# Patient Record
Sex: FEMALE | Race: WHITE | NOT HISPANIC OR LATINO | Employment: OTHER | URBAN - METROPOLITAN AREA
[De-identification: names, ages, dates, MRNs, and addresses within clinical notes are randomized per-mention and may not be internally consistent; named-entity substitution may affect disease eponyms.]

---

## 2017-01-01 ENCOUNTER — NON-PROVIDER VISIT (OUTPATIENT)
Dept: CARDIOLOGY | Facility: MEDICAL CENTER | Age: 67
End: 2017-01-01
Payer: MEDICARE

## 2017-01-01 ENCOUNTER — APPOINTMENT (OUTPATIENT)
Dept: RADIOLOGY | Facility: MEDICAL CENTER | Age: 67
DRG: 834 | End: 2017-01-01
Attending: INTERNAL MEDICINE
Payer: MEDICARE

## 2017-01-01 ENCOUNTER — HOSPITAL ENCOUNTER (OUTPATIENT)
Dept: RADIOLOGY | Facility: MEDICAL CENTER | Age: 67
End: 2017-04-29
Attending: OBSTETRICS & GYNECOLOGY
Payer: MEDICARE

## 2017-01-01 ENCOUNTER — HOSPITAL ENCOUNTER (OUTPATIENT)
Facility: MEDICAL CENTER | Age: 67
End: 2017-08-14
Attending: INTERNAL MEDICINE
Payer: MEDICARE

## 2017-01-01 ENCOUNTER — HOSPITAL ENCOUNTER (OUTPATIENT)
Dept: LAB | Facility: MEDICAL CENTER | Age: 67
End: 2017-08-28
Attending: INTERNAL MEDICINE
Payer: MEDICARE

## 2017-01-01 ENCOUNTER — APPOINTMENT (OUTPATIENT)
Dept: RADIOLOGY | Facility: MEDICAL CENTER | Age: 67
DRG: 834 | End: 2017-01-01
Attending: HOSPITALIST
Payer: MEDICARE

## 2017-01-01 ENCOUNTER — HOSPITAL ENCOUNTER (OUTPATIENT)
Facility: MEDICAL CENTER | Age: 67
End: 2017-10-10
Attending: NURSE PRACTITIONER
Payer: MEDICARE

## 2017-01-01 ENCOUNTER — RESOLUTE PROFESSIONAL BILLING HOSPITAL PROF FEE (OUTPATIENT)
Dept: HOSPITALIST | Facility: MEDICAL CENTER | Age: 67
End: 2017-01-01
Payer: MEDICARE

## 2017-01-01 ENCOUNTER — HOSPITAL ENCOUNTER (OUTPATIENT)
Dept: LAB | Facility: MEDICAL CENTER | Age: 67
End: 2017-10-03
Attending: NURSE PRACTITIONER
Payer: MEDICARE

## 2017-01-01 ENCOUNTER — HOSPITAL ENCOUNTER (OUTPATIENT)
Facility: MEDICAL CENTER | Age: 67
End: 2017-03-31
Attending: HOSPITALIST | Admitting: HOSPITALIST
Payer: MEDICARE

## 2017-01-01 ENCOUNTER — APPOINTMENT (OUTPATIENT)
Dept: MEDICAL GROUP | Facility: MEDICAL CENTER | Age: 67
End: 2017-01-01
Payer: MEDICARE

## 2017-01-01 ENCOUNTER — HOSPITAL ENCOUNTER (OUTPATIENT)
Facility: MEDICAL CENTER | Age: 67
End: 2017-06-01
Attending: INTERNAL MEDICINE | Admitting: INTERNAL MEDICINE
Payer: MEDICARE

## 2017-01-01 ENCOUNTER — TELEPHONE (OUTPATIENT)
Dept: OTHER | Facility: MEDICAL CENTER | Age: 67
End: 2017-01-01

## 2017-01-01 ENCOUNTER — HOSPITAL ENCOUNTER (INPATIENT)
Facility: MEDICAL CENTER | Age: 67
LOS: 33 days | DRG: 834 | End: 2017-11-24
Attending: EMERGENCY MEDICINE | Admitting: HOSPITALIST
Payer: MEDICARE

## 2017-01-01 ENCOUNTER — OUTPATIENT INFUSION SERVICES (OUTPATIENT)
Dept: ONCOLOGY | Facility: MEDICAL CENTER | Age: 67
End: 2017-01-01
Attending: INTERNAL MEDICINE
Payer: MEDICARE

## 2017-01-01 ENCOUNTER — HOSPITAL ENCOUNTER (OUTPATIENT)
Facility: MEDICAL CENTER | Age: 67
End: 2017-09-07
Attending: HOSPITALIST | Admitting: HOSPITALIST
Payer: MEDICARE

## 2017-01-01 ENCOUNTER — HOSPITAL ENCOUNTER (OUTPATIENT)
Facility: MEDICAL CENTER | Age: 67
End: 2017-02-06
Attending: INTERNAL MEDICINE
Payer: MEDICARE

## 2017-01-01 ENCOUNTER — APPOINTMENT (OUTPATIENT)
Dept: RADIOLOGY | Facility: MEDICAL CENTER | Age: 67
End: 2017-01-01
Attending: INTERNAL MEDICINE
Payer: MEDICARE

## 2017-01-01 ENCOUNTER — NON-PROVIDER VISIT (OUTPATIENT)
Dept: CARDIOLOGY | Facility: MEDICAL CENTER | Age: 67
End: 2017-01-01

## 2017-01-01 ENCOUNTER — HOSPITAL ENCOUNTER (OUTPATIENT)
Facility: MEDICAL CENTER | Age: 67
End: 2017-03-06
Attending: INTERNAL MEDICINE
Payer: MEDICARE

## 2017-01-01 VITALS
HEIGHT: 62 IN | OXYGEN SATURATION: 98 % | HEART RATE: 83 BPM | SYSTOLIC BLOOD PRESSURE: 140 MMHG | BODY MASS INDEX: 26.86 KG/M2 | TEMPERATURE: 98.1 F | RESPIRATION RATE: 16 BRPM | DIASTOLIC BLOOD PRESSURE: 65 MMHG | WEIGHT: 145.94 LBS

## 2017-01-01 VITALS
OXYGEN SATURATION: 99 % | HEIGHT: 62 IN | RESPIRATION RATE: 16 BRPM | BODY MASS INDEX: 27.63 KG/M2 | SYSTOLIC BLOOD PRESSURE: 116 MMHG | WEIGHT: 150.13 LBS | DIASTOLIC BLOOD PRESSURE: 56 MMHG | TEMPERATURE: 98.4 F | HEART RATE: 71 BPM

## 2017-01-01 VITALS
OXYGEN SATURATION: 31 % | DIASTOLIC BLOOD PRESSURE: 53 MMHG | TEMPERATURE: 98.2 F | HEIGHT: 62 IN | HEART RATE: 101 BPM | RESPIRATION RATE: 14 BRPM | BODY MASS INDEX: 26.61 KG/M2 | SYSTOLIC BLOOD PRESSURE: 106 MMHG | WEIGHT: 144.62 LBS

## 2017-01-01 VITALS
HEIGHT: 63 IN | WEIGHT: 150.79 LBS | DIASTOLIC BLOOD PRESSURE: 71 MMHG | TEMPERATURE: 98.3 F | BODY MASS INDEX: 26.72 KG/M2 | HEART RATE: 60 BPM | OXYGEN SATURATION: 99 % | RESPIRATION RATE: 16 BRPM | SYSTOLIC BLOOD PRESSURE: 141 MMHG

## 2017-01-01 VITALS
HEART RATE: 67 BPM | TEMPERATURE: 97.2 F | OXYGEN SATURATION: 97 % | RESPIRATION RATE: 18 BRPM | SYSTOLIC BLOOD PRESSURE: 152 MMHG | DIASTOLIC BLOOD PRESSURE: 60 MMHG | WEIGHT: 152.9 LBS | BODY MASS INDEX: 28.14 KG/M2 | HEIGHT: 62 IN

## 2017-01-01 VITALS
BODY MASS INDEX: 27.59 KG/M2 | HEIGHT: 62 IN | DIASTOLIC BLOOD PRESSURE: 53 MMHG | WEIGHT: 149.91 LBS | RESPIRATION RATE: 16 BRPM | OXYGEN SATURATION: 98 % | HEART RATE: 79 BPM | SYSTOLIC BLOOD PRESSURE: 114 MMHG | TEMPERATURE: 98.4 F

## 2017-01-01 VITALS — HEIGHT: 63 IN | BODY MASS INDEX: 26.75 KG/M2 | WEIGHT: 151 LBS

## 2017-01-01 DIAGNOSIS — D46.9 MYELODYSPLASTIC SYNDROME (HCC): ICD-10-CM

## 2017-01-01 DIAGNOSIS — Z01.812 PRE-OPERATIVE LABORATORY EXAMINATION: ICD-10-CM

## 2017-01-01 DIAGNOSIS — D69.6 THROMBOCYTOPENIA (HCC): ICD-10-CM

## 2017-01-01 DIAGNOSIS — Z95.5 STENTED CORONARY ARTERY: ICD-10-CM

## 2017-01-01 DIAGNOSIS — Z12.31 VISIT FOR SCREENING MAMMOGRAM: ICD-10-CM

## 2017-01-01 DIAGNOSIS — D41.9: ICD-10-CM

## 2017-01-01 DIAGNOSIS — D46.9 MDS (MYELODYSPLASTIC SYNDROME) (HCC): ICD-10-CM

## 2017-01-01 DIAGNOSIS — D61.818 PANCYTOPENIA (HCC): ICD-10-CM

## 2017-01-01 DIAGNOSIS — I21.4 NSTEMI (NON-ST ELEVATED MYOCARDIAL INFARCTION) (HCC): ICD-10-CM

## 2017-01-01 LAB
ABO GROUP BLD: NORMAL
ALBUMIN SERPL BCP-MCNC: 1.8 G/DL (ref 3.2–4.9)
ALBUMIN SERPL BCP-MCNC: 2 G/DL (ref 3.2–4.9)
ALBUMIN SERPL BCP-MCNC: 2 G/DL (ref 3.2–4.9)
ALBUMIN SERPL BCP-MCNC: 2.1 G/DL (ref 3.2–4.9)
ALBUMIN SERPL BCP-MCNC: 2.1 G/DL (ref 3.2–4.9)
ALBUMIN SERPL BCP-MCNC: 2.2 G/DL (ref 3.2–4.9)
ALBUMIN SERPL BCP-MCNC: 2.3 G/DL (ref 3.2–4.9)
ALBUMIN SERPL BCP-MCNC: 2.5 G/DL (ref 3.2–4.9)
ALBUMIN SERPL BCP-MCNC: 2.6 G/DL (ref 3.2–4.9)
ALBUMIN SERPL BCP-MCNC: 2.7 G/DL (ref 3.2–4.9)
ALBUMIN SERPL BCP-MCNC: 2.8 G/DL (ref 3.2–4.9)
ALBUMIN SERPL BCP-MCNC: 3.3 G/DL (ref 3.2–4.9)
ALBUMIN SERPL BCP-MCNC: 3.3 G/DL (ref 3.2–4.9)
ALBUMIN SERPL BCP-MCNC: 3.5 G/DL (ref 3.2–4.9)
ALBUMIN SERPL BCP-MCNC: 3.7 G/DL (ref 3.2–4.9)
ALBUMIN SERPL BCP-MCNC: 3.8 G/DL (ref 3.2–4.9)
ALBUMIN SERPL BCP-MCNC: 3.8 G/DL (ref 3.2–4.9)
ALBUMIN SERPL BCP-MCNC: 4.3 G/DL (ref 3.2–4.9)
ALBUMIN/GLOB SERPL: 0.6 G/DL
ALBUMIN/GLOB SERPL: 0.7 G/DL
ALBUMIN/GLOB SERPL: 0.9 G/DL
ALBUMIN/GLOB SERPL: 1 G/DL
ALBUMIN/GLOB SERPL: 1.1 G/DL
ALBUMIN/GLOB SERPL: 1.3 G/DL
ALBUMIN/GLOB SERPL: 1.3 G/DL
ALBUMIN/GLOB SERPL: 1.5 G/DL
ALBUMIN/GLOB SERPL: 1.5 G/DL
ALBUMIN/GLOB SERPL: 1.6 G/DL
ALBUMIN/GLOB SERPL: 1.7 G/DL
ALP SERPL-CCNC: 103 U/L (ref 30–99)
ALP SERPL-CCNC: 113 U/L (ref 30–99)
ALP SERPL-CCNC: 113 U/L (ref 30–99)
ALP SERPL-CCNC: 115 U/L (ref 30–99)
ALP SERPL-CCNC: 120 U/L (ref 30–99)
ALP SERPL-CCNC: 122 U/L (ref 30–99)
ALP SERPL-CCNC: 123 U/L (ref 30–99)
ALP SERPL-CCNC: 127 U/L (ref 30–99)
ALP SERPL-CCNC: 128 U/L (ref 30–99)
ALP SERPL-CCNC: 130 U/L (ref 30–99)
ALP SERPL-CCNC: 130 U/L (ref 30–99)
ALP SERPL-CCNC: 131 U/L (ref 30–99)
ALP SERPL-CCNC: 136 U/L (ref 30–99)
ALP SERPL-CCNC: 143 U/L (ref 30–99)
ALP SERPL-CCNC: 145 U/L (ref 30–99)
ALP SERPL-CCNC: 160 U/L (ref 30–99)
ALP SERPL-CCNC: 56 U/L (ref 30–99)
ALP SERPL-CCNC: 57 U/L (ref 30–99)
ALP SERPL-CCNC: 58 U/L (ref 30–99)
ALP SERPL-CCNC: 59 U/L (ref 30–99)
ALP SERPL-CCNC: 63 U/L (ref 30–99)
ALP SERPL-CCNC: 76 U/L (ref 30–99)
ALP SERPL-CCNC: 87 U/L (ref 30–99)
ALT SERPL-CCNC: 10 U/L (ref 2–50)
ALT SERPL-CCNC: 11 U/L (ref 2–50)
ALT SERPL-CCNC: 12 U/L (ref 2–50)
ALT SERPL-CCNC: 17 U/L (ref 2–50)
ALT SERPL-CCNC: 18 U/L (ref 2–50)
ALT SERPL-CCNC: 23 U/L (ref 2–50)
ALT SERPL-CCNC: 24 U/L (ref 2–50)
ALT SERPL-CCNC: 24 U/L (ref 2–50)
ALT SERPL-CCNC: 27 U/L (ref 2–50)
ALT SERPL-CCNC: 28 U/L (ref 2–50)
ALT SERPL-CCNC: 32 U/L (ref 2–50)
ALT SERPL-CCNC: 33 U/L (ref 2–50)
ALT SERPL-CCNC: 38 U/L (ref 2–50)
ALT SERPL-CCNC: 46 U/L (ref 2–50)
ALT SERPL-CCNC: 6 U/L (ref 2–50)
ALT SERPL-CCNC: 6 U/L (ref 2–50)
ALT SERPL-CCNC: 7 U/L (ref 2–50)
ALT SERPL-CCNC: 8 U/L (ref 2–50)
ALT SERPL-CCNC: 9 U/L (ref 2–50)
ANION GAP SERPL CALC-SCNC: 10 MMOL/L (ref 0–11.9)
ANION GAP SERPL CALC-SCNC: 11 MMOL/L (ref 0–11.9)
ANION GAP SERPL CALC-SCNC: 12 MMOL/L (ref 0–11.9)
ANION GAP SERPL CALC-SCNC: 4 MMOL/L (ref 0–11.9)
ANION GAP SERPL CALC-SCNC: 5 MMOL/L (ref 0–11.9)
ANION GAP SERPL CALC-SCNC: 6 MMOL/L (ref 0–11.9)
ANION GAP SERPL CALC-SCNC: 7 MMOL/L (ref 0–11.9)
ANION GAP SERPL CALC-SCNC: 8 MMOL/L (ref 0–11.9)
ANION GAP SERPL CALC-SCNC: 9 MMOL/L (ref 0–11.9)
ANISOCYTOSIS BLD QL SMEAR: ABNORMAL
APPEARANCE UR: CLEAR
APPEARANCE UR: CLEAR
APTT PPP: 35.5 SEC (ref 24.7–36)
AST SERPL-CCNC: 10 U/L (ref 12–45)
AST SERPL-CCNC: 11 U/L (ref 12–45)
AST SERPL-CCNC: 12 U/L (ref 12–45)
AST SERPL-CCNC: 12 U/L (ref 12–45)
AST SERPL-CCNC: 13 U/L (ref 12–45)
AST SERPL-CCNC: 16 U/L (ref 12–45)
AST SERPL-CCNC: 17 U/L (ref 12–45)
AST SERPL-CCNC: 19 U/L (ref 12–45)
AST SERPL-CCNC: 22 U/L (ref 12–45)
AST SERPL-CCNC: 25 U/L (ref 12–45)
AST SERPL-CCNC: 29 U/L (ref 12–45)
AST SERPL-CCNC: 34 U/L (ref 12–45)
AST SERPL-CCNC: 41 U/L (ref 12–45)
AST SERPL-CCNC: 41 U/L (ref 12–45)
AST SERPL-CCNC: 42 U/L (ref 12–45)
AST SERPL-CCNC: 45 U/L (ref 12–45)
AST SERPL-CCNC: 49 U/L (ref 12–45)
AST SERPL-CCNC: 66 U/L (ref 12–45)
AST SERPL-CCNC: 86 U/L (ref 12–45)
AST SERPL-CCNC: 9 U/L (ref 12–45)
BACTERIA #/AREA URNS HPF: NEGATIVE /HPF
BACTERIA BLD CULT: NORMAL
BACTERIA BLD CULT: NORMAL
BACTERIA UR CULT: NORMAL
BARCODED ABORH UBTYP: 5100
BARCODED ABORH UBTYP: 6200
BARCODED ABORH UBTYP: 9500
BARCODED ABORH UBTYP: 9500
BARCODED PRD CODE UBPRD: NORMAL
BARCODED UNIT NUM UBUNT: NORMAL
BASE EXCESS BLDA CALC-SCNC: 7 MMOL/L (ref -4–3)
BASOPHILS # BLD AUTO: 0 % (ref 0–1.8)
BASOPHILS # BLD AUTO: 0.9 % (ref 0–1.8)
BASOPHILS # BLD AUTO: 1 % (ref 0–1.8)
BASOPHILS # BLD AUTO: 1.1 % (ref 0–1.8)
BASOPHILS # BLD AUTO: 1.5 % (ref 0–1.8)
BASOPHILS # BLD AUTO: ABNORMAL % (ref 0–1.8)
BASOPHILS # BLD: 0 K/UL (ref 0–0.12)
BASOPHILS # BLD: 0.01 K/UL (ref 0–0.12)
BASOPHILS # BLD: 0.02 K/UL (ref 0–0.12)
BASOPHILS # BLD: ABNORMAL K/UL (ref 0–0.12)
BILIRUB CONJ SERPL-MCNC: 1.5 MG/DL (ref 0.1–0.5)
BILIRUB CONJ SERPL-MCNC: 1.8 MG/DL (ref 0.1–0.5)
BILIRUB CONJ SERPL-MCNC: 2 MG/DL (ref 0.1–0.5)
BILIRUB INDIRECT SERPL-MCNC: 1 MG/DL (ref 0–1)
BILIRUB INDIRECT SERPL-MCNC: 1.1 MG/DL (ref 0–1)
BILIRUB SERPL-MCNC: 0.4 MG/DL (ref 0.1–1.5)
BILIRUB SERPL-MCNC: 0.6 MG/DL (ref 0.1–1.5)
BILIRUB SERPL-MCNC: 0.7 MG/DL (ref 0.1–1.5)
BILIRUB SERPL-MCNC: 0.8 MG/DL (ref 0.1–1.5)
BILIRUB SERPL-MCNC: 0.9 MG/DL (ref 0.1–1.5)
BILIRUB SERPL-MCNC: 0.9 MG/DL (ref 0.1–1.5)
BILIRUB SERPL-MCNC: 2.6 MG/DL (ref 0.1–1.5)
BILIRUB SERPL-MCNC: 2.6 MG/DL (ref 0.1–1.5)
BILIRUB SERPL-MCNC: 2.8 MG/DL (ref 0.1–1.5)
BILIRUB SERPL-MCNC: 3.3 MG/DL (ref 0.1–1.5)
BILIRUB SERPL-MCNC: 3.5 MG/DL (ref 0.1–1.5)
BILIRUB SERPL-MCNC: 3.8 MG/DL (ref 0.1–1.5)
BILIRUB SERPL-MCNC: 3.9 MG/DL (ref 0.1–1.5)
BILIRUB UR QL STRIP.AUTO: ABNORMAL
BILIRUB UR QL STRIP.AUTO: NEGATIVE
BLASTS NFR BLD MANUAL: 17.1 %
BLASTS NFR BLD MANUAL: 28 %
BLASTS NFR BLD MANUAL: 28.7 %
BLASTS NFR BLD MANUAL: 3.2 %
BLASTS NFR BLD MANUAL: 3.7 %
BLASTS NFR BLD MANUAL: 35.9 %
BLASTS NFR BLD MANUAL: 36.9 %
BLASTS NFR BLD MANUAL: 43.2 %
BLASTS NFR BLD MANUAL: 45.5 %
BLASTS NFR BLD MANUAL: 5.4 %
BLD GP AB SCN SERPL QL: NORMAL
BNP SERPL-MCNC: 27 PG/ML (ref 0–100)
BODY TEMPERATURE: ABNORMAL CENTIGRADE
BUN SERPL-MCNC: 10 MG/DL (ref 8–22)
BUN SERPL-MCNC: 11 MG/DL (ref 8–22)
BUN SERPL-MCNC: 12 MG/DL (ref 8–22)
BUN SERPL-MCNC: 13 MG/DL (ref 8–22)
BUN SERPL-MCNC: 14 MG/DL (ref 8–22)
BUN SERPL-MCNC: 15 MG/DL (ref 8–22)
BUN SERPL-MCNC: 16 MG/DL (ref 8–22)
BUN SERPL-MCNC: 17 MG/DL (ref 8–22)
BUN SERPL-MCNC: 18 MG/DL (ref 8–22)
BUN SERPL-MCNC: 18 MG/DL (ref 8–22)
BUN SERPL-MCNC: 23 MG/DL (ref 8–22)
BUN SERPL-MCNC: 30 MG/DL (ref 8–22)
BUN SERPL-MCNC: 32 MG/DL (ref 8–22)
BUN SERPL-MCNC: 5 MG/DL (ref 8–22)
BUN SERPL-MCNC: 5 MG/DL (ref 8–22)
BUN SERPL-MCNC: 6 MG/DL (ref 8–22)
BUN SERPL-MCNC: 6 MG/DL (ref 8–22)
BUN SERPL-MCNC: 7 MG/DL (ref 8–22)
BUN SERPL-MCNC: 8 MG/DL (ref 8–22)
BUN SERPL-MCNC: 9 MG/DL (ref 8–22)
CALCIUM SERPL-MCNC: 7.8 MG/DL (ref 8.5–10.5)
CALCIUM SERPL-MCNC: 8.1 MG/DL (ref 8.5–10.5)
CALCIUM SERPL-MCNC: 8.1 MG/DL (ref 8.5–10.5)
CALCIUM SERPL-MCNC: 8.2 MG/DL (ref 8.5–10.5)
CALCIUM SERPL-MCNC: 8.3 MG/DL (ref 8.5–10.5)
CALCIUM SERPL-MCNC: 8.4 MG/DL (ref 8.5–10.5)
CALCIUM SERPL-MCNC: 8.4 MG/DL (ref 8.5–10.5)
CALCIUM SERPL-MCNC: 8.5 MG/DL (ref 8.5–10.5)
CALCIUM SERPL-MCNC: 8.6 MG/DL (ref 8.5–10.5)
CALCIUM SERPL-MCNC: 8.7 MG/DL (ref 8.5–10.5)
CALCIUM SERPL-MCNC: 8.8 MG/DL (ref 8.5–10.5)
CALCIUM SERPL-MCNC: 8.9 MG/DL (ref 8.5–10.5)
CALCIUM SERPL-MCNC: 9 MG/DL (ref 8.5–10.5)
CALCIUM SERPL-MCNC: 9 MG/DL (ref 8.5–10.5)
CALCIUM SERPL-MCNC: 9.1 MG/DL (ref 8.5–10.5)
CALCIUM SERPL-MCNC: 9.4 MG/DL (ref 8.5–10.5)
CALCIUM SERPL-MCNC: 9.5 MG/DL (ref 8.5–10.5)
CALCIUM SERPL-MCNC: 9.9 MG/DL (ref 8.5–10.5)
CAOX CRY #/AREA URNS HPF: ABNORMAL /HPF
CHLORIDE SERPL-SCNC: 100 MMOL/L (ref 96–112)
CHLORIDE SERPL-SCNC: 100 MMOL/L (ref 96–112)
CHLORIDE SERPL-SCNC: 101 MMOL/L (ref 96–112)
CHLORIDE SERPL-SCNC: 102 MMOL/L (ref 96–112)
CHLORIDE SERPL-SCNC: 103 MMOL/L (ref 96–112)
CHLORIDE SERPL-SCNC: 104 MMOL/L (ref 96–112)
CHLORIDE SERPL-SCNC: 105 MMOL/L (ref 96–112)
CHLORIDE SERPL-SCNC: 105 MMOL/L (ref 96–112)
CHLORIDE SERPL-SCNC: 106 MMOL/L (ref 96–112)
CHLORIDE SERPL-SCNC: 106 MMOL/L (ref 96–112)
CHLORIDE SERPL-SCNC: 107 MMOL/L (ref 96–112)
CHLORIDE SERPL-SCNC: 108 MMOL/L (ref 96–112)
CHLORIDE SERPL-SCNC: 90 MMOL/L (ref 96–112)
CHLORIDE SERPL-SCNC: 91 MMOL/L (ref 96–112)
CHLORIDE SERPL-SCNC: 92 MMOL/L (ref 96–112)
CHLORIDE SERPL-SCNC: 93 MMOL/L (ref 96–112)
CHLORIDE SERPL-SCNC: 94 MMOL/L (ref 96–112)
CHLORIDE SERPL-SCNC: 96 MMOL/L (ref 96–112)
CHLORIDE SERPL-SCNC: 99 MMOL/L (ref 96–112)
CMV IGG SERPL IA-ACNC: >10 U/ML
CMV IGM SERPL IA-ACNC: 15.1 AU/ML
CO2 SERPL-SCNC: 19 MMOL/L (ref 20–33)
CO2 SERPL-SCNC: 21 MMOL/L (ref 20–33)
CO2 SERPL-SCNC: 22 MMOL/L (ref 20–33)
CO2 SERPL-SCNC: 23 MMOL/L (ref 20–33)
CO2 SERPL-SCNC: 23 MMOL/L (ref 20–33)
CO2 SERPL-SCNC: 24 MMOL/L (ref 20–33)
CO2 SERPL-SCNC: 25 MMOL/L (ref 20–33)
CO2 SERPL-SCNC: 26 MMOL/L (ref 20–33)
CO2 SERPL-SCNC: 27 MMOL/L (ref 20–33)
CO2 SERPL-SCNC: 29 MMOL/L (ref 20–33)
CO2 SERPL-SCNC: 30 MMOL/L (ref 20–33)
CO2 SERPL-SCNC: 31 MMOL/L (ref 20–33)
CO2 SERPL-SCNC: 31 MMOL/L (ref 20–33)
CO2 SERPL-SCNC: 32 MMOL/L (ref 20–33)
CO2 SERPL-SCNC: 33 MMOL/L (ref 20–33)
CO2 SERPL-SCNC: 36 MMOL/L (ref 20–33)
CO2 SERPL-SCNC: 36 MMOL/L (ref 20–33)
CO2 SERPL-SCNC: 37 MMOL/L (ref 20–33)
COLOR UR: ABNORMAL
COLOR UR: YELLOW
COMPONENT P 8504P: NORMAL
COMPONENT R 8504R: NORMAL
CREAT SERPL-MCNC: 0.2 MG/DL (ref 0.5–1.4)
CREAT SERPL-MCNC: 0.22 MG/DL (ref 0.5–1.4)
CREAT SERPL-MCNC: 0.23 MG/DL (ref 0.5–1.4)
CREAT SERPL-MCNC: 0.24 MG/DL (ref 0.5–1.4)
CREAT SERPL-MCNC: 0.24 MG/DL (ref 0.5–1.4)
CREAT SERPL-MCNC: 0.26 MG/DL (ref 0.5–1.4)
CREAT SERPL-MCNC: 0.27 MG/DL (ref 0.5–1.4)
CREAT SERPL-MCNC: 0.29 MG/DL (ref 0.5–1.4)
CREAT SERPL-MCNC: 0.3 MG/DL (ref 0.5–1.4)
CREAT SERPL-MCNC: 0.3 MG/DL (ref 0.5–1.4)
CREAT SERPL-MCNC: 0.31 MG/DL (ref 0.5–1.4)
CREAT SERPL-MCNC: 0.32 MG/DL (ref 0.5–1.4)
CREAT SERPL-MCNC: 0.33 MG/DL (ref 0.5–1.4)
CREAT SERPL-MCNC: 0.33 MG/DL (ref 0.5–1.4)
CREAT SERPL-MCNC: 0.34 MG/DL (ref 0.5–1.4)
CREAT SERPL-MCNC: 0.34 MG/DL (ref 0.5–1.4)
CREAT SERPL-MCNC: 0.35 MG/DL (ref 0.5–1.4)
CREAT SERPL-MCNC: 0.36 MG/DL (ref 0.5–1.4)
CREAT SERPL-MCNC: 0.36 MG/DL (ref 0.5–1.4)
CREAT SERPL-MCNC: 0.38 MG/DL (ref 0.5–1.4)
CREAT SERPL-MCNC: 0.4 MG/DL (ref 0.5–1.4)
CREAT SERPL-MCNC: 0.41 MG/DL (ref 0.5–1.4)
CREAT SERPL-MCNC: 0.41 MG/DL (ref 0.5–1.4)
CREAT SERPL-MCNC: 0.42 MG/DL (ref 0.5–1.4)
CREAT SERPL-MCNC: 0.42 MG/DL (ref 0.5–1.4)
CREAT SERPL-MCNC: 0.47 MG/DL (ref 0.5–1.4)
CREAT SERPL-MCNC: 0.48 MG/DL (ref 0.5–1.4)
CREAT SERPL-MCNC: 0.49 MG/DL (ref 0.5–1.4)
CREAT SERPL-MCNC: 0.5 MG/DL (ref 0.5–1.4)
CREAT SERPL-MCNC: 0.5 MG/DL (ref 0.5–1.4)
CREAT SERPL-MCNC: 0.55 MG/DL (ref 0.5–1.4)
CREAT SERPL-MCNC: 0.61 MG/DL (ref 0.5–1.4)
EKG IMPRESSION: NORMAL
EKG IMPRESSION: NORMAL
EOSINOPHIL # BLD AUTO: 0 K/UL (ref 0–0.51)
EOSINOPHIL # BLD AUTO: 0.01 K/UL (ref 0–0.51)
EOSINOPHIL # BLD AUTO: 0.01 K/UL (ref 0–0.51)
EOSINOPHIL # BLD AUTO: 0.03 K/UL (ref 0–0.51)
EOSINOPHIL # BLD AUTO: 0.09 K/UL (ref 0–0.51)
EOSINOPHIL # BLD AUTO: ABNORMAL K/UL (ref 0–0.51)
EOSINOPHIL NFR BLD: 0 % (ref 0–6.9)
EOSINOPHIL NFR BLD: 0.5 % (ref 0–6.9)
EOSINOPHIL NFR BLD: 0.9 % (ref 0–6.9)
EOSINOPHIL NFR BLD: 2.2 % (ref 0–6.9)
EOSINOPHIL NFR BLD: 5.1 % (ref 0–6.9)
EOSINOPHIL NFR BLD: ABNORMAL % (ref 0–6.9)
EPI CELLS #/AREA URNS HPF: ABNORMAL /HPF
ERYTHROCYTE [DISTWIDTH] IN BLOOD BY AUTOMATED COUNT: 36.7 FL (ref 35.9–50)
ERYTHROCYTE [DISTWIDTH] IN BLOOD BY AUTOMATED COUNT: 37.2 FL (ref 35.9–50)
ERYTHROCYTE [DISTWIDTH] IN BLOOD BY AUTOMATED COUNT: 37.3 FL (ref 35.9–50)
ERYTHROCYTE [DISTWIDTH] IN BLOOD BY AUTOMATED COUNT: 37.4 FL (ref 35.9–50)
ERYTHROCYTE [DISTWIDTH] IN BLOOD BY AUTOMATED COUNT: 37.4 FL (ref 35.9–50)
ERYTHROCYTE [DISTWIDTH] IN BLOOD BY AUTOMATED COUNT: 37.5 FL (ref 35.9–50)
ERYTHROCYTE [DISTWIDTH] IN BLOOD BY AUTOMATED COUNT: 37.6 FL (ref 35.9–50)
ERYTHROCYTE [DISTWIDTH] IN BLOOD BY AUTOMATED COUNT: 37.7 FL (ref 35.9–50)
ERYTHROCYTE [DISTWIDTH] IN BLOOD BY AUTOMATED COUNT: 37.8 FL (ref 35.9–50)
ERYTHROCYTE [DISTWIDTH] IN BLOOD BY AUTOMATED COUNT: 37.9 FL (ref 35.9–50)
ERYTHROCYTE [DISTWIDTH] IN BLOOD BY AUTOMATED COUNT: 37.9 FL (ref 35.9–50)
ERYTHROCYTE [DISTWIDTH] IN BLOOD BY AUTOMATED COUNT: 38 FL (ref 35.9–50)
ERYTHROCYTE [DISTWIDTH] IN BLOOD BY AUTOMATED COUNT: 38.3 FL (ref 35.9–50)
ERYTHROCYTE [DISTWIDTH] IN BLOOD BY AUTOMATED COUNT: 38.4 FL (ref 35.9–50)
ERYTHROCYTE [DISTWIDTH] IN BLOOD BY AUTOMATED COUNT: 38.5 FL (ref 35.9–50)
ERYTHROCYTE [DISTWIDTH] IN BLOOD BY AUTOMATED COUNT: 38.6 FL (ref 35.9–50)
ERYTHROCYTE [DISTWIDTH] IN BLOOD BY AUTOMATED COUNT: 38.6 FL (ref 35.9–50)
ERYTHROCYTE [DISTWIDTH] IN BLOOD BY AUTOMATED COUNT: 38.7 FL (ref 35.9–50)
ERYTHROCYTE [DISTWIDTH] IN BLOOD BY AUTOMATED COUNT: 38.8 FL (ref 35.9–50)
ERYTHROCYTE [DISTWIDTH] IN BLOOD BY AUTOMATED COUNT: 39.1 FL (ref 35.9–50)
ERYTHROCYTE [DISTWIDTH] IN BLOOD BY AUTOMATED COUNT: 39.5 FL (ref 35.9–50)
ERYTHROCYTE [DISTWIDTH] IN BLOOD BY AUTOMATED COUNT: 40.4 FL (ref 35.9–50)
ERYTHROCYTE [DISTWIDTH] IN BLOOD BY AUTOMATED COUNT: 40.9 FL (ref 35.9–50)
ERYTHROCYTE [DISTWIDTH] IN BLOOD BY AUTOMATED COUNT: 41.1 FL (ref 35.9–50)
ERYTHROCYTE [DISTWIDTH] IN BLOOD BY AUTOMATED COUNT: 42.4 FL (ref 35.9–50)
ERYTHROCYTE [DISTWIDTH] IN BLOOD BY AUTOMATED COUNT: 42.8 FL (ref 35.9–50)
ERYTHROCYTE [DISTWIDTH] IN BLOOD BY AUTOMATED COUNT: 43.5 FL (ref 35.9–50)
ERYTHROCYTE [DISTWIDTH] IN BLOOD BY AUTOMATED COUNT: 44.8 FL (ref 35.9–50)
ERYTHROCYTE [DISTWIDTH] IN BLOOD BY AUTOMATED COUNT: 45.1 FL (ref 35.9–50)
ERYTHROCYTE [DISTWIDTH] IN BLOOD BY AUTOMATED COUNT: 45.2 FL (ref 35.9–50)
ERYTHROCYTE [DISTWIDTH] IN BLOOD BY AUTOMATED COUNT: 45.4 FL (ref 35.9–50)
ERYTHROCYTE [DISTWIDTH] IN BLOOD BY AUTOMATED COUNT: 47.3 FL (ref 35.9–50)
ERYTHROCYTE [DISTWIDTH] IN BLOOD BY AUTOMATED COUNT: 56.5 FL (ref 35.9–50)
ERYTHROCYTE [DISTWIDTH] IN BLOOD BY AUTOMATED COUNT: 59.2 FL (ref 35.9–50)
ERYTHROCYTE [DISTWIDTH] IN BLOOD BY AUTOMATED COUNT: 62.9 FL (ref 35.9–50)
ERYTHROCYTE [DISTWIDTH] IN BLOOD BY AUTOMATED COUNT: 71.7 FL (ref 35.9–50)
EST. AVERAGE GLUCOSE BLD GHB EST-MCNC: 137 MG/DL
EST. AVERAGE GLUCOSE BLD GHB EST-MCNC: 169 MG/DL
GALACTOMANNAN AG SERPL QL IA: NEGATIVE
GALACTOMANNAN AG SERPL-ACNC: 0.06
GFR SERPL CREATININE-BSD FRML MDRD: >60 ML/MIN/1.73 M 2
GLOBULIN SER CALC-MCNC: 2.3 G/DL (ref 1.9–3.5)
GLOBULIN SER CALC-MCNC: 2.4 G/DL (ref 1.9–3.5)
GLOBULIN SER CALC-MCNC: 2.5 G/DL (ref 1.9–3.5)
GLOBULIN SER CALC-MCNC: 2.6 G/DL (ref 1.9–3.5)
GLOBULIN SER CALC-MCNC: 2.7 G/DL (ref 1.9–3.5)
GLOBULIN SER CALC-MCNC: 2.8 G/DL (ref 1.9–3.5)
GLOBULIN SER CALC-MCNC: 2.9 G/DL (ref 1.9–3.5)
GLOBULIN SER CALC-MCNC: 3 G/DL (ref 1.9–3.5)
GLOBULIN SER CALC-MCNC: 3.1 G/DL (ref 1.9–3.5)
GLOBULIN SER CALC-MCNC: 3.2 G/DL (ref 1.9–3.5)
GLOBULIN SER CALC-MCNC: 3.3 G/DL (ref 1.9–3.5)
GLOBULIN SER CALC-MCNC: 3.4 G/DL (ref 1.9–3.5)
GLOBULIN SER CALC-MCNC: 3.5 G/DL (ref 1.9–3.5)
GLUCOSE SERPL-MCNC: 102 MG/DL (ref 65–99)
GLUCOSE SERPL-MCNC: 127 MG/DL (ref 65–99)
GLUCOSE SERPL-MCNC: 130 MG/DL (ref 65–99)
GLUCOSE SERPL-MCNC: 131 MG/DL (ref 65–99)
GLUCOSE SERPL-MCNC: 136 MG/DL (ref 65–99)
GLUCOSE SERPL-MCNC: 136 MG/DL (ref 65–99)
GLUCOSE SERPL-MCNC: 137 MG/DL (ref 65–99)
GLUCOSE SERPL-MCNC: 137 MG/DL (ref 65–99)
GLUCOSE SERPL-MCNC: 142 MG/DL (ref 65–99)
GLUCOSE SERPL-MCNC: 143 MG/DL (ref 65–99)
GLUCOSE SERPL-MCNC: 144 MG/DL (ref 65–99)
GLUCOSE SERPL-MCNC: 145 MG/DL (ref 65–99)
GLUCOSE SERPL-MCNC: 147 MG/DL (ref 65–99)
GLUCOSE SERPL-MCNC: 147 MG/DL (ref 65–99)
GLUCOSE SERPL-MCNC: 148 MG/DL (ref 65–99)
GLUCOSE SERPL-MCNC: 148 MG/DL (ref 65–99)
GLUCOSE SERPL-MCNC: 150 MG/DL (ref 65–99)
GLUCOSE SERPL-MCNC: 151 MG/DL (ref 65–99)
GLUCOSE SERPL-MCNC: 152 MG/DL (ref 65–99)
GLUCOSE SERPL-MCNC: 155 MG/DL (ref 65–99)
GLUCOSE SERPL-MCNC: 155 MG/DL (ref 65–99)
GLUCOSE SERPL-MCNC: 156 MG/DL (ref 65–99)
GLUCOSE SERPL-MCNC: 156 MG/DL (ref 65–99)
GLUCOSE SERPL-MCNC: 161 MG/DL (ref 65–99)
GLUCOSE SERPL-MCNC: 165 MG/DL (ref 65–99)
GLUCOSE SERPL-MCNC: 170 MG/DL (ref 65–99)
GLUCOSE SERPL-MCNC: 174 MG/DL (ref 65–99)
GLUCOSE SERPL-MCNC: 186 MG/DL (ref 65–99)
GLUCOSE SERPL-MCNC: 187 MG/DL (ref 65–99)
GLUCOSE SERPL-MCNC: 190 MG/DL (ref 65–99)
GLUCOSE SERPL-MCNC: 192 MG/DL (ref 65–99)
GLUCOSE SERPL-MCNC: 277 MG/DL (ref 65–99)
GLUCOSE SERPL-MCNC: 289 MG/DL (ref 65–99)
GLUCOSE UR STRIP.AUTO-MCNC: NEGATIVE MG/DL
GLUCOSE UR STRIP.AUTO-MCNC: NEGATIVE MG/DL
HBA1C MFR BLD: 6.4 % (ref 0–5.6)
HBA1C MFR BLD: 7.5 % (ref 0–5.6)
HCO3 BLDA-SCNC: 30 MMOL/L (ref 17–25)
HCT VFR BLD AUTO: 17.4 % (ref 37–47)
HCT VFR BLD AUTO: 18.1 % (ref 37–47)
HCT VFR BLD AUTO: 18.2 % (ref 37–47)
HCT VFR BLD AUTO: 18.6 % (ref 37–47)
HCT VFR BLD AUTO: 18.9 % (ref 37–47)
HCT VFR BLD AUTO: 19.4 % (ref 37–47)
HCT VFR BLD AUTO: 19.7 % (ref 37–47)
HCT VFR BLD AUTO: 19.8 % (ref 37–47)
HCT VFR BLD AUTO: 19.8 % (ref 37–47)
HCT VFR BLD AUTO: 19.9 % (ref 37–47)
HCT VFR BLD AUTO: 20 % (ref 37–47)
HCT VFR BLD AUTO: 20.4 % (ref 37–47)
HCT VFR BLD AUTO: 20.4 % (ref 37–47)
HCT VFR BLD AUTO: 20.5 % (ref 37–47)
HCT VFR BLD AUTO: 20.6 % (ref 37–47)
HCT VFR BLD AUTO: 20.9 % (ref 37–47)
HCT VFR BLD AUTO: 21.6 % (ref 37–47)
HCT VFR BLD AUTO: 21.7 % (ref 37–47)
HCT VFR BLD AUTO: 21.8 % (ref 37–47)
HCT VFR BLD AUTO: 21.9 % (ref 37–47)
HCT VFR BLD AUTO: 22.9 % (ref 37–47)
HCT VFR BLD AUTO: 22.9 % (ref 37–47)
HCT VFR BLD AUTO: 23 % (ref 37–47)
HCT VFR BLD AUTO: 23.4 % (ref 37–47)
HCT VFR BLD AUTO: 23.5 % (ref 37–47)
HCT VFR BLD AUTO: 23.8 % (ref 37–47)
HCT VFR BLD AUTO: 23.8 % (ref 37–47)
HCT VFR BLD AUTO: 23.9 % (ref 37–47)
HCT VFR BLD AUTO: 24.2 % (ref 37–47)
HCT VFR BLD AUTO: 24.9 % (ref 37–47)
HCT VFR BLD AUTO: 25 % (ref 37–47)
HCT VFR BLD AUTO: 25 % (ref 37–47)
HCT VFR BLD AUTO: 25.2 % (ref 37–47)
HCT VFR BLD AUTO: 26.3 % (ref 37–47)
HCT VFR BLD AUTO: 27.8 % (ref 37–47)
HCT VFR BLD AUTO: 29.8 % (ref 37–47)
HCT VFR BLD AUTO: 30.4 % (ref 37–47)
HCT VFR BLD AUTO: 38.5 % (ref 37–47)
HCT VFR BLD AUTO: 38.7 % (ref 37–47)
HGB BLD-MCNC: 10.4 G/DL (ref 12–16)
HGB BLD-MCNC: 11.3 G/DL (ref 12–16)
HGB BLD-MCNC: 12.7 G/DL (ref 12–16)
HGB BLD-MCNC: 12.9 G/DL (ref 12–16)
HGB BLD-MCNC: 6.4 G/DL (ref 12–16)
HGB BLD-MCNC: 6.5 G/DL (ref 12–16)
HGB BLD-MCNC: 6.7 G/DL (ref 12–16)
HGB BLD-MCNC: 6.8 G/DL (ref 12–16)
HGB BLD-MCNC: 6.8 G/DL (ref 12–16)
HGB BLD-MCNC: 6.9 G/DL (ref 12–16)
HGB BLD-MCNC: 7 G/DL (ref 12–16)
HGB BLD-MCNC: 7.1 G/DL (ref 12–16)
HGB BLD-MCNC: 7.2 G/DL (ref 12–16)
HGB BLD-MCNC: 7.2 G/DL (ref 12–16)
HGB BLD-MCNC: 7.4 G/DL (ref 12–16)
HGB BLD-MCNC: 7.6 G/DL (ref 12–16)
HGB BLD-MCNC: 7.8 G/DL (ref 12–16)
HGB BLD-MCNC: 8 G/DL (ref 12–16)
HGB BLD-MCNC: 8.1 G/DL (ref 12–16)
HGB BLD-MCNC: 8.2 G/DL (ref 12–16)
HGB BLD-MCNC: 8.4 G/DL (ref 12–16)
HGB BLD-MCNC: 8.6 G/DL (ref 12–16)
HGB BLD-MCNC: 8.7 G/DL (ref 12–16)
HGB BLD-MCNC: 8.8 G/DL (ref 12–16)
HGB BLD-MCNC: 9 G/DL (ref 12–16)
HGB BLD-MCNC: 9.1 G/DL (ref 12–16)
HGB BLD-MCNC: 9.3 G/DL (ref 12–16)
HGB BLD-MCNC: 9.5 G/DL (ref 12–16)
HGB RETIC QN AUTO: 33.8 PG/CELL (ref 29–35)
HGB RETIC QN AUTO: 42.2 PG/CELL (ref 29–35)
HYALINE CASTS #/AREA URNS LPF: ABNORMAL /LPF
HYPOCHROMIA BLD QL SMEAR: ABNORMAL
IMM GRANULOCYTES # BLD AUTO: 0 K/UL (ref 0–0.11)
IMM GRANULOCYTES # BLD AUTO: 0.01 K/UL (ref 0–0.11)
IMM GRANULOCYTES # BLD AUTO: 0.02 K/UL (ref 0–0.11)
IMM GRANULOCYTES # BLD AUTO: ABNORMAL K/UL (ref 0–0.11)
IMM GRANULOCYTES NFR BLD AUTO: 0 % (ref 0–0.9)
IMM GRANULOCYTES NFR BLD AUTO: 0.6 % (ref 0–0.9)
IMM GRANULOCYTES NFR BLD AUTO: 7.4 % (ref 0–0.9)
IMM GRANULOCYTES NFR BLD AUTO: ABNORMAL % (ref 0–0.9)
IMM RETICS NFR: 18 % (ref 9.3–17.4)
IMM RETICS NFR: 21 % (ref 9.3–17.4)
INHALED O2 FLOW RATE: 11 L/MIN (ref 2–10)
INR PPP: 0.97 (ref 0.87–1.13)
INR PPP: 1.23 (ref 0.87–1.13)
KETONES UR STRIP.AUTO-MCNC: 15 MG/DL
KETONES UR STRIP.AUTO-MCNC: ABNORMAL MG/DL
LACTATE BLD-SCNC: 1.2 MMOL/L (ref 0.5–2)
LACTATE BLD-SCNC: 1.5 MMOL/L (ref 0.5–2)
LDH SERPL-CCNC: 222 U/L (ref 107–266)
LEUKOCYTE ESTERASE UR QL STRIP.AUTO: ABNORMAL
LEUKOCYTE ESTERASE UR QL STRIP.AUTO: NEGATIVE
LIPASE SERPL-CCNC: 7 U/L (ref 11–82)
LV EJECT FRACT  99904: 55
LYMPHOCYTES # BLD AUTO: 0.14 K/UL (ref 1–4.8)
LYMPHOCYTES # BLD AUTO: 0.22 K/UL (ref 1–4.8)
LYMPHOCYTES # BLD AUTO: 0.36 K/UL (ref 1–4.8)
LYMPHOCYTES # BLD AUTO: 0.37 K/UL (ref 1–4.8)
LYMPHOCYTES # BLD AUTO: 0.41 K/UL (ref 1–4.8)
LYMPHOCYTES # BLD AUTO: 0.48 K/UL (ref 1–4.8)
LYMPHOCYTES # BLD AUTO: 0.53 K/UL (ref 1–4.8)
LYMPHOCYTES # BLD AUTO: 0.54 K/UL (ref 1–4.8)
LYMPHOCYTES # BLD AUTO: 0.57 K/UL (ref 1–4.8)
LYMPHOCYTES # BLD AUTO: 0.62 K/UL (ref 1–4.8)
LYMPHOCYTES # BLD AUTO: 0.66 K/UL (ref 1–4.8)
LYMPHOCYTES # BLD AUTO: 0.94 K/UL (ref 1–4.8)
LYMPHOCYTES # BLD AUTO: 0.94 K/UL (ref 1–4.8)
LYMPHOCYTES # BLD AUTO: 0.95 K/UL (ref 1–4.8)
LYMPHOCYTES # BLD AUTO: 0.97 K/UL (ref 1–4.8)
LYMPHOCYTES # BLD AUTO: 0.98 K/UL (ref 1–4.8)
LYMPHOCYTES # BLD AUTO: 1.04 K/UL (ref 1–4.8)
LYMPHOCYTES # BLD AUTO: 1.3 K/UL (ref 1–4.8)
LYMPHOCYTES # BLD AUTO: ABNORMAL K/UL (ref 1–4.8)
LYMPHOCYTES NFR BLD: 29.5 % (ref 22–41)
LYMPHOCYTES NFR BLD: 33.9 % (ref 22–41)
LYMPHOCYTES NFR BLD: 38.1 % (ref 22–41)
LYMPHOCYTES NFR BLD: 43.3 % (ref 22–41)
LYMPHOCYTES NFR BLD: 43.8 % (ref 22–41)
LYMPHOCYTES NFR BLD: 49.6 % (ref 22–41)
LYMPHOCYTES NFR BLD: 50.9 % (ref 22–41)
LYMPHOCYTES NFR BLD: 51.9 % (ref 22–41)
LYMPHOCYTES NFR BLD: 56 % (ref 22–41)
LYMPHOCYTES NFR BLD: 58.8 % (ref 22–41)
LYMPHOCYTES NFR BLD: 68.8 % (ref 22–41)
LYMPHOCYTES NFR BLD: 69.3 % (ref 22–41)
LYMPHOCYTES NFR BLD: 69.6 % (ref 22–41)
LYMPHOCYTES NFR BLD: 72.9 % (ref 22–41)
LYMPHOCYTES NFR BLD: 91.7 % (ref 22–41)
LYMPHOCYTES NFR BLD: 93.7 % (ref 22–41)
LYMPHOCYTES NFR BLD: 94.6 % (ref 22–41)
LYMPHOCYTES NFR BLD: 95.1 % (ref 22–41)
LYMPHOCYTES NFR BLD: ABNORMAL % (ref 22–41)
MACROCYTES BLD QL SMEAR: ABNORMAL
MACROCYTES BLD QL SMEAR: ABNORMAL
MAGNESIUM SERPL-MCNC: 1.6 MG/DL (ref 1.5–2.5)
MAGNESIUM SERPL-MCNC: 1.9 MG/DL (ref 1.5–2.5)
MANUAL DIFF BLD: NORMAL
MCH RBC QN AUTO: 28.6 PG (ref 27–33)
MCH RBC QN AUTO: 28.8 PG (ref 27–33)
MCH RBC QN AUTO: 28.9 PG (ref 27–33)
MCH RBC QN AUTO: 28.9 PG (ref 27–33)
MCH RBC QN AUTO: 29 PG (ref 27–33)
MCH RBC QN AUTO: 29.1 PG (ref 27–33)
MCH RBC QN AUTO: 29.2 PG (ref 27–33)
MCH RBC QN AUTO: 29.3 PG (ref 27–33)
MCH RBC QN AUTO: 29.4 PG (ref 27–33)
MCH RBC QN AUTO: 29.5 PG (ref 27–33)
MCH RBC QN AUTO: 29.6 PG (ref 27–33)
MCH RBC QN AUTO: 29.7 PG (ref 27–33)
MCH RBC QN AUTO: 29.8 PG (ref 27–33)
MCH RBC QN AUTO: 29.9 PG (ref 27–33)
MCH RBC QN AUTO: 29.9 PG (ref 27–33)
MCH RBC QN AUTO: 30 PG (ref 27–33)
MCH RBC QN AUTO: 30.1 PG (ref 27–33)
MCH RBC QN AUTO: 30.1 PG (ref 27–33)
MCH RBC QN AUTO: 30.3 PG (ref 27–33)
MCH RBC QN AUTO: 30.3 PG (ref 27–33)
MCH RBC QN AUTO: 30.4 PG (ref 27–33)
MCH RBC QN AUTO: 30.5 PG (ref 27–33)
MCH RBC QN AUTO: 30.6 PG (ref 27–33)
MCH RBC QN AUTO: 30.7 PG (ref 27–33)
MCH RBC QN AUTO: 30.8 PG (ref 27–33)
MCH RBC QN AUTO: 30.8 PG (ref 27–33)
MCH RBC QN AUTO: 31 PG (ref 27–33)
MCH RBC QN AUTO: 31.3 PG (ref 27–33)
MCH RBC QN AUTO: 32.2 PG (ref 27–33)
MCH RBC QN AUTO: 32.3 PG (ref 27–33)
MCH RBC QN AUTO: 33.6 PG (ref 27–33)
MCH RBC QN AUTO: 33.9 PG (ref 27–33)
MCH RBC QN AUTO: 35.8 PG (ref 27–33)
MCH RBC QN AUTO: 36 PG (ref 27–33)
MCHC RBC AUTO-ENTMCNC: 31.7 G/DL (ref 33.6–35)
MCHC RBC AUTO-ENTMCNC: 33 G/DL (ref 33.6–35)
MCHC RBC AUTO-ENTMCNC: 33.3 G/DL (ref 33.6–35)
MCHC RBC AUTO-ENTMCNC: 33.8 G/DL (ref 33.6–35)
MCHC RBC AUTO-ENTMCNC: 34 G/DL (ref 33.6–35)
MCHC RBC AUTO-ENTMCNC: 34.1 G/DL (ref 33.6–35)
MCHC RBC AUTO-ENTMCNC: 34.2 G/DL (ref 33.6–35)
MCHC RBC AUTO-ENTMCNC: 34.5 G/DL (ref 33.6–35)
MCHC RBC AUTO-ENTMCNC: 34.7 G/DL (ref 33.6–35)
MCHC RBC AUTO-ENTMCNC: 34.8 G/DL (ref 33.6–35)
MCHC RBC AUTO-ENTMCNC: 34.9 G/DL (ref 33.6–35)
MCHC RBC AUTO-ENTMCNC: 35 G/DL (ref 33.6–35)
MCHC RBC AUTO-ENTMCNC: 35.1 G/DL (ref 33.6–35)
MCHC RBC AUTO-ENTMCNC: 35.3 G/DL (ref 33.6–35)
MCHC RBC AUTO-ENTMCNC: 35.4 G/DL (ref 33.6–35)
MCHC RBC AUTO-ENTMCNC: 35.5 G/DL (ref 33.6–35)
MCHC RBC AUTO-ENTMCNC: 35.6 G/DL (ref 33.6–35)
MCHC RBC AUTO-ENTMCNC: 35.7 G/DL (ref 33.6–35)
MCHC RBC AUTO-ENTMCNC: 35.9 G/DL (ref 33.6–35)
MCHC RBC AUTO-ENTMCNC: 36 G/DL (ref 33.6–35)
MCHC RBC AUTO-ENTMCNC: 36.1 G/DL (ref 33.6–35)
MCHC RBC AUTO-ENTMCNC: 36.4 G/DL (ref 33.6–35)
MCHC RBC AUTO-ENTMCNC: 36.5 G/DL (ref 33.6–35)
MCHC RBC AUTO-ENTMCNC: 36.6 G/DL (ref 33.6–35)
MCHC RBC AUTO-ENTMCNC: 36.7 G/DL (ref 33.6–35)
MCHC RBC AUTO-ENTMCNC: 36.8 G/DL (ref 33.6–35)
MCHC RBC AUTO-ENTMCNC: 36.9 G/DL (ref 33.6–35)
MCHC RBC AUTO-ENTMCNC: 37.2 G/DL (ref 33.6–35)
MCV RBC AUTO: 101.8 FL (ref 81.4–97.8)
MCV RBC AUTO: 101.9 FL (ref 81.4–97.8)
MCV RBC AUTO: 103.8 FL (ref 81.4–97.8)
MCV RBC AUTO: 113 FL (ref 81.4–97.8)
MCV RBC AUTO: 80.9 FL (ref 81.4–97.8)
MCV RBC AUTO: 81.6 FL (ref 81.4–97.8)
MCV RBC AUTO: 81.6 FL (ref 81.4–97.8)
MCV RBC AUTO: 82.3 FL (ref 81.4–97.8)
MCV RBC AUTO: 82.4 FL (ref 81.4–97.8)
MCV RBC AUTO: 82.4 FL (ref 81.4–97.8)
MCV RBC AUTO: 82.5 FL (ref 81.4–97.8)
MCV RBC AUTO: 82.6 FL (ref 81.4–97.8)
MCV RBC AUTO: 82.6 FL (ref 81.4–97.8)
MCV RBC AUTO: 82.7 FL (ref 81.4–97.8)
MCV RBC AUTO: 82.8 FL (ref 81.4–97.8)
MCV RBC AUTO: 82.9 FL (ref 81.4–97.8)
MCV RBC AUTO: 83.1 FL (ref 81.4–97.8)
MCV RBC AUTO: 83.2 FL (ref 81.4–97.8)
MCV RBC AUTO: 83.3 FL (ref 81.4–97.8)
MCV RBC AUTO: 83.4 FL (ref 81.4–97.8)
MCV RBC AUTO: 83.6 FL (ref 81.4–97.8)
MCV RBC AUTO: 84 FL (ref 81.4–97.8)
MCV RBC AUTO: 84.1 FL (ref 81.4–97.8)
MCV RBC AUTO: 84.1 FL (ref 81.4–97.8)
MCV RBC AUTO: 84.2 FL (ref 81.4–97.8)
MCV RBC AUTO: 84.3 FL (ref 81.4–97.8)
MCV RBC AUTO: 84.6 FL (ref 81.4–97.8)
MCV RBC AUTO: 84.6 FL (ref 81.4–97.8)
MCV RBC AUTO: 84.7 FL (ref 81.4–97.8)
MCV RBC AUTO: 84.7 FL (ref 81.4–97.8)
MCV RBC AUTO: 84.8 FL (ref 81.4–97.8)
MCV RBC AUTO: 85.2 FL (ref 81.4–97.8)
MCV RBC AUTO: 85.3 FL (ref 81.4–97.8)
MCV RBC AUTO: 85.5 FL (ref 81.4–97.8)
MCV RBC AUTO: 85.5 FL (ref 81.4–97.8)
MCV RBC AUTO: 85.7 FL (ref 81.4–97.8)
MCV RBC AUTO: 85.8 FL (ref 81.4–97.8)
MCV RBC AUTO: 87.9 FL (ref 81.4–97.8)
MCV RBC AUTO: 89.6 FL (ref 81.4–97.8)
METAMYELOCYTES NFR BLD MANUAL: 0.7 %
METAMYELOCYTES NFR BLD MANUAL: 0.9 %
METAMYELOCYTES NFR BLD MANUAL: 1.1 %
MICRO URNS: ABNORMAL
MICRO URNS: ABNORMAL
MICROCYTES BLD QL SMEAR: ABNORMAL
MONOCYTES # BLD AUTO: 0 K/UL (ref 0–0.85)
MONOCYTES # BLD AUTO: 0.01 K/UL (ref 0–0.85)
MONOCYTES # BLD AUTO: 0.01 K/UL (ref 0–0.85)
MONOCYTES # BLD AUTO: 0.02 K/UL (ref 0–0.85)
MONOCYTES # BLD AUTO: 0.03 K/UL (ref 0–0.85)
MONOCYTES # BLD AUTO: 0.03 K/UL (ref 0–0.85)
MONOCYTES # BLD AUTO: 0.05 K/UL (ref 0–0.85)
MONOCYTES # BLD AUTO: 0.08 K/UL (ref 0–0.85)
MONOCYTES # BLD AUTO: 0.08 K/UL (ref 0–0.85)
MONOCYTES # BLD AUTO: 0.09 K/UL (ref 0–0.85)
MONOCYTES # BLD AUTO: 0.1 K/UL (ref 0–0.85)
MONOCYTES # BLD AUTO: 0.11 K/UL (ref 0–0.85)
MONOCYTES # BLD AUTO: 0.18 K/UL (ref 0–0.85)
MONOCYTES # BLD AUTO: ABNORMAL K/UL (ref 0–0.85)
MONOCYTES NFR BLD AUTO: 0 % (ref 0–13.4)
MONOCYTES NFR BLD AUTO: 0.9 % (ref 0–13.4)
MONOCYTES NFR BLD AUTO: 1.5 % (ref 0–13.4)
MONOCYTES NFR BLD AUTO: 2.9 % (ref 0–13.4)
MONOCYTES NFR BLD AUTO: 25 % (ref 0–13.4)
MONOCYTES NFR BLD AUTO: 3.6 % (ref 0–13.4)
MONOCYTES NFR BLD AUTO: 4.9 % (ref 0–13.4)
MONOCYTES NFR BLD AUTO: 40.7 % (ref 0–13.4)
MONOCYTES NFR BLD AUTO: 6.7 % (ref 0–13.4)
MONOCYTES NFR BLD AUTO: 7.4 % (ref 0–13.4)
MONOCYTES NFR BLD AUTO: 9.8 % (ref 0–13.4)
MONOCYTES NFR BLD AUTO: ABNORMAL % (ref 0–13.4)
MORPHOLOGY BLD-IMP: NORMAL
MYELOCYTES NFR BLD MANUAL: 0.9 %
MYELOCYTES NFR BLD MANUAL: 1.5 %
MYELOCYTES NFR BLD MANUAL: 1.9 %
MYELOCYTES NFR BLD MANUAL: 2.2 %
NEUTROPHILS # BLD AUTO: 0 K/UL (ref 2–7.15)
NEUTROPHILS # BLD AUTO: 0.01 K/UL (ref 2–7.15)
NEUTROPHILS # BLD AUTO: 0.01 K/UL (ref 2–7.15)
NEUTROPHILS # BLD AUTO: 0.02 K/UL (ref 2–7.15)
NEUTROPHILS # BLD AUTO: 0.02 K/UL (ref 2–7.15)
NEUTROPHILS # BLD AUTO: 0.04 K/UL (ref 2–7.15)
NEUTROPHILS # BLD AUTO: 0.07 K/UL (ref 2–7.15)
NEUTROPHILS # BLD AUTO: 0.15 K/UL (ref 2–7.15)
NEUTROPHILS # BLD AUTO: 0.18 K/UL (ref 2–7.15)
NEUTROPHILS # BLD AUTO: 0.19 K/UL (ref 2–7.15)
NEUTROPHILS # BLD AUTO: 0.26 K/UL (ref 2–7.15)
NEUTROPHILS # BLD AUTO: 0.35 K/UL (ref 2–7.15)
NEUTROPHILS # BLD AUTO: 0.6 K/UL (ref 2–7.15)
NEUTROPHILS # BLD AUTO: 0.76 K/UL (ref 2–7.15)
NEUTROPHILS # BLD AUTO: 1.1 K/UL (ref 2–7.15)
NEUTROPHILS # BLD AUTO: ABNORMAL K/UL (ref 2–7.15)
NEUTROPHILS NFR BLD: 0 % (ref 44–72)
NEUTROPHILS NFR BLD: 1.1 % (ref 44–72)
NEUTROPHILS NFR BLD: 11.7 % (ref 44–72)
NEUTROPHILS NFR BLD: 19.3 % (ref 44–72)
NEUTROPHILS NFR BLD: 2.7 % (ref 44–72)
NEUTROPHILS NFR BLD: 2.8 % (ref 44–72)
NEUTROPHILS NFR BLD: 34.3 % (ref 44–72)
NEUTROPHILS NFR BLD: 48.6 % (ref 44–72)
NEUTROPHILS NFR BLD: 6.2 % (ref 44–72)
NEUTROPHILS NFR BLD: 6.3 % (ref 44–72)
NEUTROPHILS NFR BLD: 60.2 % (ref 44–72)
NEUTROPHILS NFR BLD: 7 % (ref 44–72)
NEUTROPHILS NFR BLD: 8.5 % (ref 44–72)
NEUTROPHILS NFR BLD: 9 % (ref 44–72)
NEUTROPHILS NFR BLD: 9.6 % (ref 44–72)
NEUTROPHILS NFR BLD: 9.8 % (ref 44–72)
NEUTROPHILS NFR BLD: ABNORMAL % (ref 44–72)
NEUTS BAND NFR BLD MANUAL: 0.9 % (ref 0–10)
NEUTS BAND NFR BLD MANUAL: 5.7 % (ref 0–10)
NITRITE UR QL STRIP.AUTO: NEGATIVE
NITRITE UR QL STRIP.AUTO: POSITIVE
NRBC # BLD AUTO: 0 K/UL
NRBC # BLD AUTO: 0.02 K/UL
NRBC # BLD AUTO: 0.03 K/UL
NRBC BLD AUTO-RTO: 0 /100 WBC
NRBC BLD AUTO-RTO: 1 /100 WBC
NRBC BLD AUTO-RTO: 1.1 /100 WBC
NRBC BLD AUTO-RTO: 1.3 /100 WBC
NRBC BLD AUTO-RTO: 1.4 /100 WBC
NRBC BLD AUTO-RTO: 1.5 /100 WBC
NRBC BLD AUTO-RTO: 1.6 /100 WBC
NRBC BLD AUTO-RTO: 3 /100 WBC
NRBC BLD AUTO-RTO: 3.2 /100 WBC
OVALOCYTES BLD QL SMEAR: NORMAL
PATH REV: NORMAL
PATH REV: NORMAL
PCO2 BLDA: 38.6 MMHG (ref 26–37)
PH BLDA: 7.51 [PH] (ref 7.4–7.5)
PH UR STRIP.AUTO: 5 [PH]
PH UR STRIP.AUTO: 5.5 [PH]
PHOSPHATE SERPL-MCNC: 3 MG/DL (ref 2.5–4.5)
PHOSPHATE SERPL-MCNC: 3.5 MG/DL (ref 2.5–4.5)
PLATELET # BLD AUTO: 10 K/UL (ref 164–446)
PLATELET # BLD AUTO: 11 K/UL (ref 164–446)
PLATELET # BLD AUTO: 15 K/UL (ref 164–446)
PLATELET # BLD AUTO: 18 K/UL (ref 164–446)
PLATELET # BLD AUTO: 19 K/UL (ref 164–446)
PLATELET # BLD AUTO: 19 K/UL (ref 164–446)
PLATELET # BLD AUTO: 198 K/UL (ref 164–446)
PLATELET # BLD AUTO: 2 K/UL (ref 164–446)
PLATELET # BLD AUTO: 2 K/UL (ref 164–446)
PLATELET # BLD AUTO: 20 K/UL (ref 164–446)
PLATELET # BLD AUTO: 20 K/UL (ref 164–446)
PLATELET # BLD AUTO: 22 K/UL (ref 164–446)
PLATELET # BLD AUTO: 23 K/UL (ref 164–446)
PLATELET # BLD AUTO: 235 K/UL (ref 164–446)
PLATELET # BLD AUTO: 25 K/UL (ref 164–446)
PLATELET # BLD AUTO: 26 K/UL (ref 164–446)
PLATELET # BLD AUTO: 27 K/UL (ref 164–446)
PLATELET # BLD AUTO: 28 K/UL (ref 164–446)
PLATELET # BLD AUTO: 3 K/UL (ref 164–446)
PLATELET # BLD AUTO: 31 K/UL (ref 164–446)
PLATELET # BLD AUTO: 33 K/UL (ref 164–446)
PLATELET # BLD AUTO: 35 K/UL (ref 164–446)
PLATELET # BLD AUTO: 36 K/UL (ref 164–446)
PLATELET # BLD AUTO: 37 K/UL (ref 164–446)
PLATELET # BLD AUTO: 39 K/UL (ref 164–446)
PLATELET # BLD AUTO: 47 K/UL (ref 164–446)
PLATELET # BLD AUTO: 48 K/UL (ref 164–446)
PLATELET # BLD AUTO: 5 K/UL (ref 164–446)
PLATELET # BLD AUTO: 51 K/UL (ref 164–446)
PLATELET # BLD AUTO: 51 K/UL (ref 164–446)
PLATELET # BLD AUTO: 53 K/UL (ref 164–446)
PLATELET # BLD AUTO: 55 K/UL (ref 164–446)
PLATELET # BLD AUTO: 57 K/UL (ref 164–446)
PLATELET # BLD AUTO: 65 K/UL (ref 164–446)
PLATELET # BLD AUTO: 67 K/UL (ref 164–446)
PLATELET # BLD AUTO: 7 K/UL (ref 164–446)
PLATELET # BLD AUTO: 81 K/UL (ref 164–446)
PLATELET # BLD AUTO: 9 K/UL (ref 164–446)
PLATELET BLD QL SMEAR: NORMAL
PLATELETS.RETICULATED NFR BLD AUTO: 0.6 K/UL (ref 0.6–13.1)
PLATELETS.RETICULATED NFR BLD AUTO: 1 K/UL (ref 0.6–13.1)
PLATELETS.RETICULATED NFR BLD AUTO: 1.1 K/UL (ref 0.6–13.1)
PLATELETS.RETICULATED NFR BLD AUTO: 1.3 K/UL (ref 0.6–13.1)
PLATELETS.RETICULATED NFR BLD AUTO: 1.4 K/UL (ref 0.6–13.1)
PLATELETS.RETICULATED NFR BLD AUTO: 1.5 K/UL (ref 0.6–13.1)
PLATELETS.RETICULATED NFR BLD AUTO: 1.5 K/UL (ref 0.6–13.1)
PLATELETS.RETICULATED NFR BLD AUTO: 1.7 K/UL (ref 0.6–13.1)
PLATELETS.RETICULATED NFR BLD AUTO: 2.8 K/UL (ref 0.6–13.1)
PLATELETS.RETICULATED NFR BLD AUTO: 2.8 K/UL (ref 0.6–13.1)
PLATELETS.RETICULATED NFR BLD AUTO: 6.3 K/UL (ref 0.6–13.1)
PMV BLD AUTO: 10 FL (ref 9–12.9)
PMV BLD AUTO: 10.1 FL (ref 9–12.9)
PMV BLD AUTO: 10.2 FL (ref 9–12.9)
PMV BLD AUTO: 10.4 FL (ref 9–12.9)
PMV BLD AUTO: 10.4 FL (ref 9–12.9)
PMV BLD AUTO: 10.5 FL (ref 9–12.9)
PMV BLD AUTO: 10.6 FL (ref 9–12.9)
PMV BLD AUTO: 10.6 FL (ref 9–12.9)
PMV BLD AUTO: 10.8 FL (ref 9–12.9)
PMV BLD AUTO: 11.7 FL (ref 9–12.9)
PMV BLD AUTO: 11.7 FL (ref 9–12.9)
PMV BLD AUTO: 12.3 FL (ref 9–12.9)
PMV BLD AUTO: 13.6 FL (ref 9–12.9)
PMV BLD AUTO: 8.4 FL (ref 9–12.9)
PMV BLD AUTO: 8.7 FL (ref 9–12.9)
PMV BLD AUTO: 8.8 FL (ref 9–12.9)
PMV BLD AUTO: 9.1 FL (ref 9–12.9)
PMV BLD AUTO: 9.1 FL (ref 9–12.9)
PMV BLD AUTO: 9.3 FL (ref 9–12.9)
PMV BLD AUTO: 9.4 FL (ref 9–12.9)
PMV BLD AUTO: 9.5 FL (ref 9–12.9)
PMV BLD AUTO: 9.6 FL (ref 9–12.9)
PMV BLD AUTO: 9.7 FL (ref 9–12.9)
PMV BLD AUTO: 9.7 FL (ref 9–12.9)
PMV BLD AUTO: 9.8 FL (ref 9–12.9)
PMV BLD AUTO: 9.8 FL (ref 9–12.9)
PO2 BLDA: 81.3 MMHG (ref 64–87)
POIKILOCYTOSIS BLD QL SMEAR: NORMAL
POTASSIUM SERPL-SCNC: 3.3 MMOL/L (ref 3.6–5.5)
POTASSIUM SERPL-SCNC: 3.4 MMOL/L (ref 3.6–5.5)
POTASSIUM SERPL-SCNC: 3.5 MMOL/L (ref 3.6–5.5)
POTASSIUM SERPL-SCNC: 3.6 MMOL/L (ref 3.6–5.5)
POTASSIUM SERPL-SCNC: 3.7 MMOL/L (ref 3.6–5.5)
POTASSIUM SERPL-SCNC: 3.8 MMOL/L (ref 3.6–5.5)
POTASSIUM SERPL-SCNC: 3.9 MMOL/L (ref 3.6–5.5)
POTASSIUM SERPL-SCNC: 4 MMOL/L (ref 3.6–5.5)
POTASSIUM SERPL-SCNC: 4.1 MMOL/L (ref 3.6–5.5)
POTASSIUM SERPL-SCNC: 4.3 MMOL/L (ref 3.6–5.5)
POTASSIUM SERPL-SCNC: 4.3 MMOL/L (ref 3.6–5.5)
POTASSIUM SERPL-SCNC: 4.4 MMOL/L (ref 3.6–5.5)
POTASSIUM SERPL-SCNC: 4.4 MMOL/L (ref 3.6–5.5)
POTASSIUM SERPL-SCNC: 4.5 MMOL/L (ref 3.6–5.5)
PRODUCT TYPE UPROD: NORMAL
PROMYELOCYTES NFR BLD MANUAL: 0.9 %
PROT SERPL-MCNC: 4.9 G/DL (ref 6–8.2)
PROT SERPL-MCNC: 5 G/DL (ref 6–8.2)
PROT SERPL-MCNC: 5.1 G/DL (ref 6–8.2)
PROT SERPL-MCNC: 5.1 G/DL (ref 6–8.2)
PROT SERPL-MCNC: 5.2 G/DL (ref 6–8.2)
PROT SERPL-MCNC: 5.2 G/DL (ref 6–8.2)
PROT SERPL-MCNC: 5.3 G/DL (ref 6–8.2)
PROT SERPL-MCNC: 5.3 G/DL (ref 6–8.2)
PROT SERPL-MCNC: 5.4 G/DL (ref 6–8.2)
PROT SERPL-MCNC: 5.5 G/DL (ref 6–8.2)
PROT SERPL-MCNC: 5.6 G/DL (ref 6–8.2)
PROT SERPL-MCNC: 5.7 G/DL (ref 6–8.2)
PROT SERPL-MCNC: 5.9 G/DL (ref 6–8.2)
PROT SERPL-MCNC: 5.9 G/DL (ref 6–8.2)
PROT SERPL-MCNC: 6 G/DL (ref 6–8.2)
PROT SERPL-MCNC: 6.3 G/DL (ref 6–8.2)
PROT SERPL-MCNC: 6.4 G/DL (ref 6–8.2)
PROT SERPL-MCNC: 6.7 G/DL (ref 6–8.2)
PROT SERPL-MCNC: 6.8 G/DL (ref 6–8.2)
PROT UR QL STRIP: 30 MG/DL
PROT UR QL STRIP: NEGATIVE MG/DL
PROTHROMBIN TIME: 13.2 SEC (ref 12–14.6)
PROTHROMBIN TIME: 15.9 SEC (ref 12–14.6)
RBC # BLD AUTO: 1.98 M/UL (ref 4.2–5.4)
RBC # BLD AUTO: 2.02 M/UL (ref 4.2–5.4)
RBC # BLD AUTO: 2.11 M/UL (ref 4.2–5.4)
RBC # BLD AUTO: 2.12 M/UL (ref 4.2–5.4)
RBC # BLD AUTO: 2.21 M/UL (ref 4.2–5.4)
RBC # BLD AUTO: 2.21 M/UL (ref 4.2–5.4)
RBC # BLD AUTO: 2.26 M/UL (ref 4.2–5.4)
RBC # BLD AUTO: 2.27 M/UL (ref 4.2–5.4)
RBC # BLD AUTO: 2.28 M/UL (ref 4.2–5.4)
RBC # BLD AUTO: 2.3 M/UL (ref 4.2–5.4)
RBC # BLD AUTO: 2.32 M/UL (ref 4.2–5.4)
RBC # BLD AUTO: 2.34 M/UL (ref 4.2–5.4)
RBC # BLD AUTO: 2.38 M/UL (ref 4.2–5.4)
RBC # BLD AUTO: 2.4 M/UL (ref 4.2–5.4)
RBC # BLD AUTO: 2.41 M/UL (ref 4.2–5.4)
RBC # BLD AUTO: 2.42 M/UL (ref 4.2–5.4)
RBC # BLD AUTO: 2.43 M/UL (ref 4.2–5.4)
RBC # BLD AUTO: 2.44 M/UL (ref 4.2–5.4)
RBC # BLD AUTO: 2.46 M/UL (ref 4.2–5.4)
RBC # BLD AUTO: 2.54 M/UL (ref 4.2–5.4)
RBC # BLD AUTO: 2.59 M/UL (ref 4.2–5.4)
RBC # BLD AUTO: 2.64 M/UL (ref 4.2–5.4)
RBC # BLD AUTO: 2.66 M/UL (ref 4.2–5.4)
RBC # BLD AUTO: 2.7 M/UL (ref 4.2–5.4)
RBC # BLD AUTO: 2.74 M/UL (ref 4.2–5.4)
RBC # BLD AUTO: 2.81 M/UL (ref 4.2–5.4)
RBC # BLD AUTO: 2.82 M/UL (ref 4.2–5.4)
RBC # BLD AUTO: 2.83 M/UL (ref 4.2–5.4)
RBC # BLD AUTO: 2.85 M/UL (ref 4.2–5.4)
RBC # BLD AUTO: 2.86 M/UL (ref 4.2–5.4)
RBC # BLD AUTO: 2.87 M/UL (ref 4.2–5.4)
RBC # BLD AUTO: 2.9 M/UL (ref 4.2–5.4)
RBC # BLD AUTO: 2.96 M/UL (ref 4.2–5.4)
RBC # BLD AUTO: 2.99 M/UL (ref 4.2–5.4)
RBC # BLD AUTO: 3.01 M/UL (ref 4.2–5.4)
RBC # BLD AUTO: 3.05 M/UL (ref 4.2–5.4)
RBC # BLD AUTO: 3.19 M/UL (ref 4.2–5.4)
RBC # BLD AUTO: 3.6 M/UL (ref 4.2–5.4)
RBC # BLD AUTO: 3.76 M/UL (ref 4.2–5.4)
RBC # BLD AUTO: 3.78 M/UL (ref 4.2–5.4)
RBC # BLD AUTO: 3.8 M/UL (ref 4.2–5.4)
RBC # URNS HPF: ABNORMAL /HPF
RBC BLD AUTO: NORMAL
RBC BLD AUTO: PRESENT
RBC UR QL AUTO: NEGATIVE
RBC UR QL AUTO: NEGATIVE
RETICS # AUTO: 0.06 M/UL (ref 0.04–0.06)
RETICS # AUTO: 0.15 M/UL (ref 0.04–0.06)
RETICS/RBC NFR: 2.8 % (ref 0.8–2.1)
RETICS/RBC NFR: 3.9 % (ref 0.8–2.1)
RH BLD: NORMAL
SAO2 % BLDA: 95.2 % (ref 93–99)
SIGNIFICANT IND 70042: NORMAL
SITE SITE: NORMAL
SMUDGE CELLS BLD QL SMEAR: NORMAL
SODIUM SERPL-SCNC: 121 MMOL/L (ref 135–145)
SODIUM SERPL-SCNC: 130 MMOL/L (ref 135–145)
SODIUM SERPL-SCNC: 131 MMOL/L (ref 135–145)
SODIUM SERPL-SCNC: 132 MMOL/L (ref 135–145)
SODIUM SERPL-SCNC: 133 MMOL/L (ref 135–145)
SODIUM SERPL-SCNC: 134 MMOL/L (ref 135–145)
SODIUM SERPL-SCNC: 135 MMOL/L (ref 135–145)
SODIUM SERPL-SCNC: 136 MMOL/L (ref 135–145)
SODIUM SERPL-SCNC: 137 MMOL/L (ref 135–145)
SODIUM SERPL-SCNC: 138 MMOL/L (ref 135–145)
SODIUM SERPL-SCNC: 139 MMOL/L (ref 135–145)
SODIUM SERPL-SCNC: 140 MMOL/L (ref 135–145)
SODIUM SERPL-SCNC: 141 MMOL/L (ref 135–145)
SOURCE SOURCE: NORMAL
SP GR UR STRIP.AUTO: 1.02
SP GR UR STRIP.AUTO: 1.03
TEST NAME 95000: ABNORMAL
TROPONIN I SERPL-MCNC: 0.02 NG/ML (ref 0–0.04)
TROPONIN I SERPL-MCNC: <0.01 NG/ML (ref 0–0.04)
UNIT STATUS USTAT: NORMAL
URATE SERPL-MCNC: 1.5 MG/DL (ref 1.9–8.2)
URATE SERPL-MCNC: 1.6 MG/DL (ref 1.9–8.2)
URATE SERPL-MCNC: 1.8 MG/DL (ref 1.9–8.2)
URATE SERPL-MCNC: 1.8 MG/DL (ref 1.9–8.2)
URATE SERPL-MCNC: 2.1 MG/DL (ref 1.9–8.2)
URATE SERPL-MCNC: 2.3 MG/DL (ref 1.9–8.2)
URATE SERPL-MCNC: 2.7 MG/DL (ref 1.9–8.2)
URATE SERPL-MCNC: 2.9 MG/DL (ref 1.9–8.2)
URATE SERPL-MCNC: 3 MG/DL (ref 1.9–8.2)
URATE SERPL-MCNC: 3.1 MG/DL (ref 1.9–8.2)
URATE SERPL-MCNC: 3.9 MG/DL (ref 1.9–8.2)
URATE SERPL-MCNC: <1.5 MG/DL (ref 1.9–8.2)
UROBILINOGEN UR STRIP.AUTO-MCNC: 0.2 MG/DL
UROBILINOGEN UR STRIP.AUTO-MCNC: 0.2 MG/DL
VARIANT LYMPHS BLD QL SMEAR: NORMAL
WBC # BLD AUTO: 0.2 K/UL (ref 4.8–10.8)
WBC # BLD AUTO: 0.3 K/UL (ref 4.8–10.8)
WBC # BLD AUTO: 0.4 K/UL (ref 4.8–10.8)
WBC # BLD AUTO: 0.5 K/UL (ref 4.8–10.8)
WBC # BLD AUTO: 0.6 K/UL (ref 4.8–10.8)
WBC # BLD AUTO: 0.7 K/UL (ref 4.8–10.8)
WBC # BLD AUTO: 0.9 K/UL (ref 4.8–10.8)
WBC # BLD AUTO: 1 K/UL (ref 4.8–10.8)
WBC # BLD AUTO: 1.4 K/UL (ref 4.8–10.8)
WBC # BLD AUTO: 1.4 K/UL (ref 4.8–10.8)
WBC # BLD AUTO: 1.5 K/UL (ref 4.8–10.8)
WBC # BLD AUTO: 1.8 K/UL (ref 4.8–10.8)
WBC # BLD AUTO: 1.8 K/UL (ref 4.8–10.8)
WBC # BLD AUTO: 1.9 K/UL (ref 4.8–10.8)
WBC # BLD AUTO: 2.1 K/UL (ref 4.8–10.8)
WBC # BLD AUTO: 2.8 K/UL (ref 4.8–10.8)
WBC # BLD AUTO: 3 K/UL (ref 4.8–10.8)
WBC #/AREA URNS HPF: ABNORMAL /HPF
WBC OTHER NFR BLD MANUAL: 58.9 %

## 2017-01-01 PROCEDURE — G8988 SELF CARE GOAL STATUS: HCPCS | Mod: CL

## 2017-01-01 PROCEDURE — 86644 CMV ANTIBODY: CPT

## 2017-01-01 PROCEDURE — A9270 NON-COVERED ITEM OR SERVICE: HCPCS | Performed by: HOSPITALIST

## 2017-01-01 PROCEDURE — 770004 HCHG ROOM/CARE - ONCOLOGY PRIVATE *

## 2017-01-01 PROCEDURE — 80048 BASIC METABOLIC PNL TOTAL CA: CPT

## 2017-01-01 PROCEDURE — A4212 NON CORING NEEDLE OR STYLET: HCPCS

## 2017-01-01 PROCEDURE — 700102 HCHG RX REV CODE 250 W/ 637 OVERRIDE(OP): Performed by: INTERNAL MEDICINE

## 2017-01-01 PROCEDURE — 84550 ASSAY OF BLOOD/URIC ACID: CPT

## 2017-01-01 PROCEDURE — 96361 HYDRATE IV INFUSION ADD-ON: CPT

## 2017-01-01 PROCEDURE — A9270 NON-COVERED ITEM OR SERVICE: HCPCS | Performed by: INTERNAL MEDICINE

## 2017-01-01 PROCEDURE — P9034 PLATELETS, PHERESIS: HCPCS

## 2017-01-01 PROCEDURE — 700102 HCHG RX REV CODE 250 W/ 637 OVERRIDE(OP): Performed by: HOSPITALIST

## 2017-01-01 PROCEDURE — 700111 HCHG RX REV CODE 636 W/ 250 OVERRIDE (IP): Performed by: INTERNAL MEDICINE

## 2017-01-01 PROCEDURE — 86900 BLOOD TYPING SEROLOGIC ABO: CPT

## 2017-01-01 PROCEDURE — 83880 ASSAY OF NATRIURETIC PEPTIDE: CPT

## 2017-01-01 PROCEDURE — G8978 MOBILITY CURRENT STATUS: HCPCS | Mod: CI

## 2017-01-01 PROCEDURE — 99232 SBSQ HOSP IP/OBS MODERATE 35: CPT | Performed by: INTERNAL MEDICINE

## 2017-01-01 PROCEDURE — 86901 BLOOD TYPING SEROLOGIC RH(D): CPT

## 2017-01-01 PROCEDURE — 85027 COMPLETE CBC AUTOMATED: CPT

## 2017-01-01 PROCEDURE — 700105 HCHG RX REV CODE 258: Performed by: INTERNAL MEDICINE

## 2017-01-01 PROCEDURE — 99152 MOD SED SAME PHYS/QHP 5/>YRS: CPT | Performed by: HOSPITALIST

## 2017-01-01 PROCEDURE — 99232 SBSQ HOSP IP/OBS MODERATE 35: CPT | Performed by: HOSPITALIST

## 2017-01-01 PROCEDURE — 99239 HOSP IP/OBS DSCHRG MGMT >30: CPT | Performed by: HOSPITALIST

## 2017-01-01 PROCEDURE — 700111 HCHG RX REV CODE 636 W/ 250 OVERRIDE (IP): Performed by: HOSPITALIST

## 2017-01-01 PROCEDURE — 88360 TUMOR IMMUNOHISTOCHEM/MANUAL: CPT

## 2017-01-01 PROCEDURE — 99233 SBSQ HOSP IP/OBS HIGH 50: CPT | Performed by: INTERNAL MEDICINE

## 2017-01-01 PROCEDURE — 30233N1 TRANSFUSION OF NONAUTOLOGOUS RED BLOOD CELLS INTO PERIPHERAL VEIN, PERCUTANEOUS APPROACH: ICD-10-PCS | Performed by: INTERNAL MEDICINE

## 2017-01-01 PROCEDURE — 94640 AIRWAY INHALATION TREATMENT: CPT

## 2017-01-01 PROCEDURE — 160048 HCHG OR STATISTICAL LEVEL 1-5: Performed by: HOSPITALIST

## 2017-01-01 PROCEDURE — 76937 US GUIDE VASCULAR ACCESS: CPT

## 2017-01-01 PROCEDURE — 36415 COLL VENOUS BLD VENIPUNCTURE: CPT

## 2017-01-01 PROCEDURE — 84484 ASSAY OF TROPONIN QUANT: CPT

## 2017-01-01 PROCEDURE — 302131 K PAD MOTOR: Performed by: INTERNAL MEDICINE

## 2017-01-01 PROCEDURE — 85025 COMPLETE CBC W/AUTO DIFF WBC: CPT

## 2017-01-01 PROCEDURE — 71010 DX-CHEST-PORTABLE (1 VIEW): CPT

## 2017-01-01 PROCEDURE — 87305 ASPERGILLUS AG IA: CPT

## 2017-01-01 PROCEDURE — 306780 HCHG STAT FOR TRANSFUSION PER CASE

## 2017-01-01 PROCEDURE — 93010 ELECTROCARDIOGRAM REPORT: CPT | Performed by: INTERNAL MEDICINE

## 2017-01-01 PROCEDURE — 85007 BL SMEAR W/DIFF WBC COUNT: CPT

## 2017-01-01 PROCEDURE — G0422 INTENS CARDIAC REHAB W/EXERC: HCPCS | Performed by: FAMILY MEDICINE

## 2017-01-01 PROCEDURE — 93005 ELECTROCARDIOGRAM TRACING: CPT | Performed by: INTERNAL MEDICINE

## 2017-01-01 PROCEDURE — 36430 TRANSFUSION BLD/BLD COMPNT: CPT

## 2017-01-01 PROCEDURE — A9579 GAD-BASE MR CONTRAST NOS,1ML: HCPCS | Performed by: INTERNAL MEDICINE

## 2017-01-01 PROCEDURE — 97165 OT EVAL LOW COMPLEX 30 MIN: CPT

## 2017-01-01 PROCEDURE — 86923 COMPATIBILITY TEST ELECTRIC: CPT

## 2017-01-01 PROCEDURE — 700105 HCHG RX REV CODE 258: Performed by: EMERGENCY MEDICINE

## 2017-01-01 PROCEDURE — 88185 FLOWCYTOMETRY/TC ADD-ON: CPT | Mod: 91

## 2017-01-01 PROCEDURE — P9016 RBC LEUKOCYTES REDUCED: HCPCS

## 2017-01-01 PROCEDURE — 80053 COMPREHEN METABOLIC PANEL: CPT

## 2017-01-01 PROCEDURE — 85055 RETICULATED PLATELET ASSAY: CPT

## 2017-01-01 PROCEDURE — 88305 TISSUE EXAM BY PATHOLOGIST: CPT

## 2017-01-01 PROCEDURE — 83036 HEMOGLOBIN GLYCOSYLATED A1C: CPT

## 2017-01-01 PROCEDURE — 62270 DX LMBR SPI PNXR: CPT | Performed by: INTERNAL MEDICINE

## 2017-01-01 PROCEDURE — 99233 SBSQ HOSP IP/OBS HIGH 50: CPT | Performed by: HOSPITALIST

## 2017-01-01 PROCEDURE — 80076 HEPATIC FUNCTION PANEL: CPT

## 2017-01-01 PROCEDURE — 80069 RENAL FUNCTION PANEL: CPT

## 2017-01-01 PROCEDURE — 83605 ASSAY OF LACTIC ACID: CPT

## 2017-01-01 PROCEDURE — 86850 RBC ANTIBODY SCREEN: CPT

## 2017-01-01 PROCEDURE — 700105 HCHG RX REV CODE 258

## 2017-01-01 PROCEDURE — 88311 DECALCIFY TISSUE: CPT

## 2017-01-01 PROCEDURE — 160002 HCHG RECOVERY MINUTES (STAT): Performed by: HOSPITALIST

## 2017-01-01 PROCEDURE — G8988 SELF CARE GOAL STATUS: HCPCS | Mod: CH

## 2017-01-01 PROCEDURE — 85018 HEMOGLOBIN: CPT

## 2017-01-01 PROCEDURE — 87086 URINE CULTURE/COLONY COUNT: CPT

## 2017-01-01 PROCEDURE — 88108 CYTOPATH CONCENTRATE TECH: CPT

## 2017-01-01 PROCEDURE — G8987 SELF CARE CURRENT STATUS: HCPCS | Mod: CH

## 2017-01-01 PROCEDURE — 83690 ASSAY OF LIPASE: CPT

## 2017-01-01 PROCEDURE — 99220 PR INITIAL OBSERVATION CARE,LEVL III: CPT | Performed by: HOSPITALIST

## 2017-01-01 PROCEDURE — 009U3ZX DRAINAGE OF SPINAL CANAL, PERCUTANEOUS APPROACH, DIAGNOSTIC: ICD-10-PCS | Performed by: INTERNAL MEDICINE

## 2017-01-01 PROCEDURE — 83615 LACTATE (LD) (LDH) ENZYME: CPT

## 2017-01-01 PROCEDURE — 99233 SBSQ HOSP IP/OBS HIGH 50: CPT | Mod: 25 | Performed by: INTERNAL MEDICINE

## 2017-01-01 PROCEDURE — 85046 RETICYTE/HGB CONCENTRATE: CPT

## 2017-01-01 PROCEDURE — 700111 HCHG RX REV CODE 636 W/ 250 OVERRIDE (IP)

## 2017-01-01 PROCEDURE — 770022 HCHG ROOM/CARE - ICU (200)

## 2017-01-01 PROCEDURE — 38221 DX BONE MARROW BIOPSIES: CPT | Performed by: HOSPITALIST

## 2017-01-01 PROCEDURE — 99152 MOD SED SAME PHYS/QHP 5/>YRS: CPT | Performed by: INTERNAL MEDICINE

## 2017-01-01 PROCEDURE — 160002 HCHG RECOVERY MINUTES (STAT): Performed by: INTERNAL MEDICINE

## 2017-01-01 PROCEDURE — 97166 OT EVAL MOD COMPLEX 45 MIN: CPT

## 2017-01-01 PROCEDURE — 85610 PROTHROMBIN TIME: CPT

## 2017-01-01 PROCEDURE — G8979 MOBILITY GOAL STATUS: HCPCS | Mod: CI

## 2017-01-01 PROCEDURE — 83735 ASSAY OF MAGNESIUM: CPT

## 2017-01-01 PROCEDURE — 99291 CRITICAL CARE FIRST HOUR: CPT | Performed by: HOSPITALIST

## 2017-01-01 PROCEDURE — 85730 THROMBOPLASTIN TIME PARTIAL: CPT

## 2017-01-01 PROCEDURE — G0378 HOSPITAL OBSERVATION PER HR: HCPCS

## 2017-01-01 PROCEDURE — 700117 HCHG RX CONTRAST REV CODE 255: Performed by: INTERNAL MEDICINE

## 2017-01-01 PROCEDURE — G8989 SELF CARE D/C STATUS: HCPCS | Mod: CH

## 2017-01-01 PROCEDURE — 82248 BILIRUBIN DIRECT: CPT

## 2017-01-01 PROCEDURE — G0423 INTENS CARDIAC REHAB NO EXER: HCPCS | Mod: 59 | Performed by: FAMILY MEDICINE

## 2017-01-01 PROCEDURE — 87040 BLOOD CULTURE FOR BACTERIA: CPT

## 2017-01-01 PROCEDURE — 30233R1 TRANSFUSION OF NONAUTOLOGOUS PLATELETS INTO PERIPHERAL VEIN, PERCUTANEOUS APPROACH: ICD-10-PCS | Performed by: INTERNAL MEDICINE

## 2017-01-01 PROCEDURE — 96374 THER/PROPH/DIAG INJ IV PUSH: CPT

## 2017-01-01 PROCEDURE — 76705 ECHO EXAM OF ABDOMEN: CPT

## 2017-01-01 PROCEDURE — G8980 MOBILITY D/C STATUS: HCPCS | Mod: CI

## 2017-01-01 PROCEDURE — 93306 TTE W/DOPPLER COMPLETE: CPT | Mod: 26 | Performed by: INTERNAL MEDICINE

## 2017-01-01 PROCEDURE — 51798 US URINE CAPACITY MEASURE: CPT

## 2017-01-01 PROCEDURE — G8987 SELF CARE CURRENT STATUS: HCPCS | Mod: CM

## 2017-01-01 PROCEDURE — 700105 HCHG RX REV CODE 258: Performed by: HOSPITALIST

## 2017-01-01 PROCEDURE — 70553 MRI BRAIN STEM W/O & W/DYE: CPT

## 2017-01-01 PROCEDURE — 97162 PT EVAL MOD COMPLEX 30 MIN: CPT

## 2017-01-01 PROCEDURE — 99153 MOD SED SAME PHYS/QHP EA: CPT

## 2017-01-01 PROCEDURE — 97530 THERAPEUTIC ACTIVITIES: CPT

## 2017-01-01 PROCEDURE — 88313 SPECIAL STAINS GROUP 2: CPT

## 2017-01-01 PROCEDURE — 87040 BLOOD CULTURE FOR BACTERIA: CPT | Mod: 91

## 2017-01-01 PROCEDURE — 302129 PCA PLUS: Performed by: HOSPITALIST

## 2017-01-01 PROCEDURE — 36561 INSERT TUNNELED CV CATH: CPT

## 2017-01-01 PROCEDURE — 3E04305 INTRODUCTION OF OTHER ANTINEOPLASTIC INTO CENTRAL VEIN, PERCUTANEOUS APPROACH: ICD-10-PCS | Performed by: INTERNAL MEDICINE

## 2017-01-01 PROCEDURE — 160027 HCHG SURGERY MINUTES - 1ST 30 MINS LEVEL 2: Performed by: HOSPITALIST

## 2017-01-01 PROCEDURE — 160035 HCHG PACU - 1ST 60 MINS PHASE I: Performed by: HOSPITALIST

## 2017-01-01 PROCEDURE — 85049 AUTOMATED PLATELET COUNT: CPT

## 2017-01-01 PROCEDURE — 93306 TTE W/DOPPLER COMPLETE: CPT

## 2017-01-01 PROCEDURE — 302151 K-PAD 14X20: Performed by: INTERNAL MEDICINE

## 2017-01-01 PROCEDURE — 700101 HCHG RX REV CODE 250

## 2017-01-01 PROCEDURE — 71020 DX-CHEST-2 VIEWS: CPT

## 2017-01-01 PROCEDURE — 160048 HCHG OR STATISTICAL LEVEL 1-5: Performed by: INTERNAL MEDICINE

## 2017-01-01 PROCEDURE — 86644 CMV ANTIBODY: CPT | Mod: 91

## 2017-01-01 PROCEDURE — 81003 URINALYSIS AUTO W/O SCOPE: CPT

## 2017-01-01 PROCEDURE — G8978 MOBILITY CURRENT STATUS: HCPCS | Mod: CJ

## 2017-01-01 PROCEDURE — P9016 RBC LEUKOCYTES REDUCED: HCPCS | Mod: 91

## 2017-01-01 PROCEDURE — 88237 TISSUE CULTURE BONE MARROW: CPT

## 2017-01-01 PROCEDURE — 71010 DX-CHEST-LIMITED (1 VIEW): CPT

## 2017-01-01 PROCEDURE — 77063 BREAST TOMOSYNTHESIS BI: CPT

## 2017-01-01 PROCEDURE — 88184 FLOWCYTOMETRY/ TC 1 MARKER: CPT

## 2017-01-01 PROCEDURE — 86645 CMV ANTIBODY IGM: CPT

## 2017-01-01 PROCEDURE — 93005 ELECTROCARDIOGRAM TRACING: CPT | Performed by: EMERGENCY MEDICINE

## 2017-01-01 PROCEDURE — 87449 NOS EACH ORGANISM AG IA: CPT

## 2017-01-01 PROCEDURE — 99285 EMERGENCY DEPT VISIT HI MDM: CPT

## 2017-01-01 PROCEDURE — 80500 HCHG CLINICAL PATH CONSULT-LIMITED: CPT

## 2017-01-01 PROCEDURE — 82803 BLOOD GASES ANY COMBINATION: CPT

## 2017-01-01 PROCEDURE — 160027 HCHG SURGERY MINUTES - 1ST 30 MINS LEVEL 2: Performed by: INTERNAL MEDICINE

## 2017-01-01 PROCEDURE — 81001 URINALYSIS AUTO W/SCOPE: CPT

## 2017-01-01 PROCEDURE — 700111 HCHG RX REV CODE 636 W/ 250 OVERRIDE (IP): Performed by: RADIOLOGY

## 2017-01-01 PROCEDURE — 99153 MOD SED SAME PHYS/QHP EA: CPT | Performed by: INTERNAL MEDICINE

## 2017-01-01 PROCEDURE — 77001 FLUOROGUIDE FOR VEIN DEVICE: CPT

## 2017-01-01 PROCEDURE — 96376 TX/PRO/DX INJ SAME DRUG ADON: CPT

## 2017-01-01 PROCEDURE — 160038 HCHG SURGERY MINUTES - EA ADDL 1 MIN LEVEL 2: Performed by: INTERNAL MEDICINE

## 2017-01-01 RX ORDER — ACETAMINOPHEN 325 MG/1
650 TABLET ORAL EVERY 6 HOURS PRN
Status: DISCONTINUED | OUTPATIENT
Start: 2017-01-01 | End: 2017-01-01 | Stop reason: HOSPADM

## 2017-01-01 RX ORDER — PROCHLORPERAZINE MALEATE 10 MG
10 TABLET ORAL EVERY 6 HOURS PRN
Status: CANCELLED | OUTPATIENT
Start: 2017-01-01

## 2017-01-01 RX ORDER — BUPIVACAINE HYDROCHLORIDE 2.5 MG/ML
INJECTION, SOLUTION EPIDURAL; INFILTRATION; INTRACAUDAL
Status: COMPLETED
Start: 2017-01-01 | End: 2017-01-01

## 2017-01-01 RX ORDER — ONDANSETRON 2 MG/ML
4 INJECTION INTRAMUSCULAR; INTRAVENOUS
Status: CANCELLED | OUTPATIENT
Start: 2017-01-01

## 2017-01-01 RX ORDER — SODIUM CHLORIDE 9 MG/ML
INJECTION, SOLUTION INTRAVENOUS ONCE
Status: CANCELLED | OUTPATIENT
Start: 2017-01-01 | End: 2017-01-01

## 2017-01-01 RX ORDER — SODIUM CHLORIDE 9 MG/ML
INJECTION, SOLUTION INTRAVENOUS
Status: DISCONTINUED | OUTPATIENT
Start: 2017-01-01 | End: 2017-01-01 | Stop reason: HOSPADM

## 2017-01-01 RX ORDER — ONDANSETRON 2 MG/ML
4 INJECTION INTRAMUSCULAR; INTRAVENOUS EVERY 4 HOURS PRN
Status: DISCONTINUED | OUTPATIENT
Start: 2017-01-01 | End: 2017-01-01 | Stop reason: HOSPADM

## 2017-01-01 RX ORDER — ONDANSETRON 2 MG/ML
8 INJECTION INTRAMUSCULAR; INTRAVENOUS ONCE
Status: COMPLETED | OUTPATIENT
Start: 2017-01-01 | End: 2017-01-01

## 2017-01-01 RX ORDER — LIDOCAINE HYDROCHLORIDE 10 MG/ML
0.5 INJECTION, SOLUTION INFILTRATION; PERINEURAL SEE ADMIN INSTRUCTIONS
Status: CANCELLED | OUTPATIENT
Start: 2017-01-01

## 2017-01-01 RX ORDER — ONDANSETRON 8 MG/1
8 TABLET, ORALLY DISINTEGRATING ORAL
Status: CANCELLED | OUTPATIENT
Start: 2017-01-01

## 2017-01-01 RX ORDER — SODIUM CHLORIDE 9 MG/ML
INJECTION, SOLUTION INTRAVENOUS
Status: ACTIVE
Start: 2017-01-01 | End: 2017-01-01

## 2017-01-01 RX ORDER — SODIUM CHLORIDE 9 MG/ML
INJECTION, SOLUTION INTRAVENOUS CONTINUOUS
Status: CANCELLED | OUTPATIENT
Start: 2017-01-01

## 2017-01-01 RX ORDER — L. ACIDOPHILUS/L.BULGARICUS 100MM CELL
1 GRANULES IN PACKET (EA) ORAL
Status: DISCONTINUED | OUTPATIENT
Start: 2017-01-01 | End: 2017-01-01

## 2017-01-01 RX ORDER — FUROSEMIDE 10 MG/ML
20 INJECTION INTRAMUSCULAR; INTRAVENOUS
Status: DISCONTINUED | OUTPATIENT
Start: 2017-01-01 | End: 2017-01-01

## 2017-01-01 RX ORDER — POTASSIUM CHLORIDE 20 MEQ/1
40 TABLET, EXTENDED RELEASE ORAL DAILY
Status: COMPLETED | OUTPATIENT
Start: 2017-01-01 | End: 2017-01-01

## 2017-01-01 RX ORDER — HALOPERIDOL 5 MG/ML
5 INJECTION INTRAMUSCULAR EVERY 4 HOURS PRN
Status: DISCONTINUED | OUTPATIENT
Start: 2017-01-01 | End: 2017-01-01

## 2017-01-01 RX ORDER — DIPHENHYDRAMINE HCL 25 MG
25 TABLET ORAL ONCE
Status: DISCONTINUED | OUTPATIENT
Start: 2017-01-01 | End: 2017-01-01 | Stop reason: HOSPADM

## 2017-01-01 RX ORDER — LIDOCAINE HYDROCHLORIDE 10 MG/ML
INJECTION, SOLUTION INFILTRATION; PERINEURAL
Status: COMPLETED
Start: 2017-01-01 | End: 2017-01-01

## 2017-01-01 RX ORDER — ACETAMINOPHEN 325 MG/1
650 TABLET ORAL ONCE
Status: CANCELLED | OUTPATIENT
Start: 2017-01-01 | End: 2017-01-01

## 2017-01-01 RX ORDER — OXYCODONE HCL 10 MG/1
10 TABLET, FILM COATED, EXTENDED RELEASE ORAL ONCE
Status: COMPLETED | OUTPATIENT
Start: 2017-01-01 | End: 2017-01-01

## 2017-01-01 RX ORDER — ONDANSETRON 2 MG/ML
8 INJECTION INTRAMUSCULAR; INTRAVENOUS ONCE
Status: CANCELLED | OUTPATIENT
Start: 2017-01-01 | End: 2017-01-01

## 2017-01-01 RX ORDER — MIDAZOLAM HYDROCHLORIDE 1 MG/ML
INJECTION INTRAMUSCULAR; INTRAVENOUS
Status: DISCONTINUED | OUTPATIENT
Start: 2017-01-01 | End: 2017-01-01 | Stop reason: HOSPADM

## 2017-01-01 RX ORDER — SODIUM CHLORIDE 9 MG/ML
INJECTION, SOLUTION INTRAVENOUS CONTINUOUS
Status: DISCONTINUED | OUTPATIENT
Start: 2017-01-01 | End: 2017-01-01

## 2017-01-01 RX ORDER — FUROSEMIDE 40 MG/1
40 TABLET ORAL
Status: DISCONTINUED | OUTPATIENT
Start: 2017-01-01 | End: 2017-01-01

## 2017-01-01 RX ORDER — BUTALBITAL, ACETAMINOPHEN AND CAFFEINE 50; 325; 40 MG/1; MG/1; MG/1
1 TABLET ORAL EVERY 6 HOURS PRN
Status: DISCONTINUED | OUTPATIENT
Start: 2017-01-01 | End: 2017-01-01

## 2017-01-01 RX ORDER — SODIUM CHLORIDE, SODIUM LACTATE, POTASSIUM CHLORIDE, CALCIUM CHLORIDE 600; 310; 30; 20 MG/100ML; MG/100ML; MG/100ML; MG/100ML
INJECTION, SOLUTION INTRAVENOUS CONTINUOUS
Status: DISCONTINUED | OUTPATIENT
Start: 2017-01-01 | End: 2017-01-01 | Stop reason: HOSPADM

## 2017-01-01 RX ORDER — SODIUM CHLORIDE 9 MG/ML
500 INJECTION, SOLUTION INTRAVENOUS
Status: DISCONTINUED | OUTPATIENT
Start: 2017-01-01 | End: 2017-01-01 | Stop reason: HOSPADM

## 2017-01-01 RX ORDER — MIDAZOLAM HYDROCHLORIDE 1 MG/ML
INJECTION INTRAMUSCULAR; INTRAVENOUS
Status: COMPLETED
Start: 2017-01-01 | End: 2017-01-01

## 2017-01-01 RX ORDER — OXYCODONE HCL 10 MG/1
10 TABLET, FILM COATED, EXTENDED RELEASE ORAL EVERY 12 HOURS
Status: DISCONTINUED | OUTPATIENT
Start: 2017-01-01 | End: 2017-01-01

## 2017-01-01 RX ORDER — LIDOCAINE HYDROCHLORIDE 10 MG/ML
0.5 INJECTION, SOLUTION INFILTRATION; PERINEURAL PRN
Status: CANCELLED | OUTPATIENT
Start: 2017-01-01

## 2017-01-01 RX ORDER — TEMAZEPAM 15 MG/1
15 CAPSULE ORAL
Status: DISCONTINUED | OUTPATIENT
Start: 2017-01-01 | End: 2017-01-01 | Stop reason: HOSPADM

## 2017-01-01 RX ORDER — OXYCODONE HYDROCHLORIDE 10 MG/1
10 TABLET ORAL
Status: DISCONTINUED | OUTPATIENT
Start: 2017-01-01 | End: 2017-01-01 | Stop reason: HOSPADM

## 2017-01-01 RX ORDER — MORPHINE SULFATE 4 MG/ML
2 INJECTION, SOLUTION INTRAMUSCULAR; INTRAVENOUS
Status: DISCONTINUED | OUTPATIENT
Start: 2017-01-01 | End: 2017-01-01

## 2017-01-01 RX ORDER — LORAZEPAM 2 MG/ML
1-2 INJECTION INTRAMUSCULAR
Status: DISCONTINUED | OUTPATIENT
Start: 2017-01-01 | End: 2017-01-01 | Stop reason: HOSPADM

## 2017-01-01 RX ORDER — FUROSEMIDE 10 MG/ML
40 INJECTION INTRAMUSCULAR; INTRAVENOUS
Status: DISCONTINUED | OUTPATIENT
Start: 2017-01-01 | End: 2017-01-01

## 2017-01-01 RX ORDER — SODIUM CHLORIDE 9 MG/ML
500 INJECTION, SOLUTION INTRAVENOUS PRN
Status: DISCONTINUED | OUTPATIENT
Start: 2017-01-01 | End: 2017-01-01 | Stop reason: HOSPADM

## 2017-01-01 RX ORDER — BUPIVACAINE HYDROCHLORIDE AND EPINEPHRINE 2.5; 5 MG/ML; UG/ML
INJECTION, SOLUTION EPIDURAL; INFILTRATION; INTRACAUDAL; PERINEURAL
Status: COMPLETED
Start: 2017-01-01 | End: 2017-01-01

## 2017-01-01 RX ORDER — SULFAMETHOXAZOLE AND TRIMETHOPRIM 800; 160 MG/1; MG/1
1 TABLET ORAL
Status: DISCONTINUED | OUTPATIENT
Start: 2017-01-01 | End: 2017-01-01

## 2017-01-01 RX ORDER — POTASSIUM CHLORIDE 20 MEQ/1
40 TABLET, EXTENDED RELEASE ORAL ONCE
Status: COMPLETED | OUTPATIENT
Start: 2017-01-01 | End: 2017-01-01

## 2017-01-01 RX ORDER — OXYCODONE HYDROCHLORIDE 5 MG/1
5 TABLET ORAL
Status: DISCONTINUED | OUTPATIENT
Start: 2017-01-01 | End: 2017-01-01 | Stop reason: HOSPADM

## 2017-01-01 RX ORDER — CARVEDILOL 6.25 MG/1
6.25 TABLET ORAL 2 TIMES DAILY WITH MEALS
Status: DISCONTINUED | OUTPATIENT
Start: 2017-01-01 | End: 2017-01-01

## 2017-01-01 RX ORDER — SODIUM CHLORIDE 9 MG/ML
1000 INJECTION, SOLUTION INTRAVENOUS ONCE
Status: COMPLETED | OUTPATIENT
Start: 2017-01-01 | End: 2017-01-01

## 2017-01-01 RX ORDER — ASPIRIN 81 MG/1
81 TABLET, CHEWABLE ORAL DAILY
COMMUNITY

## 2017-01-01 RX ORDER — CEFAZOLIN SODIUM 1 G/3ML
INJECTION, POWDER, FOR SOLUTION INTRAMUSCULAR; INTRAVENOUS
Status: COMPLETED
Start: 2017-01-01 | End: 2017-01-01

## 2017-01-01 RX ORDER — FUROSEMIDE 10 MG/ML
40 INJECTION INTRAMUSCULAR; INTRAVENOUS ONCE
Status: CANCELLED | OUTPATIENT
Start: 2017-01-01 | End: 2017-01-01

## 2017-01-01 RX ORDER — ACETAMINOPHEN 325 MG/1
650 TABLET ORAL ONCE
Status: DISCONTINUED | OUTPATIENT
Start: 2017-01-01 | End: 2017-01-01 | Stop reason: HOSPADM

## 2017-01-01 RX ORDER — OXYCODONE HYDROCHLORIDE 5 MG/1
5 TABLET ORAL
Status: DISCONTINUED | OUTPATIENT
Start: 2017-01-01 | End: 2017-01-01

## 2017-01-01 RX ORDER — ACETAMINOPHEN 325 MG/1
650 TABLET ORAL
Status: CANCELLED | OUTPATIENT
Start: 2017-01-01

## 2017-01-01 RX ORDER — POTASSIUM CHLORIDE 29.8 MG/ML
40 INJECTION INTRAVENOUS ONCE
Status: COMPLETED | OUTPATIENT
Start: 2017-01-01 | End: 2017-01-01

## 2017-01-01 RX ORDER — POTASSIUM CHLORIDE 20 MEQ/1
40 TABLET, EXTENDED RELEASE ORAL DAILY
Status: DISCONTINUED | OUTPATIENT
Start: 2017-01-01 | End: 2017-01-01

## 2017-01-01 RX ORDER — ATROPINE SULFATE 10 MG/ML
2 SOLUTION/ DROPS OPHTHALMIC EVERY 4 HOURS PRN
Status: DISCONTINUED | OUTPATIENT
Start: 2017-01-01 | End: 2017-01-01 | Stop reason: HOSPADM

## 2017-01-01 RX ORDER — POTASSIUM CHLORIDE 20 MEQ/1
40 TABLET, EXTENDED RELEASE ORAL 2 TIMES DAILY
Status: DISCONTINUED | OUTPATIENT
Start: 2017-01-01 | End: 2017-01-01

## 2017-01-01 RX ORDER — OXYCODONE HCL 20 MG/1
20 TABLET, FILM COATED, EXTENDED RELEASE ORAL EVERY 12 HOURS
Status: DISCONTINUED | OUTPATIENT
Start: 2017-01-01 | End: 2017-01-01 | Stop reason: HOSPADM

## 2017-01-01 RX ORDER — OMEPRAZOLE 20 MG/1
20 CAPSULE, DELAYED RELEASE ORAL EVERY MORNING
Status: DISCONTINUED | OUTPATIENT
Start: 2017-01-01 | End: 2017-01-01 | Stop reason: HOSPADM

## 2017-01-01 RX ORDER — MORPHINE SULFATE 4 MG/ML
2 INJECTION, SOLUTION INTRAMUSCULAR; INTRAVENOUS
Status: DISCONTINUED | OUTPATIENT
Start: 2017-01-01 | End: 2017-01-01 | Stop reason: HOSPADM

## 2017-01-01 RX ORDER — CIPROFLOXACIN 500 MG/1
500 TABLET, FILM COATED ORAL 2 TIMES DAILY
Status: DISCONTINUED | OUTPATIENT
Start: 2017-01-01 | End: 2017-01-01

## 2017-01-01 RX ORDER — ONDANSETRON 2 MG/ML
4 INJECTION INTRAMUSCULAR; INTRAVENOUS EVERY 8 HOURS PRN
Status: DISCONTINUED | OUTPATIENT
Start: 2017-01-01 | End: 2017-01-01 | Stop reason: HOSPADM

## 2017-01-01 RX ORDER — ONDANSETRON 4 MG/1
4 TABLET, ORALLY DISINTEGRATING ORAL EVERY 4 HOURS PRN
Status: DISCONTINUED | OUTPATIENT
Start: 2017-01-01 | End: 2017-01-01 | Stop reason: HOSPADM

## 2017-01-01 RX ORDER — POTASSIUM CHLORIDE 20 MEQ/1
40 TABLET, EXTENDED RELEASE ORAL ONCE
Status: DISCONTINUED | OUTPATIENT
Start: 2017-01-01 | End: 2017-01-01

## 2017-01-01 RX ORDER — POLYETHYLENE GLYCOL 3350 17 G/17G
1 POWDER, FOR SOLUTION ORAL
Status: DISCONTINUED | OUTPATIENT
Start: 2017-01-01 | End: 2017-01-01

## 2017-01-01 RX ORDER — AMOXICILLIN 250 MG
2 CAPSULE ORAL 2 TIMES DAILY
Status: DISCONTINUED | OUTPATIENT
Start: 2017-01-01 | End: 2017-01-01 | Stop reason: HOSPADM

## 2017-01-01 RX ORDER — FLUCONAZOLE 100 MG/1
100 TABLET ORAL DAILY
Status: DISCONTINUED | OUTPATIENT
Start: 2017-01-01 | End: 2017-01-01

## 2017-01-01 RX ORDER — SODIUM CHLORIDE 9 MG/ML
INJECTION, SOLUTION INTRAVENOUS
Status: COMPLETED
Start: 2017-01-01 | End: 2017-01-01

## 2017-01-01 RX ORDER — SODIUM CHLORIDE 9 MG/ML
250 INJECTION, SOLUTION INTRAVENOUS CONTINUOUS
Status: DISCONTINUED | OUTPATIENT
Start: 2017-01-01 | End: 2017-01-01

## 2017-01-01 RX ORDER — POTASSIUM CHLORIDE 7.45 MG/ML
10 INJECTION INTRAVENOUS
Status: COMPLETED | OUTPATIENT
Start: 2017-01-01 | End: 2017-01-01

## 2017-01-01 RX ORDER — CARVEDILOL 3.12 MG/1
3.12 TABLET ORAL 2 TIMES DAILY WITH MEALS
COMMUNITY

## 2017-01-01 RX ORDER — HYDROCODONE BITARTRATE AND ACETAMINOPHEN 10; 325 MG/1; MG/1
1-2 TABLET ORAL EVERY 6 HOURS PRN
COMMUNITY

## 2017-01-01 RX ORDER — MIDAZOLAM HYDROCHLORIDE 1 MG/ML
.5-2 INJECTION INTRAMUSCULAR; INTRAVENOUS PRN
Status: DISCONTINUED | OUTPATIENT
Start: 2017-01-01 | End: 2017-01-01 | Stop reason: HOSPADM

## 2017-01-01 RX ORDER — ALUMINA, MAGNESIA, AND SIMETHICONE 2400; 2400; 240 MG/30ML; MG/30ML; MG/30ML
10 SUSPENSION ORAL 4 TIMES DAILY PRN
Status: DISCONTINUED | OUTPATIENT
Start: 2017-01-01 | End: 2017-01-01

## 2017-01-01 RX ORDER — METHYLPREDNISOLONE SODIUM SUCCINATE 40 MG/ML
40 INJECTION, POWDER, LYOPHILIZED, FOR SOLUTION INTRAMUSCULAR; INTRAVENOUS EVERY 6 HOURS
Status: DISCONTINUED | OUTPATIENT
Start: 2017-01-01 | End: 2017-01-01

## 2017-01-01 RX ORDER — FUROSEMIDE 10 MG/ML
40 INJECTION INTRAMUSCULAR; INTRAVENOUS ONCE
Status: COMPLETED | OUTPATIENT
Start: 2017-01-01 | End: 2017-01-01

## 2017-01-01 RX ORDER — DIPHENHYDRAMINE HCL 25 MG
25 TABLET ORAL ONCE
Status: CANCELLED | OUTPATIENT
Start: 2017-01-01 | End: 2017-01-01

## 2017-01-01 RX ORDER — ATORVASTATIN CALCIUM 40 MG/1
40 TABLET, FILM COATED ORAL NIGHTLY
Status: DISCONTINUED | OUTPATIENT
Start: 2017-01-01 | End: 2017-01-01

## 2017-01-01 RX ORDER — ACYCLOVIR 800 MG/1
400 TABLET ORAL 2 TIMES DAILY
Status: DISCONTINUED | OUTPATIENT
Start: 2017-01-01 | End: 2017-01-01

## 2017-01-01 RX ORDER — OXYCODONE HYDROCHLORIDE 5 MG/1
2.5 TABLET ORAL
Status: DISCONTINUED | OUTPATIENT
Start: 2017-01-01 | End: 2017-01-01

## 2017-01-01 RX ORDER — ALLOPURINOL 100 MG/1
300 TABLET ORAL DAILY
Status: DISCONTINUED | OUTPATIENT
Start: 2017-01-01 | End: 2017-01-01

## 2017-01-01 RX ORDER — ONDANSETRON 2 MG/ML
4 INJECTION INTRAMUSCULAR; INTRAVENOUS PRN
Status: DISCONTINUED | OUTPATIENT
Start: 2017-01-01 | End: 2017-01-01 | Stop reason: HOSPADM

## 2017-01-01 RX ORDER — BISACODYL 10 MG
10 SUPPOSITORY, RECTAL RECTAL
Status: DISCONTINUED | OUTPATIENT
Start: 2017-01-01 | End: 2017-01-01

## 2017-01-01 RX ORDER — MIDAZOLAM HYDROCHLORIDE 1 MG/ML
.5-2 INJECTION INTRAMUSCULAR; INTRAVENOUS PRN
Status: ACTIVE | OUTPATIENT
Start: 2017-01-01 | End: 2017-01-01

## 2017-01-01 RX ORDER — CARVEDILOL 3.12 MG/1
3.12 TABLET ORAL 2 TIMES DAILY WITH MEALS
Status: DISCONTINUED | OUTPATIENT
Start: 2017-01-01 | End: 2017-01-01

## 2017-01-01 RX ADMIN — CEFEPIME 2 G: 2 INJECTION, POWDER, FOR SOLUTION INTRAMUSCULAR; INTRAVENOUS at 09:31

## 2017-01-01 RX ADMIN — OXYCODONE HYDROCHLORIDE 20 MG: 20 TABLET, FILM COATED, EXTENDED RELEASE ORAL at 21:45

## 2017-01-01 RX ADMIN — STANDARDIZED SENNA CONCENTRATE AND DOCUSATE SODIUM 2 TABLET: 8.6; 5 TABLET, FILM COATED ORAL at 20:17

## 2017-01-01 RX ADMIN — ACYCLOVIR 400 MG: 800 TABLET ORAL at 20:40

## 2017-01-01 RX ADMIN — STANDARDIZED SENNA CONCENTRATE AND DOCUSATE SODIUM 2 TABLET: 8.6; 5 TABLET, FILM COATED ORAL at 08:42

## 2017-01-01 RX ADMIN — OXYCODONE HYDROCHLORIDE 10 MG: 10 TABLET ORAL at 08:52

## 2017-01-01 RX ADMIN — CARVEDILOL 3.12 MG: 3.12 TABLET, FILM COATED ORAL at 06:39

## 2017-01-01 RX ADMIN — MEROPENEM 500 MG: 500 INJECTION, POWDER, FOR SOLUTION INTRAVENOUS at 18:18

## 2017-01-01 RX ADMIN — OXYCODONE HYDROCHLORIDE 10 MG: 10 TABLET ORAL at 17:01

## 2017-01-01 RX ADMIN — OXYCODONE HYDROCHLORIDE 10 MG: 10 TABLET ORAL at 02:51

## 2017-01-01 RX ADMIN — ATORVASTATIN CALCIUM 40 MG: 40 TABLET, FILM COATED ORAL at 20:38

## 2017-01-01 RX ADMIN — MEROPENEM 500 MG: 500 INJECTION, POWDER, FOR SOLUTION INTRAVENOUS at 06:00

## 2017-01-01 RX ADMIN — OXYCODONE HYDROCHLORIDE 20 MG: 20 TABLET, FILM COATED, EXTENDED RELEASE ORAL at 07:51

## 2017-01-01 RX ADMIN — ACETAMINOPHEN 650 MG: 325 TABLET, FILM COATED ORAL at 01:09

## 2017-01-01 RX ADMIN — OMEPRAZOLE 20 MG: 20 CAPSULE, DELAYED RELEASE ORAL at 08:26

## 2017-01-01 RX ADMIN — OXYCODONE HYDROCHLORIDE 10 MG: 10 TABLET ORAL at 11:56

## 2017-01-01 RX ADMIN — AZACITIDINE 128.3 MG: 100 INJECTION, POWDER, LYOPHILIZED, FOR SOLUTION INTRAVENOUS; SUBCUTANEOUS at 16:26

## 2017-01-01 RX ADMIN — STANDARDIZED SENNA CONCENTRATE AND DOCUSATE SODIUM 2 TABLET: 8.6; 5 TABLET, FILM COATED ORAL at 20:26

## 2017-01-01 RX ADMIN — CEFEPIME 2 G: 2 INJECTION, POWDER, FOR SOLUTION INTRAMUSCULAR; INTRAVENOUS at 14:17

## 2017-01-01 RX ADMIN — CEFEPIME 2 G: 2 INJECTION, POWDER, FOR SOLUTION INTRAMUSCULAR; INTRAVENOUS at 20:49

## 2017-01-01 RX ADMIN — AZACITIDINE 129.8 MG: 100 INJECTION, POWDER, LYOPHILIZED, FOR SOLUTION INTRAVENOUS; SUBCUTANEOUS at 16:20

## 2017-01-01 RX ADMIN — OXYCODONE HYDROCHLORIDE 10 MG: 10 TABLET ORAL at 12:12

## 2017-01-01 RX ADMIN — ACYCLOVIR 400 MG: 800 TABLET ORAL at 14:17

## 2017-01-01 RX ADMIN — LIDOCAINE HYDROCHLORIDE: 10 INJECTION, SOLUTION INFILTRATION; PERINEURAL at 13:55

## 2017-01-01 RX ADMIN — ACYCLOVIR 400 MG: 800 TABLET ORAL at 08:34

## 2017-01-01 RX ADMIN — OXYCODONE HYDROCHLORIDE 10 MG: 10 TABLET ORAL at 06:39

## 2017-01-01 RX ADMIN — ALLOPURINOL 300 MG: 300 TABLET ORAL at 08:33

## 2017-01-01 RX ADMIN — ONDANSETRON 4 MG: 2 INJECTION INTRAMUSCULAR; INTRAVENOUS at 00:16

## 2017-01-01 RX ADMIN — ALLOPURINOL 300 MG: 300 TABLET ORAL at 08:40

## 2017-01-01 RX ADMIN — AZACITIDINE 129.8 MG: 100 INJECTION, POWDER, LYOPHILIZED, FOR SOLUTION INTRAVENOUS; SUBCUTANEOUS at 16:50

## 2017-01-01 RX ADMIN — ACYCLOVIR 400 MG: 800 TABLET ORAL at 08:53

## 2017-01-01 RX ADMIN — OXYCODONE HYDROCHLORIDE 5 MG: 5 TABLET ORAL at 19:00

## 2017-01-01 RX ADMIN — HEPARIN 500 UNITS: 100 SYRINGE at 11:01

## 2017-01-01 RX ADMIN — OXYCODONE HYDROCHLORIDE 20 MG: 20 TABLET, FILM COATED, EXTENDED RELEASE ORAL at 08:21

## 2017-01-01 RX ADMIN — CARVEDILOL 6.25 MG: 6.25 TABLET, FILM COATED ORAL at 17:58

## 2017-01-01 RX ADMIN — OMEPRAZOLE 20 MG: 20 CAPSULE, DELAYED RELEASE ORAL at 09:11

## 2017-01-01 RX ADMIN — ATORVASTATIN CALCIUM 40 MG: 40 TABLET, FILM COATED ORAL at 21:26

## 2017-01-01 RX ADMIN — ACYCLOVIR 400 MG: 800 TABLET ORAL at 20:34

## 2017-01-01 RX ADMIN — ONDANSETRON 8 MG: 2 INJECTION, SOLUTION INTRAMUSCULAR; INTRAVENOUS at 15:32

## 2017-01-01 RX ADMIN — CEFEPIME 2 G: 2 INJECTION, POWDER, FOR SOLUTION INTRAMUSCULAR; INTRAVENOUS at 08:04

## 2017-01-01 RX ADMIN — OXYCODONE HYDROCHLORIDE 10 MG: 10 TABLET ORAL at 18:15

## 2017-01-01 RX ADMIN — CEFEPIME 2 G: 2 INJECTION, POWDER, FOR SOLUTION INTRAMUSCULAR; INTRAVENOUS at 20:42

## 2017-01-01 RX ADMIN — OMEPRAZOLE 20 MG: 20 CAPSULE, DELAYED RELEASE ORAL at 08:55

## 2017-01-01 RX ADMIN — ATORVASTATIN CALCIUM 40 MG: 40 TABLET, FILM COATED ORAL at 20:09

## 2017-01-01 RX ADMIN — STANDARDIZED SENNA CONCENTRATE AND DOCUSATE SODIUM 2 TABLET: 8.6; 5 TABLET, FILM COATED ORAL at 09:03

## 2017-01-01 RX ADMIN — ACYCLOVIR 400 MG: 800 TABLET ORAL at 08:56

## 2017-01-01 RX ADMIN — ACYCLOVIR 400 MG: 800 TABLET ORAL at 07:30

## 2017-01-01 RX ADMIN — OXYCODONE HYDROCHLORIDE 10 MG: 10 TABLET ORAL at 14:49

## 2017-01-01 RX ADMIN — OXYCODONE HYDROCHLORIDE 10 MG: 10 TABLET ORAL at 17:57

## 2017-01-01 RX ADMIN — OMEPRAZOLE 20 MG: 20 CAPSULE, DELAYED RELEASE ORAL at 07:44

## 2017-01-01 RX ADMIN — CARVEDILOL 3.12 MG: 3.12 TABLET, FILM COATED ORAL at 08:42

## 2017-01-01 RX ADMIN — ALLOPURINOL 300 MG: 300 TABLET ORAL at 09:40

## 2017-01-01 RX ADMIN — LIDOCAINE HYDROCHLORIDE: 10 INJECTION, SOLUTION INFILTRATION; PERINEURAL at 10:15

## 2017-01-01 RX ADMIN — VORICONAZOLE 270 MG: 10 INJECTION, POWDER, LYOPHILIZED, FOR SOLUTION INTRAVENOUS at 20:52

## 2017-01-01 RX ADMIN — OXYCODONE HYDROCHLORIDE 10 MG: 10 TABLET, FILM COATED, EXTENDED RELEASE ORAL at 09:23

## 2017-01-01 RX ADMIN — POTASSIUM CHLORIDE 40 MEQ: 1500 TABLET, EXTENDED RELEASE ORAL at 09:41

## 2017-01-01 RX ADMIN — STANDARDIZED SENNA CONCENTRATE AND DOCUSATE SODIUM 2 TABLET: 8.6; 5 TABLET, FILM COATED ORAL at 09:23

## 2017-01-01 RX ADMIN — CEFEPIME 2 G: 2 INJECTION, POWDER, FOR SOLUTION INTRAMUSCULAR; INTRAVENOUS at 20:24

## 2017-01-01 RX ADMIN — ALLOPURINOL 300 MG: 300 TABLET ORAL at 09:50

## 2017-01-01 RX ADMIN — STANDARDIZED SENNA CONCENTRATE AND DOCUSATE SODIUM 2 TABLET: 8.6; 5 TABLET, FILM COATED ORAL at 08:49

## 2017-01-01 RX ADMIN — OXYCODONE HYDROCHLORIDE 20 MG: 20 TABLET, FILM COATED, EXTENDED RELEASE ORAL at 20:35

## 2017-01-01 RX ADMIN — OXYCODONE HYDROCHLORIDE 20 MG: 20 TABLET, FILM COATED, EXTENDED RELEASE ORAL at 20:46

## 2017-01-01 RX ADMIN — OXYCODONE HYDROCHLORIDE 20 MG: 20 TABLET, FILM COATED, EXTENDED RELEASE ORAL at 08:47

## 2017-01-01 RX ADMIN — ACETAMINOPHEN 650 MG: 325 TABLET, FILM COATED ORAL at 05:02

## 2017-01-01 RX ADMIN — OMEPRAZOLE 20 MG: 20 CAPSULE, DELAYED RELEASE ORAL at 09:04

## 2017-01-01 RX ADMIN — SODIUM CHLORIDE: 9 INJECTION, SOLUTION INTRAVENOUS at 20:57

## 2017-01-01 RX ADMIN — SODIUM CHLORIDE: 9 INJECTION, SOLUTION INTRAVENOUS at 00:02

## 2017-01-01 RX ADMIN — CEFEPIME 2 G: 2 INJECTION, POWDER, FOR SOLUTION INTRAMUSCULAR; INTRAVENOUS at 20:14

## 2017-01-01 RX ADMIN — OXYCODONE HYDROCHLORIDE 10 MG: 10 TABLET ORAL at 15:23

## 2017-01-01 RX ADMIN — FUROSEMIDE 40 MG: 40 TABLET ORAL at 09:11

## 2017-01-01 RX ADMIN — STANDARDIZED SENNA CONCENTRATE AND DOCUSATE SODIUM 2 TABLET: 8.6; 5 TABLET, FILM COATED ORAL at 08:54

## 2017-01-01 RX ADMIN — OXYCODONE HYDROCHLORIDE 10 MG: 10 TABLET ORAL at 00:15

## 2017-01-01 RX ADMIN — SODIUM CHLORIDE: 9 INJECTION, SOLUTION INTRAVENOUS at 13:06

## 2017-01-01 RX ADMIN — OXYCODONE HYDROCHLORIDE 10 MG: 10 TABLET ORAL at 09:13

## 2017-01-01 RX ADMIN — CARVEDILOL 6.25 MG: 6.25 TABLET, FILM COATED ORAL at 08:17

## 2017-01-01 RX ADMIN — OXYCODONE HYDROCHLORIDE 10 MG: 10 TABLET ORAL at 17:39

## 2017-01-01 RX ADMIN — METHYLPREDNISOLONE SODIUM SUCCINATE 40 MG: 40 INJECTION, POWDER, FOR SOLUTION INTRAMUSCULAR; INTRAVENOUS at 14:25

## 2017-01-01 RX ADMIN — CARVEDILOL 6.25 MG: 6.25 TABLET, FILM COATED ORAL at 17:16

## 2017-01-01 RX ADMIN — ALLOPURINOL 300 MG: 300 TABLET ORAL at 09:51

## 2017-01-01 RX ADMIN — ATORVASTATIN CALCIUM 40 MG: 40 TABLET, FILM COATED ORAL at 20:48

## 2017-01-01 RX ADMIN — ACYCLOVIR 400 MG: 800 TABLET ORAL at 07:43

## 2017-01-01 RX ADMIN — OXYCODONE HYDROCHLORIDE 20 MG: 20 TABLET, FILM COATED, EXTENDED RELEASE ORAL at 20:26

## 2017-01-01 RX ADMIN — ACETAMINOPHEN 650 MG: 325 TABLET, FILM COATED ORAL at 06:34

## 2017-01-01 RX ADMIN — SODIUM CHLORIDE: 9 INJECTION, SOLUTION INTRAVENOUS at 18:10

## 2017-01-01 RX ADMIN — CARVEDILOL 3.12 MG: 3.12 TABLET, FILM COATED ORAL at 17:13

## 2017-01-01 RX ADMIN — ACYCLOVIR 400 MG: 800 TABLET ORAL at 22:07

## 2017-01-01 RX ADMIN — ACYCLOVIR 400 MG: 800 TABLET ORAL at 21:21

## 2017-01-01 RX ADMIN — OMEPRAZOLE 20 MG: 20 CAPSULE, DELAYED RELEASE ORAL at 09:13

## 2017-01-01 RX ADMIN — ACYCLOVIR 400 MG: 800 TABLET ORAL at 08:40

## 2017-01-01 RX ADMIN — OXYCODONE HYDROCHLORIDE 20 MG: 20 TABLET, FILM COATED, EXTENDED RELEASE ORAL at 08:10

## 2017-01-01 RX ADMIN — ONDANSETRON 4 MG: 4 TABLET, ORALLY DISINTEGRATING ORAL at 15:38

## 2017-01-01 RX ADMIN — OXYCODONE HYDROCHLORIDE 10 MG: 10 TABLET ORAL at 15:17

## 2017-01-01 RX ADMIN — ALLOPURINOL 300 MG: 300 TABLET ORAL at 08:21

## 2017-01-01 RX ADMIN — STANDARDIZED SENNA CONCENTRATE AND DOCUSATE SODIUM 2 TABLET: 8.6; 5 TABLET, FILM COATED ORAL at 20:39

## 2017-01-01 RX ADMIN — STANDARDIZED SENNA CONCENTRATE AND DOCUSATE SODIUM 2 TABLET: 8.6; 5 TABLET, FILM COATED ORAL at 08:46

## 2017-01-01 RX ADMIN — MEROPENEM 500 MG: 500 INJECTION, POWDER, FOR SOLUTION INTRAVENOUS at 23:07

## 2017-01-01 RX ADMIN — Medication 10 MG/HR: at 10:17

## 2017-01-01 RX ADMIN — ALLOPURINOL 300 MG: 300 TABLET ORAL at 08:47

## 2017-01-01 RX ADMIN — ACYCLOVIR 400 MG: 800 TABLET ORAL at 20:18

## 2017-01-01 RX ADMIN — CARVEDILOL 3.12 MG: 3.12 TABLET, FILM COATED ORAL at 18:09

## 2017-01-01 RX ADMIN — OXYCODONE HYDROCHLORIDE 20 MG: 20 TABLET, FILM COATED, EXTENDED RELEASE ORAL at 08:16

## 2017-01-01 RX ADMIN — MEROPENEM 500 MG: 500 INJECTION, POWDER, FOR SOLUTION INTRAVENOUS at 00:49

## 2017-01-01 RX ADMIN — STANDARDIZED SENNA CONCENTRATE AND DOCUSATE SODIUM 2 TABLET: 8.6; 5 TABLET, FILM COATED ORAL at 09:12

## 2017-01-01 RX ADMIN — SULFAMETHOXAZOLE AND TRIMETHOPRIM 1 TABLET: 800; 160 TABLET ORAL at 08:22

## 2017-01-01 RX ADMIN — SODIUM CHLORIDE: 9 INJECTION, SOLUTION INTRAVENOUS at 14:20

## 2017-01-01 RX ADMIN — SODIUM CHLORIDE 1000 ML: 9 INJECTION, SOLUTION INTRAVENOUS at 10:50

## 2017-01-01 RX ADMIN — OXYCODONE HYDROCHLORIDE 20 MG: 20 TABLET, FILM COATED, EXTENDED RELEASE ORAL at 20:45

## 2017-01-01 RX ADMIN — STANDARDIZED SENNA CONCENTRATE AND DOCUSATE SODIUM 2 TABLET: 8.6; 5 TABLET, FILM COATED ORAL at 07:43

## 2017-01-01 RX ADMIN — OXYCODONE HYDROCHLORIDE 20 MG: 20 TABLET, FILM COATED, EXTENDED RELEASE ORAL at 22:07

## 2017-01-01 RX ADMIN — CEFEPIME 2 G: 2 INJECTION, POWDER, FOR SOLUTION INTRAMUSCULAR; INTRAVENOUS at 08:53

## 2017-01-01 RX ADMIN — STANDARDIZED SENNA CONCENTRATE AND DOCUSATE SODIUM 2 TABLET: 8.6; 5 TABLET, FILM COATED ORAL at 08:45

## 2017-01-01 RX ADMIN — SODIUM CHLORIDE: 9 INJECTION, SOLUTION INTRAVENOUS at 18:21

## 2017-01-01 RX ADMIN — FUROSEMIDE 40 MG: 40 TABLET ORAL at 15:58

## 2017-01-01 RX ADMIN — VORICONAZOLE 270 MG: 10 INJECTION, POWDER, LYOPHILIZED, FOR SOLUTION INTRAVENOUS at 09:23

## 2017-01-01 RX ADMIN — OXYCODONE HYDROCHLORIDE 5 MG: 5 TABLET ORAL at 19:45

## 2017-01-01 RX ADMIN — CEFEPIME 2 G: 2 INJECTION, POWDER, FOR SOLUTION INTRAMUSCULAR; INTRAVENOUS at 20:17

## 2017-01-01 RX ADMIN — FUROSEMIDE 40 MG: 40 TABLET ORAL at 18:46

## 2017-01-01 RX ADMIN — ATORVASTATIN CALCIUM 40 MG: 40 TABLET, FILM COATED ORAL at 20:35

## 2017-01-01 RX ADMIN — OMEPRAZOLE 20 MG: 20 CAPSULE, DELAYED RELEASE ORAL at 08:41

## 2017-01-01 RX ADMIN — CARVEDILOL 3.12 MG: 3.12 TABLET, FILM COATED ORAL at 09:01

## 2017-01-01 RX ADMIN — ONDANSETRON 4 MG: 2 INJECTION INTRAMUSCULAR; INTRAVENOUS at 22:12

## 2017-01-01 RX ADMIN — ACYCLOVIR 400 MG: 800 TABLET ORAL at 08:54

## 2017-01-01 RX ADMIN — AZACITIDINE 129.8 MG: 100 INJECTION, POWDER, LYOPHILIZED, FOR SOLUTION INTRAVENOUS; SUBCUTANEOUS at 17:05

## 2017-01-01 RX ADMIN — CARVEDILOL 3.12 MG: 3.12 TABLET, FILM COATED ORAL at 17:39

## 2017-01-01 RX ADMIN — ATORVASTATIN CALCIUM 40 MG: 40 TABLET, FILM COATED ORAL at 20:33

## 2017-01-01 RX ADMIN — AZACITIDINE 131.3 MG: 100 INJECTION, POWDER, LYOPHILIZED, FOR SOLUTION INTRAVENOUS; SUBCUTANEOUS at 17:10

## 2017-01-01 RX ADMIN — CARVEDILOL 3.12 MG: 3.12 TABLET, FILM COATED ORAL at 08:54

## 2017-01-01 RX ADMIN — STANDARDIZED SENNA CONCENTRATE AND DOCUSATE SODIUM 1 TABLET: 8.6; 5 TABLET, FILM COATED ORAL at 08:56

## 2017-01-01 RX ADMIN — OXYCODONE HYDROCHLORIDE 10 MG: 10 TABLET ORAL at 09:54

## 2017-01-01 RX ADMIN — POTASSIUM CHLORIDE 10 MEQ: 10 INJECTION, SOLUTION INTRAVENOUS at 10:31

## 2017-01-01 RX ADMIN — ACYCLOVIR 400 MG: 800 TABLET ORAL at 09:28

## 2017-01-01 RX ADMIN — CARVEDILOL 6.25 MG: 6.25 TABLET, FILM COATED ORAL at 18:46

## 2017-01-01 RX ADMIN — SODIUM CHLORIDE: 9 INJECTION, SOLUTION INTRAVENOUS at 18:00

## 2017-01-01 RX ADMIN — ONDANSETRON 4 MG: 4 TABLET, ORALLY DISINTEGRATING ORAL at 21:46

## 2017-01-01 RX ADMIN — TEMAZEPAM 15 MG: 15 CAPSULE ORAL at 20:44

## 2017-01-01 RX ADMIN — ACYCLOVIR 400 MG: 800 TABLET ORAL at 20:46

## 2017-01-01 RX ADMIN — CIPROFLOXACIN HYDROCHLORIDE 500 MG: 500 TABLET, FILM COATED ORAL at 21:22

## 2017-01-01 RX ADMIN — OXYCODONE HYDROCHLORIDE 20 MG: 20 TABLET, FILM COATED, EXTENDED RELEASE ORAL at 09:52

## 2017-01-01 RX ADMIN — ONDANSETRON 8 MG: 2 INJECTION INTRAMUSCULAR; INTRAVENOUS at 16:20

## 2017-01-01 RX ADMIN — ALLOPURINOL 300 MG: 300 TABLET ORAL at 08:53

## 2017-01-01 RX ADMIN — CEFEPIME 2 G: 2 INJECTION, POWDER, FOR SOLUTION INTRAMUSCULAR; INTRAVENOUS at 20:48

## 2017-01-01 RX ADMIN — CARVEDILOL 3.12 MG: 3.12 TABLET, FILM COATED ORAL at 17:34

## 2017-01-01 RX ADMIN — ALLOPURINOL 300 MG: 300 TABLET ORAL at 08:54

## 2017-01-01 RX ADMIN — STANDARDIZED SENNA CONCENTRATE AND DOCUSATE SODIUM 2 TABLET: 8.6; 5 TABLET, FILM COATED ORAL at 10:04

## 2017-01-01 RX ADMIN — OXYCODONE HYDROCHLORIDE 5 MG: 5 TABLET ORAL at 08:41

## 2017-01-01 RX ADMIN — OXYCODONE HYDROCHLORIDE 10 MG: 10 TABLET, FILM COATED, EXTENDED RELEASE ORAL at 08:53

## 2017-01-01 RX ADMIN — OXYCODONE HYDROCHLORIDE 20 MG: 20 TABLET, FILM COATED, EXTENDED RELEASE ORAL at 08:40

## 2017-01-01 RX ADMIN — CARVEDILOL 3.12 MG: 3.12 TABLET, FILM COATED ORAL at 17:03

## 2017-01-01 RX ADMIN — Medication 10 MG/HR: at 14:32

## 2017-01-01 RX ADMIN — POTASSIUM CHLORIDE 40 MEQ: 1500 TABLET, EXTENDED RELEASE ORAL at 09:04

## 2017-01-01 RX ADMIN — OXYCODONE HYDROCHLORIDE 10 MG: 10 TABLET ORAL at 02:11

## 2017-01-01 RX ADMIN — CEFEPIME 2 G: 2 INJECTION, POWDER, FOR SOLUTION INTRAMUSCULAR; INTRAVENOUS at 10:34

## 2017-01-01 RX ADMIN — STANDARDIZED SENNA CONCENTRATE AND DOCUSATE SODIUM 2 TABLET: 8.6; 5 TABLET, FILM COATED ORAL at 20:48

## 2017-01-01 RX ADMIN — OXYCODONE HYDROCHLORIDE 10 MG: 10 TABLET, FILM COATED, EXTENDED RELEASE ORAL at 20:25

## 2017-01-01 RX ADMIN — ACYCLOVIR 400 MG: 800 TABLET ORAL at 08:11

## 2017-01-01 RX ADMIN — SODIUM CHLORIDE: 9 INJECTION, SOLUTION INTRAVENOUS at 20:43

## 2017-01-01 RX ADMIN — ACYCLOVIR 400 MG: 800 TABLET ORAL at 20:48

## 2017-01-01 RX ADMIN — ATORVASTATIN CALCIUM 40 MG: 40 TABLET, FILM COATED ORAL at 20:26

## 2017-01-01 RX ADMIN — OXYCODONE HYDROCHLORIDE 20 MG: 20 TABLET, FILM COATED, EXTENDED RELEASE ORAL at 09:28

## 2017-01-01 RX ADMIN — ATORVASTATIN CALCIUM 40 MG: 40 TABLET, FILM COATED ORAL at 20:34

## 2017-01-01 RX ADMIN — ATORVASTATIN CALCIUM 40 MG: 40 TABLET, FILM COATED ORAL at 20:18

## 2017-01-01 RX ADMIN — OXYCODONE HYDROCHLORIDE 5 MG: 5 TABLET ORAL at 17:08

## 2017-01-01 RX ADMIN — MEROPENEM 500 MG: 500 INJECTION, POWDER, FOR SOLUTION INTRAVENOUS at 17:33

## 2017-01-01 RX ADMIN — OXYCODONE HYDROCHLORIDE 20 MG: 20 TABLET, FILM COATED, EXTENDED RELEASE ORAL at 08:04

## 2017-01-01 RX ADMIN — POTASSIUM CHLORIDE 40 MEQ: 1500 TABLET, EXTENDED RELEASE ORAL at 08:45

## 2017-01-01 RX ADMIN — MIDAZOLAM 1 MG: 1 INJECTION INTRAMUSCULAR; INTRAVENOUS at 10:56

## 2017-01-01 RX ADMIN — ALLOPURINOL 300 MG: 300 TABLET ORAL at 07:58

## 2017-01-01 RX ADMIN — MIDAZOLAM 1 MG: 1 INJECTION INTRAMUSCULAR; INTRAVENOUS at 10:53

## 2017-01-01 RX ADMIN — OXYCODONE HYDROCHLORIDE 5 MG: 5 TABLET ORAL at 18:05

## 2017-01-01 RX ADMIN — ACYCLOVIR 400 MG: 800 TABLET ORAL at 20:10

## 2017-01-01 RX ADMIN — STANDARDIZED SENNA CONCENTRATE AND DOCUSATE SODIUM 2 TABLET: 8.6; 5 TABLET, FILM COATED ORAL at 20:40

## 2017-01-01 RX ADMIN — ONDANSETRON 4 MG: 4 TABLET, ORALLY DISINTEGRATING ORAL at 18:06

## 2017-01-01 RX ADMIN — MEROPENEM 500 MG: 500 INJECTION, POWDER, FOR SOLUTION INTRAVENOUS at 18:06

## 2017-01-01 RX ADMIN — SODIUM CHLORIDE: 9 INJECTION, SOLUTION INTRAVENOUS at 06:34

## 2017-01-01 RX ADMIN — CARVEDILOL 3.12 MG: 3.12 TABLET, FILM COATED ORAL at 17:53

## 2017-01-01 RX ADMIN — CEFEPIME 2 G: 2 INJECTION, POWDER, FOR SOLUTION INTRAMUSCULAR; INTRAVENOUS at 21:19

## 2017-01-01 RX ADMIN — FLUCONAZOLE 100 MG: 100 TABLET ORAL at 08:42

## 2017-01-01 RX ADMIN — OXYCODONE HYDROCHLORIDE 10 MG: 10 TABLET ORAL at 20:25

## 2017-01-01 RX ADMIN — CARVEDILOL 3.12 MG: 3.12 TABLET, FILM COATED ORAL at 08:53

## 2017-01-01 RX ADMIN — OXYCODONE HYDROCHLORIDE 20 MG: 20 TABLET, FILM COATED, EXTENDED RELEASE ORAL at 09:11

## 2017-01-01 RX ADMIN — OXYCODONE HYDROCHLORIDE 20 MG: 20 TABLET, FILM COATED, EXTENDED RELEASE ORAL at 08:54

## 2017-01-01 RX ADMIN — CEFEPIME 2 G: 2 INJECTION, POWDER, FOR SOLUTION INTRAMUSCULAR; INTRAVENOUS at 20:25

## 2017-01-01 RX ADMIN — CARVEDILOL 3.12 MG: 3.12 TABLET, FILM COATED ORAL at 17:18

## 2017-01-01 RX ADMIN — CARVEDILOL 3.12 MG: 3.12 TABLET, FILM COATED ORAL at 08:05

## 2017-01-01 RX ADMIN — ACYCLOVIR 400 MG: 800 TABLET ORAL at 20:39

## 2017-01-01 RX ADMIN — GADODIAMIDE 15 ML: 287 INJECTION INTRAVENOUS at 18:25

## 2017-01-01 RX ADMIN — OXYCODONE HYDROCHLORIDE 10 MG: 10 TABLET ORAL at 12:01

## 2017-01-01 RX ADMIN — BUPIVACAINE HYDROCHLORIDE AND EPINEPHRINE 30 ML: 2.5; 5 INJECTION, SOLUTION EPIDURAL; INFILTRATION; INTRACAUDAL; PERINEURAL at 11:00

## 2017-01-01 RX ADMIN — CEFEPIME 2 G: 2 INJECTION, POWDER, FOR SOLUTION INTRAMUSCULAR; INTRAVENOUS at 20:30

## 2017-01-01 RX ADMIN — OXYCODONE HYDROCHLORIDE 10 MG: 10 TABLET ORAL at 21:22

## 2017-01-01 RX ADMIN — LORAZEPAM 2 MG: 2 INJECTION INTRAMUSCULAR; INTRAVENOUS at 14:16

## 2017-01-01 RX ADMIN — OXYCODONE HYDROCHLORIDE 10 MG: 10 TABLET ORAL at 06:06

## 2017-01-01 RX ADMIN — CARVEDILOL 3.12 MG: 3.12 TABLET, FILM COATED ORAL at 21:22

## 2017-01-01 RX ADMIN — MORPHINE SULFATE 2 MG: 4 INJECTION INTRAVENOUS at 17:06

## 2017-01-01 RX ADMIN — OXYCODONE HYDROCHLORIDE 10 MG: 10 TABLET ORAL at 12:42

## 2017-01-01 RX ADMIN — CEFEPIME 2 G: 2 INJECTION, POWDER, FOR SOLUTION INTRAMUSCULAR; INTRAVENOUS at 09:57

## 2017-01-01 RX ADMIN — OXYCODONE HYDROCHLORIDE 20 MG: 20 TABLET, FILM COATED, EXTENDED RELEASE ORAL at 09:05

## 2017-01-01 RX ADMIN — OXYCODONE HYDROCHLORIDE 20 MG: 20 TABLET, FILM COATED, EXTENDED RELEASE ORAL at 20:14

## 2017-01-01 RX ADMIN — ALLOPURINOL 300 MG: 300 TABLET ORAL at 09:04

## 2017-01-01 RX ADMIN — ALLOPURINOL 300 MG: 300 TABLET ORAL at 08:27

## 2017-01-01 RX ADMIN — ACYCLOVIR 400 MG: 800 TABLET ORAL at 09:50

## 2017-01-01 RX ADMIN — CARVEDILOL 6.25 MG: 6.25 TABLET, FILM COATED ORAL at 08:10

## 2017-01-01 RX ADMIN — SODIUM CHLORIDE: 9 INJECTION, SOLUTION INTRAVENOUS at 13:00

## 2017-01-01 RX ADMIN — SULFAMETHOXAZOLE AND TRIMETHOPRIM 1 TABLET: 800; 160 TABLET ORAL at 07:59

## 2017-01-01 RX ADMIN — FUROSEMIDE 40 MG: 10 INJECTION, SOLUTION INTRAMUSCULAR; INTRAVENOUS at 09:54

## 2017-01-01 RX ADMIN — CARVEDILOL 6.25 MG: 6.25 TABLET, FILM COATED ORAL at 06:29

## 2017-01-01 RX ADMIN — OXYCODONE HYDROCHLORIDE 10 MG: 10 TABLET ORAL at 06:33

## 2017-01-01 RX ADMIN — SODIUM CHLORIDE 250 ML: 9 INJECTION, SOLUTION INTRAVENOUS at 08:44

## 2017-01-01 RX ADMIN — OXYCODONE HYDROCHLORIDE 20 MG: 20 TABLET, FILM COATED, EXTENDED RELEASE ORAL at 08:27

## 2017-01-01 RX ADMIN — ACYCLOVIR 400 MG: 800 TABLET ORAL at 20:47

## 2017-01-01 RX ADMIN — CEFEPIME 2 G: 2 INJECTION, POWDER, FOR SOLUTION INTRAMUSCULAR; INTRAVENOUS at 09:05

## 2017-01-01 RX ADMIN — ONDANSETRON 8 MG: 2 INJECTION, SOLUTION INTRAMUSCULAR; INTRAVENOUS at 16:25

## 2017-01-01 RX ADMIN — OXYCODONE HYDROCHLORIDE 20 MG: 20 TABLET, FILM COATED, EXTENDED RELEASE ORAL at 20:32

## 2017-01-01 RX ADMIN — ATORVASTATIN CALCIUM 40 MG: 40 TABLET, FILM COATED ORAL at 20:40

## 2017-01-01 RX ADMIN — CARVEDILOL 6.25 MG: 6.25 TABLET, FILM COATED ORAL at 18:37

## 2017-01-01 RX ADMIN — ALLOPURINOL 300 MG: 300 TABLET ORAL at 08:22

## 2017-01-01 RX ADMIN — ACYCLOVIR 400 MG: 800 TABLET ORAL at 09:13

## 2017-01-01 RX ADMIN — OXYCODONE HYDROCHLORIDE 20 MG: 20 TABLET, FILM COATED, EXTENDED RELEASE ORAL at 08:56

## 2017-01-01 RX ADMIN — OMEPRAZOLE 20 MG: 20 CAPSULE, DELAYED RELEASE ORAL at 09:50

## 2017-01-01 RX ADMIN — ACYCLOVIR 400 MG: 800 TABLET ORAL at 20:28

## 2017-01-01 RX ADMIN — ACYCLOVIR 400 MG: 800 TABLET ORAL at 20:14

## 2017-01-01 RX ADMIN — OXYCODONE HYDROCHLORIDE 20 MG: 20 TABLET, FILM COATED, EXTENDED RELEASE ORAL at 20:28

## 2017-01-01 RX ADMIN — OXYCODONE HYDROCHLORIDE 10 MG: 10 TABLET ORAL at 18:21

## 2017-01-01 RX ADMIN — CEFEPIME 2 G: 2 INJECTION, POWDER, FOR SOLUTION INTRAMUSCULAR; INTRAVENOUS at 08:26

## 2017-01-01 RX ADMIN — OXYCODONE HYDROCHLORIDE 20 MG: 20 TABLET, FILM COATED, EXTENDED RELEASE ORAL at 00:24

## 2017-01-01 RX ADMIN — CEFEPIME 2 G: 2 INJECTION, POWDER, FOR SOLUTION INTRAMUSCULAR; INTRAVENOUS at 08:55

## 2017-01-01 RX ADMIN — OXYCODONE HYDROCHLORIDE 20 MG: 20 TABLET, FILM COATED, EXTENDED RELEASE ORAL at 07:44

## 2017-01-01 RX ADMIN — CARVEDILOL 3.12 MG: 3.12 TABLET, FILM COATED ORAL at 08:21

## 2017-01-01 RX ADMIN — OXYCODONE HYDROCHLORIDE 20 MG: 20 TABLET, FILM COATED, EXTENDED RELEASE ORAL at 21:46

## 2017-01-01 RX ADMIN — CARVEDILOL 3.12 MG: 3.12 TABLET, FILM COATED ORAL at 08:27

## 2017-01-01 RX ADMIN — ATORVASTATIN CALCIUM 40 MG: 40 TABLET, FILM COATED ORAL at 21:22

## 2017-01-01 RX ADMIN — CEFEPIME 2 G: 2 INJECTION, POWDER, FOR SOLUTION INTRAMUSCULAR; INTRAVENOUS at 12:03

## 2017-01-01 RX ADMIN — OXYCODONE HYDROCHLORIDE 10 MG: 10 TABLET, FILM COATED, EXTENDED RELEASE ORAL at 12:01

## 2017-01-01 RX ADMIN — MORPHINE SULFATE 2 MG: 4 INJECTION INTRAVENOUS at 16:21

## 2017-01-01 RX ADMIN — CEFEPIME 2 G: 2 INJECTION, POWDER, FOR SOLUTION INTRAMUSCULAR; INTRAVENOUS at 20:41

## 2017-01-01 RX ADMIN — CEFEPIME 2 G: 2 INJECTION, POWDER, FOR SOLUTION INTRAMUSCULAR; INTRAVENOUS at 08:00

## 2017-01-01 RX ADMIN — STANDARDIZED SENNA CONCENTRATE AND DOCUSATE SODIUM 2 TABLET: 8.6; 5 TABLET, FILM COATED ORAL at 20:47

## 2017-01-01 RX ADMIN — STANDARDIZED SENNA CONCENTRATE AND DOCUSATE SODIUM 1 TABLET: 8.6; 5 TABLET, FILM COATED ORAL at 08:27

## 2017-01-01 RX ADMIN — MEROPENEM 500 MG: 500 INJECTION, POWDER, FOR SOLUTION INTRAVENOUS at 00:27

## 2017-01-01 RX ADMIN — MEROPENEM 500 MG: 500 INJECTION, POWDER, FOR SOLUTION INTRAVENOUS at 05:31

## 2017-01-01 RX ADMIN — CEFEPIME 2 G: 2 INJECTION, POWDER, FOR SOLUTION INTRAMUSCULAR; INTRAVENOUS at 20:47

## 2017-01-01 RX ADMIN — SODIUM CHLORIDE: 9 INJECTION, SOLUTION INTRAVENOUS at 06:07

## 2017-01-01 RX ADMIN — CARVEDILOL 6.25 MG: 6.25 TABLET, FILM COATED ORAL at 05:36

## 2017-01-01 RX ADMIN — OXYCODONE HYDROCHLORIDE 10 MG: 10 TABLET, FILM COATED, EXTENDED RELEASE ORAL at 21:26

## 2017-01-01 RX ADMIN — SODIUM CHLORIDE: 9 INJECTION, SOLUTION INTRAVENOUS at 21:30

## 2017-01-01 RX ADMIN — OXYCODONE HYDROCHLORIDE 10 MG: 10 TABLET ORAL at 04:51

## 2017-01-01 RX ADMIN — SODIUM CHLORIDE: 9 INJECTION, SOLUTION INTRAVENOUS at 17:16

## 2017-01-01 RX ADMIN — ALLOPURINOL 300 MG: 300 TABLET ORAL at 09:29

## 2017-01-01 RX ADMIN — STANDARDIZED SENNA CONCENTRATE AND DOCUSATE SODIUM 2 TABLET: 8.6; 5 TABLET, FILM COATED ORAL at 20:38

## 2017-01-01 RX ADMIN — ACYCLOVIR 400 MG: 800 TABLET ORAL at 20:42

## 2017-01-01 RX ADMIN — STANDARDIZED SENNA CONCENTRATE AND DOCUSATE SODIUM 2 TABLET: 8.6; 5 TABLET, FILM COATED ORAL at 09:50

## 2017-01-01 RX ADMIN — MORPHINE SULFATE 2 MG: 4 INJECTION INTRAVENOUS at 02:23

## 2017-01-01 RX ADMIN — OXYCODONE HYDROCHLORIDE 10 MG: 10 TABLET ORAL at 11:24

## 2017-01-01 RX ADMIN — OMEPRAZOLE 20 MG: 20 CAPSULE, DELAYED RELEASE ORAL at 08:17

## 2017-01-01 RX ADMIN — OXYCODONE HYDROCHLORIDE 10 MG: 10 TABLET ORAL at 06:11

## 2017-01-01 RX ADMIN — STANDARDIZED SENNA CONCENTRATE AND DOCUSATE SODIUM 2 TABLET: 8.6; 5 TABLET, FILM COATED ORAL at 09:29

## 2017-01-01 RX ADMIN — CEFEPIME 2 G: 2 INJECTION, POWDER, FOR SOLUTION INTRAMUSCULAR; INTRAVENOUS at 20:53

## 2017-01-01 RX ADMIN — OXYCODONE HYDROCHLORIDE 20 MG: 20 TABLET, FILM COATED, EXTENDED RELEASE ORAL at 21:44

## 2017-01-01 RX ADMIN — CARVEDILOL 6.25 MG: 6.25 TABLET, FILM COATED ORAL at 16:48

## 2017-01-01 RX ADMIN — FUROSEMIDE 40 MG: 10 INJECTION, SOLUTION INTRAMUSCULAR; INTRAVENOUS at 05:58

## 2017-01-01 RX ADMIN — ALLOPURINOL 300 MG: 300 TABLET ORAL at 08:17

## 2017-01-01 RX ADMIN — ACYCLOVIR 400 MG: 800 TABLET ORAL at 21:43

## 2017-01-01 RX ADMIN — OXYCODONE HYDROCHLORIDE 10 MG: 10 TABLET ORAL at 17:53

## 2017-01-01 RX ADMIN — OXYCODONE HYDROCHLORIDE 10 MG: 10 TABLET, FILM COATED, EXTENDED RELEASE ORAL at 09:13

## 2017-01-01 RX ADMIN — ACYCLOVIR 400 MG: 800 TABLET ORAL at 20:54

## 2017-01-01 RX ADMIN — MORPHINE SULFATE 2 MG: 4 INJECTION INTRAVENOUS at 21:20

## 2017-01-01 RX ADMIN — ONDANSETRON 4 MG: 4 TABLET, ORALLY DISINTEGRATING ORAL at 05:58

## 2017-01-01 RX ADMIN — OXYCODONE HYDROCHLORIDE 10 MG: 10 TABLET ORAL at 18:37

## 2017-01-01 RX ADMIN — OXYCODONE HYDROCHLORIDE 20 MG: 20 TABLET, FILM COATED, EXTENDED RELEASE ORAL at 21:21

## 2017-01-01 RX ADMIN — OXYCODONE HYDROCHLORIDE 10 MG: 10 TABLET ORAL at 17:12

## 2017-01-01 RX ADMIN — ACYCLOVIR 400 MG: 800 TABLET ORAL at 20:33

## 2017-01-01 RX ADMIN — OXYCODONE HYDROCHLORIDE 20 MG: 20 TABLET, FILM COATED, EXTENDED RELEASE ORAL at 08:20

## 2017-01-01 RX ADMIN — CARVEDILOL 6.25 MG: 6.25 TABLET, FILM COATED ORAL at 06:33

## 2017-01-01 RX ADMIN — ACETAMINOPHEN 650 MG: 325 TABLET, FILM COATED ORAL at 18:09

## 2017-01-01 RX ADMIN — OMEPRAZOLE 20 MG: 20 CAPSULE, DELAYED RELEASE ORAL at 08:19

## 2017-01-01 RX ADMIN — OMEPRAZOLE 20 MG: 20 CAPSULE, DELAYED RELEASE ORAL at 09:51

## 2017-01-01 RX ADMIN — CEFEPIME 2 G: 2 INJECTION, POWDER, FOR SOLUTION INTRAMUSCULAR; INTRAVENOUS at 08:52

## 2017-01-01 RX ADMIN — SODIUM CHLORIDE: 9 INJECTION, SOLUTION INTRAVENOUS at 23:18

## 2017-01-01 RX ADMIN — STANDARDIZED SENNA CONCENTRATE AND DOCUSATE SODIUM 2 TABLET: 8.6; 5 TABLET, FILM COATED ORAL at 08:26

## 2017-01-01 RX ADMIN — SODIUM CHLORIDE: 9 INJECTION, SOLUTION INTRAVENOUS at 16:21

## 2017-01-01 RX ADMIN — STANDARDIZED SENNA CONCENTRATE AND DOCUSATE SODIUM 2 TABLET: 8.6; 5 TABLET, FILM COATED ORAL at 20:49

## 2017-01-01 RX ADMIN — OMEPRAZOLE 20 MG: 20 CAPSULE, DELAYED RELEASE ORAL at 08:47

## 2017-01-01 RX ADMIN — MORPHINE SULFATE 2 MG: 4 INJECTION INTRAVENOUS at 14:17

## 2017-01-01 RX ADMIN — OXYCODONE HYDROCHLORIDE 5 MG: 5 TABLET ORAL at 23:38

## 2017-01-01 RX ADMIN — ALLOPURINOL 300 MG: 300 TABLET ORAL at 07:52

## 2017-01-01 RX ADMIN — CARVEDILOL 6.25 MG: 6.25 TABLET, FILM COATED ORAL at 16:33

## 2017-01-01 RX ADMIN — STANDARDIZED SENNA CONCENTRATE AND DOCUSATE SODIUM 2 TABLET: 8.6; 5 TABLET, FILM COATED ORAL at 20:28

## 2017-01-01 RX ADMIN — OXYCODONE HYDROCHLORIDE 20 MG: 20 TABLET, FILM COATED, EXTENDED RELEASE ORAL at 20:48

## 2017-01-01 RX ADMIN — CARVEDILOL 6.25 MG: 6.25 TABLET, FILM COATED ORAL at 17:51

## 2017-01-01 RX ADMIN — OXYCODONE HYDROCHLORIDE 10 MG: 10 TABLET ORAL at 18:13

## 2017-01-01 RX ADMIN — STANDARDIZED SENNA CONCENTRATE AND DOCUSATE SODIUM 2 TABLET: 8.6; 5 TABLET, FILM COATED ORAL at 08:17

## 2017-01-01 RX ADMIN — ACYCLOVIR 400 MG: 800 TABLET ORAL at 20:49

## 2017-01-01 RX ADMIN — SODIUM CHLORIDE: 9 INJECTION, SOLUTION INTRAVENOUS at 17:53

## 2017-01-01 RX ADMIN — ONDANSETRON 4 MG: 4 TABLET, ORALLY DISINTEGRATING ORAL at 08:28

## 2017-01-01 RX ADMIN — OXYCODONE HYDROCHLORIDE 20 MG: 20 TABLET, FILM COATED, EXTENDED RELEASE ORAL at 20:54

## 2017-01-01 RX ADMIN — CARVEDILOL 3.12 MG: 3.12 TABLET, FILM COATED ORAL at 19:19

## 2017-01-01 RX ADMIN — ACYCLOVIR 400 MG: 800 TABLET ORAL at 21:45

## 2017-01-01 RX ADMIN — ACYCLOVIR 400 MG: 800 TABLET ORAL at 08:21

## 2017-01-01 RX ADMIN — ALLOPURINOL 300 MG: 300 TABLET ORAL at 09:11

## 2017-01-01 RX ADMIN — ACYCLOVIR 400 MG: 800 TABLET ORAL at 20:26

## 2017-01-01 RX ADMIN — OXYCODONE HYDROCHLORIDE 10 MG: 10 TABLET ORAL at 14:39

## 2017-01-01 RX ADMIN — ALLOPURINOL 300 MG: 300 TABLET ORAL at 09:23

## 2017-01-01 RX ADMIN — MEROPENEM 500 MG: 500 INJECTION, POWDER, FOR SOLUTION INTRAVENOUS at 12:26

## 2017-01-01 RX ADMIN — OXYCODONE HYDROCHLORIDE 10 MG: 10 TABLET ORAL at 06:09

## 2017-01-01 RX ADMIN — ONDANSETRON 4 MG: 4 TABLET, ORALLY DISINTEGRATING ORAL at 21:13

## 2017-01-01 RX ADMIN — HEPARIN 500 UNITS: 100 SYRINGE at 15:58

## 2017-01-01 RX ADMIN — CEFEPIME 2 G: 2 INJECTION, POWDER, FOR SOLUTION INTRAMUSCULAR; INTRAVENOUS at 20:29

## 2017-01-01 RX ADMIN — CEFEPIME 2 G: 2 INJECTION, POWDER, FOR SOLUTION INTRAMUSCULAR; INTRAVENOUS at 08:19

## 2017-01-01 RX ADMIN — OXYCODONE HYDROCHLORIDE 10 MG: 10 TABLET ORAL at 14:41

## 2017-01-01 RX ADMIN — ACYCLOVIR 400 MG: 800 TABLET ORAL at 09:52

## 2017-01-01 RX ADMIN — CEFEPIME 2 G: 2 INJECTION, POWDER, FOR SOLUTION INTRAMUSCULAR; INTRAVENOUS at 21:49

## 2017-01-01 RX ADMIN — OMEPRAZOLE 20 MG: 20 CAPSULE, DELAYED RELEASE ORAL at 07:30

## 2017-01-01 RX ADMIN — ACYCLOVIR 400 MG: 800 TABLET ORAL at 08:47

## 2017-01-01 RX ADMIN — STANDARDIZED SENNA CONCENTRATE AND DOCUSATE SODIUM 2 TABLET: 8.6; 5 TABLET, FILM COATED ORAL at 07:59

## 2017-01-01 RX ADMIN — OMEPRAZOLE 20 MG: 20 CAPSULE, DELAYED RELEASE ORAL at 07:53

## 2017-01-01 RX ADMIN — ACYCLOVIR 400 MG: 800 TABLET ORAL at 20:51

## 2017-01-01 RX ADMIN — CARVEDILOL 3.12 MG: 3.12 TABLET, FILM COATED ORAL at 06:41

## 2017-01-01 RX ADMIN — CEFEPIME 2 G: 2 INJECTION, POWDER, FOR SOLUTION INTRAMUSCULAR; INTRAVENOUS at 21:03

## 2017-01-01 RX ADMIN — OXYCODONE HYDROCHLORIDE 20 MG: 20 TABLET, FILM COATED, EXTENDED RELEASE ORAL at 20:44

## 2017-01-01 RX ADMIN — OMEPRAZOLE 20 MG: 20 CAPSULE, DELAYED RELEASE ORAL at 08:42

## 2017-01-01 RX ADMIN — STANDARDIZED SENNA CONCENTRATE AND DOCUSATE SODIUM 2 TABLET: 8.6; 5 TABLET, FILM COATED ORAL at 08:22

## 2017-01-01 RX ADMIN — SODIUM CHLORIDE: 9 INJECTION, SOLUTION INTRAVENOUS at 00:36

## 2017-01-01 RX ADMIN — OXYCODONE HYDROCHLORIDE 10 MG: 10 TABLET ORAL at 15:57

## 2017-01-01 RX ADMIN — OMEPRAZOLE 20 MG: 20 CAPSULE, DELAYED RELEASE ORAL at 09:40

## 2017-01-01 RX ADMIN — CARVEDILOL 6.25 MG: 6.25 TABLET, FILM COATED ORAL at 07:26

## 2017-01-01 RX ADMIN — ACYCLOVIR 400 MG: 800 TABLET ORAL at 07:59

## 2017-01-01 RX ADMIN — SODIUM CHLORIDE: 9 INJECTION, SOLUTION INTRAVENOUS at 14:48

## 2017-01-01 RX ADMIN — CARVEDILOL 6.25 MG: 6.25 TABLET, FILM COATED ORAL at 17:47

## 2017-01-01 RX ADMIN — OXYCODONE HYDROCHLORIDE 20 MG: 20 TABLET, FILM COATED, EXTENDED RELEASE ORAL at 08:46

## 2017-01-01 RX ADMIN — SODIUM CHLORIDE: 9 INJECTION, SOLUTION INTRAVENOUS at 03:37

## 2017-01-01 RX ADMIN — STANDARDIZED SENNA CONCENTRATE AND DOCUSATE SODIUM 2 TABLET: 8.6; 5 TABLET, FILM COATED ORAL at 08:10

## 2017-01-01 RX ADMIN — ALLOPURINOL 300 MG: 300 TABLET ORAL at 08:56

## 2017-01-01 RX ADMIN — MEROPENEM 500 MG: 500 INJECTION, POWDER, FOR SOLUTION INTRAVENOUS at 06:04

## 2017-01-01 RX ADMIN — STANDARDIZED SENNA CONCENTRATE AND DOCUSATE SODIUM 2 TABLET: 8.6; 5 TABLET, FILM COATED ORAL at 21:26

## 2017-01-01 RX ADMIN — ACYCLOVIR 400 MG: 800 TABLET ORAL at 08:42

## 2017-01-01 RX ADMIN — ACYCLOVIR 400 MG: 800 TABLET ORAL at 09:40

## 2017-01-01 RX ADMIN — OXYCODONE HYDROCHLORIDE 10 MG: 10 TABLET ORAL at 05:58

## 2017-01-01 RX ADMIN — SODIUM CHLORIDE: 9 INJECTION, SOLUTION INTRAVENOUS at 05:35

## 2017-01-01 RX ADMIN — ATORVASTATIN CALCIUM 40 MG: 40 TABLET, FILM COATED ORAL at 20:49

## 2017-01-01 RX ADMIN — OXYCODONE HYDROCHLORIDE 10 MG: 10 TABLET ORAL at 14:26

## 2017-01-01 RX ADMIN — CARVEDILOL 6.25 MG: 6.25 TABLET, FILM COATED ORAL at 17:31

## 2017-01-01 RX ADMIN — STANDARDIZED SENNA CONCENTRATE AND DOCUSATE SODIUM 2 TABLET: 8.6; 5 TABLET, FILM COATED ORAL at 20:18

## 2017-01-01 RX ADMIN — CEFEPIME 2 G: 2 INJECTION, POWDER, FOR SOLUTION INTRAMUSCULAR; INTRAVENOUS at 09:17

## 2017-01-01 RX ADMIN — CEFEPIME 2 G: 2 INJECTION, POWDER, FOR SOLUTION INTRAMUSCULAR; INTRAVENOUS at 12:24

## 2017-01-01 RX ADMIN — OMEPRAZOLE 20 MG: 20 CAPSULE, DELAYED RELEASE ORAL at 08:46

## 2017-01-01 RX ADMIN — ATORVASTATIN CALCIUM 40 MG: 40 TABLET, FILM COATED ORAL at 20:25

## 2017-01-01 RX ADMIN — OXYCODONE HYDROCHLORIDE 20 MG: 20 TABLET, FILM COATED, EXTENDED RELEASE ORAL at 20:38

## 2017-01-01 RX ADMIN — ACYCLOVIR 400 MG: 800 TABLET ORAL at 21:22

## 2017-01-01 RX ADMIN — OXYCODONE HYDROCHLORIDE 10 MG: 10 TABLET ORAL at 19:08

## 2017-01-01 RX ADMIN — ALLOPURINOL 300 MG: 300 TABLET ORAL at 10:04

## 2017-01-01 RX ADMIN — OXYCODONE HYDROCHLORIDE 20 MG: 20 TABLET, FILM COATED, EXTENDED RELEASE ORAL at 11:35

## 2017-01-01 RX ADMIN — ATORVASTATIN CALCIUM 40 MG: 40 TABLET, FILM COATED ORAL at 20:13

## 2017-01-01 RX ADMIN — CARVEDILOL 3.12 MG: 3.12 TABLET, FILM COATED ORAL at 16:36

## 2017-01-01 RX ADMIN — OXYCODONE HYDROCHLORIDE 10 MG: 10 TABLET ORAL at 11:44

## 2017-01-01 RX ADMIN — CIPROFLOXACIN HYDROCHLORIDE 500 MG: 500 TABLET, FILM COATED ORAL at 08:42

## 2017-01-01 RX ADMIN — ACYCLOVIR 400 MG: 800 TABLET ORAL at 20:38

## 2017-01-01 RX ADMIN — VORICONAZOLE 270 MG: 10 INJECTION, POWDER, LYOPHILIZED, FOR SOLUTION INTRAVENOUS at 20:39

## 2017-01-01 RX ADMIN — CARVEDILOL 3.12 MG: 3.12 TABLET, FILM COATED ORAL at 17:00

## 2017-01-01 RX ADMIN — OMEPRAZOLE 20 MG: 20 CAPSULE, DELAYED RELEASE ORAL at 08:53

## 2017-01-01 RX ADMIN — SULFAMETHOXAZOLE AND TRIMETHOPRIM 1 TABLET: 800; 160 TABLET ORAL at 08:11

## 2017-01-01 RX ADMIN — FUROSEMIDE 40 MG: 10 INJECTION, SOLUTION INTRAMUSCULAR; INTRAVENOUS at 12:15

## 2017-01-01 RX ADMIN — FUROSEMIDE 20 MG: 10 INJECTION, SOLUTION INTRAVENOUS at 21:19

## 2017-01-01 RX ADMIN — ATORVASTATIN CALCIUM 40 MG: 40 TABLET, FILM COATED ORAL at 20:17

## 2017-01-01 RX ADMIN — ACYCLOVIR 400 MG: 800 TABLET ORAL at 21:46

## 2017-01-01 RX ADMIN — OXYCODONE HYDROCHLORIDE 10 MG: 10 TABLET ORAL at 13:57

## 2017-01-01 RX ADMIN — SODIUM CHLORIDE: 9 INJECTION, SOLUTION INTRAVENOUS at 18:05

## 2017-01-01 RX ADMIN — SODIUM CHLORIDE: 9 INJECTION, SOLUTION INTRAVENOUS at 11:29

## 2017-01-01 RX ADMIN — ACYCLOVIR 400 MG: 800 TABLET ORAL at 20:35

## 2017-01-01 RX ADMIN — SODIUM CHLORIDE: 9 INJECTION, SOLUTION INTRAVENOUS at 02:18

## 2017-01-01 RX ADMIN — AZACITIDINE 125.3 MG: 100 INJECTION, POWDER, LYOPHILIZED, FOR SOLUTION INTRAVENOUS; SUBCUTANEOUS at 15:54

## 2017-01-01 RX ADMIN — CEFEPIME 2 G: 2 INJECTION, POWDER, FOR SOLUTION INTRAMUSCULAR; INTRAVENOUS at 08:46

## 2017-01-01 RX ADMIN — OXYCODONE HYDROCHLORIDE 10 MG: 10 TABLET ORAL at 16:12

## 2017-01-01 RX ADMIN — ONDANSETRON 8 MG: 2 INJECTION, SOLUTION INTRAMUSCULAR; INTRAVENOUS at 15:55

## 2017-01-01 RX ADMIN — ACYCLOVIR 400 MG: 800 TABLET ORAL at 21:26

## 2017-01-01 RX ADMIN — SODIUM CHLORIDE: 9 INJECTION, SOLUTION INTRAVENOUS at 05:30

## 2017-01-01 RX ADMIN — ACYCLOVIR 400 MG: 800 TABLET ORAL at 20:25

## 2017-01-01 RX ADMIN — CARVEDILOL 3.12 MG: 3.12 TABLET, FILM COATED ORAL at 09:23

## 2017-01-01 RX ADMIN — CEFEPIME 2 G: 2 INJECTION, POWDER, FOR SOLUTION INTRAMUSCULAR; INTRAVENOUS at 21:46

## 2017-01-01 RX ADMIN — OXYCODONE HYDROCHLORIDE 10 MG: 10 TABLET ORAL at 06:12

## 2017-01-01 RX ADMIN — STANDARDIZED SENNA CONCENTRATE AND DOCUSATE SODIUM 2 TABLET: 8.6; 5 TABLET, FILM COATED ORAL at 20:14

## 2017-01-01 RX ADMIN — ACYCLOVIR 400 MG: 800 TABLET ORAL at 08:41

## 2017-01-01 RX ADMIN — STANDARDIZED SENNA CONCENTRATE AND DOCUSATE SODIUM 2 TABLET: 8.6; 5 TABLET, FILM COATED ORAL at 09:41

## 2017-01-01 RX ADMIN — ATORVASTATIN CALCIUM 40 MG: 40 TABLET, FILM COATED ORAL at 20:46

## 2017-01-01 RX ADMIN — OXYCODONE HYDROCHLORIDE 20 MG: 20 TABLET, FILM COATED, EXTENDED RELEASE ORAL at 09:51

## 2017-01-01 RX ADMIN — ACYCLOVIR 400 MG: 800 TABLET ORAL at 08:16

## 2017-01-01 RX ADMIN — ATORVASTATIN CALCIUM 40 MG: 40 TABLET, FILM COATED ORAL at 20:54

## 2017-01-01 RX ADMIN — ALLOPURINOL 300 MG: 300 TABLET ORAL at 07:31

## 2017-01-01 RX ADMIN — OXYCODONE HYDROCHLORIDE 10 MG: 10 TABLET, FILM COATED, EXTENDED RELEASE ORAL at 20:42

## 2017-01-01 RX ADMIN — OXYCODONE HYDROCHLORIDE 5 MG: 5 TABLET ORAL at 05:06

## 2017-01-01 RX ADMIN — CEFEPIME 2 G: 2 INJECTION, POWDER, FOR SOLUTION INTRAMUSCULAR; INTRAVENOUS at 09:52

## 2017-01-01 RX ADMIN — CEFEPIME 2 G: 2 INJECTION, POWDER, FOR SOLUTION INTRAMUSCULAR; INTRAVENOUS at 21:26

## 2017-01-01 RX ADMIN — STANDARDIZED SENNA CONCENTRATE AND DOCUSATE SODIUM 2 TABLET: 8.6; 5 TABLET, FILM COATED ORAL at 08:33

## 2017-01-01 RX ADMIN — CARVEDILOL 3.12 MG: 3.12 TABLET, FILM COATED ORAL at 06:11

## 2017-01-01 RX ADMIN — OXYCODONE HYDROCHLORIDE 20 MG: 20 TABLET, FILM COATED, EXTENDED RELEASE ORAL at 21:03

## 2017-01-01 RX ADMIN — OXYCODONE HYDROCHLORIDE 10 MG: 10 TABLET ORAL at 00:24

## 2017-01-01 RX ADMIN — CARVEDILOL 6.25 MG: 6.25 TABLET, FILM COATED ORAL at 18:16

## 2017-01-01 RX ADMIN — OXYCODONE HYDROCHLORIDE 10 MG: 10 TABLET ORAL at 04:26

## 2017-01-01 RX ADMIN — ACYCLOVIR 400 MG: 800 TABLET ORAL at 08:46

## 2017-01-01 RX ADMIN — CARVEDILOL 3.12 MG: 3.12 TABLET, FILM COATED ORAL at 17:08

## 2017-01-01 RX ADMIN — ATORVASTATIN CALCIUM 40 MG: 40 TABLET, FILM COATED ORAL at 21:46

## 2017-01-01 RX ADMIN — ALLOPURINOL 300 MG: 300 TABLET ORAL at 07:43

## 2017-01-01 RX ADMIN — OXYCODONE HYDROCHLORIDE 10 MG: 10 TABLET ORAL at 14:23

## 2017-01-01 RX ADMIN — ONDANSETRON 8 MG: 2 INJECTION, SOLUTION INTRAMUSCULAR; INTRAVENOUS at 15:15

## 2017-01-01 RX ADMIN — ACYCLOVIR 400 MG: 800 TABLET ORAL at 08:20

## 2017-01-01 RX ADMIN — OXYCODONE HYDROCHLORIDE 10 MG: 10 TABLET ORAL at 11:55

## 2017-01-01 RX ADMIN — SODIUM CHLORIDE 1000 ML: 9 INJECTION, SOLUTION INTRAVENOUS at 13:07

## 2017-01-01 RX ADMIN — ACYCLOVIR 400 MG: 800 TABLET ORAL at 09:22

## 2017-01-01 RX ADMIN — CARVEDILOL 3.12 MG: 3.12 TABLET, FILM COATED ORAL at 18:00

## 2017-01-01 RX ADMIN — OXYCODONE HYDROCHLORIDE 10 MG: 10 TABLET ORAL at 06:34

## 2017-01-01 RX ADMIN — CEFEPIME 2 G: 2 INJECTION, POWDER, FOR SOLUTION INTRAMUSCULAR; INTRAVENOUS at 08:24

## 2017-01-01 RX ADMIN — OXYCODONE HYDROCHLORIDE 10 MG: 10 TABLET ORAL at 06:03

## 2017-01-01 RX ADMIN — OMEPRAZOLE 20 MG: 20 CAPSULE, DELAYED RELEASE ORAL at 09:29

## 2017-01-01 RX ADMIN — STANDARDIZED SENNA CONCENTRATE AND DOCUSATE SODIUM 2 TABLET: 8.6; 5 TABLET, FILM COATED ORAL at 21:47

## 2017-01-01 RX ADMIN — OMEPRAZOLE 20 MG: 20 CAPSULE, DELAYED RELEASE ORAL at 08:11

## 2017-01-01 RX ADMIN — VORICONAZOLE 400 MG: 10 INJECTION, POWDER, LYOPHILIZED, FOR SOLUTION INTRAVENOUS at 10:31

## 2017-01-01 RX ADMIN — TEMAZEPAM 15 MG: 15 CAPSULE ORAL at 21:45

## 2017-01-01 RX ADMIN — ACYCLOVIR 400 MG: 800 TABLET ORAL at 09:11

## 2017-01-01 RX ADMIN — CARVEDILOL 3.12 MG: 3.12 TABLET, FILM COATED ORAL at 17:42

## 2017-01-01 RX ADMIN — ATORVASTATIN CALCIUM 40 MG: 40 TABLET, FILM COATED ORAL at 20:47

## 2017-01-01 RX ADMIN — CARVEDILOL 6.25 MG: 6.25 TABLET, FILM COATED ORAL at 06:03

## 2017-01-01 RX ADMIN — FUROSEMIDE 40 MG: 40 TABLET ORAL at 07:26

## 2017-01-01 RX ADMIN — CEFEPIME 2 G: 2 INJECTION, POWDER, FOR SOLUTION INTRAMUSCULAR; INTRAVENOUS at 09:24

## 2017-01-01 RX ADMIN — OXYCODONE HYDROCHLORIDE 5 MG: 5 TABLET ORAL at 20:18

## 2017-01-01 RX ADMIN — ALLOPURINOL 300 MG: 300 TABLET ORAL at 08:46

## 2017-01-01 RX ADMIN — CARVEDILOL 3.12 MG: 3.12 TABLET, FILM COATED ORAL at 07:43

## 2017-01-01 RX ADMIN — ATORVASTATIN CALCIUM 40 MG: 40 TABLET, FILM COATED ORAL at 21:04

## 2017-01-01 RX ADMIN — ALUMINUM HYDROXIDE, MAGNESIUM HYDROXIDE,SIMETHICONE 10 ML: 400; 400; 40 LIQUID ORAL at 11:07

## 2017-01-01 RX ADMIN — STANDARDIZED SENNA CONCENTRATE AND DOCUSATE SODIUM 2 TABLET: 8.6; 5 TABLET, FILM COATED ORAL at 22:07

## 2017-01-01 RX ADMIN — OMEPRAZOLE 20 MG: 20 CAPSULE, DELAYED RELEASE ORAL at 07:59

## 2017-01-01 RX ADMIN — CARVEDILOL 6.25 MG: 6.25 TABLET, FILM COATED ORAL at 09:40

## 2017-01-01 RX ADMIN — OXYCODONE HYDROCHLORIDE 20 MG: 20 TABLET, FILM COATED, EXTENDED RELEASE ORAL at 09:04

## 2017-01-01 RX ADMIN — LORAZEPAM 2 MG: 2 INJECTION INTRAMUSCULAR; INTRAVENOUS at 13:20

## 2017-01-01 RX ADMIN — CARVEDILOL 3.12 MG: 3.12 TABLET, FILM COATED ORAL at 07:51

## 2017-01-01 RX ADMIN — POTASSIUM CHLORIDE 40 MEQ: 1500 TABLET, EXTENDED RELEASE ORAL at 08:42

## 2017-01-01 RX ADMIN — CEFEPIME 2 G: 2 INJECTION, POWDER, FOR SOLUTION INTRAMUSCULAR; INTRAVENOUS at 12:15

## 2017-01-01 RX ADMIN — ACYCLOVIR 400 MG: 800 TABLET ORAL at 08:04

## 2017-01-01 RX ADMIN — FUROSEMIDE 40 MG: 10 INJECTION, SOLUTION INTRAMUSCULAR; INTRAVENOUS at 08:46

## 2017-01-01 RX ADMIN — OXYCODONE HYDROCHLORIDE 10 MG: 10 TABLET ORAL at 00:03

## 2017-01-01 RX ADMIN — OMEPRAZOLE 20 MG: 20 CAPSULE, DELAYED RELEASE ORAL at 09:23

## 2017-01-01 RX ADMIN — ACYCLOVIR 400 MG: 800 TABLET ORAL at 09:54

## 2017-01-01 RX ADMIN — CARVEDILOL 6.25 MG: 6.25 TABLET, FILM COATED ORAL at 17:10

## 2017-01-01 RX ADMIN — ALLOPURINOL 300 MG: 300 TABLET ORAL at 08:05

## 2017-01-01 RX ADMIN — STANDARDIZED SENNA CONCENTRATE AND DOCUSATE SODIUM 2 TABLET: 8.6; 5 TABLET, FILM COATED ORAL at 09:51

## 2017-01-01 RX ADMIN — OMEPRAZOLE 20 MG: 20 CAPSULE, DELAYED RELEASE ORAL at 08:33

## 2017-01-01 RX ADMIN — FUROSEMIDE 20 MG: 10 INJECTION, SOLUTION INTRAVENOUS at 06:10

## 2017-01-01 RX ADMIN — CEFEPIME 2 G: 2 INJECTION, POWDER, FOR SOLUTION INTRAMUSCULAR; INTRAVENOUS at 20:59

## 2017-01-01 RX ADMIN — OXYCODONE HYDROCHLORIDE 10 MG: 10 TABLET ORAL at 13:01

## 2017-01-01 RX ADMIN — CARVEDILOL 6.25 MG: 6.25 TABLET, FILM COATED ORAL at 05:22

## 2017-01-01 RX ADMIN — ACYCLOVIR 400 MG: 800 TABLET ORAL at 08:26

## 2017-01-01 RX ADMIN — MORPHINE SULFATE 2 MG: 4 INJECTION INTRAVENOUS at 14:49

## 2017-01-01 RX ADMIN — CARVEDILOL 3.12 MG: 3.12 TABLET, FILM COATED ORAL at 17:55

## 2017-01-01 RX ADMIN — OXYCODONE HYDROCHLORIDE 5 MG: 5 TABLET ORAL at 12:22

## 2017-01-01 RX ADMIN — ATORVASTATIN CALCIUM 40 MG: 40 TABLET, FILM COATED ORAL at 20:45

## 2017-01-01 RX ADMIN — OXYCODONE HYDROCHLORIDE 10 MG: 10 TABLET ORAL at 19:17

## 2017-01-01 RX ADMIN — OXYCODONE HYDROCHLORIDE 20 MG: 20 TABLET, FILM COATED, EXTENDED RELEASE ORAL at 09:53

## 2017-01-01 RX ADMIN — OXYCODONE HYDROCHLORIDE 10 MG: 10 TABLET ORAL at 01:02

## 2017-01-01 RX ADMIN — CEFEPIME 2 G: 2 INJECTION, POWDER, FOR SOLUTION INTRAMUSCULAR; INTRAVENOUS at 08:54

## 2017-01-01 RX ADMIN — ACETAMINOPHEN 650 MG: 325 TABLET, FILM COATED ORAL at 17:49

## 2017-01-01 RX ADMIN — OXYCODONE HYDROCHLORIDE 20 MG: 20 TABLET, FILM COATED, EXTENDED RELEASE ORAL at 20:41

## 2017-01-01 RX ADMIN — CARVEDILOL 6.25 MG: 6.25 TABLET, FILM COATED ORAL at 06:17

## 2017-01-01 RX ADMIN — OXYCODONE HYDROCHLORIDE 10 MG: 10 TABLET ORAL at 10:50

## 2017-01-01 RX ADMIN — LORAZEPAM 2 MG: 2 INJECTION INTRAMUSCULAR; INTRAVENOUS at 13:46

## 2017-01-01 RX ADMIN — POTASSIUM CHLORIDE 40 MEQ: 29.8 INJECTION, SOLUTION INTRAVENOUS at 14:26

## 2017-01-01 RX ADMIN — STANDARDIZED SENNA CONCENTRATE AND DOCUSATE SODIUM 2 TABLET: 8.6; 5 TABLET, FILM COATED ORAL at 08:41

## 2017-01-01 RX ADMIN — ALLOPURINOL 300 MG: 300 TABLET ORAL at 08:10

## 2017-01-01 RX ADMIN — FENTANYL CITRATE 50 MCG: 50 INJECTION, SOLUTION INTRAMUSCULAR; INTRAVENOUS at 10:53

## 2017-01-01 RX ADMIN — HEPARIN 500 UNITS: 100 SYRINGE at 16:40

## 2017-01-01 RX ADMIN — OMEPRAZOLE 20 MG: 20 CAPSULE, DELAYED RELEASE ORAL at 09:05

## 2017-01-01 RX ADMIN — CARVEDILOL 3.12 MG: 3.12 TABLET, FILM COATED ORAL at 17:12

## 2017-01-01 RX ADMIN — ACETAMINOPHEN 650 MG: 325 TABLET, FILM COATED ORAL at 14:10

## 2017-01-01 RX ADMIN — ONDANSETRON 4 MG: 4 TABLET, ORALLY DISINTEGRATING ORAL at 10:08

## 2017-01-01 RX ADMIN — CARVEDILOL 3.12 MG: 3.12 TABLET, FILM COATED ORAL at 17:56

## 2017-01-01 RX ADMIN — CARVEDILOL 3.12 MG: 3.12 TABLET, FILM COATED ORAL at 06:07

## 2017-01-01 RX ADMIN — OMEPRAZOLE 20 MG: 20 CAPSULE, DELAYED RELEASE ORAL at 09:53

## 2017-01-01 RX ADMIN — CARVEDILOL 6.25 MG: 6.25 TABLET, FILM COATED ORAL at 08:48

## 2017-01-01 RX ADMIN — ALLOPURINOL 300 MG: 300 TABLET ORAL at 11:55

## 2017-01-01 RX ADMIN — OXYCODONE HYDROCHLORIDE 10 MG: 10 TABLET ORAL at 17:17

## 2017-01-01 RX ADMIN — ACYCLOVIR 400 MG: 800 TABLET ORAL at 07:51

## 2017-01-01 RX ADMIN — OXYCODONE HYDROCHLORIDE 20 MG: 20 TABLET, FILM COATED, EXTENDED RELEASE ORAL at 08:33

## 2017-01-01 RX ADMIN — STANDARDIZED SENNA CONCENTRATE AND DOCUSATE SODIUM 2 TABLET: 8.6; 5 TABLET, FILM COATED ORAL at 21:22

## 2017-01-01 RX ADMIN — OXYCODONE HYDROCHLORIDE 5 MG: 5 TABLET ORAL at 23:33

## 2017-01-01 RX ADMIN — ALLOPURINOL 300 MG: 300 TABLET ORAL at 09:03

## 2017-01-01 RX ADMIN — SODIUM CHLORIDE: 9 INJECTION, SOLUTION INTRAVENOUS at 09:56

## 2017-01-01 RX ADMIN — OXYCODONE HYDROCHLORIDE 10 MG: 10 TABLET ORAL at 08:04

## 2017-01-01 RX ADMIN — CEFEPIME 2 G: 2 INJECTION, POWDER, FOR SOLUTION INTRAMUSCULAR; INTRAVENOUS at 09:09

## 2017-01-01 RX ADMIN — ACYCLOVIR 400 MG: 800 TABLET ORAL at 21:04

## 2017-01-01 RX ADMIN — ONDANSETRON 4 MG: 4 TABLET, ORALLY DISINTEGRATING ORAL at 02:02

## 2017-01-01 RX ADMIN — POTASSIUM CHLORIDE 10 MEQ: 10 INJECTION, SOLUTION INTRAVENOUS at 11:45

## 2017-01-01 RX ADMIN — SODIUM CHLORIDE: 9 INJECTION, SOLUTION INTRAVENOUS at 13:23

## 2017-01-01 RX ADMIN — FUROSEMIDE 40 MG: 10 INJECTION, SOLUTION INTRAMUSCULAR; INTRAVENOUS at 18:06

## 2017-01-01 RX ADMIN — OXYCODONE HYDROCHLORIDE 20 MG: 20 TABLET, FILM COATED, EXTENDED RELEASE ORAL at 20:10

## 2017-01-01 RX ADMIN — OXYCODONE HYDROCHLORIDE 20 MG: 20 TABLET, FILM COATED, EXTENDED RELEASE ORAL at 08:00

## 2017-01-01 RX ADMIN — MEROPENEM 500 MG: 500 INJECTION, POWDER, FOR SOLUTION INTRAVENOUS at 11:03

## 2017-01-01 RX ADMIN — POTASSIUM CHLORIDE 40 MEQ: 1500 TABLET, EXTENDED RELEASE ORAL at 09:30

## 2017-01-01 RX ADMIN — ACYCLOVIR 400 MG: 800 TABLET ORAL at 09:04

## 2017-01-01 RX ADMIN — OXYCODONE HYDROCHLORIDE 10 MG: 10 TABLET ORAL at 16:13

## 2017-01-01 RX ADMIN — OMEPRAZOLE 20 MG: 20 CAPSULE, DELAYED RELEASE ORAL at 08:22

## 2017-01-01 RX ADMIN — SODIUM CHLORIDE, POTASSIUM CHLORIDE, SODIUM LACTATE AND CALCIUM CHLORIDE: 600; 310; 30; 20 INJECTION, SOLUTION INTRAVENOUS at 12:00

## 2017-01-01 RX ADMIN — ONDANSETRON 8 MG: 2 INJECTION INTRAMUSCULAR; INTRAVENOUS at 15:57

## 2017-01-01 RX ADMIN — HEPARIN 500 UNITS: 100 SYRINGE at 13:00

## 2017-01-01 RX ADMIN — POTASSIUM CHLORIDE 40 MEQ: 1500 TABLET, EXTENDED RELEASE ORAL at 09:53

## 2017-01-01 RX ADMIN — ONDANSETRON 8 MG: 2 INJECTION, SOLUTION INTRAMUSCULAR; INTRAVENOUS at 16:35

## 2017-01-01 RX ADMIN — SODIUM CHLORIDE: 9 INJECTION, SOLUTION INTRAVENOUS at 17:13

## 2017-01-01 RX ADMIN — OXYCODONE HYDROCHLORIDE 20 MG: 20 TABLET, FILM COATED, EXTENDED RELEASE ORAL at 20:18

## 2017-01-01 RX ADMIN — VORICONAZOLE 400 MG: 10 INJECTION, POWDER, LYOPHILIZED, FOR SOLUTION INTRAVENOUS at 19:56

## 2017-01-01 RX ADMIN — SODIUM CHLORIDE: 9 INJECTION, SOLUTION INTRAVENOUS at 10:38

## 2017-01-01 RX ADMIN — OMEPRAZOLE 20 MG: 20 CAPSULE, DELAYED RELEASE ORAL at 08:56

## 2017-01-01 RX ADMIN — CEFEPIME 2 G: 2 INJECTION, POWDER, FOR SOLUTION INTRAMUSCULAR; INTRAVENOUS at 22:07

## 2017-01-01 RX ADMIN — OXYCODONE HYDROCHLORIDE 5 MG: 5 TABLET ORAL at 13:06

## 2017-01-01 RX ADMIN — OXYCODONE HYDROCHLORIDE 20 MG: 20 TABLET, FILM COATED, EXTENDED RELEASE ORAL at 20:49

## 2017-01-01 RX ADMIN — STANDARDIZED SENNA CONCENTRATE AND DOCUSATE SODIUM 2 TABLET: 8.6; 5 TABLET, FILM COATED ORAL at 21:05

## 2017-01-01 RX ADMIN — CEFAZOLIN 1000 MG: 1 INJECTION, POWDER, FOR SOLUTION INTRAVENOUS at 11:00

## 2017-01-01 RX ADMIN — OXYCODONE HYDROCHLORIDE 10 MG: 10 TABLET ORAL at 01:48

## 2017-01-01 RX ADMIN — ATORVASTATIN CALCIUM 40 MG: 40 TABLET, FILM COATED ORAL at 20:42

## 2017-01-01 RX ADMIN — CARVEDILOL 3.12 MG: 3.12 TABLET, FILM COATED ORAL at 09:13

## 2017-01-01 RX ADMIN — FUROSEMIDE 40 MG: 40 TABLET ORAL at 06:12

## 2017-01-01 RX ADMIN — SODIUM CHLORIDE: 9 INJECTION, SOLUTION INTRAVENOUS at 14:44

## 2017-01-01 RX ADMIN — ALLOPURINOL 300 MG: 300 TABLET ORAL at 09:14

## 2017-01-01 RX ADMIN — ONDANSETRON 4 MG: 4 TABLET, ORALLY DISINTEGRATING ORAL at 06:28

## 2017-01-01 RX ADMIN — ACETAMINOPHEN 650 MG: 325 TABLET, FILM COATED ORAL at 11:23

## 2017-01-01 RX ADMIN — CARVEDILOL 6.25 MG: 6.25 TABLET, FILM COATED ORAL at 06:12

## 2017-01-01 RX ADMIN — AZACITIDINE 128.3 MG: 100 INJECTION, POWDER, LYOPHILIZED, FOR SOLUTION INTRAVENOUS; SUBCUTANEOUS at 16:04

## 2017-01-01 RX ADMIN — CARVEDILOL 3.12 MG: 3.12 TABLET, FILM COATED ORAL at 06:28

## 2017-01-01 RX ADMIN — OXYCODONE HYDROCHLORIDE 10 MG: 10 TABLET ORAL at 15:31

## 2017-01-01 RX ADMIN — CEFEPIME 2 G: 2 INJECTION, POWDER, FOR SOLUTION INTRAMUSCULAR; INTRAVENOUS at 08:35

## 2017-01-01 RX ADMIN — OXYCODONE HYDROCHLORIDE 10 MG: 10 TABLET ORAL at 12:34

## 2017-01-01 RX ADMIN — CEFEPIME 2 G: 2 INJECTION, POWDER, FOR SOLUTION INTRAMUSCULAR; INTRAVENOUS at 09:41

## 2017-01-01 RX ADMIN — OXYCODONE HYDROCHLORIDE 10 MG: 10 TABLET ORAL at 12:53

## 2017-01-01 RX ADMIN — ATORVASTATIN CALCIUM 40 MG: 40 TABLET, FILM COATED ORAL at 20:28

## 2017-01-01 RX ADMIN — OXYCODONE HYDROCHLORIDE 5 MG: 5 TABLET ORAL at 04:36

## 2017-01-01 RX ADMIN — OMEPRAZOLE 20 MG: 20 CAPSULE, DELAYED RELEASE ORAL at 08:05

## 2017-01-01 RX ADMIN — CARVEDILOL 6.25 MG: 6.25 TABLET, FILM COATED ORAL at 05:58

## 2017-01-01 RX ADMIN — ACYCLOVIR 400 MG: 800 TABLET ORAL at 08:25

## 2017-01-01 ASSESSMENT — PAIN SCALES - GENERAL
PAINLEVEL_OUTOF10: 0
PAINLEVEL_OUTOF10: 7
PAINLEVEL_OUTOF10: 2
PAINLEVEL_OUTOF10: 0
PAINLEVEL_OUTOF10: 0
PAINLEVEL_OUTOF10: 7
PAINLEVEL_OUTOF10: 7
PAINLEVEL_OUTOF10: 4
PAINLEVEL_OUTOF10: 1
PAINLEVEL_OUTOF10: 4
PAINLEVEL_OUTOF10: 7
PAINLEVEL_OUTOF10: 8
PAINLEVEL_OUTOF10: 0
PAINLEVEL_OUTOF10: 4
PAINLEVEL_OUTOF10: 0
PAINLEVEL_OUTOF10: 0
PAINLEVEL_OUTOF10: 2
PAINLEVEL_OUTOF10: 8
PAINLEVEL_OUTOF10: 8
PAINLEVEL_OUTOF10: 0
PAINLEVEL: 4=SLIGHT-MODERATE PAIN
PAINLEVEL_OUTOF10: 2
PAINLEVEL_OUTOF10: 0
PAINLEVEL: NO PAIN
PAINLEVEL_OUTOF10: 2
PAINLEVEL_OUTOF10: 3
PAINLEVEL_OUTOF10: 9
PAINLEVEL_OUTOF10: ASSUMED PAIN PRESENT
PAINLEVEL_OUTOF10: 6
PAINLEVEL_OUTOF10: 0
PAINLEVEL_OUTOF10: 3
PAINLEVEL_OUTOF10: 2
PAINLEVEL_OUTOF10: 3
PAINLEVEL_OUTOF10: 10
PAINLEVEL_OUTOF10: 3
PAINLEVEL_OUTOF10: 2
PAINLEVEL_OUTOF10: 7
PAINLEVEL_OUTOF10: 8
PAINLEVEL_OUTOF10: 0
PAINLEVEL_OUTOF10: 7
PAINLEVEL_OUTOF10: 0
PAINLEVEL_OUTOF10: 2
PAINLEVEL_OUTOF10: ASSUMED PAIN PRESENT
PAINLEVEL_OUTOF10: 0
PAINLEVEL_OUTOF10: 0
PAINLEVEL_OUTOF10: 4
PAINLEVEL_OUTOF10: 6
PAINLEVEL_OUTOF10: 3
PAINLEVEL_OUTOF10: 7
PAINLEVEL_OUTOF10: 0
PAINLEVEL_OUTOF10: 3
PAINLEVEL_OUTOF10: 3
PAINLEVEL_OUTOF10: 0
PAINLEVEL_OUTOF10: 4
PAINLEVEL_OUTOF10: 7
PAINLEVEL_OUTOF10: 3
PAINLEVEL_OUTOF10: 8
PAINLEVEL_OUTOF10: 9
PAINLEVEL_OUTOF10: 8
PAINLEVEL_OUTOF10: 0
PAINLEVEL_OUTOF10: 8
PAINLEVEL_OUTOF10: 0
PAINLEVEL_OUTOF10: 3
PAINLEVEL_OUTOF10: 0
PAINLEVEL_OUTOF10: 5
PAINLEVEL_OUTOF10: 8
PAINLEVEL_OUTOF10: 3
PAINLEVEL_OUTOF10: 8
PAINLEVEL_OUTOF10: 4
PAINLEVEL_OUTOF10: 8
PAINLEVEL_OUTOF10: 3
PAINLEVEL_OUTOF10: 2
PAINLEVEL_OUTOF10: 7
PAINLEVEL_OUTOF10: 0
PAINLEVEL_OUTOF10: 6
PAINLEVEL_OUTOF10: 0
PAINLEVEL_OUTOF10: 5
PAINLEVEL_OUTOF10: 0
PAINLEVEL_OUTOF10: 3
PAINLEVEL_OUTOF10: 6
PAINLEVEL_OUTOF10: 8
PAINLEVEL_OUTOF10: 0
PAINLEVEL_OUTOF10: 8
PAINLEVEL_OUTOF10: 6
PAINLEVEL_OUTOF10: ASSUMED PAIN PRESENT
PAINLEVEL_OUTOF10: 0
PAINLEVEL_OUTOF10: 10
PAINLEVEL_OUTOF10: 0
PAINLEVEL_OUTOF10: 0
PAINLEVEL_OUTOF10: 2
PAINLEVEL_OUTOF10: 8
PAINLEVEL_OUTOF10: 9
PAINLEVEL_OUTOF10: 0
PAINLEVEL_OUTOF10: 8
PAINLEVEL_OUTOF10: 3
PAINLEVEL_OUTOF10: 0
PAINLEVEL_OUTOF10: 7
PAINLEVEL_OUTOF10: 4
PAINLEVEL_OUTOF10: 9
PAINLEVEL_OUTOF10: 8
PAINLEVEL_OUTOF10: 0
PAINLEVEL_OUTOF10: 0
PAINLEVEL_OUTOF10: 8
PAINLEVEL_OUTOF10: 8
PAINLEVEL_OUTOF10: 0
PAINLEVEL_OUTOF10: 4
PAINLEVEL_OUTOF10: 8
PAINLEVEL_OUTOF10: 4
PAINLEVEL_OUTOF10: 0
PAINLEVEL_OUTOF10: 0
PAINLEVEL_OUTOF10: 7
PAINLEVEL_OUTOF10: 10
PAINLEVEL_OUTOF10: 8
PAINLEVEL_OUTOF10: 0
PAINLEVEL_OUTOF10: ASSUMED PAIN PRESENT
PAINLEVEL_OUTOF10: 7
PAINLEVEL_OUTOF10: 0
PAINLEVEL_OUTOF10: ASSUMED PAIN PRESENT
PAINLEVEL_OUTOF10: 7
PAINLEVEL_OUTOF10: 0
PAINLEVEL_OUTOF10: 0
PAINLEVEL_OUTOF10: 8
PAINLEVEL_OUTOF10: 2
PAINLEVEL_OUTOF10: 8
PAINLEVEL_OUTOF10: 0
PAINLEVEL_OUTOF10: 2
PAINLEVEL_OUTOF10: 3
PAINLEVEL_OUTOF10: 6
PAINLEVEL_OUTOF10: 3
PAINLEVEL_OUTOF10: 7
PAINLEVEL_OUTOF10: 5
PAINLEVEL_OUTOF10: 0
PAINLEVEL_OUTOF10: 0
PAINLEVEL_OUTOF10: 7
PAINLEVEL_OUTOF10: 10
PAINLEVEL_OUTOF10: 0
PAINLEVEL_OUTOF10: 8
PAINLEVEL_OUTOF10: 0
PAINLEVEL_OUTOF10: 7
PAINLEVEL_OUTOF10: 8
PAINLEVEL_OUTOF10: 0
PAINLEVEL_OUTOF10: 7
PAINLEVEL_OUTOF10: 5
PAINLEVEL_OUTOF10: 0
PAINLEVEL_OUTOF10: 0
PAINLEVEL_OUTOF10: 5
PAINLEVEL_OUTOF10: 9
PAINLEVEL_OUTOF10: 6
PAINLEVEL_OUTOF10: 1
PAINLEVEL_OUTOF10: 3
PAINLEVEL_OUTOF10: 8
PAINLEVEL: NO PAIN
PAINLEVEL_OUTOF10: 8
PAINLEVEL_OUTOF10: 6
PAINLEVEL_OUTOF10: 7
PAINLEVEL_OUTOF10: 3
PAINLEVEL_OUTOF10: 0
PAINLEVEL_OUTOF10: 0
PAINLEVEL_OUTOF10: 2
PAINLEVEL_OUTOF10: 6
PAINLEVEL_OUTOF10: 0
PAINLEVEL_OUTOF10: 6
PAINLEVEL_OUTOF10: 10
PAINLEVEL_OUTOF10: 8
PAINLEVEL_OUTOF10: 0
PAINLEVEL_OUTOF10: 8
PAINLEVEL_OUTOF10: 8
PAINLEVEL_OUTOF10: 0
PAINLEVEL_OUTOF10: 0
PAINLEVEL_OUTOF10: 3
PAINLEVEL_OUTOF10: 3
PAINLEVEL_OUTOF10: 4
PAINLEVEL_OUTOF10: 2
PAINLEVEL_OUTOF10: 8
PAINLEVEL_OUTOF10: 0
PAINLEVEL_OUTOF10: 8
PAINLEVEL_OUTOF10: 4
PAINLEVEL_OUTOF10: ASSUMED PAIN PRESENT
PAINLEVEL_OUTOF10: 0
PAINLEVEL_OUTOF10: 2
PAINLEVEL_OUTOF10: 9
PAINLEVEL_OUTOF10: 8
PAINLEVEL_OUTOF10: 10
PAINLEVEL_OUTOF10: 0
PAINLEVEL_OUTOF10: 1
PAINLEVEL_OUTOF10: 0
PAINLEVEL_OUTOF10: 7
PAINLEVEL_OUTOF10: 2
PAINLEVEL_OUTOF10: 8
PAINLEVEL_OUTOF10: 0
PAINLEVEL_OUTOF10: 0
PAINLEVEL_OUTOF10: ASSUMED PAIN PRESENT
PAINLEVEL_OUTOF10: 7
PAINLEVEL_OUTOF10: 8
PAINLEVEL_OUTOF10: 0
PAINLEVEL_OUTOF10: 8
PAINLEVEL_OUTOF10: 3
PAINLEVEL_OUTOF10: 8
PAINLEVEL_OUTOF10: 0
PAINLEVEL_OUTOF10: 6
PAINLEVEL_OUTOF10: 0
PAINLEVEL_OUTOF10: 4
PAINLEVEL_OUTOF10: 3
PAINLEVEL_OUTOF10: 3
PAINLEVEL_OUTOF10: 1
PAINLEVEL_OUTOF10: 8
PAINLEVEL_OUTOF10: 7
PAINLEVEL_OUTOF10: 8
PAINLEVEL_OUTOF10: 8
PAINLEVEL_OUTOF10: 0
PAINLEVEL_OUTOF10: 5
PAINLEVEL_OUTOF10: 3
PAINLEVEL_OUTOF10: 3
PAINLEVEL_OUTOF10: 0
PAINLEVEL_OUTOF10: 4
PAINLEVEL_OUTOF10: 2
PAINLEVEL_OUTOF10: 0
PAINLEVEL_OUTOF10: 7
PAINLEVEL_OUTOF10: 7
PAINLEVEL_OUTOF10: 3
PAINLEVEL_OUTOF10: 7
PAINLEVEL_OUTOF10: 8
PAINLEVEL_OUTOF10: 8
PAINLEVEL_OUTOF10: 0
PAINLEVEL_OUTOF10: 8
PAINLEVEL_OUTOF10: 0
PAINLEVEL_OUTOF10: 0
PAINLEVEL_OUTOF10: ASSUMED PAIN PRESENT
PAINLEVEL_OUTOF10: 0
PAINLEVEL_OUTOF10: 10
PAINLEVEL_OUTOF10: 8
PAINLEVEL_OUTOF10: 0
PAINLEVEL_OUTOF10: 6
PAINLEVEL_OUTOF10: 8
PAINLEVEL_OUTOF10: 0
PAINLEVEL_OUTOF10: 7
PAINLEVEL_OUTOF10: 2

## 2017-01-01 ASSESSMENT — ENCOUNTER SYMPTOMS
PHOTOPHOBIA: 0
SPUTUM PRODUCTION: 0
SENSORY CHANGE: 0
SHORTNESS OF BREATH: 0
FEVER: 0
EYES NEGATIVE: 1
DIAPHORESIS: 0
MYALGIAS: 1
PALPITATIONS: 0
SPUTUM PRODUCTION: 0
DIARRHEA: 0
HEADACHES: 1
BRUISES/BLEEDS EASILY: 1
ABDOMINAL PAIN: 0
WEAKNESS: 1
MYALGIAS: 1
CLAUDICATION: 0
NERVOUS/ANXIOUS: 0
PHOTOPHOBIA: 0
HEADACHES: 1
MYALGIAS: 0
SENSORY CHANGE: 0
WHEEZING: 0
ABDOMINAL PAIN: 0
CONSTIPATION: 0
GASTROINTESTINAL NEGATIVE: 1
NERVOUS/ANXIOUS: 0
SHORTNESS OF BREATH: 0
SHORTNESS OF BREATH: 0
DIZZINESS: 0
CONSTIPATION: 0
MEMORY LOSS: 1
BLURRED VISION: 0
HEARTBURN: 0
INSOMNIA: 0
BRUISES/BLEEDS EASILY: 0
COUGH: 0
HEADACHES: 0
MYALGIAS: 1
INSOMNIA: 0
DOUBLE VISION: 0
CHILLS: 0
WEAKNESS: 1
MYALGIAS: 1
MYALGIAS: 1
HEARTBURN: 0
SENSORY CHANGE: 0
DEPRESSION: 0
SENSORY CHANGE: 0
BRUISES/BLEEDS EASILY: 0
SHORTNESS OF BREATH: 0
ABDOMINAL PAIN: 0
HEARTBURN: 0
DEPRESSION: 1
HEARTBURN: 0
MYALGIAS: 1
COUGH: 0
HEARTBURN: 0
DEPRESSION: 0
PALPITATIONS: 0
DIARRHEA: 0
GASTROINTESTINAL NEGATIVE: 1
WEAKNESS: 1
CHILLS: 0
HEADACHES: 1
DIZZINESS: 0
PHOTOPHOBIA: 0
NAUSEA: 0
ABDOMINAL PAIN: 0
CARDIOVASCULAR NEGATIVE: 1
WEAKNESS: 1
PHOTOPHOBIA: 0
NERVOUS/ANXIOUS: 0
HEADACHES: 0
NERVOUS/ANXIOUS: 0
FEVER: 0
SPEECH CHANGE: 0
ORTHOPNEA: 0
WEAKNESS: 0
MYALGIAS: 1
SENSORY CHANGE: 0
CONSTIPATION: 0
CONSTIPATION: 0
HEADACHES: 1
NERVOUS/ANXIOUS: 1
RESPIRATORY NEGATIVE: 1
SHORTNESS OF BREATH: 1
WEAKNESS: 1
EYES NEGATIVE: 1
BLURRED VISION: 0
MYALGIAS: 1
WEIGHT LOSS: 0
PHOTOPHOBIA: 0
COUGH: 0
FEVER: 0
HEADACHES: 1
CHILLS: 0
DIARRHEA: 0
ABDOMINAL PAIN: 0
FEVER: 1
CHILLS: 0
INSOMNIA: 0
HEARTBURN: 0
COUGH: 0
NERVOUS/ANXIOUS: 0
SPEECH CHANGE: 0
CHILLS: 0
HEARTBURN: 0
DIZZINESS: 0
BLOOD IN STOOL: 0
NERVOUS/ANXIOUS: 0
DIZZINESS: 0
WEAKNESS: 0
GASTROINTESTINAL NEGATIVE: 1
BLURRED VISION: 0
WEAKNESS: 0
BLURRED VISION: 0
HEARTBURN: 0
BLURRED VISION: 0
HEARTBURN: 0
CONSTIPATION: 0
PALPITATIONS: 0
CLAUDICATION: 0
CARDIOVASCULAR NEGATIVE: 1
SPUTUM PRODUCTION: 0
DEPRESSION: 0
HEADACHES: 1
VOMITING: 0
MYALGIAS: 0
FEVER: 0
VOMITING: 0
CONSTIPATION: 1
HEARTBURN: 0
DIARRHEA: 0
PND: 0
ABDOMINAL PAIN: 0
NECK PAIN: 0
HEMOPTYSIS: 0
GASTROINTESTINAL NEGATIVE: 1
VOMITING: 0
DEPRESSION: 0
SPUTUM PRODUCTION: 0
BRUISES/BLEEDS EASILY: 0
FEVER: 1
ABDOMINAL PAIN: 0
VOMITING: 0
DEPRESSION: 0
SENSORY CHANGE: 0
FEVER: 0
VOMITING: 0
EYE PAIN: 0
CHILLS: 0
DIZZINESS: 0
CONSTIPATION: 0
BLURRED VISION: 0
PHOTOPHOBIA: 0
WEAKNESS: 1
DEPRESSION: 1
MYALGIAS: 0
VOMITING: 0
WEAKNESS: 1
MYALGIAS: 1
BLURRED VISION: 0
SENSORY CHANGE: 0
BLURRED VISION: 0
RESPIRATORY NEGATIVE: 1
CHILLS: 0
DIARRHEA: 0
FALLS: 0
VOMITING: 0
BLURRED VISION: 0
WHEEZING: 0
PALPITATIONS: 0
CARDIOVASCULAR NEGATIVE: 1
HEARTBURN: 1
WEAKNESS: 1
RESPIRATORY NEGATIVE: 1
SHORTNESS OF BREATH: 0
SPEECH CHANGE: 0
VOMITING: 0
INSOMNIA: 0
DIZZINESS: 0
DIARRHEA: 0
CLAUDICATION: 0
COUGH: 0
CONSTIPATION: 0
HEARTBURN: 0
CONSTIPATION: 0
GASTROINTESTINAL NEGATIVE: 1
FEVER: 1
SHORTNESS OF BREATH: 0
SPUTUM PRODUCTION: 0
CHILLS: 0
CHILLS: 0
INSOMNIA: 0
CLAUDICATION: 0
SPUTUM PRODUCTION: 0
SHORTNESS OF BREATH: 1
WEAKNESS: 1
ABDOMINAL PAIN: 0
CONSTIPATION: 0
BLURRED VISION: 0
INSOMNIA: 0
PALPITATIONS: 0
VOMITING: 0
BRUISES/BLEEDS EASILY: 0
FEVER: 0
WHEEZING: 0
FEVER: 0
LOSS OF CONSCIOUSNESS: 0
INSOMNIA: 0
CARDIOVASCULAR NEGATIVE: 1
SHORTNESS OF BREATH: 1
CONSTIPATION: 1
NERVOUS/ANXIOUS: 0
BRUISES/BLEEDS EASILY: 0
CONSTIPATION: 0
MYALGIAS: 0
LOSS OF CONSCIOUSNESS: 0
WEAKNESS: 1
NERVOUS/ANXIOUS: 0
BRUISES/BLEEDS EASILY: 0
NERVOUS/ANXIOUS: 0
FEVER: 1
DIZZINESS: 0
GASTROINTESTINAL NEGATIVE: 1
SPEECH CHANGE: 0
CHILLS: 0
CHILLS: 0
SPEECH CHANGE: 0
HEADACHES: 0
ABDOMINAL PAIN: 0
BLOOD IN STOOL: 0
SHORTNESS OF BREATH: 0
MYALGIAS: 0
NAUSEA: 0
DIAPHORESIS: 0
BLURRED VISION: 0
SENSORY CHANGE: 0
INSOMNIA: 0
INSOMNIA: 0
CARDIOVASCULAR NEGATIVE: 1
CONSTIPATION: 0
CARDIOVASCULAR NEGATIVE: 1
PHOTOPHOBIA: 0
BLURRED VISION: 0
CONSTIPATION: 0
NERVOUS/ANXIOUS: 0
NERVOUS/ANXIOUS: 0
CHILLS: 0
ABDOMINAL PAIN: 0
HEARTBURN: 0
GASTROINTESTINAL NEGATIVE: 1
COUGH: 0
CONSTIPATION: 0
PHOTOPHOBIA: 0
GASTROINTESTINAL NEGATIVE: 1
CHILLS: 0
CLAUDICATION: 0
NERVOUS/ANXIOUS: 0
HEADACHES: 1
SHORTNESS OF BREATH: 0
ABDOMINAL PAIN: 0
FEVER: 0
SHORTNESS OF BREATH: 0
HEARTBURN: 0
SPEECH CHANGE: 0
CONSTIPATION: 0
FEVER: 0
EYE PAIN: 0
CLAUDICATION: 0
DOUBLE VISION: 0
VOMITING: 0
WEAKNESS: 1
NERVOUS/ANXIOUS: 0
FEVER: 0
INSOMNIA: 0
HEARTBURN: 0
DEPRESSION: 0
DIZZINESS: 0
SHORTNESS OF BREATH: 1
COUGH: 0
CLAUDICATION: 0
SHORTNESS OF BREATH: 0
NAUSEA: 0
WEAKNESS: 0
ABDOMINAL PAIN: 0
ABDOMINAL PAIN: 0
CONSTIPATION: 0
SPUTUM PRODUCTION: 0
MYALGIAS: 0
CHILLS: 0
TREMORS: 0
DIZZINESS: 0
BLOOD IN STOOL: 0
CLAUDICATION: 0
EYE PAIN: 0
VOMITING: 0
VOMITING: 0
DIARRHEA: 0
HEMOPTYSIS: 0
PALPITATIONS: 0
HEMOPTYSIS: 0
BRUISES/BLEEDS EASILY: 0
SPUTUM PRODUCTION: 0
DIARRHEA: 0
VOMITING: 0
NERVOUS/ANXIOUS: 0
CONSTIPATION: 0
ORTHOPNEA: 0
PHOTOPHOBIA: 0
CONSTIPATION: 0
COUGH: 0
HEARTBURN: 0
CLAUDICATION: 0
DIARRHEA: 0
CLAUDICATION: 0
ORTHOPNEA: 0
BACK PAIN: 0
HEADACHES: 1
HEADACHES: 1
PHOTOPHOBIA: 0
WEAKNESS: 1
DEPRESSION: 0
SHORTNESS OF BREATH: 0
NERVOUS/ANXIOUS: 0
FEVER: 0
PHOTOPHOBIA: 0
COUGH: 0
MYALGIAS: 1
BLURRED VISION: 0
CONSTIPATION: 0
HEADACHES: 1
DIARRHEA: 0
HEMOPTYSIS: 0
DIARRHEA: 0
CARDIOVASCULAR NEGATIVE: 1
CHILLS: 0
HEARTBURN: 0
NERVOUS/ANXIOUS: 0
NERVOUS/ANXIOUS: 0
DIARRHEA: 0
CLAUDICATION: 0
DEPRESSION: 0
COUGH: 0
PHOTOPHOBIA: 0
SENSORY CHANGE: 0
HEARTBURN: 0
SENSORY CHANGE: 0
GASTROINTESTINAL NEGATIVE: 1
EYES NEGATIVE: 1
GASTROINTESTINAL NEGATIVE: 1
MYALGIAS: 0
PALPITATIONS: 0
NAUSEA: 0
ABDOMINAL PAIN: 0
DOUBLE VISION: 0
ABDOMINAL PAIN: 0
EYE PAIN: 0
CARDIOVASCULAR NEGATIVE: 1
MUSCULOSKELETAL NEGATIVE: 1
DOUBLE VISION: 0
INSOMNIA: 0
ABDOMINAL PAIN: 0
HEADACHES: 1
PSYCHIATRIC NEGATIVE: 1
HEADACHES: 0
FEVER: 0
VOMITING: 0
SHORTNESS OF BREATH: 0
MUSCULOSKELETAL NEGATIVE: 1
FEVER: 1
DEPRESSION: 0
MYALGIAS: 0
HEARTBURN: 0
NAUSEA: 0
CHILLS: 0
MYALGIAS: 1
WEIGHT LOSS: 0
INSOMNIA: 0
CLAUDICATION: 0
CHILLS: 0
NERVOUS/ANXIOUS: 0
FEVER: 0
MYALGIAS: 0
GASTROINTESTINAL NEGATIVE: 1
DIARRHEA: 0
VOMITING: 0
PALPITATIONS: 0
BLURRED VISION: 0
SPEECH CHANGE: 0
DIZZINESS: 0
BRUISES/BLEEDS EASILY: 0
SHORTNESS OF BREATH: 0
MYALGIAS: 0
MUSCULOSKELETAL NEGATIVE: 1
HEARTBURN: 0
HEADACHES: 1
HEMOPTYSIS: 0
CHILLS: 0
DEPRESSION: 0
RESPIRATORY NEGATIVE: 1
BLURRED VISION: 0
SPUTUM PRODUCTION: 0
DIARRHEA: 0
SPUTUM PRODUCTION: 0
SPEECH CHANGE: 0
COUGH: 0
PND: 0
DIARRHEA: 0
COUGH: 1
DEPRESSION: 0
BRUISES/BLEEDS EASILY: 0
VOMITING: 0
SENSORY CHANGE: 0
PHOTOPHOBIA: 0
SHORTNESS OF BREATH: 1
INSOMNIA: 0
NERVOUS/ANXIOUS: 0
NERVOUS/ANXIOUS: 0
SPEECH CHANGE: 0
SENSORY CHANGE: 0
DEPRESSION: 1
SPEECH CHANGE: 0
HEADACHES: 0
NAUSEA: 0
MYALGIAS: 0
BRUISES/BLEEDS EASILY: 0
SHORTNESS OF BREATH: 0
SPUTUM PRODUCTION: 0
WHEEZING: 0
DIZZINESS: 0
SEIZURES: 0
FEVER: 0
FALLS: 0
EYES NEGATIVE: 1
CHILLS: 0
SHORTNESS OF BREATH: 1
SPEECH CHANGE: 0
PALPITATIONS: 0
MYALGIAS: 0
SPEECH CHANGE: 0
DIARRHEA: 0
DEPRESSION: 1
MYALGIAS: 0
CLAUDICATION: 0
MUSCULOSKELETAL NEGATIVE: 1
EYES NEGATIVE: 1
MYALGIAS: 1
CONSTIPATION: 0
ABDOMINAL PAIN: 0
HEARTBURN: 0
DIARRHEA: 0
CHILLS: 0
HEARTBURN: 0
WEAKNESS: 1
COUGH: 1
COUGH: 0
CHILLS: 0
SHORTNESS OF BREATH: 0
HEMOPTYSIS: 0
WEAKNESS: 1
VOMITING: 0
CLAUDICATION: 0
SHORTNESS OF BREATH: 0
BRUISES/BLEEDS EASILY: 0
HEMOPTYSIS: 0
DEPRESSION: 0
PHOTOPHOBIA: 0
PHOTOPHOBIA: 0
GASTROINTESTINAL NEGATIVE: 1
NAUSEA: 0
ABDOMINAL PAIN: 0
BLURRED VISION: 0
PHOTOPHOBIA: 0
VOMITING: 0
FEVER: 0
CHILLS: 0
FEVER: 0
NERVOUS/ANXIOUS: 0
SENSORY CHANGE: 0
WEAKNESS: 1
ABDOMINAL PAIN: 0
MUSCULOSKELETAL NEGATIVE: 1
NAUSEA: 0
BLURRED VISION: 0
SORE THROAT: 0
HEADACHES: 0
SHORTNESS OF BREATH: 0
FEVER: 0
FEVER: 0
SHORTNESS OF BREATH: 0
BLURRED VISION: 0
FEVER: 1
PALPITATIONS: 0
FEVER: 0
MYALGIAS: 1
CONSTIPATION: 0
CLAUDICATION: 0
DIZZINESS: 0
HEADACHES: 0
BRUISES/BLEEDS EASILY: 0
CONSTIPATION: 0
RESPIRATORY NEGATIVE: 1
CHILLS: 0
MUSCULOSKELETAL NEGATIVE: 1
PALPITATIONS: 0
MYALGIAS: 0
WEAKNESS: 1
SENSORY CHANGE: 0
FEVER: 0
NERVOUS/ANXIOUS: 0
FEVER: 0
COUGH: 0
CONSTIPATION: 0
SHORTNESS OF BREATH: 0
CLAUDICATION: 0
WHEEZING: 0
WEAKNESS: 0
DIZZINESS: 0
SPUTUM PRODUCTION: 0
WHEEZING: 0
HEADACHES: 1
WEAKNESS: 0
ABDOMINAL PAIN: 0
VOMITING: 0
SORE THROAT: 0
SHORTNESS OF BREATH: 0
INSOMNIA: 0
PHOTOPHOBIA: 0
SHORTNESS OF BREATH: 0
DOUBLE VISION: 0
CHILLS: 0
COUGH: 0
DIZZINESS: 0
ABDOMINAL PAIN: 0
PALPITATIONS: 0
WEAKNESS: 1
CARDIOVASCULAR NEGATIVE: 1
CHILLS: 0
HEARTBURN: 0
DEPRESSION: 0
ABDOMINAL PAIN: 0
SENSORY CHANGE: 0
INSOMNIA: 0
COUGH: 0
SENSORY CHANGE: 0
CLAUDICATION: 0
DEPRESSION: 0
WEAKNESS: 0
DIARRHEA: 0
GASTROINTESTINAL NEGATIVE: 1
DEPRESSION: 0
PALPITATIONS: 0
DIZZINESS: 0
FEVER: 0
BACK PAIN: 0
DIARRHEA: 0
NEUROLOGICAL NEGATIVE: 1
DIZZINESS: 0
NERVOUS/ANXIOUS: 0
DIZZINESS: 1
ABDOMINAL PAIN: 0
TREMORS: 0
CLAUDICATION: 0
BRUISES/BLEEDS EASILY: 0
WHEEZING: 0
COUGH: 0
NERVOUS/ANXIOUS: 0
PHOTOPHOBIA: 0
EYES NEGATIVE: 1
DIZZINESS: 0
EYE PAIN: 0
CONSTIPATION: 0
HEADACHES: 1
CHILLS: 0
CARDIOVASCULAR NEGATIVE: 1
HEARTBURN: 0
MYALGIAS: 1
HEADACHES: 0
DIZZINESS: 0
BLURRED VISION: 0
WEAKNESS: 1
COUGH: 0
HEADACHES: 0
DEPRESSION: 0
PND: 0
MYALGIAS: 1
PHOTOPHOBIA: 0
COUGH: 0
SENSORY CHANGE: 0
BRUISES/BLEEDS EASILY: 0
SPUTUM PRODUCTION: 0
COUGH: 0
CLAUDICATION: 0
RESPIRATORY NEGATIVE: 1
SENSORY CHANGE: 0
FEVER: 0
BLOOD IN STOOL: 0
WEAKNESS: 1
SPUTUM PRODUCTION: 0
HEARTBURN: 0
DEPRESSION: 0
FEVER: 0
DOUBLE VISION: 0
FEVER: 0
CHILLS: 0
SHORTNESS OF BREATH: 1
MYALGIAS: 1
SPEECH CHANGE: 0
DIARRHEA: 0
BRUISES/BLEEDS EASILY: 0
VOMITING: 0
VOMITING: 0
HEADACHES: 0
DIZZINESS: 0
DOUBLE VISION: 0
ORTHOPNEA: 0
SHORTNESS OF BREATH: 0
SPUTUM PRODUCTION: 0
EYES NEGATIVE: 1
DIZZINESS: 0
COUGH: 0
SHORTNESS OF BREATH: 0
SPUTUM PRODUCTION: 0
EYES NEGATIVE: 1
DIZZINESS: 0
DEPRESSION: 1
FEVER: 0
COUGH: 0
SORE THROAT: 0
INSOMNIA: 1
COUGH: 0
NERVOUS/ANXIOUS: 0
MYALGIAS: 1
DIAPHORESIS: 0
BLURRED VISION: 0
DIZZINESS: 0
BLURRED VISION: 0
HEADACHES: 1
WEAKNESS: 1
BRUISES/BLEEDS EASILY: 0
DEPRESSION: 1
DEPRESSION: 1
INSOMNIA: 0
EYE PAIN: 0
SORE THROAT: 0
COUGH: 0
DOUBLE VISION: 0
CHILLS: 0
CONSTIPATION: 0
PALPITATIONS: 0
INSOMNIA: 0
HEADACHES: 0
SHORTNESS OF BREATH: 0
HEADACHES: 1
HEARTBURN: 0
WEAKNESS: 1
FEVER: 0
WEAKNESS: 1
COUGH: 0
VOMITING: 0
SENSORY CHANGE: 0
HEMOPTYSIS: 0
INSOMNIA: 0
CARDIOVASCULAR NEGATIVE: 1
SENSORY CHANGE: 0
NAUSEA: 0
GASTROINTESTINAL NEGATIVE: 1
PALPITATIONS: 0
HEARTBURN: 0
NAUSEA: 0
CHILLS: 0
CHILLS: 0
SPEECH CHANGE: 0
GASTROINTESTINAL NEGATIVE: 1
DIARRHEA: 0
DEPRESSION: 0
BLURRED VISION: 0
BLURRED VISION: 0
DIZZINESS: 0
DIZZINESS: 0
DEPRESSION: 0
VOMITING: 0
DEPRESSION: 0
GASTROINTESTINAL NEGATIVE: 1
HEADACHES: 1
SENSORY CHANGE: 0
SPUTUM PRODUCTION: 0
CHILLS: 0
PHOTOPHOBIA: 0
CARDIOVASCULAR NEGATIVE: 1
MYALGIAS: 0
COUGH: 0
CONSTIPATION: 0
PHOTOPHOBIA: 0
COUGH: 0
SPEECH CHANGE: 0
HEADACHES: 0
SHORTNESS OF BREATH: 1
FEVER: 0
DEPRESSION: 1
SPEECH CHANGE: 0
HEADACHES: 1
SPEECH CHANGE: 0
BLURRED VISION: 0
HEARTBURN: 0
INSOMNIA: 0
NAUSEA: 0
DIARRHEA: 0
RESPIRATORY NEGATIVE: 1
EYES NEGATIVE: 1
MYALGIAS: 1
SPUTUM PRODUCTION: 0
SEIZURES: 0
MYALGIAS: 1
SHORTNESS OF BREATH: 0
CONSTIPATION: 0
MUSCULOSKELETAL NEGATIVE: 1
SPEECH CHANGE: 0
FEVER: 0
EYE PAIN: 0
CARDIOVASCULAR NEGATIVE: 1
CLAUDICATION: 0
EYE PAIN: 0
DIZZINESS: 0
GASTROINTESTINAL NEGATIVE: 1
SPEECH CHANGE: 0
CONSTIPATION: 0
DIAPHORESIS: 0
DIARRHEA: 0
EYE PAIN: 0
CHILLS: 0
BLURRED VISION: 0
CLAUDICATION: 0
MUSCULOSKELETAL NEGATIVE: 1
CLAUDICATION: 0
WEAKNESS: 1
DEPRESSION: 1
HEADACHES: 1
DIZZINESS: 0
SHORTNESS OF BREATH: 0
FEVER: 0
DIZZINESS: 0
MYALGIAS: 1
NERVOUS/ANXIOUS: 0
CONSTIPATION: 0
DEPRESSION: 0
FEVER: 0
VOMITING: 0
COUGH: 1
EYES NEGATIVE: 1
COUGH: 0
WEAKNESS: 1
DIARRHEA: 0
ORTHOPNEA: 0
NAUSEA: 0
HEARTBURN: 0
DIZZINESS: 0
NERVOUS/ANXIOUS: 1
ABDOMINAL PAIN: 0
VOMITING: 0
INSOMNIA: 0
HEADACHES: 1
HEARTBURN: 0
DIZZINESS: 0
FEVER: 0
BLURRED VISION: 0
MYALGIAS: 1
HEADACHES: 1
HEADACHES: 0
PALPITATIONS: 0
DIARRHEA: 0
INSOMNIA: 0
CARDIOVASCULAR NEGATIVE: 1
BLURRED VISION: 0
WHEEZING: 0
NAUSEA: 0
CHILLS: 0
HEMOPTYSIS: 0
COUGH: 0
SHORTNESS OF BREATH: 0
NAUSEA: 0
NERVOUS/ANXIOUS: 0
WEAKNESS: 0
BLOOD IN STOOL: 0
PHOTOPHOBIA: 0
SHORTNESS OF BREATH: 0
DIARRHEA: 0
SPUTUM PRODUCTION: 0
DEPRESSION: 0
HEADACHES: 1
CONSTIPATION: 0
MYALGIAS: 0
VOMITING: 0
CARDIOVASCULAR NEGATIVE: 1
INSOMNIA: 0
NERVOUS/ANXIOUS: 0
ORTHOPNEA: 0
SHORTNESS OF BREATH: 0
SHORTNESS OF BREATH: 1
HEADACHES: 0
CONSTIPATION: 0
VOMITING: 0
CLAUDICATION: 0
SPEECH CHANGE: 0
ABDOMINAL PAIN: 0
HEMOPTYSIS: 0
VOMITING: 0
SPEECH CHANGE: 0
BRUISES/BLEEDS EASILY: 0
CONSTIPATION: 0
CARDIOVASCULAR NEGATIVE: 1
DEPRESSION: 0
CARDIOVASCULAR NEGATIVE: 1
HEARTBURN: 0
SPEECH CHANGE: 0
SHORTNESS OF BREATH: 0
SPEECH CHANGE: 0
SHORTNESS OF BREATH: 0
MYALGIAS: 1
NECK PAIN: 0
MYALGIAS: 1
DEPRESSION: 0
HEADACHES: 0
SPEECH CHANGE: 0
PALPITATIONS: 0
SORE THROAT: 0
DIARRHEA: 0
WEAKNESS: 1
WEAKNESS: 1
FEVER: 0
NERVOUS/ANXIOUS: 0
ABDOMINAL PAIN: 0
FEVER: 0
PHOTOPHOBIA: 0
ORTHOPNEA: 0
SENSORY CHANGE: 0
COUGH: 0
BRUISES/BLEEDS EASILY: 0
BLURRED VISION: 0
CONSTIPATION: 0
DEPRESSION: 1
FEVER: 0
SORE THROAT: 0

## 2017-01-01 ASSESSMENT — COGNITIVE AND FUNCTIONAL STATUS - GENERAL
SUGGESTED CMS G CODE MODIFIER DAILY ACTIVITY: CH
WALKING IN HOSPITAL ROOM: A LITTLE
WALKING IN HOSPITAL ROOM: A LOT
STANDING UP FROM CHAIR USING ARMS: A LOT
CLIMB 3 TO 5 STEPS WITH RAILING: A LITTLE
MOBILITY SCORE: 17
MOVING TO AND FROM BED TO CHAIR: A LITTLE
CLIMB 3 TO 5 STEPS WITH RAILING: A LOT
MOVING TO AND FROM BED TO CHAIR: A LITTLE
MOBILITY SCORE: 18
SUGGESTED CMS G CODE MODIFIER DAILY ACTIVITY: CL
TURNING FROM BACK TO SIDE WHILE IN FLAT BAD: A LITTLE
MOBILITY SCORE: 9
DAILY ACTIVITIY SCORE: 10
MOVING FROM LYING ON BACK TO SITTING ON SIDE OF FLAT BED: A LITTLE
EATING MEALS: A LOT
WALKING IN HOSPITAL ROOM: A LITTLE
PERSONAL GROOMING: A LOT
CLIMB 3 TO 5 STEPS WITH RAILING: A LITTLE
SUGGESTED CMS G CODE MODIFIER MOBILITY: CK
WALKING IN HOSPITAL ROOM: A LITTLE
DAILY ACTIVITIY SCORE: 24
HELP NEEDED FOR BATHING: TOTAL
TOILETING: A LOT
SUGGESTED CMS G CODE MODIFIER MOBILITY: CM
MOVING FROM LYING ON BACK TO SITTING ON SIDE OF FLAT BED: UNABLE
SUGGESTED CMS G CODE MODIFIER MOBILITY: CK
TURNING FROM BACK TO SIDE WHILE IN FLAT BAD: UNABLE
MOVING TO AND FROM BED TO CHAIR: UNABLE
MOVING FROM LYING ON BACK TO SITTING ON SIDE OF FLAT BED: A LITTLE
DRESSING REGULAR UPPER BODY CLOTHING: A LOT
STANDING UP FROM CHAIR USING ARMS: A LITTLE
DRESSING REGULAR LOWER BODY CLOTHING: TOTAL
SUGGESTED CMS G CODE MODIFIER MOBILITY: CJ
CLIMB 3 TO 5 STEPS WITH RAILING: A LOT
MOBILITY SCORE: 22
TURNING FROM BACK TO SIDE WHILE IN FLAT BAD: A LITTLE
STANDING UP FROM CHAIR USING ARMS: A LITTLE

## 2017-01-01 ASSESSMENT — LIFESTYLE VARIABLES
DO YOU DRINK ALCOHOL: NO
EVER_SMOKED: NEVER
DO YOU DRINK ALCOHOL: NO
SUBSTANCE_ABUSE: 0
DO YOU DRINK ALCOHOL: NO
ALCOHOL_USE: NO
SUBSTANCE_ABUSE: 0
EVER_SMOKED: NEVER
DO YOU DRINK ALCOHOL: NO

## 2017-01-01 ASSESSMENT — ACTIVITIES OF DAILY LIVING (ADL)
TOILETING: INDEPENDENT
TOILETING: INDEPENDENT

## 2017-01-01 ASSESSMENT — PATIENT HEALTH QUESTIONNAIRE - PHQ9
SUM OF ALL RESPONSES TO PHQ9 QUESTIONS 1 AND 2: 3
2. FEELING DOWN, DEPRESSED, IRRITABLE, OR HOPELESS: 1
7. TROUBLE CONCENTRATING ON THINGS, SUCH AS READING THE NEWSPAPER OR WATCHING TELEVISION: 3
1. LITTLE INTEREST OR PLEASURE IN DOING THINGS: NOT AT ALL
SUM OF ALL RESPONSES TO PHQ9 QUESTIONS 1 AND 2: 0
SUM OF ALL RESPONSES TO PHQ QUESTIONS 1-9: 0
5. POOR APPETITE OR OVEREATING: 3
1. LITTLE INTEREST OR PLEASURE IN DOING THINGS: 2
SUM OF ALL RESPONSES TO PHQ QUESTIONS 1-9: 17
8. MOVING OR SPEAKING SO SLOWLY THAT OTHER PEOPLE COULD HAVE NOTICED. OR THE OPPOSITE, BEING SO FIGETY OR RESTLESS THAT YOU HAVE BEEN MOVING AROUND A LOT MORE THAN USUAL: 1
4. FEELING TIRED OR HAVING LITTLE ENERGY: 3
3. TROUBLE FALLING OR STAYING ASLEEP OR SLEEPING TOO MUCH: 3
2. FEELING DOWN, DEPRESSED, IRRITABLE, OR HOPELESS: NOT AT ALL
9. THOUGHTS THAT YOU WOULD BE BETTER OFF DEAD, OR OF HURTING YOURSELF: 0
SUM OF ALL RESPONSES TO PHQ QUESTIONS 1-9: 17
6. FEELING BAD ABOUT YOURSELF - OR THAT YOU ARE A FAILURE OR HAVE LET YOURSELF OR YOUR FAMILY DOWN: 1

## 2017-01-01 ASSESSMENT — GAIT ASSESSMENTS
ASSISTIVE DEVICE: FRONT WHEEL WALKER
DISTANCE (FEET): 30
GAIT LEVEL OF ASSIST: CONTACT GUARD ASSIST
DEVIATION: BRADYKINETIC;ANTALGIC
GAIT LEVEL OF ASSIST: STAND BY ASSIST
DEVIATION: BRADYKINETIC
GAIT LEVEL OF ASSIST: REFUSED

## 2017-01-05 NOTE — PROGRESS NOTES
"Returned patient's phone call.  Referred her to [facility] at her request.  She is experiencing anxiety and fear over the \"unknown\" with her diagnosis of MDS.  She will be getting cycle 4 of her chemotherapy regimen next week and then will have a BMBx to determine response to treatment.  She is still traveling between [place] and [place], staying in [place] at her son's home during her treatment week.  She is also not as active as she would like to be.  Hard for her to go outdoors as previously in [place] due to extreme cold/snow and ice.  Discussed ways to do light exercise in the house.  Suggested when she gets to [place] to go to [employer] and walk \"laps\" inside the mall; she verbalized that she like this idea. She will also be looking at an affordable \"work-out place\" when she come to [facility] next week. Continue to follow as necessary.  "

## 2017-02-28 PROBLEM — Z95.5 STENTED CORONARY ARTERY: Status: ACTIVE | Noted: 2017-01-01

## 2017-02-28 PROBLEM — I21.4 NSTEMI (NON-ST ELEVATED MYOCARDIAL INFARCTION) (HCC): Status: ACTIVE | Noted: 2017-01-01

## 2017-02-28 NOTE — PROGRESS NOTES
Patient viewed [employer] [facility] Video    Cardiac Rehab Individual Treatment Plan Assessment: Initial 2017 Session #     1   MRN: [id number]   Allergies: Review of patient's allergies indicates no known allergies.   Patient Name: Cheryle Lee Koski : 1950 Risk Stratification: High    Diagnoses:   1. NSTEMI (non-ST elevated myocardial infarction) (CMS-HCC)    2. Stented coronary artery     Age: 66 y.o. Physician: Pcp Pt States None    Date of Event: 17 Specialist: Steve   Risk Factors:  Hypertension, Hyperlipidemia, Family History, Obesity, Stress, Sedentary Lifestyle, Age, Female > 55   Exercise Nutrition Education Psychosocial   Stages of change Stages of change Stages of change Stages of change   Preparation Preparation Preparation Preparation   Fitness Test Lipids Learning Barriers Outcome Survey Tools   DIST: 0219  Available: Yes Learning Barriers: Vision FP QOL Overall Score: 20.21   Max HR: 80 Date: 16 Family Support PHQ-9: 17   RPE: 11 Total: 179 mg/dL Yes Nutrition Screen: 26 %   SPO2: 97 % Tri mg/dL Lives: Other (lives with her son currently) Intervention   MET: 2.1 HDL: 60 mg/dL Tobacco Use Behavioral Health Consult: Yes   EF=  (not available) LDL: 89 mg/dL History   Smoking status   • Never Smoker    Smokeless tobacco   • Never Used      Physician Referral: Yes   Ambulatory Status Diabetes Smoking Intervention Identifies Stressors: Yes   Fall Risk Assessed: Yes Diabetes: No Smoking Cessation Referral: N/A Drug Intervention: No   Exercise Ambulatory Status Assist Devices: None HbA1C: 6.3 % Date: 04/21/15 Ind. Education / Counseling: N/A Education   Exercise Prescription Monitors BS at Home: No Tobacco Adjunct: N/A Psychosocial Education: Coping Techniques, Positive Support System, S/S Depression   Mode: Treadmill, NuStep, UBE, Airdyne   Frequency: na Random BS: 127 (2017) Education Intervention Target Goal   Frequency:  3 days/week Weight Management Healthy Heart  "Education: Class Schedule Given, Medications Reviewed, Patient Education Binder Provided, Risk Factors Discussed, Videos Viewed per Cr (1:1, and workshops scheduled) Assess presence or absence of depression using a valid screening: Yes   Duration:  15-25 minutes  Weight: 68.493 kg (151 lb) Target Goal Use Stress Management: Yes   Intensity:  11-13 RPE Height: 160 cm (5' 2.99\") Complete Tobacco Cessation: Complete Tobacco Cessation: N/A Adverse Events: Yes   METS:  1.0-3.0 BMI (Calculated): 26.76 Educate / Review and have understanding of cardiac disease prevention: Educate / Review and have understanding of cardiac disease prevention: Yes Unexpected Events: Yes   Progression:  ^ increments of 1-3 to THR/RPE as tolerated Goal weight: 141lb Medication Compliance: Yes    THR: THR: 20-30 BPM ^Resting HR History   Alcohol Use   • Yes     Comment: occ         Angina with Exercise?  Angina with Exercise: No      Resistance Training?  Resistance Training: Yes      Hypertension      Diagnosed with HTN: Yes Intervention      Resting BP: 148/78 mmHg Dietician Consult/Class: Pending (scheduled)      Peak Ex BP: 148/78 mmHg Nurse/Patient Discussion: Yes     Intervention Diabetes Ed Referral: No     Home Exercise:  Yes Discuss Maintenance /Wt Loss: Yes     Mode: Walk Attend Cooking School: Pending (scheduled)     Duration: 5 minutes 4 X daily Dietary Goal:  (24% fat, low sodium)     Frequency: 7 days active Education     Education Nutrition Education: Healthy Eating, Sodium Reduction     Equipment Orientation, Exercise Safety, S/S to Report, RPE Scale, Warm Up / Cool Down, Physically Active Target Goal     Target Goal LDL-C < 100 if trig. > 200:  No       Start Individual Exercise Rx Yes LDL-C < 70 for high risk patient:  Yes       BP < 140/90 or < 130/80 if DM or CKD Yes Non HDL-C Should be < 130:  Yes       Aerobic activity 30 + min / day 5 days / wk Yes HbA1C < 7%: Yes         BMI < 25: Yes       Notes: due to right " rotator cuff, patient's range of motion of right shoulder is decreased, unable to lift arm above her, patient has knee pain with sqautting 6/10 especially in her right knee  Initial Assessment:  Heart Sounds: S1S2 WNL (S1S2 WNL)       Lung Sounds: Clear throughout bilaterally       Edema: None       Right Peripheral Pulses:  Carotid: 2+  Radial: 1+  Dorsalis Pedis:  (not assessed)  Posterior Tibialis: 1+   Left Peripheral Pulses:  Carotid: 2+  Radial: 1+  Dorsalis Pedis:  (not assessed)  Posterior Tibialis: 1+    Incision: NA      Color: pink       Frame Size: Medium        Cognitive Assessment: patient denies having any cognitive barriers       Fall Assessment:    Gait: steady  Balance: unsteady  Upper Body: unable to life right arm up above head due to right shoulder pain  Lower Body: unable to maintain a squat due to knee pain especially on the right   Recent Falls: none  Current Medications and Vaccinations: Reviewed/Updated  Patient Stated Goals: eat healthy, begin exercising, attend healthy minds workshops and work on feelings of depression, feel more energy  Other: Patient understands she feels depressed. She is looking forward to Healthy Mind workshop and education. She contributes her stress and depression to her myelodysplasia and recent NSTEMI. She is motivated to eat more healthy and lose weight and exercise

## 2017-03-06 NOTE — PROGRESS NOTES
Cardiac Rehab Individual Treatment Plan Assessment: Discharge 2017 Session #     1   MRN: [id number]   Allergies: Review of patient's allergies indicates no known allergies.   Patient Name: Cheryle Lee Koski : 1950 Risk Stratification: High    Diagnoses:   NSTEMI (non-ST elevated myocardial infarction) (CMS-HCC)    Stented coronary artery   Age: 66 y.o. Physician: Pcp Pt States None    Date of Event: 17 Specialist: Dr. Wilson   Risk Factors:  Hypertension, Hyperlipidemia, Family History, Obesity, Stress, Sedentary Lifestyle, Age, Female > 55   Exercise Nutrition Education Psychosocial   Stages of change Stages of change Stages of change Stages of change   Relapse Relapse Relapse Relapse   Fitness Test Lipids Learning Barriers Outcome Survey Tools   DIST:            Max HR:   Date:   Family Support     RPE:   Total:         SPO2:   Trig:     Intervention   MET:   HDL:   Tobacco Use     EF=   LDL:   History   Smoking status   • Never Smoker    Smokeless tobacco   • Never Used          Ambulatory Status Diabetes Smoking Intervention                       Education   Exercise Prescription                 Education Intervention Target Goal   Frequency:    Weight Management       Duration:      Target Goal     Intensity:      Complete Tobacco Cessation:       METS:      Educate / Review and have understanding of cardiac disease prevention:       Progression:           THR:   History   Alcohol Use   • Yes     Comment: occ         Angina with Exercise?         Resistance Training?         Hypertension        Intervention                       Intervention       Home Exercise:          Mode:         Duration:         Frequency:   Education     Education         Target Goal     Target Goal LDL-C < 100 if trig. > 200:          Start Individual Exercise Rx   LDL-C < 70 for high risk patient:          BP < 140/90 or < 130/80 if DM or CKD   Non HDL-C Should be < 130:          Aerobic activity 30 + min / day 5  days / wk                     Notes: Patient will be discharged from Intensive Cardiac Rehab at this time due to conflict with Chemotherapy treatment.

## 2017-03-24 NOTE — PROGRESS NOTES
Date: 3/24/2017    Initial Individual Treatment Plan review for the [facility] Intensive Cardiac Rehabilitation (ICR) Program.    Cheryle Lee Koski, 66 y.o. female, with qualifying diagnosis/diagnoses of recent NSTEMI with Stent. Co-morbid conditions include Hypertension, Hyperlipidemia, Family History, Obesity, Stress, Sedentary Lifestyle, Age, Female > 55    Primary Care Provider: Pcp Pt States None    Heart Specialist (s): Dr. Freeman    Patient has successfully completed 1 Exercise Sessions, and an appropriate number of corresponding Education Sessions.  Patient is an excellent candidate for the [facility] Intensive Cardiac Rehabilitation (ICR) Program.    I will review the Individual Treatment Plan again at 30 day post-initiation of ICR.    Faheem Dominguez MD, [facility]  , [facility]

## 2017-03-31 PROBLEM — D41.9: Status: ACTIVE | Noted: 2017-01-01

## 2017-03-31 NOTE — IP AVS SNAPSHOT
" Home Care Instructions                                                                                                                Name:Cheryle Lee Koski  Medical Record Number:[id number]  CSN: [id number]    YOB: 1950   Age: 66 y.o.  Sex: female  HT:1.6 m (5' 3\") WT: 68.4 kg (150 lb 12.7 oz)          Admit Date: 3/31/2017     Discharge Date:   Today's Date: 3/31/2017  Attending Doctor:  Shayan Branch M.D.                  Allergies:  Review of patient's allergies indicates no known allergies.                Discharge Instructions           BONE MARROW ASPIRATION & BIOPSY DISCHARGE INSTRUCTIONS    1. After the numbing medicine wears off, you may feel some discomfort.    2. Keep bandage clean and dry for 24 hours.  After this time, you can change the bandage.  You may now bathe or shower.    3. If bleeding occurs after your bone marrow aspiration or biopsy, apply pressure to the area and call your doctor immediately.    4. Call your doctor if pain persists for greater than 24 hours in the area where you had your aspiration or biopsy.    5. Call your doctor immediately if you notice redness or drainage in the area or if you have a fever.    6. Call your doctor if you have numbness or weakness in the area where the doctor took the bone marrow or down your leg.    7. Do not drive or drink alcohol for 24 hours if you have had sedation medication.  The medication will make you drowsy.    8. Resume your regular diet.    9. Follow up with your doctor.    10. Additional instructions: *see oncology on monday**    11. Prescriptions: *none**        I acknowledge receipt and understanding of these Home Care Instructions.           Medication List      ASK your doctor about these medications        Instructions    acyclovir 400 MG tablet   Commonly known as:  ZOVIRAX    Take 400 mg by mouth 2 times a day. Continuous.   Dose:  400 mg       atorvastatin 10 MG Tabs   Commonly known as:  LIPITOR    Take 40 mg by mouth " every evening. Patient states increased per Dr. Wilson   Dose:  40 mg       BRILINTA 90 MG Tabs tablet   Generic drug:  ticagrelor    Take 90 mg by mouth 2 Times a Day.   Dose:  90 mg       carvedilol 3.125 MG Tabs   Commonly known as:  COREG    Take 3.125 mg by mouth 2 times a day, with meals.   Dose:  3.125 mg       ciprofloxacin 500 MG Tabs   Commonly known as:  CIPRO    Take 500 mg by mouth 2 times a day. Continuous.   Dose:  500 mg       docusate sodium 100 MG Caps   Commonly known as:  COLACE    Take 100 mg by mouth 2 times a day.   Dose:  100 mg       fluconazole 100 MG Tabs   Commonly known as:  DIFLUCAN    Take 100 mg by mouth every day. Continuous.   Dose:  100 mg       GLUCOSAMINE CHONDR 1500 COMPLX Caps    Take 1 Cap by mouth every morning.   Dose:  1 Cap       OCUVITE Tabs    Take 1 Tab by mouth every day.   Dose:  1 Tab       omeprazole 20 MG delayed-release capsule   Commonly known as:  PRILOSEC    Take 1 Cap by mouth every morning.   Dose:  20 mg       ondansetron 8 MG Tbdp   Commonly known as:  ZOFRAN ODT    Take 8 mg by mouth every 8 hours as needed for Nausea/Vomiting.   Dose:  8 mg       vitamin D 2000 UNIT Tabs    Take 2,000 Units by mouth every day.   Dose:  2000 Units               Medication Information     Above and/or attached are the medications your physician expects you to take upon discharge. Review all of your home medications and newly ordered medications with your doctor and/or pharmacist. Follow medication instructions as directed by your doctor and/or pharmacist. Please keep your medication list with you and share with your physician. Update the information when medications are discontinued, doses are changed, or new medications (including over-the-counter products) are added; and carry medication information at all times in the event of emergency situations.        Resources     Quit Smoking / Tobacco Use:    I understand the use of any tobacco products increases my chance of  suffering from future heart disease or stroke and could cause other illnesses which may shorten my life. Quitting the use of tobacco products is the single most important thing I can do to improve my health. For further information on smoking / tobacco cessation call a Toll Free Quit Line at 1-677.275.4987 (*National [employer] [place]) or 1-589.126.6151 (American Lung Association) or you can access the web based program at www.lungusa.org.    [place] Tobacco Users Help Line:  (550) 615-4633       Toll Free: 1-238.128.4290  Quit Tobacco Program [facility] (685)324-0024    Crisis Hotline:    [place] Crisis Hotline:  8-500-MOLPAVH or 1-216.258.8843    [place] Crisis Hotline:    1-963.379.2233 or 179-258-5076    Discharge Survey:   Thank you for choosing [facility]. We hope we did everything we could to make your hospital stay a pleasant one. You may be receiving a survey and we would appreciate your time and participation in answering the questions. Your input is very valuable to us in our efforts to improve our service to our patients and their families.            Signatures     My signature on this form indicates that:    1. I acknowledge receipt and understanding of these Home Care Instruction.  2. My questions regarding this information have been answered to my satisfaction.  3. I have formulated a plan with my discharge nurse to obtain my prescribed medications for home.    __________________________________      __________________________________                   Patient Signature                                 Guardian/Responsible Adult Signature      __________________________________                 __________       ________                       Nurse Signature                                               Date                 Time

## 2017-03-31 NOTE — PROCEDURES
DATE OF PROCEDURE: March 31, 2017    PROCEDURE: Bone marrow biopsy with bone marrow aspiration.     REQUESTING PHYSICIAN: Dr. Sanabria    INDICATIONS: myelodysplastic syndrome    INFORMED CONSENT: Consent signed and on the chart.     DESCRIPTION: A time out was called. The patient was placed in the prone position. The left buttock was prepped and draped in a sterile fashion. 1 mL of 1% lidocaine was injected into the skin followed by 3 mL into the periosteum of the posterior ilium bone. Large-bore needle was used to obtain 5 mL of aspirate followed by 5 mL   into a heparinized syringe for flow cytometry. This followed by a  bone marrow biopsy using the Jamshidi needle.     SEDATION USED: 4 mg IV versed and 100 mcg IV fentanyl    ESTIMATED BLOOD LOSS: none

## 2017-03-31 NOTE — DISCHARGE INSTRUCTIONS
BONE MARROW ASPIRATION & BIOPSY DISCHARGE INSTRUCTIONS    1. After the numbing medicine wears off, you may feel some discomfort.    2. Keep bandage clean and dry for 24 hours.  After this time, you can change the bandage.  You may now bathe or shower.    3. If bleeding occurs after your bone marrow aspiration or biopsy, apply pressure to the area and call your doctor immediately.    4. Call your doctor if pain persists for greater than 24 hours in the area where you had your aspiration or biopsy.    5. Call your doctor immediately if you notice redness or drainage in the area or if you have a fever.    6. Call your doctor if you have numbness or weakness in the area where the doctor took the bone marrow or down your leg.    7. Do not drive or drink alcohol for 24 hours if you have had sedation medication.  The medication will make you drowsy.    8. Resume your regular diet.    9. Follow up with your doctor.    10. Additional instructions: *see oncology on monday**    11. Prescriptions: *none**        I acknowledge receipt and understanding of these Home Care Instructions.

## 2017-03-31 NOTE — OR NURSING
1308 Received pt from [facility] with transport, report from MARLENY Perez.  VSS, in no signs of distress. Pt aware of POC.    1315 Pt drinking juice, denies pain, left back of hip with bandaid with small dime size of blood.     1320 Discharge instructions given to pt and family, both verbalize understanding.   Pt meets discharge criteria.  Steady on feet.     1326 Pt discharged, ambulatory to car.  All questions/concerns addressed.

## 2017-03-31 NOTE — IP AVS SNAPSHOT
3/31/2017          Cheryle Lee Koski  [address] [place] [address]    Dear Cheryle:    [facility] wants to ensure your discharge home is safe and you or your loved ones have had all your questions answered regarding your care after you leave the hospital.    You may receive a telephone call within two days of your discharge.  This call is to make certain you understand your discharge instructions as well as ensure we provided you with the best care possible during your stay with us.     The call will only last approximately 3-5 minutes and will be done by a nurse.    Once again, we want to ensure your discharge home is safe and that you have a clear understanding of any next steps in your care.  If you have any questions or concerns, please do not hesitate to contact us, we are here for you.  Thank you for choosing [facility] for your healthcare needs.    Sincerely,    Russel Vieira    [facility] [place] [facility]

## 2017-04-03 NOTE — PROGRESS NOTES
Date: 4/3/2017    Discharge Individual Treatment Plan review for the [facility] Intensive Cardiac Rehabilitation (ICR) Program.    Cheryle Lee Koski, 66 y.o. female, with qualifying diagnosis/diagnoses of recent NSTEMI with Stent. Co-morbid conditions include Hypertension, Hyperlipidemia, Family History, Obesity, Stress, Sedentary Lifestyle, Age, Female > 55    Primary Care Provider: Viviana Nowak M.D.    Heart Specialist (s): Steve    Patient has successfully completed 1 Exercise Sessions, and an appropriate number of corresponding Education Sessions.  Patient is being discharged from the [facility] Intensive Cardiac Rehabilitation (ICR) Program because of a conflict with Chemotherapy.  Hopefully she will be able to participate in ICR in the future.    Faheem Dominguez MD, [facility]  , [facility] Intensive Cardiac Rehabilitation (ICR) Program

## 2017-06-01 PROBLEM — D46.9 MYELODYSPLASTIC SYNDROME (HCC): Status: ACTIVE | Noted: 2017-01-01

## 2017-06-01 NOTE — OR SURGEON
Immediate Post- Operative Note        PostOp Diagnosis: MDS      Procedure(s): chest port      Estimated Blood Loss: Less than 5 ml        Complications: None            6/1/2017     11:13 AM     Edmar Villafuerte

## 2017-06-01 NOTE — DISCHARGE INSTRUCTIONS
Implanted Port Insertion, Care After  Refer to this sheet in the next few weeks. These instructions provide you with information on caring for yourself after your procedure. Your health care provider may also give you more specific instructions. Your treatment has been planned according to current medical practices, but problems sometimes occur. Call your health care provider if you have any problems or questions after your procedure.  WHAT TO EXPECT AFTER THE PROCEDURE  After your procedure, it is typical to have the following:   · Discomfort at the port insertion site. Ice packs to the area will help.  · Bruising on the skin over the port. This will subside in 3-4 days.  HOME CARE INSTRUCTIONS  · After your port is placed, you will get a 's information card. The card has information about your port. Keep this card with you at all times.    · Know what kind of port you have. There are many types of ports available.    · Wear a medical alert bracelet in case of an emergency. This can help alert health care workers that you have a port.    · The port can stay in for as long as your health care provider believes it is necessary.    · A home health care nurse may give medicines and take care of the port.    · You or a family member can get special training and directions for giving medicine and taking care of the port at home.    SEEK MEDICAL CARE IF:   · Your port does not flush or you are unable to get a blood return.    · You have a fever or chills.  SEEK IMMEDIATE MEDICAL CARE IF:  · You have new fluid or pus coming from your incision.    · You notice a bad smell coming from your incision site.    · You have swelling, pain, or more redness at the incision or port site.    · You have chest pain or shortness of breath.     This information is not intended to replace advice given to you by your health care provider. Make sure you discuss any questions you have with your health care provider.     Document  Released: 10/08/2014 Document Revised: 12/23/2014 Document Reviewed: 10/08/2014  [employer] Patient Education ©2016 [employer]    ACTIVITY: Rest and take it easy for the first 24 hours.  A responsible adult is recommended to remain with you during that time.  It is normal to feel sleepy.  We encourage you to not do anything that requires balance, judgment or coordination.    MILD FLU-LIKE SYMPTOMS ARE NORMAL. YOU MAY EXPERIENCE GENERALIZED MUSCLE ACHES, THROAT IRRITATION, HEADACHE AND/OR SOME NAUSEA.    FOR 24 HOURS DO NOT:  Drive, operate machinery or run household appliances.  Drink beer or alcoholic beverages.   Make important decisions or sign legal documents.    SPECIAL INSTRUCTIONS: follow up with primary care physician call with any questions  If you experience chest pain, s.o.b, call 911 return to ER   Call Dr sanz with any questions [phone]    DIET: To avoid nausea, slowly advance diet as tolerated, avoiding spicy or greasy foods for the first day.  Add more substantial food to your diet according to your physician's instructions.  Babies can be fed formula or breast milk as soon as they are hungry.  INCREASE FLUIDS AND FIBER TO AVOID CONSTIPATION.    SURGICAL DRESSING/BATHING: keep dressing clean and dry for 7 days no showering dressing.    FOLLOW-UP APPOINTMENT:  A follow-up appointment should be arranged with your doctor in [phone]; call to schedule.    You should CALL YOUR PHYSICIAN if you develop:  Fever greater than 101 degrees F.  Pain not relieved by medication, or persistent nausea or vomiting.  Excessive bleeding (blood soaking through dressing) or unexpected drainage from the wound.  Extreme redness or swelling around the incision site, drainage of pus or foul smelling drainage.  Inability to urinate or empty your bladder within 8 hours.  Problems with breathing or chest pain.    You should call 911 if you develop problems with breathing or chest pain.  If you are unable to contact  your doctor or surgical center, you should go to the nearest emergency room or urgent care center.  Physician's telephone #: [phone]    If any questions arise, call your doctor.  If your doctor is not available, please feel free to call the Surgical Center at [phone].  The Center is open Monday through Friday from 7AM to 7PM.  You can also call the HEALTH HOTLINE open 24 hours/day, 7 days/week and speak to a nurse at [phone], or toll free at [phone].    A registered nurse may call you a few days after your surgery to see how you are doing after your procedure.    MEDICATIONS: Resume taking daily medication.  Take prescribed pain medication with food.  If no medication is prescribed, you may take non-aspirin pain medication if needed.  PAIN MEDICATION CAN BE VERY CONSTIPATING.  Take a stool softener or laxative such as senokot, pericolace, or milk of magnesia if needed.  Implanted Port Insertion  An implanted port is a central line that has a round shape and is placed under the skin. It is used as a long-term IV access for:   · Medicines, such as chemotherapy.    · Fluids.    · Liquid nutrition, such as total parenteral nutrition (TPN).    · Blood samples.    LET YOUR HEALTH CARE PROVIDER KNOW ABOUT:  · Allergies to food or medicine.    · Medicines taken, including vitamins, herbs, eye drops, creams, and over-the-counter medicines.    · Any allergies to heparin.  · Use of steroids (by mouth or creams).    · Previous problems with anesthetics or numbing medicines.    · History of bleeding problems or blood clots.    · Previous surgery.    · Other health problems, including diabetes and kidney problems.    · Possibility of pregnancy, if this applies.  RISKS AND COMPLICATIONS  Generally, this is a safe procedure. However, as with any procedure, problems can occur. Possible problems include:  · Damage to the blood vessel, bruising, or bleeding at the puncture site.    · Infection.  · Blood clot in the  vessel that the port is in.  · Breakdown of the skin over your port.  · Very rarely a person may develop a condition called a pneumothorax, a collection of air in the chest that may cause one of the lungs to collapse. The placement of these catheters with the appropriate imaging guidance significantly decreases the risk of a pneumothorax.    BEFORE THE PROCEDURE   · Your health care provider may want you to have blood tests. These tests can help tell how well your kidneys and liver are working. They can also show how well your blood clots.    · If you take blood thinners (anticoagulant medicines), ask your health care provider when you should stop taking them.    · Make arrangements for someone to drive you home. This is necessary if you have been sedated for your procedure.    PROCEDURE   Port insertion usually takes about 30-45 minutes.   · An IV needle will be inserted in your arm. Medicine for pain and medicine to help relax you (sedative) will flow directly into your body through this needle.    · You will lie on an exam table, and you will be connected to monitors to keep track of your heart rate, blood pressure, and breathing throughout the procedure.  · An oxygen monitoring device may be attached to your finger. Oxygen will be given.    · Everything will be kept as germ free (sterile) as possible during the procedure. The skin near the point of the incision will be cleansed with antiseptic, and the area will be draped with sterile towels. The skin and deeper tissues over the port area will be made numb with a local anesthetic.  · Two small cuts (incisions) will be made in the skin to insert the port. One will be made in the neck to obtain access to the vein where the catheter will lie.    · Because the port reservoir will be placed under the skin, a small skin incision will be made in the upper chest, and a small pocket for the port will be made under the skin. The catheter that will be connected to the port  tunnels to a large central vein in the chest. A small, raised area will remain on your body at the site of the reservoir when the procedure is complete.  · The port placement will be done under imaging guidance to ensure the proper placement.    · The reservoir has a silicone covering that can be punctured with a special needle.    · The port will be flushed with normal saline, and blood will be drawn to make sure it is working properly.  · There will be nothing remaining outside the skin when the procedure is finished.    · Incisions will be held together by stitches, surgical glue, or a special tape.  AFTER THE PROCEDURE  · You will stay in a recovery area until the anesthesia has worn off. Your blood pressure and pulse will be checked.  · A final chest X-ray will be taken to check the placement of the port and to ensure that there is no injury to your lung.     This information is not intended to replace advice given to you by your health care provider. Make sure you discuss any questions you have with your health care provider.     Document Released: 10/08/2014 Document Revised: 01/08/2016 Document Reviewed: 10/08/2014  [employer] Interactive Patient Education ©2016 [employer]      If your physician has prescribed pain medication that includes Acetaminophen (Tylenol), do not take additional Acetaminophen (Tylenol) while taking the prescribed medication.    Depression / Suicide Risk    As you are discharged from this [facility] facility, it is important to learn how to keep safe from harming yourself.    Recognize the warning signs:  · Abrupt changes in personality, positive or negative- including increase in energy   · Giving away possessions  · Change in eating patterns- significant weight changes-  positive or negative  · Change in sleeping patterns- unable to sleep or sleeping all the time   · Unwillingness or inability to communicate  · Depression  · Unusual sadness, discouragement and loneliness  · Talk  of wanting to die  · Neglect of personal appearance   · Rebelliousness- reckless behavior  · Withdrawal from people/activities they love  · Confusion- inability to concentrate     If you or a loved one observes any of these behaviors or has concerns about self-harm, here's what you can do:  · Talk about it- your feelings and reasons for harming yourself  · Remove any means that you might use to hurt yourself (examples: pills, rope, extension cords, firearm)  · Get professional help from the community (Mental Health, Substance Abuse, psychological counseling)  · Do not be alone:Call your Safe Contact- someone whom you trust who will be there for you.  · Call your local CRISIS HOTLINE 818-0250 or 469-555-8184  · Call your local Children's Mobile Crisis Response Team [place] (807) 357-1979 or www.[employer]  · Call the toll free National Suicide Prevention Hotlines   · National Suicide Prevention Lifeline 912-442-ATYS (8112)  · National Hope Line Network 800-SUICIDE (470-3025)

## 2017-06-01 NOTE — OR NURSING
1125 patient arrived from IR s/p right chest port placement dressing clean dry intact, patient denies pain, vss, plan of care discussed with patient.  1145 patient tolerating fluids, vss  1205 discharge instructions given to patient, patient verbalize understanding of the orders.  1230 patient transported via w/c with all her personal belongings.

## 2017-06-01 NOTE — PROGRESS NOTES
IR Procedure Note:    Dr. Villafuerte placed a implanted powerport in right upper chest.  The pt tolerated the procedure well, vital signs stable.  Dermabond, Steristrips, sutures, tegaderm applied to incision, CDI and soft.  Report given to Dario FARMER.  Pt transported back to PPU.      BARD Power Port  REF:  [id number]  LOT:  [id number]

## 2017-06-01 NOTE — IP AVS SNAPSHOT
" Home Care Instructions                                                                                                                Name:Cheryle Lee Koski  Medical Record Number:[id number]  CSN: [id number]    YOB: 1950   Age: 66 y.o.  Sex: female  HT:1.575 m (5' 2\") WT: 69.355 kg (152 lb 14.4 oz)          Admit Date: 6/1/2017     Discharge Date:   Today's Date: 6/1/2017  Attending Doctor:  Lamine Sanabria M.D.                  Allergies:  Review of patient's allergies indicates no known allergies.                Discharge Instructions       Implanted Port Insertion, Care After  Refer to this sheet in the next few weeks. These instructions provide you with information on caring for yourself after your procedure. Your health care provider may also give you more specific instructions. Your treatment has been planned according to current medical practices, but problems sometimes occur. Call your health care provider if you have any problems or questions after your procedure.  WHAT TO EXPECT AFTER THE PROCEDURE  After your procedure, it is typical to have the following:   · Discomfort at the port insertion site. Ice packs to the area will help.  · Bruising on the skin over the port. This will subside in 3-4 days.  HOME CARE INSTRUCTIONS  · After your port is placed, you will get a 's information card. The card has information about your port. Keep this card with you at all times.    · Know what kind of port you have. There are many types of ports available.    · Wear a medical alert bracelet in case of an emergency. This can help alert health care workers that you have a port.    · The port can stay in for as long as your health care provider believes it is necessary.    · A home health care nurse may give medicines and take care of the port.    · You or a family member can get special training and directions for giving medicine and taking care of the port at home.    SEEK MEDICAL CARE IF:   · Your " port does not flush or you are unable to get a blood return.    · You have a fever or chills.  SEEK IMMEDIATE MEDICAL CARE IF:  · You have new fluid or pus coming from your incision.    · You notice a bad smell coming from your incision site.    · You have swelling, pain, or more redness at the incision or port site.    · You have chest pain or shortness of breath.     This information is not intended to replace advice given to you by your health care provider. Make sure you discuss any questions you have with your health care provider.     Document Released: 10/08/2014 Document Revised: 12/23/2014 Document Reviewed: 10/08/2014  [employer] Interactive Patient Education ©2016 [employer]    ACTIVITY: Rest and take it easy for the first 24 hours.  A responsible adult is recommended to remain with you during that time.  It is normal to feel sleepy.  We encourage you to not do anything that requires balance, judgment or coordination.    MILD FLU-LIKE SYMPTOMS ARE NORMAL. YOU MAY EXPERIENCE GENERALIZED MUSCLE ACHES, THROAT IRRITATION, HEADACHE AND/OR SOME NAUSEA.    FOR 24 HOURS DO NOT:  Drive, operate machinery or run household appliances.  Drink beer or alcoholic beverages.   Make important decisions or sign legal documents.    SPECIAL INSTRUCTIONS: follow up with primary care physician call with any questions  If you experience chest pain, s.o.b, call 911 return to ER   Call Dr sanz with any questions [phone]    DIET: To avoid nausea, slowly advance diet as tolerated, avoiding spicy or greasy foods for the first day.  Add more substantial food to your diet according to your physician's instructions.  Babies can be fed formula or breast milk as soon as they are hungry.  INCREASE FLUIDS AND FIBER TO AVOID CONSTIPATION.    SURGICAL DRESSING/BATHING: keep dressing clean and dry for 7 days no showering dressing.    FOLLOW-UP APPOINTMENT:  A follow-up appointment should be arranged with your doctor in [phone]; call to  schedule.    You should CALL YOUR PHYSICIAN if you develop:  Fever greater than 101 degrees F.  Pain not relieved by medication, or persistent nausea or vomiting.  Excessive bleeding (blood soaking through dressing) or unexpected drainage from the wound.  Extreme redness or swelling around the incision site, drainage of pus or foul smelling drainage.  Inability to urinate or empty your bladder within 8 hours.  Problems with breathing or chest pain.    You should call 911 if you develop problems with breathing or chest pain.  If you are unable to contact your doctor or surgical center, you should go to the nearest emergency room or urgent care center.  Physician's telephone #: [phone]    If any questions arise, call your doctor.  If your doctor is not available, please feel free to call the Surgical Center at [phone].  The Center is open Monday through Friday from 7AM to 7PM.  You can also call the [facility] HOTLINE open 24 hours/day, 7 days/week and speak to a nurse at [phone], or toll free at [phone].    A registered nurse may call you a few days after your surgery to see how you are doing after your procedure.    MEDICATIONS: Resume taking daily medication.  Take prescribed pain medication with food.  If no medication is prescribed, you may take non-aspirin pain medication if needed.  PAIN MEDICATION CAN BE VERY CONSTIPATING.  Take a stool softener or laxative such as senokot, pericolace, or milk of magnesia if needed.  Implanted Port Insertion  An implanted port is a central line that has a round shape and is placed under the skin. It is used as a long-term IV access for:   · Medicines, such as chemotherapy.    · Fluids.    · Liquid nutrition, such as total parenteral nutrition (TPN).    · Blood samples.    LET YOUR HEALTH CARE PROVIDER KNOW ABOUT:  · Allergies to food or medicine.    · Medicines taken, including vitamins, herbs, eye drops, creams, and over-the-counter medicines.    · Any  allergies to heparin.  · Use of steroids (by mouth or creams).    · Previous problems with anesthetics or numbing medicines.    · History of bleeding problems or blood clots.    · Previous surgery.    · Other health problems, including diabetes and kidney problems.    · Possibility of pregnancy, if this applies.  RISKS AND COMPLICATIONS  Generally, this is a safe procedure. However, as with any procedure, problems can occur. Possible problems include:  · Damage to the blood vessel, bruising, or bleeding at the puncture site.    · Infection.  · Blood clot in the vessel that the port is in.  · Breakdown of the skin over your port.  · Very rarely a person may develop a condition called a pneumothorax, a collection of air in the chest that may cause one of the lungs to collapse. The placement of these catheters with the appropriate imaging guidance significantly decreases the risk of a pneumothorax.    BEFORE THE PROCEDURE   · Your health care provider may want you to have blood tests. These tests can help tell how well your kidneys and liver are working. They can also show how well your blood clots.    · If you take blood thinners (anticoagulant medicines), ask your health care provider when you should stop taking them.    · Make arrangements for someone to drive you home. This is necessary if you have been sedated for your procedure.    PROCEDURE   Port insertion usually takes about 30-45 minutes.   · An IV needle will be inserted in your arm. Medicine for pain and medicine to help relax you (sedative) will flow directly into your body through this needle.    · You will lie on an exam table, and you will be connected to monitors to keep track of your heart rate, blood pressure, and breathing throughout the procedure.  · An oxygen monitoring device may be attached to your finger. Oxygen will be given.    · Everything will be kept as germ free (sterile) as possible during the procedure. The skin near the point of the  incision will be cleansed with antiseptic, and the area will be draped with sterile towels. The skin and deeper tissues over the port area will be made numb with a local anesthetic.  · Two small cuts (incisions) will be made in the skin to insert the port. One will be made in the neck to obtain access to the vein where the catheter will lie.    · Because the port reservoir will be placed under the skin, a small skin incision will be made in the upper chest, and a small pocket for the port will be made under the skin. The catheter that will be connected to the port tunnels to a large central vein in the chest. A small, raised area will remain on your body at the site of the reservoir when the procedure is complete.  · The port placement will be done under imaging guidance to ensure the proper placement.    · The reservoir has a silicone covering that can be punctured with a special needle.    · The port will be flushed with normal saline, and blood will be drawn to make sure it is working properly.  · There will be nothing remaining outside the skin when the procedure is finished.    · Incisions will be held together by stitches, surgical glue, or a special tape.  AFTER THE PROCEDURE  · You will stay in a recovery area until the anesthesia has worn off. Your blood pressure and pulse will be checked.  · A final chest X-ray will be taken to check the placement of the port and to ensure that there is no injury to your lung.     This information is not intended to replace advice given to you by your health care provider. Make sure you discuss any questions you have with your health care provider.     Document Released: 10/08/2014 Document Revised: 01/08/2016 Document Reviewed: 10/08/2014  [employer] Interactive Patient Education ©2016 [employer]      If your physician has prescribed pain medication that includes Acetaminophen (Tylenol), do not take additional Acetaminophen (Tylenol) while taking the prescribed  medication.    Depression / Suicide Risk    As you are discharged from this [facility] facility, it is important to learn how to keep safe from harming yourself.    Recognize the warning signs:  · Abrupt changes in personality, positive or negative- including increase in energy   · Giving away possessions  · Change in eating patterns- significant weight changes-  positive or negative  · Change in sleeping patterns- unable to sleep or sleeping all the time   · Unwillingness or inability to communicate  · Depression  · Unusual sadness, discouragement and loneliness  · Talk of wanting to die  · Neglect of personal appearance   · Rebelliousness- reckless behavior  · Withdrawal from people/activities they love  · Confusion- inability to concentrate     If you or a loved one observes any of these behaviors or has concerns about self-harm, here's what you can do:  · Talk about it- your feelings and reasons for harming yourself  · Remove any means that you might use to hurt yourself (examples: pills, rope, extension cords, firearm)  · Get professional help from the community (Mental Health, Substance Abuse, psychological counseling)  · Do not be alone:Call your Safe Contact- someone whom you trust who will be there for you.  · Call your local CRISIS HOTLINE 388-8162 or 037-020-9501  · Call your local Children's Mobile Crisis Response Team [place] (530) 171-4866 or www.[employer]  · Call the toll free National Suicide Prevention Hotlines   · National Suicide Prevention Lifeline 077-616-IGMB (9218)  · National Hope Line Network 800-SUICIDE (879-5637)       Medication List      CONTINUE taking these medications        Instructions    Morning Afternoon Evening Bedtime    acyclovir 400 MG tablet   Commonly known as:  ZOVIRAX        Take 400 mg by mouth 2 times a day. Continuous.   Dose:  400 mg                        aspirin 81 MG Chew chewable tablet   Commonly known as:  ASA        Take 81 mg by mouth every day.    Dose:  81 mg                        atorvastatin 10 MG Tabs   Commonly known as:  LIPITOR        Take 40 mg by mouth every evening. Patient states increased per Dr. Wilson   Dose:  40 mg                        BRILINTA 90 MG Tabs tablet   Generic drug:  ticagrelor        Take 90 mg by mouth 2 Times a Day.   Dose:  90 mg                        carvedilol 3.125 MG Tabs   Commonly known as:  COREG        Take 3.125 mg by mouth 2 times a day, with meals.   Dose:  3.125 mg                        ciprofloxacin 500 MG Tabs   Commonly known as:  CIPRO        Take 500 mg by mouth 2 times a day. Continuous.   Dose:  500 mg                        docusate sodium 100 MG Caps   Commonly known as:  COLACE        Take 100 mg by mouth 2 times a day.   Dose:  100 mg                        fluconazole 100 MG Tabs   Commonly known as:  DIFLUCAN        Take 100 mg by mouth every day. Continuous.   Dose:  100 mg                        GLUCOSAMINE CHONDR 1500 COMPLX Caps        Take 1 Cap by mouth every morning.   Dose:  1 Cap                        OCUVITE Tabs        Take 1 Tab by mouth every day.   Dose:  1 Tab                        omeprazole 20 MG delayed-release capsule   Commonly known as:  PRILOSEC        Take 1 Cap by mouth every morning.   Dose:  20 mg                        vitamin D 2000 UNIT Tabs        Take 2,000 Units by mouth every day.   Dose:  2000 Units                                Medication Information     Above and/or attached are the medications your physician expects you to take upon discharge. Review all of your home medications and newly ordered medications with your doctor and/or pharmacist. Follow medication instructions as directed by your doctor and/or pharmacist. Please keep your medication list with you and share with your physician. Update the information when medications are discontinued, doses are changed, or new medications (including over-the-counter products) are added; and carry medication  information at all times in the event of emergency situations.        Resources     Quit Smoking / Tobacco Use:    I understand the use of any tobacco products increases my chance of suffering from future heart disease or stroke and could cause other illnesses which may shorten my life. Quitting the use of tobacco products is the single most important thing I can do to improve my health. For further information on smoking / tobacco cessation call a Toll Free Quit Line at 1-869.433.3772 (*National Cancer [place]) or 1-202.904.4916 (American Lung Association) or you can access the web based program at www.lungusa.org.    [place] Tobacco Users Help Line:  (777) 467-4229       Toll Free: 1-546.135.7525  Quit Tobacco Program Pending sale to [facility] (132)128-4513    Crisis Hotline:    [place] Crisis Hotline:  5-038-XSUEXTB or 1-101.939.3256    [place] Crisis Hotline:    1-755.832.9681 or 375-601-1179    Discharge Survey:   Thank you for choosing Pending sale to [facility]. We hope we did everything we could to make your hospital stay a pleasant one. You may be receiving a survey and we would appreciate your time and participation in answering the questions. Your input is very valuable to us in our efforts to improve our service to our patients and their families.            Signatures     My signature on this form indicates that:    1. I acknowledge receipt and understanding of these Home Care Instruction.  2. My questions regarding this information have been answered to my satisfaction.  3. I have formulated a plan with my discharge nurse to obtain my prescribed medications for home.    __________________________________      __________________________________                   Patient Signature                                 Guardian/Responsible Adult Signature      __________________________________                 __________       ________                       Nurse Signature                                                Date                 Time

## 2017-06-01 NOTE — IP AVS SNAPSHOT
6/1/2017    Cheryle Lee Koski  [address] [place] [address]    Dear Cheryle:    [facility] wants to ensure your discharge home is safe and you or your loved ones have had all of your questions answered regarding your care after you leave the hospital.    Below is a list of resources and contact information should you have any questions regarding your hospital stay, follow-up instructions, or active medical symptoms.    Questions or Concerns Regarding… Contact   Medical Questions Related to Your Discharge  (7 days a week, 8am-5pm) Contact a Nurse Care Coordinator   [phone]   Medical Questions Not Related to Your Discharge  (24 hours a day / 7 days a week)  Contact the Nurse Health Line   [phone]    Medications or Discharge Instructions Refer to your discharge packet   or contact your [facility] Primary Care Provider   [phone]   Follow-up Appointment(s) Schedule your appointment via [employer]   or contact Scheduling [phone]   Billing Review your statement via [employer]  or contact Billing [phone]   Medical Records Review your records via [employer]   or contact Medical Records [phone]     You may receive a telephone call within two days of discharge. This call is to make certain you understand your discharge instructions and have the opportunity to have any questions answered. You can also easily access your medical information, test results and upcoming appointments via the [employer] free online health management tool. You can learn more and sign up at [employer]. For assistance setting up your [employer] account, please call [phone].    Once again, we want to ensure your discharge home is safe and that you have a clear understanding of any next steps in your care. If you have any questions or concerns, please do not hesitate to contact us, we are here for you. Thank you for choosing [facility] for your healthcare needs.    Sincerely,    Your [facility] Healthcare Team

## 2017-09-06 NOTE — PROGRESS NOTES
Pt arrived to [facility], ambulatory, for platelet transfusion. Pt oriented to infusion services. Pt states she typically gets her transfusions in the CDU. Labs reviewed from 9/5, pt within parameters to treat today. Port (accessed 9/5 at MD office) flushed per policy, positive blood return noted. 1 unit platelets transfused with no s/sx of adverse reaction. Port flushed and heparin locked per policy, port left accessed for bone marrow biopsy tomorrow. Pt left [facility] with no s/sx of distress. Follow up appointment not necessary at this time.

## 2017-09-07 PROBLEM — D64.9 ANEMIA, UNSPECIFIED: Status: ACTIVE | Noted: 2017-01-01

## 2017-09-07 NOTE — DISCHARGE INSTRUCTIONS
BONE MARROW ASPIRATION & BIOPSY DISCHARGE INSTRUCTIONS    1. After the numbing medicine wears off, you may feel some discomfort.    2. Keep bandage clean and dry for 24 hours.  After this time, you can change the bandage.  You may now bathe or shower.    3. If bleeding occurs after your bone marrow aspiration or biopsy, apply pressure to the area and call your doctor immediately.    4. Call your doctor if pain persists for greater than 24 hours in the area where you had your aspiration or biopsy.    5. Call your doctor immediately if you notice redness or drainage in the area or if you have a fever.    6. Call your doctor if you have numbness or weakness in the area where the doctor took the bone marrow or down your leg.    7. Do not drive or drink alcohol for 24 hours if you have had sedation medication.  The medication will make you drowsy.    8. Resume your regular diet.    9. Follow up with your doctor.        I acknowledge receipt and understanding of these Home Care Instructions.

## 2017-09-07 NOTE — PROGRESS NOTES
1239-received pt from endo, report from Mo RN. Assumed care. Pt awake, alert, denies pain. Weaned to RA.  bandaid to left posterior hip, scant amt bloody drainage noted to bandaid, dressing unchanged from Mo's outlined assessment.  Pt tolerating PO, ride called.  1300-pts implanted port de-accessed with 20 mL NS and 5 cc heparin syringe per policy.     1305-son at bedside, pt meets criteria for discharge. Instructions reviewed, they express understanding. Pt discharged at 1308

## 2017-09-07 NOTE — OR NURSING
1115 Contacted Laura FARMER in endoscopy and notified Dr. Blanco of pt.'s fever 101.6, Dr. Blanco ok to proceed with procedure. Pt has port already accessed from infusion center, blood draw completed using port, IV fluids infusing.

## 2017-09-07 NOTE — OR SURGEON
DATE OF PROCEDURE: 9/7/2017    PROCEDURE: Bone marrow biopsy with bone marrow aspiration.     REQUESTING PHYSICIAN: Dr. Sanabria    INDICATIONS: cytopenias    INFORMED CONSENT: Consent signed and on the chart.     DESCRIPTION: A time out was called. The patient was placed in the prone position. The left buttock was prepped and draped in a sterile fashion.  1 mL of 1% lidocaine was injected into the skin followed by 3 mL into the periosteum of the posterior ilium bone. Large-bore needle was used to try to obtain 5 mL of aspirate followed by 5 mL into a heparinized syringe for flow cytometry. However, the result of this was multiple dry taps  Instead, two bone marrow biopsies were obtaibed using the Jamshidi needle.     SEDATION USED AND TIME: 4mg versed 100mcg fentanyl    ESTIMATED BLOOD LOSS:  <5cc

## 2017-10-10 NOTE — PROGRESS NOTES
Patient arrived ambulatory to the [facility] for platelet transfusion. Reviewed vital signs, labs, and physician orders. Patient arrived with scattered bruising to arms, legs and face, MD aware. Contacted Dr Sanabria office to obtain CBC results, and obtain an order for CMV -, irradiated platelets. Confirmed that MD doesn't want patient to have pre-treatments with platelet transfusions, pre-treatment medicaitoins held. Platelet level 15, met criteria to receive transfusion. Consents signed by patient. Right chest port numbed with Lidocaine per patient request, accessed with sterile technique, observed brisk blood return. Platelets transfused, no adverse reaction observed. Port flushed per protocol, vera needle removed, intact, placed gauze and tape dressing. Reviewed S&S of bleeding and neutropenic precautions with patient, patient verbalized understanding of these instructions. Patient left the [facility] ambulatory in no signs of distress at 1610.

## 2017-10-21 PROBLEM — D69.6 THROMBOCYTOPENIA (HCC): Status: ACTIVE | Noted: 2017-01-01

## 2017-10-21 NOTE — H&P
" Hospital Medicine History and Physical    Date of Service  10/21/2017    Chief Complaint  Chief Complaint   Patient presents with   • Weakness     history of myelodysplastic syndrome, \"I think I need a blood transfusion\"       History of Presenting Illness  67 y.o. female with history Of myelodysplastic syndrome was in her usual state of health until 3 weeks prior to admission. She reports at that time, she began to feel gradually weaker, unable to get out of her bed, and feeling overall \"crappy\". She reports in the last 1-2 days also increased bleeding and clots from her nose, and she reports that these symptoms usually mean her counts are low and she needs further assistance. She reports no relieving or exacerbating factors of her symptoms. She currently denies any current chest pain or shortness of breath, she does have a headache, no vision changes, no abdominal pain, nausea vomiting diarrhea or constipation.       Primary Care Physician  Viviana Nowak M.D.    Consultants  None    Code Status  Full code    Review of Systems  Review of Systems   Constitutional: Positive for malaise/fatigue. Negative for chills and fever.   Eyes: Negative for blurred vision and double vision.   Respiratory: Negative for cough, sputum production and shortness of breath.    Cardiovascular: Negative for chest pain and palpitations.   Gastrointestinal: Negative for abdominal pain, constipation, diarrhea, nausea and vomiting.   Genitourinary: Negative for dysuria.   Musculoskeletal: Negative.    Skin: Negative.    Neurological: Positive for weakness and headaches.   Endo/Heme/Allergies: Bruises/bleeds easily.   Psychiatric/Behavioral: Negative.         Past Medical History  Past Medical History:   Diagnosis Date   • Myocardial infarct 01/2017    stent in place    • Hiatus hernia syndrome     repaired    • High cholesterol    • Hypertension    • MDS (myelodysplastic syndrome) (CMS-HCC)    • Psychiatric problem     Depression "       Surgical History  Past Surgical History:   Procedure Laterality Date   • BONE MARROW BIOPSY, NDL/TROCAR  9/7/2017    Procedure: BONE MARROW BIOPSY, NDL/TROCAR;  Surgeon: Angy Blanco M.D.;  Location: [facility];  Service: Orthopedics   • BONE MARROW ASPIRATION  9/7/2017    Procedure: BONE MARROW ASPIRATION;  Surgeon: Angy Blanco M.D.;  Location: [facility];  Service: Orthopedics   • BONE MARROW ASPIRATION  3/31/2017    Procedure: BONE MARROW ASPIRATION;  Surgeon: Martha Joya M.D.;  Location: [facility];  Service:    • BONE MARROW BIOPSY, NDL/TROCAR  3/31/2017    Procedure: BONE MARROW BIOPSY, NDL/TROCAR;  Surgeon: Martha Joya M.D.;  Location: [facility];  Service:    • BONE MARROW ASPIRATION  9/30/2016    Procedure: BONE MARROW ASPIRATION;  Surgeon: Shayan Branch M.D.;  Location: [facility];  Service:    • BONE MARROW BIOPSY, NDL/TROCAR  9/30/2016    Procedure: BONE MARROW BIOPSY, NDL/TROCAR;  Surgeon: Shayan Branch M.D.;  Location: [facility];  Service:    • VENTRAL HERNIA REPAIR  3/26/2014    Performed by Lamine Paniagua M.D. at SURGERY [facility]   • OTHER ORTHOPEDIC SURGERY  2010    R foot surgery   • OTHER ORTHOPEDIC SURGERY  2004    Bilateral foot surgery   • OTHER ABDOMINAL SURGERY  1998    Hiatal Hernia repair   • GYN SURGERY  1996    Total Hysterectomy       Medications  No current facility-administered medications on file prior to encounter.      Current Outpatient Prescriptions on File Prior to Encounter   Medication Sig Dispense Refill   • Hydrocodone-Acetaminophen (NORCO PO) Take  by mouth.     • aspirin (ASA) 81 MG Chew Tab chewable tablet Take 81 mg by mouth every day.     • carvedilol (COREG) 3.125 MG Tab Take 3.125 mg by mouth 2 times a day, with meals.     • ticagrelor (BRILINTA) 90 MG Tab tablet Take 90 mg by mouth 2 Times a Day.     • Cholecalciferol (VITAMIN D) 2000 UNIT  Tab Take 2,000 Units by mouth every day.     • Multiple Vitamins-Minerals (OCUVITE) Tab Take 1 Tab by mouth every day.     • atorvastatin (LIPITOR) 10 MG Tab Take 40 mg by mouth every evening. Patient states increased per Dr. Wilson     • ciprofloxacin (CIPRO) 500 MG Tab Take 500 mg by mouth 2 times a day. Continuous.     • acyclovir (ZOVIRAX) 400 MG tablet Take 400 mg by mouth 2 times a day. Continuous.     • fluconazole (DIFLUCAN) 100 MG Tab Take 100 mg by mouth every day. Continuous.     • omeprazole (PRILOSEC) 20 MG delayed-release capsule Take 1 Cap by mouth every morning. 30 Cap 11   • Glucosamine-Chondroit-Vit C-Mn (GLUCOSAMINE CHONDR 1500 COMPLX) Cap Take 1 Cap by mouth every morning.     • docusate sodium (COLACE) 100 MG Cap Take 100 mg by mouth 2 times a day.         Family History  Family history reviewed and not significant    Social History  Social History   Substance Use Topics   • Smoking status: Never Smoker   • Smokeless tobacco: Never Used   • Alcohol use Yes      Comment: 3-4 drinks per week        Allergies  No Known Allergies     Physical Exam  Laboratory   Hemodynamics  Temp (24hrs), Av.4 °C (97.6 °F), Min:36.4 °C (97.6 °F), Max:36.4 °C (97.6 °F)   Temperature: 36.4 °C (97.6 °F)  Pulse  Av  Min: 118  Max: 118 Heart Rate (Monitored): 87  Blood Pressure : 122/58, NIBP: 134/53      Respiratory      Respiration: 18, Pulse Oximetry: 100 %             Physical Exam   Constitutional: She is oriented to person, place, and time. She appears well-developed and well-nourished. She appears distressed.   HENT:   Head: Normocephalic and atraumatic.   Eyes: Pupils are equal, round, and reactive to light.   Neck: Normal range of motion. Neck supple. No tracheal deviation present. No thyromegaly present.   Cardiovascular: Normal rate, regular rhythm and normal heart sounds.  Exam reveals no gallop and no friction rub.    No murmur heard.  Pulmonary/Chest: Effort normal and breath sounds normal. No  respiratory distress. She has no wheezes. She has no rales.   Abdominal: Soft. Bowel sounds are normal. She exhibits no distension. There is no tenderness. There is no rebound.   Musculoskeletal: Normal range of motion. She exhibits no edema.   Lymphadenopathy:     She has no cervical adenopathy.   Neurological: She is alert and oriented to person, place, and time. No cranial nerve deficit.   Skin: Skin is warm and dry. She is not diaphoretic.   Psychiatric: She has a normal mood and affect.       Recent Labs      10/21/17   1035   WBC  2.8*   RBC  2.02*   HEMOGLOBIN  6.5*   HEMATOCRIT  18.1*   MCV  89.6   MCH  32.2   MCHC  35.9*   RDW  45.4   PLATELETCT  2*     Recent Labs      10/21/17   1035   SODIUM  134*   POTASSIUM  3.6   CHLORIDE  103   CO2  19*   GLUCOSE  190*   BUN  16   CREATININE  0.47*   CALCIUM  9.0     Recent Labs      10/21/17   1035   ALTSGPT  7   ASTSGOT  11*   ALKPHOSPHAT  76   TBILIRUBIN  0.6   LIPASE  7*   GLUCOSE  190*     Recent Labs      10/21/17   1035   APTT  35.5   INR  1.23*     Recent Labs      10/21/17   1035   BNPBTYPENAT  27         Lab Results   Component Value Date    TROPONINI <0.01 10/21/2017     Urinalysis:  No results found for: SPECGRAVITY, GLUCOSEUR, KETONES, NITRITE, WBCURINE, RBCURINE, BACTERIA, EPITHELCELL     Imaging  No imaging need for review    Assessment/Plan     I anticipate this patient is appropriate for observation status at this time.    * Symptomatic anemia   Assessment & Plan    Monitor in observation for transfusion.        Thrombocytopenia (CMS-HCC)   Assessment & Plan    With associated increased bleeding, blood clots from nose.  Plan for transfusion of platelets.        Myelodysplastic syndrome (CMS-HCC)- (present on admission)   Assessment & Plan    Status post prior chemotherapy regimen. Plan for new regimen this Monday. As per oncology outpatient.  Continue current prophylactic antibiotics and antivirals         HLD (hyperlipidemia)- (present on admission)    Assessment & Plan    Continue current statin therapy regimen        HTN (hypertension)- (present on admission)   Assessment & Plan    Controlled, continue current regimen.             VTE prophylaxis: SCDS.

## 2017-10-21 NOTE — ASSESSMENT & PLAN NOTE
Evolved into AML   Venetoclax/Vidaza started 10/23 and now on hold due to neutropenic fever.  Low WBC, RBCs, and platelets.

## 2017-10-21 NOTE — ED PROVIDER NOTES
"ED Provider Note    CHIEF COMPLAINT  Chief Complaint   Patient presents with   • Weakness     history of myelodysplastic syndrome, \"I think I need a blood transfusion\"       HPI  Cheryle Lee Koski is a 67 y.o. female with a mild dysplastic syndrome, status post multiple platelet and blood transfusions who presents with complaints of lightheadedness, weakness, increased fatigue, shortness of breath with exertion. The patient is concerned she might have low platelets as this is what has occurred in the past. She most recently had a transfusion about 10 days ago. The patient denies any headache, neck pain, chest pain, back pain, abdominal pain. She has had a decrease in appetite, with some slight nausea at times, but denies any vomiting or diarrhea. She has had no fever, chills, sore throat, productive cough or congestion, or any difficulty breathing.  She is followed by Dr. Sanabria of hematology/oncology.    REVIEW OF SYSTEMS  See HPI for further details. All other systems are negative.     PAST MEDICAL HISTORY  Past Medical History:   Diagnosis Date   • Hiatus hernia syndrome     repaired    • High cholesterol    • Hypertension    • MDS (myelodysplastic syndrome) (CMS-HCC)    • Myocardial infarct 01/2017    stent in place    • Psychiatric problem     Depression       FAMILY HISTORY  No family history on file.    SOCIAL HISTORY  Social History     Social History   • Marital status:      Spouse name: N/A   • Number of children: N/A   • Years of education: N/A     Social History Main Topics   • Smoking status: Never Smoker   • Smokeless tobacco: Never Used   • Alcohol use Yes      Comment: 3-4 drinks per week    • Drug use: No   • Sexual activity: Not on file     Other Topics Concern   • Not on file     Social History Narrative   • No narrative on file       SURGICAL HISTORY  Past Surgical History:   Procedure Laterality Date   • BONE MARROW BIOPSY, NDL/TROCAR  9/7/2017    Procedure: BONE MARROW BIOPSY, " "NDL/TROCAR;  Surgeon: Angy Blanco M.D.;  Location: ENDOSCOPY [facility];  Service: Orthopedics   • BONE MARROW ASPIRATION  9/7/2017    Procedure: BONE MARROW ASPIRATION;  Surgeon: Angy Blanco M.D.;  Location: ENDOSCOPY [facility];  Service: Orthopedics   • BONE MARROW ASPIRATION  3/31/2017    Procedure: BONE MARROW ASPIRATION;  Surgeon: Martha Joya M.D.;  Location: ENDOSCOPY [facility];  Service:    • BONE MARROW BIOPSY, NDL/TROCAR  3/31/2017    Procedure: BONE MARROW BIOPSY, NDL/TROCAR;  Surgeon: Martha Joya M.D.;  Location: ENDOSCOPY [facility];  Service:    • BONE MARROW ASPIRATION  9/30/2016    Procedure: BONE MARROW ASPIRATION;  Surgeon: Shayan Branch M.D.;  Location: [facility];  Service:    • BONE MARROW BIOPSY, NDL/TROCAR  9/30/2016    Procedure: BONE MARROW BIOPSY, NDL/TROCAR;  Surgeon: Shayan Branch M.D.;  Location: [facility];  Service:    • VENTRAL HERNIA REPAIR  3/26/2014    Performed by Lamine Paniagua M.D. at SURGERY [facility]   • OTHER ORTHOPEDIC SURGERY  2010    R foot surgery   • OTHER ORTHOPEDIC SURGERY  2004    Bilateral foot surgery   • OTHER ABDOMINAL SURGERY  1998    Hiatal Hernia repair   • GYN SURGERY  1996    Total Hysterectomy       CURRENT MEDICATIONS  Home Medications    **Home medications have not yet been reviewed for this encounter**         ALLERGIES  No Known Allergies    PHYSICAL EXAM  VITAL SIGNS: /58   Pulse (!) 118   Temp 36.4 °C (97.6 °F) (Temporal)   Resp 18   Ht 1.575 m (5' 2\")   Wt 65.3 kg (144 lb)   LMP 10/18/1993   SpO2 100%   BMI 26.34 kg/m²   Constitutional: Awake, alert, in no acute distress, Non-toxic appearance.   HENT: Atraumatic. Bilateral external ears normal, mucous membranesMildly dry, throat nonerythematous without exudates, nose is normal.  Eyes: PERRL, EOMI, conjunctiva moist, noninjected.  Neck: Nontender, Normal range of motion, No nuchal rigidity, No stridor. "   Lymphatic: No lymphadenopathy noted.   Cardiovascular: Regular rate and rhythm, no murmurs, rubs, gallops.  Thorax & Lungs:  Good breath sounds bilaterally, no wheezes, rales, or retractions.  No chest tenderness.  Abdomen: Bowel sounds normal, Soft, nontender, nondistended, no rebound, guarding, masses.  Back: No CVA or spinal tenderness.  Extremities: Intact distal pulses, No edema, No tenderness.   Skin: Warm, Dry, there are mild diffuse petechiae noted to the extremities and truncal area.  Musculoskeletal: No joint swelling or tenderness.  Neurologic: Alert & oriented x 3, sensory and motor function normal. No focal deficits.   Psychiatric: Affect normal, Judgment normal, Mood normal.     EKG  Twelve-lead EKG shows normal sinus rhythm, rate of 89, normal intervals, normal axis, no acute ST-T wave elevations or ST depressions, no pathologic Q waves, no evidence of an acute injury or ischemic pattern on my reading, there is no previous EKG for comparison.        RADIOLOGY/PROCEDURES  No orders to display         COURSE & MEDICAL DECISION MAKING  Pertinent Labs & Imaging studies reviewed. (See chart for details)  The patient presents with the above complaints. IV is placed, she was given a bolus of normal saline.  CBC white count 2800, 34% lymphs, 6% polys, 59% other, hemoglobin 6.5, platelets 2, chemistry sodium 134, CO2 19, anion gap 12, glucose 190, normal renal function and liver function tests. The patient has no signs of infection. Case was discussed with Dr. Sanabria of hematology/oncology.  She will be given 1 unit of packed red blood cells and 1 unit of platelets, and he will see her in the office next week. Case was discussed with Dr. Bailey and patient will be admitted for transfusion.    FINAL IMPRESSION  1. Anemia requiring blood transfusion  2. Thrombocytopenia requiring platelet transfusion  3. Myelodysplastic syndrome         Electronically signed by: Paul Patel, 10/21/2017 11:05 AM

## 2017-10-21 NOTE — ED NOTES
"Pt arrives to triage via wheelchair with daughter with c/c generalized weakness- \"I just don't feel good.\"  Pt history of myelodysplastic syndrome & receives many blood/platelet transfusions.  Pt states she feels as if she needs a transfusion.  A&ox4.  No neuro defects.  [facility] advised to inform RN of any changes.   "

## 2017-10-22 PROBLEM — D70.9 NEUTROPENIC FEVER (HCC): Status: ACTIVE | Noted: 2017-01-01

## 2017-10-22 PROBLEM — R50.81 NEUTROPENIC FEVER (HCC): Status: ACTIVE | Noted: 2017-01-01

## 2017-10-22 PROBLEM — D64.9 ANEMIA: Status: ACTIVE | Noted: 2017-01-01

## 2017-10-22 NOTE — CARE PLAN
Problem: Nutritional:  Goal: Achieve adequate nutritional intake  Patient will consume >50% of meals and snacks   Outcome: NOT MET

## 2017-10-22 NOTE — ASSESSMENT & PLAN NOTE
Transfuse if less than 7.0, HH stable at 8-9 currently  Received 1 unit 10/24, 10/30, 2 units 11/7  Secondary to MDS to AML

## 2017-10-22 NOTE — PROGRESS NOTES
Pt alert and oriented x 4 but fatigued. Denies nausea but complains of pain in her head and her legs which she states is chronic. Medicated as needed for pain with po oxycodone and tylenol with positive results. Up with SBA and tolerates well. Port to R chest flushing well with positive blood return. VSS, afebrile. Neutropenic, precautions in place. Pt with repeat Hgb of 6.4 this morning. Dr Little notified. Pt received one unit of PRBCs. Tolerated well. Pt resting comfortably at this time. Call light within reach. Hourly rounding in place.

## 2017-10-22 NOTE — PROGRESS NOTES
Received pt from ER to r309.  Oriented pt to unit/room/call light/call for assist - pt verbalized understanding.  Temp of 100.6 other VSS.  Tylenol given.  Informed dr gurrola of temp - orders to run lactic acid and blood cultures at this time - chest xray and urine done.  Fall precautions in place.  scds set up but refused at this hour despite edu.    Pt is aaox4-irritable/anxious/fatigued.  Port accessed in ER - started fluid.  Reports leg pain - prn pain med given.   Pt up with x1 assist/fww  - pt states she has been having lots of difficulty with ambulation lately and wants to be evaluated by PT/OT for rehab while in hospital.  Pt states she has had low PO for weeks and greater then 10 weight loss - nutrition consult placed.  Pt makes needs known - will continue to round.

## 2017-10-22 NOTE — PROGRESS NOTES
[facility] Hospitalist Progress Note    Date of Service: 10/22/2017    Chief Complaint  67 y.o. female admitted 10/21/2017 with symptomatic anemia and thrombocytopenia    Interval Problem Update  10/22 patient had fever overnight. Received transfusion and platelet recovered however hemoglobin still 6.5. Patient otherwise denies fever, chills, nausea, vomiting, adb pain, SOB, CP, headache, constipation, diarrhea, cough, or sputum.    Consultants/Specialty  Onc    Disposition  TBD        Review of Systems   Constitutional: Negative for chills, fever and weight loss.   HENT: Negative for congestion, ear discharge, ear pain, hearing loss and nosebleeds.    Eyes: Negative for blurred vision and pain.   Respiratory: Negative for cough, sputum production, shortness of breath and wheezing.    Cardiovascular: Negative for chest pain, palpitations, leg swelling and PND.   Gastrointestinal: Negative for abdominal pain, constipation, diarrhea, heartburn, nausea and vomiting.   Genitourinary: Negative for dysuria, frequency and hematuria.   Musculoskeletal: Negative for back pain, falls, joint pain and neck pain.   Skin: Negative for rash.   Neurological: Negative for dizziness, tremors, speech change, seizures, weakness and headaches.   Psychiatric/Behavioral: Negative for depression, substance abuse and suicidal ideas.      Physical Exam  Laboratory/Imaging   Hemodynamics  Temp (24hrs), Av °C (98.6 °F), Min:36.4 °C (97.6 °F), Max:38.1 °C (100.6 °F)   Temperature: 36.6 °C (97.9 °F)  Pulse  Av.4  Min: 68  Max: 118 Heart Rate (Monitored): 98  Blood Pressure : 140/53, NIBP: 155/51      Respiratory      Respiration: 18, Pulse Oximetry: 95 %        RUL Breath Sounds: Clear, RML Breath Sounds: Clear, RLL Breath Sounds: Diminished, DEBBEI Breath Sounds: Clear, LLL Breath Sounds: Diminished    Fluids    Intake/Output Summary (Last 24 hours) at 10/22/17 0789  Last data filed at 10/22/17 0600   Gross per 24 hour   Intake               980 ml   Output             1200 ml   Net             -220 ml       Nutrition  Orders Placed This Encounter   Procedures   • Diet Order     Standing Status:   Standing     Number of Occurrences:   1     Order Specific Question:   Diet:     Answer:   Cardiac [6]     Physical Exam   Constitutional: She is oriented to person, place, and time. She appears well-developed and well-nourished.   HENT:   Head: Normocephalic.   Nose: Nose normal.   Mouth/Throat: No oropharyngeal exudate.   Eyes: EOM are normal. Pupils are equal, round, and reactive to light.   Neck: Normal range of motion. Neck supple. No JVD present. No thyromegaly present.   Cardiovascular: Normal rate, regular rhythm and normal heart sounds.  Exam reveals no gallop and no friction rub.    No murmur heard.  Pulmonary/Chest: Effort normal and breath sounds normal. No respiratory distress. She has no wheezes. She has no rales.   Abdominal: Soft. Bowel sounds are normal. She exhibits no distension and no mass. There is no tenderness. There is no rebound and no guarding.   Musculoskeletal: Normal range of motion. She exhibits no edema or tenderness.   Lymphadenopathy:     She has no cervical adenopathy.   Neurological: She is alert and oriented to person, place, and time. No cranial nerve deficit.   Skin: Skin is warm. No rash noted.   Psychiatric: Her behavior is normal.       Recent Labs      10/21/17   1035  10/22/17   0441   WBC  2.8*  3.0*   RBC  2.02*  1.98*   HEMOGLOBIN  6.5*  6.4*   HEMATOCRIT  18.1*  17.4*   MCV  89.6  87.9   MCH  32.2  32.3   MCHC  35.9*  36.8*   RDW  45.4  47.3   PLATELETCT  2*  65*   MPV   --   10.1     Recent Labs      10/21/17   1035  10/22/17   0441   SODIUM  134*  133*   POTASSIUM  3.6  4.1   CHLORIDE  103  104   CO2  19*  21   GLUCOSE  190*  174*   BUN  16  13   CREATININE  0.47*  0.42*   CALCIUM  9.0  8.8     Recent Labs      10/21/17   1035   APTT  35.5   INR  1.23*     Recent Labs      10/21/17   1035   BNPBTYPENAT  27               Assessment/Plan     * Symptomatic anemia   Assessment & Plan    Monitor in observation for transfusion.        Thrombocytopenia (CMS-HCC)   Assessment & Plan    With associated increased bleeding, blood clots from nose.  Plan for transfusion of platelets.        Myelodysplastic syndrome (CMS-HCC)- (present on admission)   Assessment & Plan    Status post prior chemotherapy regimen. Plan for new regimen this Monday. As per oncology outpatient.  Continue current prophylactic antibiotics and antivirals         HLD (hyperlipidemia)- (present on admission)   Assessment & Plan    Continue current statin therapy regimen        HTN (hypertension)- (present on admission)   Assessment & Plan    Controlled, continue current regimen.             Reviewed items::  Labs reviewed, Medications reviewed and Radiology images reviewed  Gan catheter::  No Gan  DVT prophylaxis - mechanical:  SCDs  Ulcer Prophylaxis::  Not indicated  Antibiotics:  Treating active infection/contamination beyond 24 hours perioperative coverage   For complexity-based billing, please refer to the history, exam, and decison making above. In addition, I spent >35 minutes caring for the patient today. More than 50% of the time was spent counseling and coordinating care.    I have discussed with RN and BEATRICE and SW and other consultants about patient's plan.     Patient will need to be rolled over to inpatient status because patient likely will stay in the hospital for active treatment for neutropenic fever and anemia as normocytic anemia for more than 2 midnights

## 2017-10-22 NOTE — PROGRESS NOTES
"Received report from Day RN, assumed care of PT at 1900. PT A&Ox 4, discussed plan of care for this shift.  Pain managed with meds per MAR, pt states \"my legs feel like they are being squeezed with a vice\". PT up x1 using FWW, safety precautions in place. Call light and personal possessions within reach.   "

## 2017-10-22 NOTE — DIETARY
"Nutrition Services    Pt seen per poor po and unexplained wt loss consult     Pt is a 68 yo female with adm dx: Anemia, Neutropenic fever   Past Medical History:   Diagnosis Date   • Hiatus hernia syndrome     repaired    • High cholesterol    • Hypertension    • MDS (myelodysplastic syndrome) (CMS-HCC)    • Myocardial infarct 01/2017    stent in place    • Psychiatric problem     Depression     Spoke w/pt at bedside, she appears elderly but well nourished. She reports her po intake PTA has been decreased for \"the past couple of months\" d/t \"nothing tastes good.\" She denies having any issues chewing or swallowing. Discussed supplements, pt reports she dislikes them as they \"taste fake.\" Offered other snacks, pt agreed and preferences obtained.     Discussed wt changes, pt reports she has had \"maybe\" a 10# wt loss over \"the past month or so.\" Pt unsure of her usual body weight, guessing it is around 68.1 kg (150#); unsure when she was last at that weight. With pt's current wt, pt is noted with a wt loss of ~5% x 1 month (significant).      Diet: Cardiac - per ADLs no meals have been recorded thus far   Ht: 62\"   Wt: (10/21) 64 kg via stand up scale   Body mass index is 25.8 kg/m². - overweight   Pertinent meds and labs reviewed    Pt with moderate malnutrition of acute illness   Pt with moderate malnutrition related to decreased appetite x 1 month as evidenced by pt consuming <75% of estimated needs for >1 month per pt interview and significant wt loss of 5% wt loss x 1 month    Recommendations:   · Encourage po intake of meals and snacks   · Record percentage of meals conusmed in ADLs to help monitor po adequacy  · Sending high protein milkshake once a day to help provide adequate nutrition   · Nutrition rep to see pt daily to help provide meals and snacks she enjoys; encouraged pt to review meals with the nutrition rep as well   · Consider liberalizing diet d/t pt's advanced age, poor po intake x 1 month and " moderate malnutrition status   · Monitor wt and lab trends     RD following

## 2017-10-22 NOTE — CONSULTS
DATE OF SERVICE:  10/22/2017    CONSULT REQUESTED BY:  Keren Little MD    REASON FOR CONSULTATION:  Pancytopenia with fevers.    HISTORY OF PRESENT ILLNESS:  The patient is a 67-year-old female that is well   known to me.  She presented a year ago with a myelodysplastic syndrome   consisting of an RAEB-2.  She did have blasts, but they were less than 20% and   she was started on Dacogen in October of last year.  She completed 9 cycles   in July and then began to have a persistent constant pancytopenia and   thrombocytopenia, whereas she had significantly improved prior to that.  She   had a bone marrow aspirate and biopsy performed on 09/11/2017 at [facility], which   showed a hypocellular bone marrow and there was a great deal of difficulty   with a dry tap.  However, on viewing her blast cells, it was felt that they   were close to 30%.  She clearly has failed standard Dacogen and is now evolved   to acute leukemia, which is a common evolution unfortunately of this cancer.    She would probably not tolerate 7 and 3, which would require hospitalization   and induction chemotherapy in view of her age and also the poor prognosis.    Therefore, after consulting with Dr. Krishnamurthy at [facility], it was felt that treating   her with Vidaza and venetoclax might result in a better outcome with less   toxicity issues.  She took sometime over the last month in terms of getting   the venetoclax approved.  She finally has that available and was to start   Vidaza tomorrow in my office, but came into the hospital feeling extremely   weak and was found to have hemoglobin of 6.5 grams, hematocrit of 18.1%, and   platelet count was only 2000.  She had a white count of 2.8 and on the   peripheral blood, she had obvious blasts.  Today, they are now reading 43%   blasts and this was confirmed by the pathologist, Dr. Chau.  She is now   going to be transfused with packed RBCs and platelets.  Unfortunately, she   also had a fever to 100.6 and she has  been cultured, which are so far negative   since last night.  She has been started on Maxipime and is presently   afebrile.    PAST MEDICAL HISTORY:  Gastroesophageal reflux disease, hypertension,   hyperlipidemia, vitamin D deficiency, and myocardial infarction in January of   this year with a stent placed.    PAST SURGICAL HISTORY:  Hiatal hernia repair and hysterectomy.    SOCIAL HISTORY:  The patient is a nonsmoker and does not drink alcohol.  She   is presently single and has been living with relatives, although she does have   a new boyfriend and has been living with him in [facility].  She has 3   children.  She is retired.    CURRENT MEDICATIONS:  Zovirax, Lipitor, Coreg, Maxipime, Diflucan, Prilosec.    PHYSICAL EXAMINATION:  VITAL SIGNS:  Temperature is 36.9, pulse is 76 and regular, respirations 18,   O2 saturation 92% on room air, blood pressure 125/48.  GENERAL:  Well-developed, well-nourished, little bit pale 67-year-old female,   who is completely awake, alert and appropriate.  HEENT:  Sclerae and conjunctivae are pale, but nonicteric.  Throat and mouth   benign without any ecchymoses.  NECK:  Supple.  LYMPH NODES:  None were palpable.  LUNGS:  Clear.  HEART:  Regular rate and rhythm.  ABDOMEN:  No organomegaly or masses.  EXTREMITIES:  No edema, no actual ecchymoses or petechial rash.    LABORATORY DATA:  WBC is 3.0, ANC 0.35, 43.2% blasts in the peripheral blood.    Hb 6.4, HCT 17.4, platelets 65,000, after transfusion.  Chemistry panel,   creatinine 0.42.  Liver function tests are normal.    IMPRESSION:  1.  Myelodysplastic syndrome, RAEB-2, initial response to monthly Dacogen with   improved counts and resolution of platelet and RBC transfusions for 1 year,   but now clearly progressing and has evolved into acute myelogenous leukemia.  2.  Persistent pancytopenia, requiring transfusions of platelets and red blood   cells along with blasts in the peripheral blood.  3.  Fever secondary to  neutropenia, cultured and started on Maxipime.  4.  Myocardial infarction with stent placed in January 2017.    PLAN:  Obviously, the patient will need to be transfused and since she has   been febrile, she will no longer be in observation, but admitted for   evaluation.  She has received the venetoclax and she was to start on Vidaza   tomorrow in my office.  This will obviously need to be deferred until her   infection issue has been stabilized, which may take the next several days.  It   may not be possible to start her this week, but hopefully by next week,   presuming she becomes afebrile and cultures are negative.  Her performance   status has otherwise been ECOG 1, so she is a good candidate for continued   support and treatment.       ____________________________________     MD DENIZ VICK / KRISTINE    DD:  10/22/2017 13:34:39  DT:  10/22/2017 14:47:54    D#:  [id number]  Job#:  [id number]

## 2017-10-23 NOTE — PROGRESS NOTES
No changes from epic. AOx4 VSS afebrile. Up with SBA, steady. Medicating per MAR for pain. Discussed poor pain control with MD, new orders received. Pt updated and agreeable to dose increase in po pain meds. Port infusing IVF, + blood return. POC discussed- begin chemotherapy, labs, pain control. allen brought in venclexa, reviewed by pharmacy. Will be reviewing chemo education with patient. Call light and belongings within reach, able to make needs known, rounding in place, will monitor.

## 2017-10-23 NOTE — PROGRESS NOTES
Oncology/Hematology Progress Note               Author: Gerald Wagoner Date & Time created: 10/23/2017  7:20 AM     CC- Pancytopenia with fevers.    HPI- h/o  MDS RAEB-2   Evolved into AML  Vidaza/Venetoclax planned .  Admitted with fever, pancytopenia .      Interval History:  Feels the same     Review of Systems:  Review of Systems   Constitutional: Positive for fever and malaise/fatigue.   HENT: Negative.    Respiratory: Negative.    Cardiovascular: Negative.    Gastrointestinal: Negative.    Genitourinary: Negative.    Musculoskeletal: Negative.    Skin: Negative.    Neurological: Negative.        Physical Exam:  Physical Exam   Constitutional: She appears well-developed and well-nourished.   HENT:   Mouth/Throat: Oropharynx is clear and moist.   Eyes: Pupils are equal, round, and reactive to light. No scleral icterus.   Neck: Normal range of motion.   Cardiovascular: Normal rate and regular rhythm.    Pulmonary/Chest: Effort normal and breath sounds normal. She has no wheezes.   Abdominal: Soft. Bowel sounds are normal.   Musculoskeletal: Normal range of motion.   Neurological: She is alert.   Skin: Skin is warm and dry.       Labs:        Invalid input(s): [id number]  Recent Labs      10/21/17   1035   TROPONINI  <0.01   BNPBTYPENAT  27     Recent Labs      10/21/17   1035  10/22/17   0441  10/23/17   0245   SODIUM  134*  133*  135   POTASSIUM  3.6  4.1  3.3*   CHLORIDE  103  104  104   CO2  19*  21  21   BUN  16  13  9   CREATININE  0.47*  0.42*  0.36*   CALCIUM  9.0  8.8  8.4*     Recent Labs      10/21/17   1035  10/22/17   0441  10/23/17   0245   ALTSGPT  7   --   7   ASTSGOT  11*   --   13   ALKPHOSPHAT  76   --   63   TBILIRUBIN  0.6   --   0.8   LIPASE  7*   --    --    GLUCOSE  190*  174*  143*     Recent Labs      10/21/17   1035  10/22/17   0441  10/22/17   1928  10/23/17   0245   RBC  2.02*  1.98*  2.44*  2.32*   HEMOGLOBIN  6.5*  6.4*  7.6*  7.2*   HEMATOCRIT  18.1*  17.4*  20.6*  19.8*    PLATELETCT  2*  65*  53*  47*   PROTHROMBTM  15.9*   --    --    --    APTT  35.5   --    --    --    INR  1.23*   --    --    --      Recent Labs      10/21/17   1035  10/22/17   0441  10/22/17   1928  10/23/17   0245   WBC  2.8*  3.0*  1.9*  2.1*   NEUTSPOLYS  6.30*  11.70*  8.50*  9.00*   LYMPHOCYTES  33.90  43.30*  50.90*  49.60*   MONOCYTES  0.90  0.90  0.00  3.60   EOSINOPHILS  0.00  0.00  0.00  0.00   BASOPHILS  0.00  0.00  1.00  0.00   ASTSGOT  11*   --    --   13   ALTSGPT  7   --    --   7   ALKPHOSPHAT  76   --    --   63   TBILIRUBIN  0.6   --    --   0.8     Recent Labs      10/21/17   1035  10/22/17   0441  10/23/17   0245   SODIUM  134*  133*  135   POTASSIUM  3.6  4.1  3.3*   CHLORIDE  103  104  104   CO2  19*  21  21   GLUCOSE  190*  174*  143*   BUN  16  13  9   CREATININE  0.47*  0.42*  0.36*   CALCIUM  9.0  8.8  8.4*     Hemodynamics:  Temp (24hrs), Av.8 °C (98.3 °F), Min:36.6 °C (97.8 °F), Max:37.1 °C (98.8 °F)  Temperature: 37.1 °C (98.8 °F)  Pulse  Av.9  Min: 68  Max: 118   Blood Pressure : 133/59     Respiratory:    Respiration: 17, Pulse Oximetry: 96 %        RUL Breath Sounds: Clear, RML Breath Sounds: Clear, RLL Breath Sounds: Diminished, DEBBIE Breath Sounds: Clear, LLL Breath Sounds: Diminished  Fluids:    Intake/Output Summary (Last 24 hours) at 10/23/17 0720  Last data filed at 10/23/17 0500   Gross per 24 hour   Intake           2580.5 ml   Output                0 ml   Net           2580.5 ml        GI/Nutrition:  Orders Placed This Encounter   Procedures   • Diet Order     Standing Status:   Standing     Number of Occurrences:   1     Order Specific Question:   Diet:     Answer:   Cardiac [6]     Medical Decision Making, by Problem:  Active Hospital Problems    Diagnosis   • *Neutropenic fever (CMS-HCC) [D70.9, R50.81]   • Thrombocytopenia (CMS-HCC) [D69.6]   • Myelodysplastic syndrome (CMS-HCC) [D46.9]   • Anemia [D64.9]   • Symptomatic anemia [D64.9]   • HLD  (hyperlipidemia) [E78.5]   • HTN (hypertension) [I10]       Plan:  1.  Myelodysplastic syndrome, RAEB-2, evolving into acute myelogenous leukemia- Vidaza/Venetoclax planned as outpt . She has been afebrile .CBC continues to show worsening blasts   Will start chemo as inpt. Pt to bring Venetoclax from home . Discussed with Dr. Krishnamurthy who recommended increasing the Venetoclax dosing in an accelerated fashion unlike in CLL. Will start 20 mg dose for 4 days and then increase to 50 mg. Monitor closely for TLS.  Will start Vidaza. She tolerated Dacogen well last year .   2.  TLS prophylaxis .We will increase IV fluid. Start allopurinol.  2.  Persistent pancytopenia - Transfuse prn, responding to transfusion appropriately    3. Neutropenic fever -  on Maxipime. Currently afebrile. Cultures negative to date. We will stop Diflucan due to interaction with many Dulcolax.  4.  Myocardial infarction with stent placed in January 2017.    Complex patient requiring complex decision making.   Quality-Core Measures

## 2017-10-23 NOTE — CARE PLAN
Problem: Pain Management  Goal: Pain level will decrease to patient's comfort goal    Intervention: Follow pain managment plan developed in collaboration with patient and Interdisciplinary Team  Pain medication dose increased for improved pain control      Problem: Acute Care of the Chemotherapy Patient  Goal: Optimal Outcome for the Chemotherapy Patient    Intervention: Review WBC, ANC, platelet count, kidney function and liver function tests before chemotherapy administration  Labs reviewed and will proceed with day 1 of cycle 1 chemotherapy  Intervention: Review current weight and vital signs  completed  Intervention: If existing line or port, determine patency  Port patent with + blood return  Intervention: Place Antineoplastics in Use sign on door  completed

## 2017-10-23 NOTE — PROGRESS NOTES
[facility] Hospitalist Progress Note    Date of Service: 10/23/2017    Chief Complaint  67 y.o. female admitted 10/21/2017 with symptomatic anemia and thrombocytopenia    Interval Problem Update  10/22 patient had fever overnight. Received transfusion and platelet recovered however hemoglobin still 6.5. Patient otherwise denies fever, chills, nausea, vomiting, adb pain, SOB, CP, headache, constipation, diarrhea, cough, or sputum.  10/23 patient had no fever overnight, labs remain stable, blood culture negative to date. Start chemotherapy today her oncologist recommendation. Otherwise denies fever, chills, nausea vomiting diarrhea shortness of breath and chest pain.    Consultants/Specialty  Onc    Disposition  TBD        Review of Systems   Constitutional: Negative for fever and weight loss.   HENT: Negative for ear pain and hearing loss.    Eyes: Negative for blurred vision, double vision and pain.   Respiratory: Negative for cough, sputum production, shortness of breath and wheezing.    Cardiovascular: Negative for chest pain, palpitations, leg swelling and PND.   Gastrointestinal: Negative for constipation, diarrhea and heartburn.   Genitourinary: Negative for dysuria, frequency and hematuria.   Musculoskeletal: Negative for back pain, falls, joint pain and neck pain.   Skin: Negative for rash.   Neurological: Negative for dizziness, tremors, speech change, seizures, weakness and headaches.   Psychiatric/Behavioral: Negative for depression, substance abuse and suicidal ideas.      Physical Exam  Laboratory/Imaging   Hemodynamics  Temp (24hrs), Av.8 °C (98.3 °F), Min:36.6 °C (97.8 °F), Max:37.1 °C (98.8 °F)   Temperature: 37.1 °C (98.8 °F)  Pulse  Av.9  Min: 68  Max: 118    Blood Pressure : 133/59      Respiratory      Respiration: 17, Pulse Oximetry: 96 %        RUL Breath Sounds: Clear, RML Breath Sounds: Clear, RLL Breath Sounds: Diminished, DEBBIE Breath Sounds: Clear, LLL Breath Sounds:  Diminished    Fluids    Intake/Output Summary (Last 24 hours) at 10/23/17 0752  Last data filed at 10/23/17 0500   Gross per 24 hour   Intake           2580.5 ml   Output                0 ml   Net           2580.5 ml       Nutrition  Orders Placed This Encounter   Procedures   • Diet Order     Standing Status:   Standing     Number of Occurrences:   1     Order Specific Question:   Diet:     Answer:   Cardiac [6]     Physical Exam   Constitutional: She is oriented to person, place, and time. She appears well-developed and well-nourished. No distress.   HENT:   Head: Normocephalic.   Nose: Nose normal.   Mouth/Throat: No oropharyngeal exudate.   Eyes: EOM are normal. Pupils are equal, round, and reactive to light.   Neck: Normal range of motion. Neck supple. No JVD present. No thyromegaly present.   Cardiovascular: Normal rate, regular rhythm and normal heart sounds.    No murmur heard.  Pulmonary/Chest: Effort normal and breath sounds normal. No respiratory distress. She has no rales.   Abdominal: Soft. Bowel sounds are normal. She exhibits no distension and no mass. There is no tenderness. There is no rebound.   Musculoskeletal: Normal range of motion. She exhibits no edema or tenderness.   Lymphadenopathy:     She has no cervical adenopathy.   Neurological: She is alert and oriented to person, place, and time. No cranial nerve deficit.   Skin: Skin is warm. No rash noted. She is not diaphoretic.   Psychiatric: Her behavior is normal.       Recent Labs      10/22/17   0441  10/22/17   1928  10/23/17   0245   WBC  3.0*  1.9*  2.1*   RBC  1.98*  2.44*  2.32*   HEMOGLOBIN  6.4*  7.6*  7.2*   HEMATOCRIT  17.4*  20.6*  19.8*   MCV  87.9  85.2  85.3   MCH  32.3  30.3  31.0   MCHC  36.8*  35.6*  36.4*   RDW  47.3  44.8  45.2   PLATELETCT  65*  53*  47*   MPV  10.1  10.5  10.1     Recent Labs      10/21/17   1035  10/22/17   0441  10/23/17   0245   SODIUM  134*  133*  135   POTASSIUM  3.6  4.1  3.3*   CHLORIDE  103  104   104   CO2  19*  21  21   GLUCOSE  190*  174*  143*   BUN  16  13  9   CREATININE  0.47*  0.42*  0.36*   CALCIUM  9.0  8.8  8.4*     Recent Labs      10/21/17   1035   APTT  35.5   INR  1.23*     Recent Labs      10/21/17   1035   BNPBTYPENAT  27              Assessment/Plan     * Neutropenic fever (CMS-HCC)- (present on admission)   Assessment & Plan    Patient currently is neutropenic  Had fever overnight  Pen culture pending result  Start cefepime, Culture NGT consider de-escalate if culture remains negative for another 24 hours, .  Close monitor in the hospital  ->180->190  Avoid using GCSF in this setting  onc to follow        Thrombocytopenia (CMS-HCC)- (present on admission)   Assessment & Plan    With associated increased bleeding, blood clots from nose.    Transfused  PLT 65->47        Myelodysplastic syndrome (CMS-HCC)- (present on admission)   Assessment & Plan    Status post prior chemotherapy regimen.   Continue current prophylactic antibiotics and antivirals, DC cipro and change to cefepime  Start patient on chemo per onc        Anemia- (present on admission)   Assessment & Plan    Hgb 6.5->6.4->7.2  2/2 MDS  Cont transfuse if < 7        Symptomatic anemia- (present on admission)   Assessment & Plan    Monitor in observation for transfusion.        HLD (hyperlipidemia)- (present on admission)   Assessment & Plan    Continue current statin therapy regimen        HTN (hypertension)- (present on admission)   Assessment & Plan    Controlled, continue current regimen.             Reviewed items::  Labs reviewed, Medications reviewed and Radiology images reviewed  Gan catheter::  No Gan  DVT prophylaxis - mechanical:  SCDs  Ulcer Prophylaxis::  Not indicated  Antibiotics:  Treating active infection/contamination beyond 24 hours perioperative coverage   For complexity-based billing, please refer to the history, exam, and decison making above. In addition, I spent >35 minutes caring for the patient  today. More than 50% of the time was spent counseling and coordinating care.    I have discussed with RN and CM and SW and other consultants about patient's plan.

## 2017-10-23 NOTE — CARE PLAN
Problem: Safety  Goal: Will remain free from falls  Outcome: PROGRESSING AS EXPECTED  Educated PT on use of call light   PT oriented to room, all possessions within reach, call light within reach   Slip resistant socks on PT (yellow if fall risk)        Problem: Bowel/Gastric:  Goal: Normal bowel function is maintained or improved  Outcome: PROGRESSING AS EXPECTED  Bowel protocol given per MAR   PT educated about diet, fluid intake and activity to promote bowel function   Bathroom opportunities scheduled and/or offered

## 2017-10-23 NOTE — PROGRESS NOTES
Chemotherapy Verification - SECONDARY RN  Cycle 1 Day 1       Height = 157.5 cm  Weight = 64 kg  BSA = 1.67 m2       Medication: Azacitidine  Dose: 75 mg/m2  Calculated Dose: 125.25 mg (125.3 mg ordered)                             (In mg/m2, AUC, mg/kg)       I confirm that this process was performed independently.

## 2017-10-23 NOTE — PROGRESS NOTES
Chemotherapy verified and hung with 2 chemo RNs. + blood return from port. Tolerating well at this time. Will monitor.

## 2017-10-23 NOTE — DISCHARGE PLANNING
Medical SW    Referral: Sw to provide IMM letter to pt.    Intervention: Sw met w/ pt and family at bedside. Sw explained Medicare rights regarding d/c. Sw acquire completion of form. Sw provided copy. Sw placed original on chart.     Plan: Sw to assist w/ d/c planning as needed.

## 2017-10-23 NOTE — PROGRESS NOTES
Chemotherapy Verification - PRIMARY RN  Cycle 1 day 1      Height = 157.5 cm  Weight = 64 mg  BSA = 1.67 m2       Medication: vidaza  Dose: 75 mg/m2  Calculated Dose: 125.25 mg ordered= 125.3 mg                             (In mg/m2, AUC, mg/kg)         I confirm this process was performed independently with the BSA and all final chemotherapy dosing calculations congruent.  Any discrepancies of 5% or greater have been addressed with the chemotherapy pharmacist. The resolution of the discrepancy has been documented in the EPIC progress notes.

## 2017-10-23 NOTE — PROGRESS NOTES
"Pharmacy chemotherapy calculation verification:     Patient Name: Cheryle Lee Koski   Dx: AML evolved from MDS     Protocol: Azacitadine +Venetoclax        *Dosing Reference*  AzaCITIDine 75 mg/m2 IV over 10 min or Subcutaneous daily on Days 1-7  Venetoclax dose escalation to 800 mg/day on days 2-28 for cycle 1 and days 1-28 cycle 2 and beyond  Q28 day cycle until disease progression or unacceptable toxicity, a minimum of 4 cycles  Theo NEWMAN et al. A Phase 1b Study of Venetoclax (ABT-199/GDC-0199) in Combination with Decitabine or Azacitidine in Treatment-Naive Patients with Acute Myelogenous Leukemia Who Are ? to 65 Years and Not Eligible for Standard Induction Therapy. Blood, 126(61), 327. Accessed October 22, 2017. Retrieved from http://[email or web address].org/[email or web address]/23/327.      Allergies:  Review of patient's allergies indicates no known allergies.       /68   Pulse 82   Temp 36.9 °C (98.5 °F)   Resp 14   Ht 1.575 m (5' 2.01\")   Wt 64 kg (141 lb 1.5 oz)   LMP 10/18/1993   SpO2 93%   Breastfeeding? No   BMI 25.80 kg/m²  Body surface area is 1.67 meters squared.     10/23/17  ANC~ 190 Plt = 47k Hgb = 7.2  SCr = 0.36 mg/dL CrCl = 77 ml/min  LFTs = AST/ALT/AP = 13/7/63 T bili = 0.8  Ok to proceed with current labs.  Transfuse PRN     Drug Order   (Drug name, dose, route, IV Fluid & volume, frequency, number of doses) Cycle 1, Day 1 of 7       Previous treatment: Dacogen      Medication = Azacitidine (Vidaza)  Base Dose= 75 mg/m2  Calc Dose: Base Dose x 1.67m2 = 125.2 mg   Final Dose = 125.3 mg   Route = IV  Fluid & Volume =  mL  Admin Duration = Over 15 min           <5% difference, ok to treat with final dose       By my signature below, I confirm this process was performed independently with the BSA and all final chemotherapy dosing calculations congruent. I have reviewed the above chemotherapy order and that my calculation of the final dose and BSA (when applicable) corroborate those " calculations of the  pharmacist. Discrepancies of 5% or greater in the written dose have been addressed and documented within the EPIC Progress notes.    Britney Cox, YasmeenD

## 2017-10-23 NOTE — PROGRESS NOTES
Kam from Lab called with critical result of PLT of 53, WBC of 1.9, ANC of 0.18 and Hgb of 7.6. Critical lab result read back to Kam. This critical lab result is within parameters established by [facility] for this patient

## 2017-10-23 NOTE — PROGRESS NOTES
"Pharmacy Chemotherapy Verification  Patient Name; Koski, Cheryle Lee  Dx: AML (evolved from MDS)  Cycle: 1 (Previous treatment = Decitabine)  Regimen and Dosing Reference  AzaCITIDine 75 mg/m2 IV over 10 minutes or subcutaneous daily on days 1-7   Venetoclax dose escalation to 800 mg/day on days 2-28 for cycle 1 and days 1-28 cycle 2 and beyond  28 day cycle until disease progression or unacceptable toxicity for 4 cycles minimum  Theo NWEMAN et al. A Phase 1b Study of Venetoclax plus Decitabine or Azacitadine in Untreated Acute Myeloid Leukemia Patients > 65 years Ineligible for Standard Induction Therapy. Journal of Clinical Oncology 34, no. 15_suppl (May 2016) 7099-2455.     Allergies:Review of patient's allergies indicates no known allergies.  /65   Pulse 89   Temp 37.2 °C (98.9 °F)   Resp 16   Ht 1.575 m (5' 2.01\")   Wt 64 kg (141 lb 1.5 oz)   LMP 10/18/1993   SpO2 94%   Breastfeeding? No   BMI 25.80 kg/m²   Body surface area is 1.67 meters squared.    Labs 10/23/17  ANC ~ 190     Plt = 47 k     Hgb = 7.2 **MD aware of labs, OK to proceed with treatment 10/23/17**  SCr =  0.36      CrCl ~76.3 ml/min    AST/ALT/AP = 13/7/63 Tbili = 0.8    AzaCITIDine 75 mg/m2 x 1.67 m2 = 125.25 mg   <5% difference, OK to treat with final dose = 125.3 mg IV    Anneliese Velasquez, PharmD      "

## 2017-10-23 NOTE — PROGRESS NOTES
Chemotherapy education provided to patient. Hand outs on vidaza and venclexa and chemotherapy and you booklet provided. All current questions and concerns addressed.

## 2017-10-23 NOTE — PROGRESS NOTES
Received report from day RN, assumed care of PT at 1845. PT A&Ox4, sitting up in bed eating dinner, discussed plan of care for this shift.  Pain managed with meds per MAR. PT up x1, safety precautions in place. Call light and personal possessions within reach.

## 2017-10-24 PROBLEM — R51.9 HEADACHE: Status: ACTIVE | Noted: 2017-01-01

## 2017-10-24 NOTE — PROGRESS NOTES
Chemotherapy Verification - SECONDARY RN       Height = 157.5 cm  Weight = 67.1 kg  BSA = 1.71 m2       Medication: azacitidine VIDAZA  Dose: 75 mg/m2  Calculated Dose: 128.25 (ordered dose= 128.3 mg, <5% difference)                             (In mg/m2, AUC, mg/kg)       I confirm that this process was performed independently.

## 2017-10-24 NOTE — DISCHARGE PLANNING
Medical SW    Referral: Sw spoke to Dr Cantor.     Intervention: Pt completed chemo. Pt has MRI of brain today due to headaches and med change.      Plan: Sw to assist w/ d/c planning as needed.

## 2017-10-24 NOTE — ASSESSMENT & PLAN NOTE
LP done 10/26, no complications, pathology report - no malignant cells seen  MRI brain:  1.  Diffuse low T1 signal intensity in the visualized marrow likely representing marrow infiltrative disorders.  2.  There is mild diffuse supratentorial dural thickening. This is a nonspecific finding and likely secondary to the diffuse marrow infiltrative process.  3.  No acute infarct

## 2017-10-24 NOTE — PROGRESS NOTES
Received report from day shift RN, assumed care of patient at 1900. AOx4. Stand by assist. Complaining of 7/10 pain, will medicate when pain medicine is due again at 2115. Denies nausea at this time. Port in place with positive blood return. Pt refusing CHG bath, after education and reiterating importance pt agreeable.   Call light within reach, bed in low and locked position. No other needs at this time.

## 2017-10-24 NOTE — PROGRESS NOTES
[facility] Hospitalist Progress Note    Date of Service: 10/24/2017    Chief Complaint  67 y.o. female admitted 10/21/2017 with MDS and neutropenic fever.    Interval Problem Update  Patient afebrile and on empiric cefepime, she was started on chemo yesterday for treatment of her MDS.  She complained of headache today that has been consistent x 1 month and MRI ordered as she has not had this w/u.  I also added a long acting pain medication to try and lessen her use of the oxy IR - and at this point with frequent narcotic use she could be having rebound headache.    Consultants/Specialty  Oncology - Rizwana pagan        Review of Systems   Constitutional: Negative for chills and fever.   HENT: Negative for congestion.    Eyes: Negative for blurred vision and photophobia.   Respiratory: Negative for cough and shortness of breath.    Cardiovascular: Negative for chest pain, claudication and leg swelling.   Gastrointestinal: Negative for abdominal pain, constipation, diarrhea, heartburn and vomiting.   Genitourinary: Negative for dysuria and hematuria.   Musculoskeletal: Positive for myalgias (bilateral leg pain with headache). Negative for joint pain.   Skin: Negative for itching and rash.   Neurological: Positive for headaches. Negative for dizziness, sensory change, speech change and weakness.   Psychiatric/Behavioral: Negative for depression. The patient is not nervous/anxious and does not have insomnia.       Physical Exam  Laboratory/Imaging   Hemodynamics  Temp (24hrs), Av.2 °C (98.9 °F), Min:37.1 °C (98.7 °F), Max:37.3 °C (99.1 °F)   Temperature: 37.2 °C (98.9 °F)  Pulse  Av.5  Min: 68  Max: 118    Blood Pressure : 126/71      Respiratory      Respiration: 16, Pulse Oximetry: 94 %        RUL Breath Sounds: Clear, RML Breath Sounds: Diminished, RLL Breath Sounds: Diminished, DEBBIE Breath Sounds: Clear, LLL Breath Sounds: Diminished    Fluids    Intake/Output Summary (Last 24 hours) at 10/24/17  1614  Last data filed at 10/24/17 1530   Gross per 24 hour   Intake             3570 ml   Output                0 ml   Net             3570 ml       Nutrition  Orders Placed This Encounter   Procedures   • Diet Order     Standing Status:   Standing     Number of Occurrences:   1     Order Specific Question:   Diet:     Answer:   Cardiac [6]     Physical Exam   Constitutional: She is oriented to person, place, and time. She appears well-developed and well-nourished. No distress.   HENT:   Head: Normocephalic and atraumatic.   Eyes: Conjunctivae are normal. No scleral icterus.   Neck: Neck supple. No JVD present.   Cardiovascular: Normal rate, regular rhythm and normal heart sounds.  Exam reveals no gallop and no friction rub.    No murmur heard.  Pulmonary/Chest: Effort normal and breath sounds normal. No respiratory distress. She exhibits no tenderness.   Abdominal: Soft. Bowel sounds are normal. She exhibits no distension. There is no guarding.   Musculoskeletal: She exhibits no edema or tenderness.   Neurological: She is alert and oriented to person, place, and time. No cranial nerve deficit.   Skin: Skin is warm and dry. She is not diaphoretic. No erythema. No pallor.   Psychiatric: She has a normal mood and affect. Her behavior is normal.   Nursing note and vitals reviewed.      Recent Labs      10/22/17   1928  10/23/17   0245  10/24/17   0037   WBC  1.9*  2.1*  1.5*   RBC  2.44*  2.32*  2.12*   HEMOGLOBIN  7.6*  7.2*  6.5*   HEMATOCRIT  20.6*  19.8*  18.2*   MCV  85.2  85.3  85.8   MCH  30.3  31.0  30.7   MCHC  35.6*  36.4*  35.7*   RDW  44.8  45.2  45.1   PLATELETCT  53*  47*  36*   MPV  10.5  10.1  10.2     Recent Labs      10/22/17   0441  10/23/17   0245  10/24/17   0037   SODIUM  133*  135  133*   POTASSIUM  4.1  3.3*  3.9   CHLORIDE  104  104  103   CO2  21  21  24   GLUCOSE  174*  143*  142*   BUN  13  9  8   CREATININE  0.42*  0.36*  0.31*   CALCIUM  8.8  8.4*  8.5                      Assessment/Plan      * Neutropenic fever (CMS-HCC)- (present on admission)   Assessment & Plan    Cultures showing no growth.  Empiric cefepime          Thrombocytopenia (CMS-HCC)- (present on admission)   Assessment & Plan    Keep platelet count >10 or higher if bleeding.          Myelodysplastic syndrome (CMS-HCC)- (present on admission)   Assessment & Plan    Venetoclax/Vidaza started 10/23 per Dr Krishnamurthy recommendations    Dr Wagoner consulted        Headache   Assessment & Plan    States HA >1 month and associated with leg pain simultaneously  MRI brain with and without contrast pending  Add Oxycontin as she is using her Oxy IR every 5 hours x 1 month.          Anemia- (present on admission)   Assessment & Plan    Transfuse if less than 7.0  Receiving 1 unit today as hgb 6.5  Secondary to 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  Transfuse for <7.0        HLD (hyperlipidemia)- (present on admission)   Assessment & Plan    Continue current statin therapy regimen        HTN (hypertension)- (present on admission)   Assessment & Plan    Coreg - controlled              Reviewed items::  Labs reviewed, Medications reviewed and Radiology images reviewed  Gan catheter::  No Gan  DVT prophylaxis pharmacological::  Contraindicated - High bleeding risk  DVT prophylaxis - mechanical:  SCDs  Antibiotics:  Treating active infection/contamination beyond 24 hours perioperative coverage

## 2017-10-24 NOTE — THERAPY
"Physical Therapy Evaluation completed.   Bed Mobility:  Supine to Sit: Supervised  Transfers: Sit to Stand: Supervised  Gait: Level Of Assist: Stand by Assist with Front-Wheel Walker       Plan of Care: Patient with no further skilled PT needs in the acute care setting at this time  Discharge Recommendations: Equipment: No Equipment Needed. Post-acute therapy Discharge to home with outpatient or home health for additional skilled therapy services.    See \"Rehab Therapy-Acute\" Patient Summary Report for complete documentation.     "

## 2017-10-24 NOTE — PROGRESS NOTES
Patient and family educated regarding isolation precautions, questions and concerns answered to their satisfaction, received verbal agreement of understanding.

## 2017-10-24 NOTE — PROGRESS NOTES
Oncology/Hematology Progress Note               Author: Gerald Wagoner Date & Time created: 10/24/2017  12:09 PM     CC- Pancytopenia with fevers.    HPI- h/o  MDS RAEB-2   Evolved into AML  Vidaza/Venetoclax planned .  Admitted with fever, pancytopenia .  -10/24/ -Started Vidaza / Venetoclax       Interval History:  Continues to have headaches. Not new, but persistent .    Review of Systems:  Review of Systems   Constitutional: Positive for fever and malaise/fatigue.   Respiratory: Negative.    Cardiovascular: Negative.    Gastrointestinal: Negative.    Genitourinary: Negative.    Musculoskeletal: Negative.    Skin: Negative.    Neurological: Positive for headaches.       Physical Exam:  Physical Exam   Constitutional: She appears well-developed and well-nourished.   HENT:   Mouth/Throat: Oropharynx is clear and moist.   Eyes: Pupils are equal, round, and reactive to light. No scleral icterus.   Neck: Normal range of motion.   Cardiovascular: Normal rate and regular rhythm.    Pulmonary/Chest: Effort normal and breath sounds normal. She has no wheezes.   Abdominal: Soft. Bowel sounds are normal.   Musculoskeletal: Normal range of motion.   Neurological: She is alert.   Skin: Skin is warm and dry.       Labs:        Invalid input(s): [id number]      Recent Labs      10/22/17   0441  10/23/17   0245  10/24/17   0037   SODIUM  133*  135  133*   POTASSIUM  4.1  3.3*  3.9   CHLORIDE  104  104  103   CO2  21  21  24   BUN  13  9  8   CREATININE  0.42*  0.36*  0.31*   CALCIUM  8.8  8.4*  8.5     Recent Labs      10/22/17   0441  10/23/17   0245  10/24/17   0037   ALTSGPT   --   7  6   ASTSGOT   --   13  11*   ALKPHOSPHAT   --   63  58   TBILIRUBIN   --   0.8  0.6   GLUCOSE  174*  143*  142*     Recent Labs      10/22/17   1928  10/23/17   0245  10/24/17   0037   RBC  2.44*  2.32*  2.12*   HEMOGLOBIN  7.6*  7.2*  6.5*   HEMATOCRIT  20.6*  19.8*  18.2*   PLATELETCT  53*  47*  36*     Recent Labs      10/22/17   1928   10/23/17   0245  10/24/17   0037   WBC  1.9*  2.1*  1.5*   NEUTSPOLYS  8.50*  9.00*  9.80*   LYMPHOCYTES  50.90*  49.60*  43.80*   MONOCYTES  0.00  3.60  0.00   EOSINOPHILS  0.00  0.00  0.00   BASOPHILS  1.00  0.00  0.90   ASTSGOT   --   13  11*   ALTSGPT   --   7  6   ALKPHOSPHAT   --   63  58   TBILIRUBIN   --   0.8  0.6     Recent Labs      10/22/17   0441  10/23/17   0245  10/24/17   0037   SODIUM  133*  135  133*   POTASSIUM  4.1  3.3*  3.9   CHLORIDE  104  104  103   CO2  21  21  24   GLUCOSE  174*  143*  142*   BUN  13  9  8   CREATININE  0.42*  0.36*  0.31*   CALCIUM  8.8  8.4*  8.5     Hemodynamics:  Temp (24hrs), Av.1 °C (98.8 °F), Min:36.8 °C (98.2 °F), Max:37.3 °C (99.1 °F)  Temperature: 37.2 °C (99 °F)  Pulse  Av.1  Min: 68  Max: 118   Blood Pressure : 131/55     Respiratory:    Respiration: 16, Pulse Oximetry: 92 %        RUL Breath Sounds: Clear, RML Breath Sounds: Diminished, RLL Breath Sounds: Diminished, DEBBIE Breath Sounds: Clear, LLL Breath Sounds: Diminished  Fluids:    Intake/Output Summary (Last 24 hours) at 10/23/17 0720  Last data filed at 10/23/17 0500   Gross per 24 hour   Intake           2580.5 ml   Output                0 ml   Net           2580.5 ml        GI/Nutrition:  Orders Placed This Encounter   Procedures   • Diet Order     Standing Status:   Standing     Number of Occurrences:   1     Order Specific Question:   Diet:     Answer:   Cardiac [6]     Medical Decision Making, by Problem:  Active Hospital Problems    Diagnosis   • *Neutropenic fever (CMS-HCC) [D70.9, R50.81]   • Thrombocytopenia (CMS-HCC) [D69.6]   • Myelodysplastic syndrome (CMS-HCC) [D46.9]   • Anemia [D64.9]   • Symptomatic anemia [D64.9]   • HLD (hyperlipidemia) [E78.5]   • HTN (hypertension) [I10]       Plan:  1.  Myelodysplastic syndrome, RAEB-2, evolving into acute myelogenous leukemia- Vidaza/Venetoclax started  Discussed with Dr. Krishnamurthy who recommended increasing the Venetoclax dosing in an accelerated  fashion unlike in CLL. Will start 20 mg dose for 4 days and then increase to 50 mg. Monitor closely for TLS.  She tolerated Dacogen well last year . Started Vidaza 10/24  2.  TLS prophylaxis .- on IVF, Allopurinol . No evidence of TLS.  2.  Persistent pancytopenia - Transfuse prn, responding to transfusion appropriately    3. Neutropenic fever -  on Maxipime. Currently afebrile. Cultures negative to date. We will stop Diflucan due to interaction with Venetoclax.  4.  Myocardial infarction with stent placed in January 2017.  5. Chronic unremitting Headache - Will get MRI brain. If persistent, need LP.     Complex patient requiring complex decision making.   Quality-Core Measures

## 2017-10-24 NOTE — PROGRESS NOTES
Laura from Lab called with critical result of WBC of 1.5, ANC of 0.15, and platelets of 36 at 0120. Critical lab result read back to Laura. These results within parameters for this patient established by Dr. Wagoner.

## 2017-10-24 NOTE — PROGRESS NOTES
Chemotherapy Verification - PRIMARY RN  Cycle 1 day 2 of 5      Height = 157.5 cm  Weight = 67.1 kg  BSA = 1.71 m2       Medication: vidaza  Dose: 75 mg/m2  Calculated Dose: 128.25 mg ordered= 128.3 mg                             (In mg/m2, AUC, mg/kg)         I confirm this process was performed independently with the BSA and all final chemotherapy dosing calculations congruent.  Any discrepancies of 5% or greater have been addressed with the chemotherapy pharmacist. The resolution of the discrepancy has been documented in the EPIC progress notes.

## 2017-10-24 NOTE — PROGRESS NOTES
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 A Phase 1b Study of Venetoclax (ABT-199/GDC-0199) in Combination with Decitabine or Azacitidine in Treatment-Naive Patients with Acute Myelogenous Leukemia Who Are ? to 65 Years and Not Eligible for Standard Induction Therapy. Blood, 126(60), 327. Accessed October 22, 2017. Retrieved from http://www.bloodjournal.org/content/126/23/327.      Allergies:  Review of patient's allergies indicates no known allergies.    /60   Pulse 79   Temp 37.1 °C (98.7 °F)   Resp 16   Ht 1.575 m (5' 2.01\")   Wt 67.1 kg (147 lb 14.9 oz)   LMP 10/18/1993   SpO2 94%   Breastfeeding? No   BMI 27.05 kg/m²  Body surface area is 1.71 meters squared.     Labs 10/24/17  ANC ~ 150     Plt = 36 k     Hgb = 6.5                     **MD aware of labs, OK to proceed with treatment. Transfuse PRN**  SCr =  0.31      CrCl ~69.3 ml/min    AST/ALT/AP = 11/6/58           Tbili = 0.6     Drug Order   (Drug name, dose, route, IV Fluid & volume, frequency, number of doses) Cycle 1, Day 2 of 7       Previous treatment: Dacogen      Medication = Azacitidine (Vidaza)  Base Dose = 75 mg/m2  Calc Dose: Base Dose x 1.71 m2 = 128.25 mg   Final Dose = 128.3 mg   Route = IV  Fluid & Volume =  mL  Admin Duration = Over 15 min           <5% difference, OK to treat with final dose       By my signature below, I confirm this process was performed independently with the BSA and all final chemotherapy dosing calculations congruent. I have reviewed the above chemotherapy order and that my calculation of the final dose " and BSA (when applicable) corroborate those calculations of the  pharmacist. Discrepancies of 5% or greater in the written dose have been addressed and documented within the EPIC Progress notes.    Anneliese Velasquez, YasmeenD

## 2017-10-25 NOTE — PROGRESS NOTES
Chemotherapy Verification - PRIMARY RN  Day 3 of 5 cycle 1      Height = 157.5 cm  Weight = 67.1 kg  BSA = 1.71 m2       Medication: vidaza  Dose: 75 mg/m2  Calculated Dose: 128.25 mg ordered= 128.3 mg                             (In mg/m2, AUC, mg/kg)         I confirm this process was performed independently with the BSA and all final chemotherapy dosing calculations congruent.  Any discrepancies of 5% or greater have been addressed with the chemotherapy pharmacist. The resolution of the discrepancy has been documented in the EPIC progress notes.

## 2017-10-25 NOTE — PROGRESS NOTES
Received report from day shift RN, assumed care of patient at 1900. AOx4. Stand by assist. Complaining of 7/10 pain, medicated per MAR. Denies nausea. Port in place with positive blood return. IV abx infusing. Call light within reach, bed in low and locked position. No other needs at this time.

## 2017-10-25 NOTE — PROGRESS NOTES
Chemotherapy Verification - SECONDARY RN       Height = 157.5 cm  Weight = 67.1 kg  BSA = 1.71 m2      Medication: azacitidine VIDAZA  Dose: 75 mg/m2  Calculated Dose: 128.25 mg (ordered dose= 128.3 mg, <5% difference)                             (In mg/m2, AUC, mg/kg)       I confirm that this process was performed independently.

## 2017-10-25 NOTE — DISCHARGE PLANNING
Care Transition Team Assessment    IHD met with patient bedside. She stated she lives with her son, and he has been driving her for the last few weeks. She uses a walker but no other DME, O2 or HHC. She does not expect to discharge home with any new services.     Information Source  Orientation : Oriented x 4  Information Given By: Patient  Informant's Name: Cheryle  Who is responsible for making decisions for patient? : Patient    Readmission Evaluation  Is this a readmission?: No    Elopement Risk  Legal Hold: No  Ambulatory or Self Mobile in Wheelchair: Yes  Disoriented: No  Psychiatric Symptoms: None  History of Wandering: No  Elopement this Admit: No  Vocalizing Wanting to Leave: No  Displays Behaviors, Body Language Wanting to Leave: No-Not at Risk for Elopement  Elopement Risk: Not at Risk for Elopement    Interdisciplinary Discharge Planning  Does Admitting Nurse Feel This Could be a Complex Discharge?: No  Primary Care Physician: Dr. Nowak  Lives with - Patient's Self Care Capacity: Child Less than 18 Years of Age, Adult Children  Patient or legal guardian wants to designate a caregiver (see row info): No  Support Systems: Family Member(s)  Housing / Facility: [address]  Do You Take your Prescribed Medications Regularly: Yes  Able to Return to Previous ADL's: Yes  Mobility Issues: Yes  Prior Services: None  Assistance Needed: Yes  Durable Medical Equipment: Walker    Discharge Preparedness  What is your plan after discharge?: Home with help  What are your discharge supports?: Child  Prior Functional Level: Ambulatory, Independent with Activities of Daily Living, Independent with Medication Management, Uses Walker  Difficulity with ADLs: Walking  Difficulty with ADLs Comment: Uses walker  Difficulity with IADLs: Driving  Difficulity with IADL Comments: Family has been driving the last month    Functional Assesment  Prior Functional Level: Ambulatory, Independent with Activities of Daily Living,  Independent with Medication Management, Uses Walker    Finances  Financial Barriers to Discharge: No  Prescription Coverage: Yes ([facility] on [address])    Vision / Hearing Impairment  Vision Impairment : Yes  Right Eye Vision: Wears Glasses  Left Eye Vision: Wears Glasses  Hearing Impairment : No    Values / Beliefs / Concerns  Values / Beliefs Concerns : No    Advance Directive  Advance Directive?: None    Domestic Abuse  Have you ever been the victim of abuse or violence?: No  Physical Abuse or Sexual Abuse: No  Verbal Abuse or Emotional Abuse: No  Possible Abuse Reported to:: Not Applicable    Psychological Assessment  History of Substance Abuse: None  History of Psychiatric Problems: No  Non-compliant with Treatment: No  Newly Diagnosed Illness: No    Discharge Risks or Barriers  Discharge risks or barriers?: No    Anticipated Discharge Information  Anticipated discharge disposition: Discharge needs currently unknown  Discharge Address: [facility] [address] [place] [address]  Discharge Contact Phone Number: [phone]

## 2017-10-25 NOTE — PROGRESS NOTES
No changes from epic. AOx4 VSS afebrile. Up SBA, steady. No c/o nausea, medicating per MAR for pain. Port infusing IVF, + blood return. Cycle 1 day 3 of 5 today. Platelet transfusion today pending lumbar puncture tomorrow. Call light and belongings within reach, able to make needs known, rounding in place, will monitor.

## 2017-10-25 NOTE — PROGRESS NOTES
[facility] Hospitalist Progress Note    Date of Service: 10/25/2017    Chief Complaint  67 y.o. female admitted 10/21/2017 with MDS and neutropenic fever.    Interval Problem Update  10/24 Patient afebrile and on empiric cefepime, she was started on chemo yesterday for treatment of her MDS.  She complained of headache today that has been consistent x 1 month and MRI ordered as she has not had this w/u.  I also added a long acting pain medication to try and lessen her use of the oxy IR - and at this point with frequent narcotic use she could be having rebound headache.  10/25 Patient feeling better today, headaches have been less severe with start of oxycontin and her leg pain only is present during headache.  MRI reviewed with patient, no significant pathology seen other than marrow occupying issue which is her MDS.  Given her symptom and possibility of CNS involvement, oncology is requesting a lumbar puncture.  I will order platelet transfusion to get her level >50 for LP and order cytology on fluid.  LP should occur tomorrow in IR.    Consultants/Specialty  Oncology - Rizwana    Disposition  tbd        Review of Systems   Constitutional: Negative for chills and fever.   HENT: Negative for congestion.    Eyes: Negative for blurred vision and photophobia.   Respiratory: Negative for cough and shortness of breath.    Cardiovascular: Negative for chest pain, claudication and leg swelling.   Gastrointestinal: Negative for abdominal pain, constipation, diarrhea, heartburn and vomiting.   Genitourinary: Negative for dysuria and hematuria.   Musculoskeletal: Positive for myalgias (bilateral leg pain with headache). Negative for joint pain.   Skin: Negative for itching and rash.   Neurological: Positive for headaches (better). Negative for dizziness, sensory change, speech change and weakness.   Psychiatric/Behavioral: Negative for depression. The patient is not nervous/anxious and does not have insomnia.       Physical Exam   Laboratory/Imaging   Hemodynamics  Temp (24hrs), Av °C (98.6 °F), Min:36.5 °C (97.7 °F), Max:37.4 °C (99.4 °F)   Temperature: 37.4 °C (99.4 °F)  Pulse  Av.9  Min: 68  Max: 118    Blood Pressure : 148/76      Respiratory      Respiration: 16, Pulse Oximetry: 94 %        RUL Breath Sounds: Diminished, RML Breath Sounds: Diminished, RLL Breath Sounds: Diminished, DEBBIE Breath Sounds: Diminished, LLL Breath Sounds: Diminished    Fluids    Intake/Output Summary (Last 24 hours) at 10/25/17 1329  Last data filed at 10/25/17 0900   Gross per 24 hour   Intake             2428 ml   Output                0 ml   Net             2428 ml       Nutrition  Orders Placed This Encounter   Procedures   • Diet Order     Standing Status:   Standing     Number of Occurrences:   1     Order Specific Question:   Diet:     Answer:   Cardiac [6]     Physical Exam   Constitutional: She is oriented to person, place, and time. She appears well-developed and well-nourished. No distress.   HENT:   Head: Normocephalic and atraumatic.   Eyes: Conjunctivae are normal. No scleral icterus.   Neck: Neck supple. No JVD present.   Cardiovascular: Normal rate, regular rhythm and normal heart sounds.  Exam reveals no gallop and no friction rub.    No murmur heard.  Pulmonary/Chest: Effort normal and breath sounds normal. No respiratory distress. She exhibits no tenderness.   Abdominal: Soft. Bowel sounds are normal. She exhibits no distension. There is no guarding.   Musculoskeletal: She exhibits no edema or tenderness.   Neurological: She is alert and oriented to person, place, and time. No cranial nerve deficit.   Skin: Skin is warm and dry. She is not diaphoretic. No erythema. No pallor.   Psychiatric: She has a normal mood and affect. Her behavior is normal.   Nursing note and vitals reviewed.      Recent Labs      10/23/17   0245  10/24/17   0037  10/25/17   0050   WBC  2.1*  1.5*  0.9*   RBC  2.32*  2.12*  2.38*   HEMOGLOBIN  7.2*  6.5*  7.1*    HEMATOCRIT  19.8*  18.2*  19.9*   MCV  85.3  85.8  83.6   MCH  31.0  30.7  29.8   MCHC  36.4*  35.7*  35.7*   RDW  45.2  45.1  43.5   PLATELETCT  47*  36*  23*   MPV  10.1  10.2  9.7     Recent Labs      10/23/17   0245  10/24/17   0037  10/25/17   0050   SODIUM  135  133*  133*   POTASSIUM  3.3*  3.9  4.0   CHLORIDE  104  103  102   CO2  21  24  26   GLUCOSE  143*  142*  148*   BUN  9  8  7*   CREATININE  0.36*  0.31*  0.36*   CALCIUM  8.4*  8.5  8.7                      Assessment/Plan     * Neutropenic fever (CMS-HCC)- (present on admission)   Assessment & Plan    Cultures showing no growth.  Empiric cefepime          Thrombocytopenia (CMS-HCC)- (present on admission)   Assessment & Plan    Keep platelet count >10 or higher if bleeding.          Myelodysplastic syndrome (CMS-HCC)- (present on admission)   Assessment & Plan    Venetoclax/Vidaza started 10/23 per Dr Krishnamurthy recommendations  ANC 20  Dr Wagoner requesting lumbar puncture for cytology.  Transfuse platelets to get count >50 for IR to do LP tomorrow.          Headache   Assessment & Plan    States HA >1 month and associated with leg pain simultaneously  MRI brain with and without contrast done, no IC pathology, marrow abnormal as expected with MDS  Oxycontin helped significantly and head pain better.         Anemia- (present on admission)   Assessment & Plan    Transfuse if less than 7.0  Received 1 unit 10/24   Secondary to 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  Transfuse for <7.0        HLD (hyperlipidemia)- (present on admission)   Assessment & Plan    Continue current statin therapy regimen        HTN (hypertension)- (present on admission)   Assessment & Plan    Coreg - controlled              Reviewed items::  Labs reviewed, Medications reviewed and Radiology images reviewed  Gan catheter::  No Gan  DVT prophylaxis pharmacological::  Contraindicated - High  bleeding risk  DVT prophylaxis - mechanical:  SCDs  Antibiotics:  Treating active infection/contamination beyond 24 hours perioperative coverage

## 2017-10-25 NOTE — CARE PLAN
Problem: Discharge Barriers/Planning  Goal: Patient's continuum of care needs will be met    Intervention: Assess potential discharge barriers on admission and throughout hospital stay  No barriers noted at this time. Will DC upon chemo completion.       Problem: Acute Care of the Chemotherapy Patient  Goal: Optimal Outcome for the Chemotherapy Patient    Intervention: Review WBC, ANC, platelet count, kidney function and liver function tests before chemotherapy administration  Labs reviewed and will proceed with day 3 of 5 cycle 1

## 2017-10-25 NOTE — PROGRESS NOTES
Inocencia from Lab called with critical result of WBC of 0.9, platelets of 23, and ANC of 0.02 at 0155. Critical lab result read back to Inocencia. These results within parameters for this patient established by Dr. Wagoner.

## 2017-10-25 NOTE — PROGRESS NOTES
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 A Phase 1b Study of Venetoclax (ABT-199/GDC-0199) in Combination with Decitabine or Azacitidine in Treatment-Naive Patients with Acute Myelogenous Leukemia Who Are ? to 65 Years and Not Eligible for Standard Induction Therapy. Blood, 126(46), 327. Accessed October 22, 2017. Retrieved from http://[email or web address].org/content/126/23/327.      Allergies:  Review of patient's allergies indicates no known allergies.    /71   Pulse 78   Temp 37 °C (98.6 °F)   Resp 16   Ht 1.575 m (5' 2.01\")   Wt 67.1 kg (147 lb 14.9 oz)   LMP 10/18/1993   SpO2 93%   Breastfeeding? No   BMI 27.05 kg/m²  Body surface area is 1.71 meters squared.     Labs 10/25/17  ANC ~ 200     Plt = 23 k     Hgb = 7.1        Rizwana MAX aware of labs, OK to continue treatment. Transfusion orders in place.   SCr =  0.36      CrCl ~80 ml/min (min Cr 0.7)   AST/ALT/AP = 11/6/57           Tbili = 0.7     Drug Order   (Drug name, dose, route, IV Fluid & volume, frequency, number of doses) Cycle 1, Day 3 of 7       Previous treatment: Dacogen      Medication = Azacitidine (Vidaza)  Base Dose = 75 mg/m2  Calc Dose: Base Dose x 1.71 m2 = 128 mg   Final Dose = 128.3 mg   Route = IV  Fluid & Volume =  mL  Admin Duration = Over 15 min           <5% difference, OK to treat with final dose       By my signature below, I confirm this process was performed independently with the BSA and all final chemotherapy dosing calculations congruent. I have reviewed the above chemotherapy order and that my calculation of the " final dose and BSA (when applicable) corroborate those calculations of the  pharmacist. Discrepancies of 5% or greater in the written dose have been addressed and documented within the EPIC Progress notes.    Mckay William, YasmeenD

## 2017-10-25 NOTE — CARE PLAN
Problem: Nutritional:  Goal: Achieve adequate nutritional intake  Patient will consume >50% of meals and snacks    Outcome: PROGRESSING AS EXPECTED    Intervention: Monitor PO intake, weights, and laboratory values  Majority of meals are 50-75% consumed per ADL documentation.   X 1 meal 25-50% consumed.  Intervention: Modify distribution, type or amount of food and nutrients within meals or snacks  Receiving 1 high protein milkshake per day.   Pt dislikes supplements.     RD following

## 2017-10-26 NOTE — PROGRESS NOTES
Oncology/Hematology Progress Note               Author: Gerald Wagoner Date & Time created: 10/26/2017  4:22 PM     CC- Pancytopenia with fevers.    HPI- h/o  MDS RAEB-2   Evolved into AML  Vidaza/Venetoclax planned .  Admitted with fever, pancytopenia .  -10/24/ -Started Vidaza / Venetoclax         Interval History:  Continues to have headaches. Not new, but persistent .MRI brain was negative for leptomeningeal disease. She had diffuse marrow infiltrative process involving her bones consistent with AML.    No new complaints. Headache present, but not bothering her much today.     Review of Systems:  Review of Systems   Constitutional: Positive for fever and malaise/fatigue.   Respiratory: Negative.    Cardiovascular: Negative.    Gastrointestinal: Negative.    Genitourinary: Negative.    Musculoskeletal: Negative.    Skin: Negative.    Neurological: Positive for headaches.       Physical Exam:  Physical Exam   Constitutional: She appears well-developed and well-nourished.   HENT:   Mouth/Throat: Oropharynx is clear and moist.   Eyes: Pupils are equal, round, and reactive to light. No scleral icterus.   Neck: Normal range of motion.   Cardiovascular: Normal rate and regular rhythm.    Pulmonary/Chest: Effort normal and breath sounds normal. She has no wheezes.   Abdominal: Soft. Bowel sounds are normal.   Musculoskeletal: Normal range of motion.   Neurological: She is alert.   Skin: Skin is warm and dry.       Labs:        Invalid input(s): [id number]      Recent Labs      10/24/17   0037  10/25/17   0050  10/26/17   0020   SODIUM  133*  133*  133*   POTASSIUM  3.9  4.0  4.0   CHLORIDE  103  102  100   CO2  24 26 26   BUN  8  7*  8   CREATININE  0.31*  0.36*  0.40*   CALCIUM  8.5  8.7  8.6     Recent Labs      10/24/17   0037  10/25/17   0050  10/26/17   0020   ALTSGPT  6  6  8   ASTSGOT  11*  11*  12   ALKPHOSPHAT  58  57  56   TBILIRUBIN  0.6  0.7  0.6   GLUCOSE  142*  148*  131*     Recent Labs       10/24/17   0037  10/25/17   0050  10/26/17   0020  10/26/17   0950   RBC  2.12*  2.38*  2.34*   --    HEMOGLOBIN  6.5*  7.1*  7.2*   --    HEMATOCRIT  18.2*  19.9*  19.8*   --    PLATELETCT  36*  23*  51*  48*     Recent Labs      10/24/17   0037  10/25/17   0050  10/26/17   0020   WBC  1.5*  0.9*  0.7*   NEUTSPOLYS  9.80*  2.70*  9.60*   LYMPHOCYTES  43.80*  69.30*  58.80*   MONOCYTES  0.00  0.00  0.00   EOSINOPHILS  0.00  0.00  0.00   BASOPHILS  0.90  0.00  0.00   ASTSGOT  11*  11*  12   ALTSGPT  6  6  8   ALKPHOSPHAT  58  57  56   TBILIRUBIN  0.6  0.7  0.6     Recent Labs      10/24/17   0037  10/25/17   0050  10/26/17   0020   SODIUM  133*  133*  133*   POTASSIUM  3.9  4.0  4.0   CHLORIDE  103  102  100   CO2  24    26   GLUCOSE  142*  148*  131*   BUN  8  7*  8   CREATININE  0.31*  0.36*  0.40*   CALCIUM  8.5  8.7  8.6     Hemodynamics:  Temp (24hrs), Av.8 °C (98.3 °F), Min:36.1 °C (97 °F), Max:37.6 °C (99.6 °F)  Temperature: 36.7 °C (98 °F)  Pulse  Av.9  Min: 68  Max: 118   Blood Pressure : 105/56     Respiratory:    Respiration: 18, Pulse Oximetry: 95 %        RUL Breath Sounds: Clear, RML Breath Sounds: Diminished, RLL Breath Sounds: Diminished, DEBBIE Breath Sounds: Clear, LLL Breath Sounds: Diminished  Fluids:    Intake/Output Summary (Last 24 hours) at 10/23/17 0720  Last data filed at 10/23/17 0500   Gross per 24 hour   Intake           2580.5 ml   Output                0 ml   Net           2580.5 ml     Weight: 68.1 kg (150 lb 2.1 oz)  GI/Nutrition:  Orders Placed This Encounter   Procedures   • Diet Order     Standing Status:   Standing     Number of Occurrences:   1     Order Specific Question:   Diet:     Answer:   Cardiac [6]     Medical Decision Making, by Problem:  Active Hospital Problems    Diagnosis   • *Neutropenic fever (CMS-HCC) [D70.9, R50.81]   • Thrombocytopenia (CMS-HCC) [D69.6]   • Myelodysplastic syndrome (CMS-HCC) [D46.9]   • Anemia [D64.9]   • Symptomatic anemia [D64.9]   •  HLD (hyperlipidemia) [E78.5]   • HTN (hypertension) [I10]       Plan:  1.  Myelodysplastic syndrome, RAEB-2, evolving into acute myelogenous leukemia- Vidaza/Venetoclax started  Discussed with Dr. Krishnamurthy who recommended increasing the Venetoclax dosing in an accelerated fashion unlike in CLL.  tarted Vidaza/Veneto 10/24 , blasts trending down slightly  Started 20 mg dose  increase to 50 mg on Friday. Monitor closely for TLS.  She tolerated Dacogen well last year . S  2.  TLS prophylaxis .- on IVF, Allopurinol . No evidence of TLS.  2.  Persistent pancytopenia - Transfuse prn, responding to transfusion appropriately    3. Neutropenic fever -  on Maxipime. Currently afebrile. Cultures negative to date. We will stop Diflucan due to interaction with Venetoclax.  4.  Myocardial infarction with stent placed in January 2017.  5. Chronic unremitting Headache - MRI brain - no intracranial process. Diffuse BM infiltration which may be contributing. We will do an LP to rule out any remote possibility of CSF involvement    Complex patient requiring complex decision making.   Quality-Core Measures

## 2017-10-26 NOTE — DOCUMENTATION QUERY
DOCUMENTATION QUERY    PROVIDERS: Please select “Cosign w/ note”to reply to query.    Dr. Cantor,    To better represent the severity of illness of your patient, please review the following information and exercise your independent professional judgment in responding to this query.     K of 3.3 is noted in the Lab Results . Based upon the clinical findings, risk factors, and treatment, can a diagnosis be provided to support this finding?    • Hypokalemia  • Findings of no clinical significance   • Other explanation of clinical findings, please explain  • Unable to determine (no explanation for clinical findings)          The medical record reflects the following:   Clinical Findings Noted K of 3.3 in the lab results   Treatment kdur   Risk Factors Neutropenia, anemia, aml, htn, thrombocytopenia   Location within medical record Lab Results      Thank you,   Heather Trinidad RN  Clinical   Phone # [phone]

## 2017-10-26 NOTE — PROGRESS NOTES
Chemotherapy Verification - SECONDARY RN       Height = 157.5 cm  Weight = 68.1 kg  BSA = 1.73 m2      Medication: Vidaza  Dose: 75 mg/m2  Calculated Dose: 129.75 mg Ordered Dose: 129.8 mg                            (In mg/m2, AUC, mg/kg)       I confirm that this process was performed independently.

## 2017-10-26 NOTE — DOCUMENTATION QUERY
DOCUMENTATION QUERY    PROVIDERS: Please select “Cosign w/ note”to reply to query.    Dr. Cantor,    To better represent the severity of illness of your patient, please review the following information and exercise your independent professional judgment in responding to this query.     Na of 133 is noted in the Lab Results . Based upon the clinical findings, risk factors, and treatment, can a diagnosis be provided to support this finding?    • Hyponatremia  • Findings of no clinical significance   • Other explanation of clinical findings, please explain  • Unable to determine (no explanation for clinical findings)          The medical record reflects the following:   Clinical Findings Noted Na of 133 in the lab results.   Treatment NS IV   Risk Factors Neutropenic fever, thrombocytopenia, aml, anemia, htn   Location within medical record Lab Results      Thank you,   Heather Trinidad RN  Clinical   Phone # [phone]

## 2017-10-26 NOTE — PROGRESS NOTES
Received report and assumed patient care at change of shift. Patient is resting in bed, A&O x4. Patient reports no pain or discomfort at this time. Pt has Port on the right chest. Plan of care discussed, questions answered. Bed is in the lowest position and locked, call light within reach, non-skid socks in place, hourly rounding. Patient reports no further needs and this time.  Sister at bedside.

## 2017-10-26 NOTE — PROGRESS NOTES
Oncology/Hematology Progress Note               Author: Gerald Wagoner Date & Time created: 10/25/2017  5:18 PM     CC- Pancytopenia with fevers.    HPI- h/o  MDS RAEB-2   Evolved into AML  Vidaza/Venetoclax planned .  Admitted with fever, pancytopenia .  -10/24/ -Started Vidaza / Venetoclax         Interval History:  Continues to have headaches. Not new, but persistent .MRI brain was negative for leptomeningeal disease. She had diffuse marrow infiltrative process involving her bones consistent with cml.    Review of Systems:  Review of Systems   Constitutional: Positive for fever and malaise/fatigue.   Respiratory: Negative.    Cardiovascular: Negative.    Gastrointestinal: Negative.    Genitourinary: Negative.    Musculoskeletal: Negative.    Skin: Negative.    Neurological: Positive for headaches.       Physical Exam:  Physical Exam   Constitutional: She appears well-developed and well-nourished.   HENT:   Mouth/Throat: Oropharynx is clear and moist.   Eyes: Pupils are equal, round, and reactive to light. No scleral icterus.   Neck: Normal range of motion.   Cardiovascular: Normal rate and regular rhythm.    Pulmonary/Chest: Effort normal and breath sounds normal. She has no wheezes.   Abdominal: Soft. Bowel sounds are normal.   Musculoskeletal: Normal range of motion.   Neurological: She is alert.   Skin: Skin is warm and dry.       Labs:        Invalid input(s): [id number]      Recent Labs      10/23/17   0245  10/24/17   0037  10/25/17   0050   SODIUM  135  133*  133*   POTASSIUM  3.3*  3.9  4.0   CHLORIDE  104  103  102   CO2  21  24  26   BUN  9  8  7*   CREATININE  0.36*  0.31*  0.36*   CALCIUM  8.4*  8.5  8.7     Recent Labs      10/23/17   0245  10/24/17   0037  10/25/17   0050   ALTSGPT  7  6  6   ASTSGOT  13  11*  11*   ALKPHOSPHAT  63  58  57   TBILIRUBIN  0.8  0.6  0.7   GLUCOSE  143*  142*  148*     Recent Labs      10/23/17   0245  10/24/17   0037  10/25/17   0050   RBC  2.32*  2.12*  2.38*    HEMOGLOBIN  7.2*  6.5*  7.1*   HEMATOCRIT  19.8*  18.2*  19.9*   PLATELETCT  47*  36*  23*     Recent Labs      10/23/17   0245  10/24/17   0037  10/25/17   0050   WBC  2.1*  1.5*  0.9*   NEUTSPOLYS  9.00*  9.80*  2.70*   LYMPHOCYTES  49.60*  43.80*  69.30*   MONOCYTES  3.60  0.00  0.00   EOSINOPHILS  0.00  0.00  0.00   BASOPHILS  0.00  0.90  0.00   ASTSGOT  13  11*  11*   ALTSGPT  7  6  6   ALKPHOSPHAT  63  58  57   TBILIRUBIN  0.8  0.6  0.7     Recent Labs      10/23/17   0245  10/24/17   0037  10/25/17   0050   SODIUM  135  133*  133*   POTASSIUM  3.3*  3.9  4.0   CHLORIDE  104  103  102   CO2  21  24  26   GLUCOSE  143*  142*  148*   BUN  9  8  7*   CREATININE  0.36*  0.31*  0.36*   CALCIUM  8.4*  8.5  8.7     Hemodynamics:  Temp (24hrs), Av °C (98.6 °F), Min:36.5 °C (97.7 °F), Max:37.4 °C (99.4 °F)  Temperature: 37.2 °C (98.9 °F)  Pulse  Av.9  Min: 68  Max: 118   Blood Pressure : 156/65     Respiratory:    Respiration: 16, Pulse Oximetry: 91 %        RUL Breath Sounds: Diminished, RML Breath Sounds: Diminished, RLL Breath Sounds: Diminished, DEBBIE Breath Sounds: Diminished, LLL Breath Sounds: Diminished  Fluids:    Intake/Output Summary (Last 24 hours) at 10/23/17 0720  Last data filed at 10/23/17 0500   Gross per 24 hour   Intake           2580.5 ml   Output                0 ml   Net           2580.5 ml        GI/Nutrition:  Orders Placed This Encounter   Procedures   • Diet Order     Standing Status:   Standing     Number of Occurrences:   1     Order Specific Question:   Diet:     Answer:   Cardiac [6]     Medical Decision Making, by Problem:  Active Hospital Problems    Diagnosis   • *Neutropenic fever (CMS-HCC) [D70.9, R50.81]   • Thrombocytopenia (CMS-HCC) [D69.6]   • Myelodysplastic syndrome (CMS-HCC) [D46.9]   • Anemia [D64.9]   • Symptomatic anemia [D64.9]   • HLD (hyperlipidemia) [E78.5]   • HTN (hypertension) [I10]       Plan:  1.  Myelodysplastic syndrome, RAEB-2, evolving into acute  myelogenous leukemia- Vidaza/Venetoclax started  Discussed with Dr. Krishnamurthy who recommended increasing the Venetoclax dosing in an accelerated fashion unlike in CLL. Will start 20 mg dose for 4 days and then increase to 50 mg. Monitor closely for TLS.  She tolerated Dacogen well last year . Started Vidaza/Veneto 10/24 , blasts trending down slightly  2.  TLS prophylaxis .- on IVF, Allopurinol . No evidence of TLS.  2.  Persistent pancytopenia - Transfuse prn, responding to transfusion appropriately    3. Neutropenic fever -  on Maxipime. Currently afebrile. Cultures negative to date. We will stop Diflucan due to interaction with Venetoclax.  4.  Myocardial infarction with stent placed in January 2017.  5. Chronic unremitting Headache - MRI brain - no intracranial process. Diffuse BM infiltration which may be contributing. Consider LP if headache persists    Complex patient requiring complex decision making.   Quality-Core Measures

## 2017-10-26 NOTE — DISCHARGE PLANNING
Medical SW    Referral: Sw spoke to MD, Dr Cantor. Pt will receive lumbar puncture to test spinal fluid. Pt on both oral and IV chemo.     Intervention: Sw met w/ pt to discuss d/c plan. Pt advised will d/c in a couple weeks.      Plan: Sw to assist w/ d/c planning as needed.

## 2017-10-26 NOTE — PROCEDURES
Procedure: Lumbar Puncture  Indication: Neutropenic fever  Anesthesia: local 1% lidocaine    Informed consent was obtained from the patient and is in the chart. The patient was sat in an upright bent over position. The area was prepped with iodine and draped in the usual sterile fashion. Approx 5cc of lidocaine was administered for local anesthesia. Using landmarks, a 22 guage spinal needle was inserted in the L4-L5 innerspace. The stylet was removed. 4 tubes of clear fluid was collected and sent for lab analysis. The patient tolerated the procedure well. There was no blood loss or hematoma. A bandage was placed and she has been instructed to lie flat on her back for 2-4 hours.

## 2017-10-26 NOTE — PROGRESS NOTES
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 A Phase 1b Study of Venetoclax (ABT-199/GDC-0199) in Combination with Decitabine or Azacitidine in Treatment-Naive Patients with Acute Myelogenous Leukemia Who Are ? to 65 Years and Not Eligible for Standard Induction Therapy. Blood, 126(31), 327. Accessed October 22, 2017. Retrieved from http://[email or web address]/[email or web address]/23/327.      Allergies:  Review of patient's allergies indicates no known allergies.    /67   Pulse 88   Temp 37.1 °C (98.7 °F)   Resp 18   Ht 1.575 m (5' 2.01\")   Wt 68.1 kg (150 lb 2.1 oz)   LMP 10/18/1993   SpO2 96%   Breastfeeding? No   BMI 27.45 kg/m²  Body surface area is 1.73 meters squared.     Labs 10/25/17  ANC ~ 70     Plt = 51 k     Hgb = 7.2        Rizwana MAX aware of labs, OK to continue treatment. Transfusion orders in place.   SCr =  0.40       CrCl ~80 ml/min (min Cr 0.7)   AST/ALT/AP = 12/8/56           Tbili = 0.6     Drug Order   (Drug name, dose, route, IV Fluid & volume, frequency, number of doses) Cycle 1, Day 4 of 7       Previous treatment: Dacogen      Medication = Azacitidine (Vidaza)  Base Dose = 75 mg/m2  Calc Dose: Base Dose x 1.73 m2 = 130 mg   Final Dose = 129.8 mg   Route = IV  Fluid & Volume =  mL  Admin Duration = Over 15 min           <5% difference, OK to treat with final dose       By my signature below, I confirm this process was performed independently with the BSA and all final chemotherapy dosing calculations congruent. I have reviewed the above chemotherapy order and that my calculation of the " final dose and BSA (when applicable) corroborate those calculations of the  pharmacist. Discrepancies of 5% or greater in the written dose have been addressed and documented within the EPIC Progress notes.    Mckay William, YasmeenD

## 2017-10-26 NOTE — CARE PLAN
Problem: Safety  Goal: Will remain free from injury    Intervention: Provide assistance with mobility  Pt requires assistance to ambulate.  Pt calls for assistance.       Problem: Infection  Goal: Will remain free from infection    Intervention: Implement standard precautions and perform hand washing before and after patient contact  Pt is neutropenic. Hand hygiene performed before and after pt contact.

## 2017-10-26 NOTE — PROGRESS NOTES
[facility] Hospitalist Progress Note    Date of Service: 10/26/2017    Chief Complaint  67 y.o. female admitted 10/21/2017 with MDS and neutropenic fever.    Interval Problem Update  10/24 Patient afebrile and on empiric cefepime, she was started on chemo yesterday for treatment of her MDS.  She complained of headache today that has been consistent x 1 month and MRI ordered as she has not had this w/u.  I also added a long acting pain medication to try and lessen her use of the oxy IR - and at this point with frequent narcotic use she could be having rebound headache.  10/25 Patient feeling better today, headaches have been less severe with start of oxycontin and her leg pain only is present during headache.  MRI reviewed with patient, no significant pathology seen other than marrow occupying issue which is her MDS.  Given her symptom and possibility of CNS involvement, oncology is requesting a lumbar puncture.  I will order platelet transfusion to get her level >50 for LP and order cytology on fluid.  LP should occur tomorrow in IR.  10/26 Patient states she feels okay today but is anxious regarding lumbar puncture.  Platelets transfused yesterday and 51k this am.  Radiology wanted another draw prior to procedure and dropped to 48 so they would not due the LP.  I will give another transfusion of platelets and will have my partner do LP at bedside after they have been infused.  She wll continue the schedule of PO and IV chemotherapy.  Headaches are better with current pain medication regimen.  Patient's sister at bedside and questions answered for her also.    Consultants/Specialty  Oncology - Rizwana    Disposition  tbd        Review of Systems   Constitutional: Negative for chills and fever.   HENT: Negative for congestion.    Eyes: Negative for blurred vision and photophobia.   Respiratory: Negative for cough and shortness of breath.    Cardiovascular: Negative for chest pain, claudication and leg swelling.    Gastrointestinal: Negative for abdominal pain, constipation, diarrhea, heartburn and vomiting.   Genitourinary: Negative for dysuria and hematuria.   Musculoskeletal: Positive for myalgias (bilateral leg pain with headache). Negative for joint pain.   Skin: Negative for itching and rash.   Neurological: Positive for headaches (better). Negative for dizziness, sensory change, speech change and weakness.   Endo/Heme/Allergies: Does not bruise/bleed easily.   Psychiatric/Behavioral: Negative for depression. The patient is not nervous/anxious and does not have insomnia.       Physical Exam  Laboratory/Imaging   Hemodynamics  Temp (24hrs), Av °C (98.6 °F), Min:36.7 °C (98.1 °F), Max:37.6 °C (99.6 °F)   Temperature: 36.7 °C (98.1 °F)  Pulse  Av.2  Min: 68  Max: 118    Blood Pressure : 129/72      Respiratory      Respiration: 18, Pulse Oximetry: 96 %        RUL Breath Sounds: Clear, RML Breath Sounds: Diminished, RLL Breath Sounds: Diminished, DEBBIE Breath Sounds: Clear, LLL Breath Sounds: Diminished    Fluids    Intake/Output Summary (Last 24 hours) at 10/26/17 1400  Last data filed at 10/26/17 0500   Gross per 24 hour   Intake             3065 ml   Output                0 ml   Net             3065 ml       Nutrition  Orders Placed This Encounter   Procedures   • Diet Order     Standing Status:   Standing     Number of Occurrences:   1     Order Specific Question:   Diet:     Answer:   Cardiac [6]     Physical Exam   Constitutional: She is oriented to person, place, and time. She appears well-developed and well-nourished. No distress.   HENT:   Head: Normocephalic and atraumatic.   Eyes: Conjunctivae are normal. No scleral icterus.   Neck: Neck supple. No JVD present.   Cardiovascular: Normal rate, regular rhythm and normal heart sounds.  Exam reveals no gallop and no friction rub.    No murmur heard.  Pulmonary/Chest: Effort normal and breath sounds normal. No respiratory distress. She exhibits no tenderness.    Abdominal: Soft. Bowel sounds are normal. She exhibits no distension. There is no guarding.   Musculoskeletal: She exhibits no edema or tenderness.   Neurological: She is alert and oriented to person, place, and time. No cranial nerve deficit.   Skin: Skin is warm and dry. She is not diaphoretic. No erythema. No pallor.   Psychiatric: She has a normal mood and affect. Her behavior is normal.   Nursing note and vitals reviewed.      Recent Labs      10/24/17   0037  10/25/17   0050  10/26/17   0020  10/26/17   0950   WBC  1.5*  0.9*  0.7*   --    RBC  2.12*  2.38*  2.34*   --    HEMOGLOBIN  6.5*  7.1*  7.2*   --    HEMATOCRIT  18.2*  19.9*  19.8*   --    MCV  85.8  83.6  84.6   --    MCH  30.7  29.8  30.8   --    MCHC  35.7*  35.7*  36.4*   --    RDW  45.1  43.5  42.4   --    PLATELETCT  36*  23*  51*  48*   MPV  10.2  9.7  10.8   --      Recent Labs      10/24/17   0037  10/25/17   0050  10/26/17   0020   SODIUM  133*  133*  133*   POTASSIUM  3.9  4.0  4.0   CHLORIDE  103  102  100   CO2  24  26  26   GLUCOSE  142*  148*  131*   BUN  8  7*  8   CREATININE  0.31*  0.36*  0.40*   CALCIUM  8.5  8.7  8.6                      Assessment/Plan     * Neutropenic fever (CMS-[facility])- (present on admission)   Assessment & Plan    Cultures showing no growth.  Empiric cefepime          Thrombocytopenia (CMS-[facility])- (present on admission)   Assessment & Plan    Keep platelet count >10 or higher if bleeding.  Transfuse today to be platelet count >50 for lumbar puncture.          Myelodysplastic syndrome (CMS-HCC)- (present on admission)   Assessment & Plan    Venetoclax/Vidaza started 10/23 per Dr Krishnamurthy recommendations  ANC 20  Dr Wagoner requesting lumbar puncture for cytology.  Transfuse platelets to get count >50 for IR to do LP tomorrow.          Headache   Assessment & Plan    States HA >1 month and associated with leg pain simultaneously  MRI brain with and without contrast done, no IC pathology, marrow abnormal as expected with  MDS  Oxycontin helped significantly and head pain better.   LP today after more platelets to be given.          Anemia- (present on admission)   Assessment & Plan    Transfuse if less than 7.0  Received 1 unit 10/24   Secondary to 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  Transfuse for <7.0        HLD (hyperlipidemia)- (present on admission)   Assessment & Plan    Continue current statin therapy regimen        HTN (hypertension)- (present on admission)   Assessment & Plan    Coreg - controlled              Reviewed items::  Labs reviewed, Medications reviewed and Radiology images reviewed  Gan catheter::  No Gan  DVT prophylaxis pharmacological::  Contraindicated - High bleeding risk  DVT prophylaxis - mechanical:  SCDs  Antibiotics:  Treating active infection/contamination beyond 24 hours perioperative coverage

## 2017-10-26 NOTE — DOCUMENTATION QUERY
"DOCUMENTATION QUERY    PROVIDERS: Please select “Cosign w/ note”to reply to query.    Dr. Cantor,    To better represent the severity of illness of your patient, please review the following information and exercise your independent professional judgment in responding to this query. Please reference the information at the bottom of this query for clinical criteria to support malnutrition.    \"Pt with moderate malnutrition of acute illness\" is noted in the Dietary Note on 10/22/17. Based upon the clinical findings, risk factors, and treatment, can a diagnosis be provided to support this finding?     • Mild Protein Calorie Malnutrition  • Moderate Protein Calorie Malnutrition  • Severe Protein Calorie Malnutrition  • Cachexia  • Underweight  • Findings of no clinical significance  • Other explanation of clinical findings, please explain  • Unable to determine    The medical record reflects the following:   Clinical Findings Per the Dietary Note on 10/22/17:    Pt w/moderate malnutrition of acute illness.    Pt w/moderate malnutrition r/t decreased appetite x's 1 month as evidenced by pt consuming <75% of estimated needs for > 1 month per pt interview & significant wt loss of 5% wt loss x's 1 month.   Treatment Dietary consult, encourage PO intake, sending high protein milkshake QD, consider liberalizing diet   Risk Factors Neutropenia, anemia, thrombocytopenia, aml   Location within medical record Dietary Note       Thank you,  Heather Trinidad RN  Clinical   Phone # [phone]               "

## 2017-10-26 NOTE — PROGRESS NOTES
Marsha from Lab called with critical result of PLT 48 at 1025. Critical lab result read back to Marsha.   This critical lab result is within parameters established by Dr.[facility] Policy for this patient

## 2017-10-26 NOTE — PROGRESS NOTES
Received report from day shift RN, assumed care of patient at 1900. AOx4. Stand by assist. Denies pain or nausea at this time. Port with positive blood return. Call light within reach, bed in low and locked position. No other needs at this time.

## 2017-10-26 NOTE — PROGRESS NOTES
Trino from Lab called with critical result of WBC of 0.7 at 0158. Critical lab result read back to Trino. These results within parameters for this patient established by the hospitalist.

## 2017-10-26 NOTE — PROGRESS NOTES
Chemotherapy Verification - PRIMARY RN  Cycle 1 Day 4      Height = 157.5 cm  Weight = 68.1 kg  BSA = 1.73 m2       Medication: Vidaza  Dose: 75 mg/m2  Calculated Dose: 129.75 mg (129.8 mg ordered)                             (In mg/m2, AUC, mg/kg)       I confirm this process was performed independently with the BSA and all final chemotherapy dosing calculations congruent.  Any discrepancies of 5% or greater have been addressed with the chemotherapy pharmacist. The resolution of the discrepancy has been documented in the EPIC progress notes.

## 2017-10-27 NOTE — CARE PLAN
Problem: Bowel/Gastric:  Goal: Will not experience complications related to bowel motility  Outcome: PROGRESSING AS EXPECTED  Education provided regarding mobilization to prevent constipation. Stool softeners in use to prevent narcotic associated constipation.     Problem: Pain Management  Goal: Pain level will decrease to patient's comfort goal  Outcome: PROGRESSING AS EXPECTED  Appropriately verbalizes 0 to 10 pain scale. Calls for medication as needed.

## 2017-10-27 NOTE — THERAPY
"Occupational Therapy Evaluation completed.   Functional Status: Pt is independent supine to sit and sit to supine, mod I xfers without AD, mod I mobility to/from bathroom without AD but pushig IV pole, mod I to don socks.  No loss of balance noted. Pt at baseline. NO further acute OT needs.    Plan of Care: 1 x only eval.   Discharge Recommendations:  Equipment: Raised toilet seat with arm rests. Post-acute therapy: Currently anticipate no further skilled therapy needs once patient is discharged from the inpatient setting.    See \"Rehab Therapy-Acute\" Patient Summary Report for complete documentation.    "

## 2017-10-27 NOTE — PROGRESS NOTES
Oncology/Hematology Progress Note               Author: Gerald Wagoner Date & Time created: 10/27/2017  9:05 AM     CC- Pancytopenia with fevers.    HPI- h/o  MDS RAEB-2   Evolved into AML  Vidaza/Venetoclax planned .  Admitted with fever, pancytopenia .  -10/24/ -Started Vidaza / Venetoclax   - 10/25-MRI brain- marrow infiltrative process  -10/26-status post diagnostic LP      Interval History:  Continues to have headaches. Not new, but persistent .MRI brain was negative for leptomeningeal disease. She had diffuse marrow infiltrative process involving her bones consistent with AML.    No new complaints. Headache present, but not bothering her much today. Afebrile    Review of Systems:  Review of Systems   Constitutional: Positive for fever and malaise/fatigue.   Respiratory: Negative.    Cardiovascular: Negative.    Gastrointestinal: Negative.    Genitourinary: Negative.    Musculoskeletal: Negative.    Skin: Negative.    Neurological: Positive for headaches.       Physical Exam:  Physical Exam   Constitutional: She appears well-developed and well-nourished.   HENT:   Mouth/Throat: Oropharynx is clear and moist.   Eyes: Pupils are equal, round, and reactive to light. No scleral icterus.   Neck: Normal range of motion.   Cardiovascular: Normal rate and regular rhythm.    Pulmonary/Chest: Effort normal and breath sounds normal. She has no wheezes.   Abdominal: Soft. Bowel sounds are normal.   Musculoskeletal: Normal range of motion.   Neurological: She is alert.   Skin: Skin is warm and dry.       Labs:        Invalid input(s): [id number]      Recent Labs      10/25/17   0050  10/26/17   0020  10/27/17   0005   SODIUM  133*  133*  132*   POTASSIUM  4.0  4.0  3.8   CHLORIDE  102  100  100   CO2  26 26 26   BUN  7*  8  9   CREATININE  0.36*  0.40*  0.35*   CALCIUM  8.7  8.6  8.5     Recent Labs      10/25/17   0050  10/26/17   0020  10/27/17   0005   ALTSGPT  6  8  7   ASTSGOT  11*  12  10*   ALKPHOSPHAT  57  56   59   TBILIRUBIN  0.7  0.6  0.6   GLUCOSE  148*  131*  142*     Recent Labs      10/25/17   0050  10/26/17   0020  10/26/17   0950  10/27/17   0005   RBC  2.38*  2.34*   --   2.42*   HEMOGLOBIN  7.1*  7.2*   --   7.4*   HEMATOCRIT  19.9*  19.8*   --   20.5*   PLATELETCT  23*  51*  48*  81*     Recent Labs      10/25/17   0050  10/26/17   0020  10/27/17   0005   WBC  0.9*  0.7*  0.5*   NEUTSPOLYS  2.70*  9.60*  7.00*   LYMPHOCYTES  69.30*  58.80*  72.90*   MONOCYTES  0.00  0.00  1.50   EOSINOPHILS  0.00  0.00  0.00   BASOPHILS  0.00  0.00  0.00   ASTSGOT  11*  12  10*   ALTSGPT  6  8  7   ALKPHOSPHAT  57  56  59   TBILIRUBIN  0.7  0.6  0.6     Recent Labs      10/25/17   0050  10/26/17   0020  10/27/17   0005   SODIUM  133*  133*  132*   POTASSIUM  4.0  4.0  3.8   CHLORIDE  102  100  100   CO2  26  26  26   GLUCOSE  148*  131*  142*   BUN  7*  8  9   CREATININE  0.36*  0.40*  0.35*   CALCIUM  8.7  8.6  8.5     Hemodynamics:  Temp (24hrs), Av.8 °C (98.3 °F), Min:36.1 °C (97 °F), Max:37.3 °C (99.1 °F)  Temperature: 36.8 °C (98.3 °F)  Pulse  Av  Min: 68  Max: 118   Blood Pressure : 142/68     Respiratory:    Respiration: 18, Pulse Oximetry: 94 %        RUL Breath Sounds: Clear, RML Breath Sounds: Diminished, RLL Breath Sounds: Diminished, DEBBIE Breath Sounds: Clear, LLL Breath Sounds: Diminished  Fluids:    Intake/Output Summary (Last 24 hours) at 10/23/17 0720  Last data filed at 10/23/17 0500   Gross per 24 hour   Intake           2580.5 ml   Output                0 ml   Net           2580.5 ml        GI/Nutrition:  Orders Placed This Encounter   Procedures   • Diet Order     Standing Status:   Standing     Number of Occurrences:   1     Order Specific Question:   Diet:     Answer:   Cardiac [6]     Medical Decision Making, by Problem:  Active Hospital Problems    Diagnosis   • *Neutropenic fever (CMS-HCC) [D70.9, R50.81]   • Thrombocytopenia (CMS-HCC) [D69.6]   • Myelodysplastic syndrome (CMS-HCC) [D46.9]   •  Anemia [D64.9]   • Symptomatic anemia [D64.9]   • HLD (hyperlipidemia) [E78.5]   • HTN (hypertension) [I10]       Plan:  1.  Myelodysplastic syndrome, RAEB-2, evolving into acute myelogenous leukemia- Vidaza/Venetoclax started  Discussed with Dr. Krishnamurthy who recommended increasing the Venetoclax dosing in an accelerated fashion unlike in CLL.  Started Vidaza/Veneto 10/24 , blasts trending down slightly  Started 20 mg dose  increase to 50 mg on 10/27. Consider accelerating 100 mg after 5 days, depending on the cell counts and tolerance. Monitor closely for TLS.    2.  TLS prophylaxis .- on IVF, Allopurinol . No evidence of TLS.  2.  Persistent pancytopenia - Transfuse prn, responding to transfusion appropriately    3. Neutropenic fever -  on Maxipime. Currently afebrile. Cultures negative to date. We will stop Diflucan due to interaction with Venetoclax.  4.  Myocardial infarction with stent placed in January 2017.  5. Chronic unremitting Headache - MRI brain - no intracranial process. Diffuse BM infiltration which may be contributing. Status post LP to rule out any remote possibility of CSF involvement    Complex patient requiring complex decision making.   Quality-Core Measures

## 2017-10-27 NOTE — PROGRESS NOTES
Chemotherapy Verification - PRIMARY RN  Cycle 1 Day 5      Height = 157.5 cm  Weight = 68.1 kg  BSA = 1.73 m2       Medication: Azacitidine  Dose: 75 mg/m2  Calculated Dose: 129.75 mg (129.8 mg ordered)                             (In mg/m2, AUC, mg/kg)       I confirm this process was performed independently with the BSA and all final chemotherapy dosing calculations congruent.  Any discrepancies of 5% or greater have been addressed with the chemotherapy pharmacist. The resolution of the discrepancy has been documented in the EPIC progress notes.

## 2017-10-27 NOTE — PROGRESS NOTES
Assumed care of patient A&O. Calls for medication as needed. Standby assist. Port flushing with blood return. Reviewed plan of care.

## 2017-10-27 NOTE — PROGRESS NOTES
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 A Phase 1b Study of Venetoclax (ABT-199/GDC-0199) in Combination with Decitabine or Azacitidine in Treatment-Naive Patients with Acute Myelogenous Leukemia Who Are ? to 65 Years and Not Eligible for Standard Induction Therapy. Blood, 126(25), 327. Accessed October 22, 2017. Retrieved from http://[email or web address]/[email or web address]/23/327.      Allergies:  Review of patient's allergies indicates no known allergies.    /68   Pulse 87   Temp 36.8 °C (98.3 °F)   Resp 18   Ht 1.575 m (5' 2.01\")   Wt 68.1 kg (150 lb 2.1 oz)   LMP 10/18/1993   SpO2 94%   Breastfeeding? No   BMI 27.45 kg/m²  Body surface area is 1.73 meters squared.     Labs 10/27/17  ANC ~ 40     Plt = 81 k     Hgb = 7.4        Rizwana MAX aware of labs, OK to continue treatment. Transfusion orders in place.   SCr =  0.35       CrCl ~81.2 ml/min (min Cr 0.7)   AST/ALT/AP = 10/7/59           Tbili = 0.6     Drug Order   (Drug name, dose, route, IV Fluid & volume, frequency, number of doses) Cycle 1, Day 5 of 7       Previous treatment: Dacogen      Medication = Azacitidine (Vidaza)  Base Dose = 75 mg/m2  Calc Dose: Base Dose x 1.73 m2 = 129.75 mg   Final Dose = 129.8 mg   Route = IV  Fluid & Volume =  mL  Admin Duration = Over 15 min           <5% difference, OK to treat with final dose       By my signature below, I confirm this process was performed independently with the BSA and all final chemotherapy dosing calculations congruent. I have reviewed the above chemotherapy order and that my calculation of " the final dose and BSA (when applicable) corroborate those calculations of the  pharmacist. Discrepancies of 5% or greater in the written dose have been addressed and documented within the EPIC Progress notes.    Anneliese Velasquez, YasmeenD

## 2017-10-27 NOTE — CARE PLAN
Problem: Safety  Goal: Will remain free from injury  Hourly rounding in effect, pt instructed to call for assistance, bed locked and in lowest position. Bed alarm off, with Daphne as second RN. Pt calls appropriately. Personal possessions within reach.       Problem: Knowledge Deficit  Goal: Knowledge of disease process/condition, treatment plan, diagnostic tests, and medications will improve  Pt updated and educated on nursing interventions, medications and POC      Problem: Skin Integrity  Goal: Risk for impaired skin integrity will decrease  Pt turns self and mobilizes to prevent skin breakdown.

## 2017-10-27 NOTE — CARE PLAN
Problem: Nutritional:  Goal: Achieve adequate nutritional intake  Patient will consume >50% of meals and snacks    Outcome: MET Date Met: 10/27/17

## 2017-10-27 NOTE — PROGRESS NOTES
Tech from Lab called with critical result of ANC 0.04 and WBC 0.05 at 0154. Critical lab result read back to Tech.   This critical lab result is within parameters established by  for this patient

## 2017-10-27 NOTE — PROGRESS NOTES
[facility] Hospitalist Progress Note    Date of Service: 10/27/2017    Chief Complaint  67 y.o. female admitted 10/21/2017 with MDS and neutropenic fever.    Interval Problem Update  10/24 Patient afebrile and on empiric cefepime, she was started on chemo yesterday for treatment of her MDS.  She complained of headache today that has been consistent x 1 month and MRI ordered as she has not had this w/u.  I also added a long acting pain medication to try and lessen her use of the oxy IR - and at this point with frequent narcotic use she could be having rebound headache.  10/25 Patient feeling better today, headaches have been less severe with start of oxycontin and her leg pain only is present during headache.  MRI reviewed with patient, no significant pathology seen other than marrow occupying issue which is her MDS.  Given her symptom and possibility of CNS involvement, oncology is requesting a lumbar puncture.  I will order platelet transfusion to get her level >50 for LP and order cytology on fluid.  LP should occur tomorrow in IR.  10/26 Patient states she feels okay today but is anxious regarding lumbar puncture.  Platelets transfused yesterday and 51k this am.  Radiology wanted another draw prior to procedure and dropped to 48 so they would not due the LP.  I will give another transfusion of platelets and will have my partner do LP at bedside after they have been infused.  She wll continue the schedule of PO and IV chemotherapy.  Headaches are better with current pain medication regimen.  Patient's sister at bedside and questions answered for her also.  10/27 Patient feeling well today but states headache is still present if she doesn't take her short acting oxycodone, will increase oxycontin dose to 20 bid.  She had LP yesterday without issue and pathology report back today showing no malignant cells identified.    Consultants/Specialty  Oncology - Rizwana Sanford  [email or web address]        Review of Systems    Constitutional: Negative for chills and fever.   HENT: Negative for congestion.    Eyes: Negative for blurred vision and photophobia.   Respiratory: Negative for cough and shortness of breath.    Cardiovascular: Negative for chest pain, claudication and leg swelling.   Gastrointestinal: Negative for abdominal pain, constipation, diarrhea, heartburn and vomiting.   Genitourinary: Negative for dysuria and hematuria.   Musculoskeletal: Positive for myalgias (bilateral leg pain with headache). Negative for joint pain.   Skin: Negative for itching and rash.   Neurological: Positive for headaches (better but still needing frequent prn meds). Negative for dizziness, sensory change, speech change and weakness.   Endo/Heme/Allergies: Does not bruise/bleed easily.   Psychiatric/Behavioral: Negative for depression. The patient is not nervous/anxious and does not have insomnia.       Physical Exam  Laboratory/Imaging   Hemodynamics  Temp (24hrs), Av.9 °C (98.5 °F), Min:36.7 °C (98 °F), Max:37.3 °C (99.1 °F)   Temperature: 36.7 °C (98.1 °F)  Pulse  Av.9  Min: 68  Max: 118    Blood Pressure : 136/60      Respiratory      Respiration: 18, Pulse Oximetry: 97 %        RUL Breath Sounds: Clear, RML Breath Sounds: Diminished, RLL Breath Sounds: Diminished, DEBBIE Breath Sounds: Clear, LLL Breath Sounds: Diminished    Fluids    Intake/Output Summary (Last 24 hours) at 10/27/17 1527  Last data filed at 10/27/17 1000   Gross per 24 hour   Intake             1968 ml   Output              400 ml   Net             1568 ml       Nutrition  Orders Placed This Encounter   Procedures   • Diet Order     Standing Status:   Standing     Number of Occurrences:   1     Order Specific Question:   Diet:     Answer:   Cardiac [6]     Physical Exam   Constitutional: She is oriented to person, place, and time. She appears well-developed and well-nourished. No distress.   HENT:   Head: Normocephalic and atraumatic.   Eyes: Conjunctivae are normal.  No scleral icterus.   Neck: Neck supple. No JVD present.   Cardiovascular: Normal rate, regular rhythm and normal heart sounds.  Exam reveals no gallop and no friction rub.    No murmur heard.  Pulmonary/Chest: Effort normal and breath sounds normal. No respiratory distress. She exhibits no tenderness.   Abdominal: Soft. Bowel sounds are normal. She exhibits no distension. There is no guarding.   Musculoskeletal: She exhibits no edema or tenderness.   Neurological: She is alert and oriented to person, place, and time. No cranial nerve deficit.   Skin: Skin is warm and dry. She is not diaphoretic. No erythema. No pallor.   Psychiatric: She has a normal mood and affect. Her behavior is normal.   Nursing note and vitals reviewed.      Recent Labs      10/25/17   0050  10/26/17   0020  10/26/17   0950  10/27/17   0005   WBC  0.9*  0.7*   --   0.5*   RBC  2.38*  2.34*   --   2.42*   HEMOGLOBIN  7.1*  7.2*   --   7.4*   HEMATOCRIT  19.9*  19.8*   --   20.5*   MCV  83.6  84.6   --   84.7   MCH  29.8  30.8   --   30.6   MCHC  35.7*  36.4*   --   36.1*   RDW  43.5  42.4   --   42.8   PLATELETCT  23*  51*  48*  81*   MPV  9.7  10.8   --   9.8     Recent Labs      10/25/17   0050  10/26/17   0020  10/27/17   0005   SODIUM  133*  133*  132*   POTASSIUM  4.0  4.0  3.8   CHLORIDE  102  100  100   CO2  26  26  26   GLUCOSE  148*  131*  142*   BUN  7*  8  9   CREATININE  0.36*  0.40*  0.35*   CALCIUM  8.7  8.6  8.5                      Assessment/Plan     * Neutropenic fever (CMS-HCC)- (present on admission)   Assessment & Plan    Cultures showing no growth.  Empiric cefepime          Thrombocytopenia (CMS-HCC)- (present on admission)   Assessment & Plan    Keep platelet count >10 or higher if bleeding.          Myelodysplastic syndrome (CMS-HCC)- (present on admission)   Assessment & Plan    Venetoclax/Vidaza started 10/23 per Dr Krishnamurthy recommendations  ANC 40            Headache   Assessment & Plan    States HA >1 month and  associated with leg pain simultaneously  MRI brain with and without contrast done, no IC pathology, marrow abnormal as expected with MDS  Oxycontin helping but still needing frequent breakthrough - will increase oxycontin dose from 10 to 20mg bid  LP done 10/26, no complications, pathology report - no malignant cells seen            Anemia- (present on admission)   Assessment & Plan    Transfuse if less than 7.0  Received 1 unit 10/24   Secondary to 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  Transfuse for <7.0        HLD (hyperlipidemia)- (present on admission)   Assessment & Plan    Continue current statin therapy regimen        HTN (hypertension)- (present on admission)   Assessment & Plan    Coreg - controlled              Reviewed items::  Labs reviewed, Medications reviewed and Radiology images reviewed  Gan catheter::  No Gan  DVT prophylaxis pharmacological::  Contraindicated - High bleeding risk  DVT prophylaxis - mechanical:  SCDs  Antibiotics:  Treating active infection/contamination beyond 24 hours perioperative coverage

## 2017-10-27 NOTE — PROGRESS NOTES
Received report and assumed patient care at change of shift. Patient is resting in bed, A&O x4. Patient reports 7/10 pain at this time, pt medicated per MAR.   Pt has a port on R chest.    Plan of care discussed, questions answered. Bed is in the lowest position and locked, call light within reach, non-skid socks in place, hourly rounding. Patient reports no further needs and this time.

## 2017-10-27 NOTE — PROGRESS NOTES
Chemotherapy Verification - SECONDARY RN       Height = 157.5 cm  Weight = 68.1 kg  BSA = 1.73 m2      Medication: Azacitidine  Dose: 75 mg/m2  Calculated Dose: 129.75 Ordered Dose: 129.8 mg                            (In mg/m2, AUC, mg/kg)         I confirm that this process was performed independently.

## 2017-10-28 NOTE — PROGRESS NOTES
Assumed care of patient. Patient A&O X4. Medicating with oral oxycodone for pain. Patient calls for medication as needed. MD aware of increased pain in lower extremities. POC discussed.

## 2017-10-28 NOTE — PROGRESS NOTES
Tech from Lab called with critical result of WBC 0.5 and platelets of 67 at 0115. Critical lab result read back to Tech.   This critical lab result is within parameters established by  for this patient

## 2017-10-28 NOTE — PROGRESS NOTES
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 A Phase 1b Study of Venetoclax (ABT-199/GDC-0199) in Combination with Decitabine or Azacitidine in Treatment-Naive Patients with Acute Myelogenous Leukemia Who Are ? to 65 Years and Not Eligible for Standard Induction Therapy. Blood, 126(76), 327. Accessed October 22, 2017. Retrieved from http://www.bloodjournal.org/content/126/23/327.      Allergies:  Review of patient's allergies indicates no known allergies.    /59   Pulse 85   Temp 36.9 °C (98.4 °F)   Resp 18   Ht 1.575 m (5' 2.01\")   Wt 68.1 kg (150 lb 2.1 oz)   LMP 10/18/1993   SpO2 90%   Breastfeeding? No   BMI 27.45 kg/m²  Body surface area is 1.73 meters squared.     Labs 10/28/17  ANC ~ 0 ()     Plt = 67 k     Hgb = 7.1        Rizwana MAX aware of labs, OK to continue treatment. Transfusion orders in place.   SCr = 0.42       CrCl ~81.2 ml/min (min Cr 0.7)   AST/ALT/AP = 11/7/59          Tbili = 0.7     Drug Order   (Drug name, dose, route, IV Fluid & volume, frequency, number of doses) Cycle 1, Day 6 of 7       Previous treatment: Dacogen      Medication = Azacitidine (Vidaza)  Base Dose = 75 mg/m2  Calc Dose: Base Dose x 1.73 m2 = 129.75 mg   Final Dose = 129.8 mg   Route = IV  Fluid & Volume =  mL  Admin Duration = Over 15 min           <5% difference, OK to treat with final dose       By my signature below, I confirm this process was performed independently with the BSA and all final chemotherapy dosing calculations congruent. I have reviewed the above chemotherapy order and that my " calculation of the final dose and BSA (when applicable) corroborate those calculations of the  pharmacist. Discrepancies of 5% or greater in the written dose have been addressed and documented within the EPIC Progress notes.    Anneliese Velasquez, YasmeenD

## 2017-10-28 NOTE — PROGRESS NOTES
Chemotherapy Verification - SECONDARY RN       Height = 157.5cm  Weight = 69.7kg  BSA = 1.75m2       Medication: azacitidine  Dose: 75mg/m2  Calculated Dose: 131.25mg (ordered dose: 129.8mg, <5% difference)                               I confirm that this process was performed independently.

## 2017-10-28 NOTE — CARE PLAN
Problem: Safety  Goal: Will remain free from falls  Outcome: PROGRESSING AS EXPECTED  Patient educated regarding fall risk. Calls for assistance as needed.     Problem: Pain Management  Goal: Pain level will decrease to patient's comfort goal  Outcome: PROGRESSING AS EXPECTED  Appropriately verbalizes 0 to 10 pain scale.

## 2017-10-28 NOTE — PROGRESS NOTES
[facility] Hospitalist Progress Note    Date of Service: 10/28/2017    Chief Complaint  67 y.o. female admitted 10/21/2017 with MDS and neutropenic fever.    Interval Problem Update  10/24 Patient afebrile and on empiric cefepime, she was started on chemo yesterday for treatment of her MDS.  She complained of headache today that has been consistent x 1 month and MRI ordered as she has not had this w/u.  I also added a long acting pain medication to try and lessen her use of the oxy IR - and at this point with frequent narcotic use she could be having rebound headache.  10/25 Patient feeling better today, headaches have been less severe with start of oxycontin and her leg pain only is present during headache.  MRI reviewed with patient, no significant pathology seen other than marrow occupying issue which is her MDS.  Given her symptom and possibility of CNS involvement, oncology is requesting a lumbar puncture.  I will order platelet transfusion to get her level >50 for LP and order cytology on fluid.  LP should occur tomorrow in IR.  10/26 Patient states she feels okay today but is anxious regarding lumbar puncture.  Platelets transfused yesterday and 51k this am.  Radiology wanted another draw prior to procedure and dropped to 48 so they would not due the LP.  I will give another transfusion of platelets and will have my partner do LP at bedside after they have been infused.  She wll continue the schedule of PO and IV chemotherapy.  Headaches are better with current pain medication regimen.  Patient's sister at bedside and questions answered for her also.  10/27 Patient feeling well today but states headache is still present if she doesn't take her short acting oxycodone, will increase oxycontin dose to 20 bid.  She had LP yesterday without issue and pathology report back today showing no malignant cells identified.  10/28 Patient feeling well today and headaches seem to be slightly better.  She has no complaints.   ANC is not undetectable which is expected with the current chemo regimen.  She is having a good appetite and no issues with bowel or bladder.    Consultants/Specialty  Oncology - Rizwana pagan        Review of Systems   Constitutional: Negative for chills and fever.   HENT: Negative for congestion.    Eyes: Negative for blurred vision and photophobia.   Respiratory: Negative for cough and shortness of breath.    Cardiovascular: Negative for chest pain, claudication and leg swelling.   Gastrointestinal: Negative for abdominal pain, constipation, diarrhea, heartburn and vomiting.   Genitourinary: Negative for dysuria and hematuria.   Musculoskeletal: Positive for myalgias (bilateral leg pain with headache). Negative for joint pain.   Skin: Negative for itching and rash.   Neurological: Positive for headaches (better with changes). Negative for dizziness, sensory change, speech change and weakness.   Endo/Heme/Allergies: Does not bruise/bleed easily.   Psychiatric/Behavioral: Negative for depression. The patient is not nervous/anxious and does not have insomnia.       Physical Exam  Laboratory/Imaging   Hemodynamics  Temp (24hrs), Av.1 °C (98.7 °F), Min:36.7 °C (98.1 °F), Max:37.4 °C (99.4 °F)   Temperature: 36.7 °C (98.1 °F)  Pulse  Av.2  Min: 68  Max: 118    Blood Pressure : 141/81      Respiratory      Respiration: 18, Pulse Oximetry: 92 %        RUL Breath Sounds: Clear, RML Breath Sounds: Diminished, RLL Breath Sounds: Diminished, DEBBIE Breath Sounds: Clear, LLL Breath Sounds: Diminished    Fluids    Intake/Output Summary (Last 24 hours) at 10/28/17 1444  Last data filed at 10/27/17 1736   Gross per 24 hour   Intake             1542 ml   Output                0 ml   Net             1542 ml       Nutrition  Orders Placed This Encounter   Procedures   • Diet Order     Standing Status:   Standing     Number of Occurrences:   1     Order Specific Question:   Diet:     Answer:   Cardiac [6]      Physical Exam   Constitutional: She is oriented to person, place, and time. She appears well-developed and well-nourished. No distress.   HENT:   Head: Normocephalic and atraumatic.   Eyes: Conjunctivae are normal. No scleral icterus.   Neck: Neck supple. No JVD present.   Cardiovascular: Normal rate, regular rhythm and normal heart sounds.  Exam reveals no gallop and no friction rub.    No murmur heard.  Pulmonary/Chest: Effort normal and breath sounds normal. No respiratory distress. She exhibits no tenderness.   Abdominal: Soft. Bowel sounds are normal. She exhibits no distension. There is no guarding.   Musculoskeletal: She exhibits no edema or tenderness.   Neurological: She is alert and oriented to person, place, and time. No cranial nerve deficit.   Skin: Skin is warm and dry. She is not diaphoretic. No erythema. No pallor.   Psychiatric: She has a normal mood and affect. Her behavior is normal.   Nursing note and vitals reviewed.      Recent Labs      10/26/17   0020  10/26/17   0950  10/27/17   0005  10/28/17   0025   WBC  0.7*   --   0.5*  0.5*   RBC  2.34*   --   2.42*  2.43*   HEMOGLOBIN  7.2*   --   7.4*  7.1*   HEMATOCRIT  19.8*   --   20.5*  20.4*   MCV  84.6   --   84.7  84.0   MCH  30.8   --   30.6  29.2   MCHC  36.4*   --   36.1*  34.8   RDW  42.4   --   42.8  40.4   PLATELETCT  51*  48*  81*  67*   MPV  10.8   --   9.8  9.5     Recent Labs      10/26/17   0020  10/27/17   0005  10/28/17   0025   SODIUM  133*  132*  134*   POTASSIUM  4.0  3.8  3.8   CHLORIDE  100  100  101   CO2  26  26  25   GLUCOSE  131*  142*  150*   BUN  8  9  7*   CREATININE  0.40*  0.35*  0.42*   CALCIUM  8.6  8.5  8.6                      Assessment/Plan     * Neutropenic fever (CMS-HCC)- (present on admission)   Assessment & Plan    Cultures showing no growth.  Empiric cefepime          Thrombocytopenia (CMS-HCC)- (present on admission)   Assessment & Plan    Keep platelet count >10 or higher if bleeding.           Myelodysplastic syndrome (CMS-HCC)- (present on admission)   Assessment & Plan    Evolved into AML per oncology.  Venetoclax/Vidaza started 10/23 per Dr Krishnamurthy recommendations  ANC 0            Headache   Assessment & Plan    States HA >1 month and associated with leg pain simultaneously  MRI brain with and without contrast done, no IC pathology, marrow abnormal as expected with MDS  Oxycontin helping but still needing frequent breakthrough - will increase oxycontin dose from 10 to 20mg bid  LP done 10/26, no complications, pathology report - no malignant cells seen            Anemia- (present on admission)   Assessment & Plan    Transfuse if less than 7.0  Received 1 unit 10/24   Secondary to 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  Transfuse for <7.0        HLD (hyperlipidemia)- (present on admission)   Assessment & Plan    Continue current statin therapy regimen        HTN (hypertension)- (present on admission)   Assessment & Plan    Coreg - controlled              Reviewed items::  Labs reviewed, Medications reviewed and Radiology images reviewed  Gan catheter::  No Gan  DVT prophylaxis pharmacological::  Contraindicated - High bleeding risk  DVT prophylaxis - mechanical:  SCDs  Antibiotics:  Treating active infection/contamination beyond 24 hours perioperative coverage

## 2017-10-28 NOTE — PROGRESS NOTES
Chemotherapy Verification - PRIMARY RN      Height = 157.5 cm  Weight = 68.1 kg  BSA = 1.73 m2       Medication: azacitidine VIDAZA  Dose: 75 mg/m2  Calculated Dose: 129.75 mg (ordered dose= 129.8 mg, <5% difference)                             (In mg/m2, AUC, mg/kg)       I confirm this process was performed independently with the BSA and all final chemotherapy dosing calculations congruent.  Any discrepancies of 5% or greater have been addressed with the chemotherapy pharmacist. The resolution of the discrepancy has been documented in the EPIC progress notes.

## 2017-10-28 NOTE — PROGRESS NOTES
Oncology/Hematology Progress Note               Author: Greald Wagoner Date & Time created: 10/28/2017  2:15 PM     CC- Pancytopenia with fevers.         Interval History:  h/o  MDS RAEB-2   Evolved into AML  Vidaza/Venetoclax planned .  Admitted with fever, pancytopenia .  -10/24/ -Started Vidaza / Venetoclax   - 10/25-MRI brain- marrow infiltrative process  -10/26-status post diagnostic LP- Cytology negative  -10/27 Veneto increased to 50mg   -10/28- stable    No new complaints. Headache better. Afebrile    Review of Systems:  Review of Systems   Constitutional: Positive for fever and malaise/fatigue.   Respiratory: Negative.    Cardiovascular: Negative.    Gastrointestinal: Negative.    Genitourinary: Negative.    Musculoskeletal: Negative.    Skin: Negative.    Neurological: Positive for headaches.       Physical Exam:  Physical Exam   Constitutional: She appears well-developed and well-nourished.   HENT:   Mouth/Throat: Oropharynx is clear and moist.   Eyes: Pupils are equal, round, and reactive to light. No scleral icterus.   Neck: Normal range of motion.   Cardiovascular: Normal rate and regular rhythm.    Pulmonary/Chest: Effort normal and breath sounds normal. She has no wheezes.   Abdominal: Soft. Bowel sounds are normal.   Musculoskeletal: Normal range of motion.   Neurological: She is alert.   Skin: Skin is warm and dry.       Labs:        Invalid input(s): [id number]      Recent Labs      10/26/17   0020  10/27/17   0005  10/28/17   0025   SODIUM  133*  132*  134*   POTASSIUM  4.0  3.8  3.8   CHLORIDE  100  100  101   CO2  26  26  25   BUN  8  9  7*   CREATININE  0.40*  0.35*  0.42*   CALCIUM  8.6  8.5  8.6     Recent Labs      10/26/17   0020  10/27/17   0005  10/28/17   0025   ALTSGPT  8  7  7   ASTSGOT  12  10*  11*   ALKPHOSPHAT  56  59  59   TBILIRUBIN  0.6  0.6  0.7   GLUCOSE  131*  142*  150*     Recent Labs      10/26/17   0020  10/26/17   0950  10/27/17   0005  10/28/17   0025   RBC  2.34*    --   2.42*  2.43*   HEMOGLOBIN  7.2*   --   7.4*  7.1*   HEMATOCRIT  19.8*   --   20.5*  20.4*   PLATELETCT  51*  48*  81*  67*     Recent Labs      10/26/17   0020  10/27/17   0005  10/28/17   002   WBC  0.7*  0.5*  0.5*   NEUTSPOLYS  9.60*  7.00*   --    LYMPHOCYTES  58.80*  72.90*   --    MONOCYTES  0.00  1.50   --    EOSINOPHILS  0.00  0.00   --    BASOPHILS  0.00  0.00   --    ASTSGOT  12  10*  11*   ALTSGPT  8  7  7   ALKPHOSPHAT  56  59  59   TBILIRUBIN  0.6  0.6  0.7     Recent Labs      10/26/17   0020  10/27/17   0005  10/28/17   0025   SODIUM  133*  132*  134*   POTASSIUM  4.0  3.8  3.8   CHLORIDE  100  100  101   CO2  26  26  25   GLUCOSE  131*  142*  150*   BUN  8  9  7*   CREATININE  0.40*  0.35*  0.42*   CALCIUM  8.6  8.5  8.6     Hemodynamics:  Temp (24hrs), Av.1 °C (98.7 °F), Min:36.7 °C (98.1 °F), Max:37.4 °C (99.4 °F)  Temperature: 36.7 °C (98.1 °F)  Pulse  Av.2  Min: 68  Max: 118   Blood Pressure : 141/81     Respiratory:    Respiration: 18, Pulse Oximetry: 92 %        RUL Breath Sounds: Clear, RML Breath Sounds: Diminished, RLL Breath Sounds: Diminished, DEBBIE Breath Sounds: Clear, LLL Breath Sounds: Diminished  Fluids:    Intake/Output Summary (Last 24 hours) at 10/23/17 0720  Last data filed at 10/23/17 0500   Gross per 24 hour   Intake           2580.5 ml   Output                0 ml   Net           2580.5 ml        GI/Nutrition:  Orders Placed This Encounter   Procedures   • Diet Order     Standing Status:   Standing     Number of Occurrences:   1     Order Specific Question:   Diet:     Answer:   Cardiac [6]     Medical Decision Making, by Problem:  Active Hospital Problems    Diagnosis   • *Neutropenic fever (CMS-HCC) [D70.9, R50.81]   • Thrombocytopenia (CMS-HCC) [D69.6]   • Myelodysplastic syndrome (CMS-HCC) [D46.9]   • Anemia [D64.9]   • Symptomatic anemia [D64.9]   • HLD (hyperlipidemia) [E78.5]   • HTN (hypertension) [I10]       Plan:  1.  Myelodysplastic syndrome, RAEB-2,  evolving into acute myelogenous leukemia- Vidaza/Venetoclax started  Discussed with Dr. Krishnamurthy who recommended increasing the Venetoclax dosing in an accelerated fashion unlike in [facility].  Started Vidaza/Veneto 10/24 , blasts trending down slightly  Started 20 mg dose  increase to 50 mg on 10/26. Consider accelerating 100 mg after 5 days, depending on the cell counts and tolerance. Monitor closely for TLS.    2.  TLS prophylaxis .- on IVF, Allopurinol . No evidence of TLS.  2.  Persistent pancytopenia - Transfuse prn,    3. Neutropenic fever -  on Maxipime. Currently afebrile. Cultures negative to date. We will stop Diflucan due to interaction with Venetoclax.  4.  Myocardial infarction with stent placed in January 2017.  5. Chronic unremitting Headache - MRI brain - no intracranial process. Diffuse BM infiltration which may be contributing.Improving. Status post LP ,no CSF involvement    Complex patient requiring complex decision making.   Quality-Core Measures

## 2017-10-29 NOTE — CARE PLAN
Problem: Safety  Goal: Will remain free from falls  Outcome: PROGRESSING AS EXPECTED  Pt is free from accidental injury. Fall precautions are in place. Treaded socks in use, appropriate sign on the door, personal possessions and call light within reach, bed in low, and gait assessed. Up x1, calls appropriately.    Problem: Pain Management  Goal: Pain level will decrease to patient's comfort goal  Outcome: PROGRESSING SLOWER THAN EXPECTED  Pt's pain is being managed, see MAR. Pt calls appropriately. Hourly rounding is in place.     Problem: Acute Care of the Chemotherapy Patient  Goal: Optimal Outcome for the Chemotherapy Patient  Outcome: PROGRESSING AS EXPECTED  PO Chemo to start today. S/p IV chemo, precautions in place.

## 2017-10-29 NOTE — PROGRESS NOTES
Davie from Lab called with critical result of WBC 0.4 at 0057. Critical lab result read back to Davie.   This critical lab result is within parameters established by  for this patient

## 2017-10-29 NOTE — PROGRESS NOTES
Oncology/Hematology Progress Note               Author: Gerald Wagoner Date & Time created: 10/29/2017  1:15 PM     CC- Pancytopenia with fevers.         Interval History:  h/o  MDS RAEB-2   Evolved into AML  Vidaza/Venetoclax planned .  Admitted with fever, pancytopenia .  -10/24/ -Started Vidaza / Venetoclax   - 10/25-MRI brain- marrow infiltrative process  -10/26-status post diagnostic LP- Cytology negative  -10/27 Veneto increased to 50mg   -10/2810/29- stable    No new complaints. Headache better. Afebrile    Review of Systems:  Review of Systems   Constitutional: Positive for malaise/fatigue. Negative for fever.   Respiratory: Negative.    Cardiovascular: Negative.    Gastrointestinal: Negative.    Genitourinary: Negative.    Musculoskeletal: Negative.    Skin: Negative.    Neurological: Positive for headaches.       Physical Exam:  Physical Exam   Constitutional: She appears well-developed and well-nourished.   HENT:   Mouth/Throat: Oropharynx is clear and moist.   Eyes: Pupils are equal, round, and reactive to light. No scleral icterus.   Neck: Normal range of motion.   Cardiovascular: Normal rate and regular rhythm.    Pulmonary/Chest: Effort normal and breath sounds normal. She has no wheezes.   Abdominal: Soft. Bowel sounds are normal.   Musculoskeletal: Normal range of motion.   Neurological: She is alert.   Skin: Skin is warm and dry.       Labs:        Invalid input(s): [id number]      Recent Labs      10/27/17   0005  10/28/17   0025  10/29/17   0009   SODIUM  132*  134*  135   POTASSIUM  3.8  3.8  3.8   CHLORIDE  100  101  103   CO2  26  25  27   BUN  9  7*  7*   CREATININE  0.35*  0.42*  0.38*   CALCIUM  8.5  8.6  8.4*     Recent Labs      10/27/17   0005  10/28/17   0025  10/29/17   0009   ALTSGPT  7  7  7   ASTSGOT  10*  11*  10*   ALKPHOSPHAT  59  59  59   TBILIRUBIN  0.6  0.7  0.6   GLUCOSE  142*  150*  148*     Recent Labs      10/27/17   0005  10/28/17   0025  10/29/17   0009   RBC  2.42*   2.43*  2.27*   HEMOGLOBIN  7.4*  7.1*  7.1*   HEMATOCRIT  20.5*  20.4*  19.4*   PLATELETCT  81*  67*  51*     Recent Labs      10/27/17   0005  10/28/17   0025  10/29/17   000   WBC  0.5*  0.5*  0.4*   NEUTSPOLYS  7.00*   --    --    LYMPHOCYTES  72.90*   --    --    MONOCYTES  1.50   --    --    EOSINOPHILS  0.00   --    --    BASOPHILS  0.00   --    --    ASTSGOT  10*  11*  10*   ALTSGPT  7  7  7   ALKPHOSPHAT  59  59  59   TBILIRUBIN  0.6  0.7  0.6     Recent Labs      10/27/17   0005  10/28/17   0025  10/29/17   0009   SODIUM  132*  134*  135   POTASSIUM  3.8  3.8  3.8   CHLORIDE  100  101  103   CO2  26  25  27   GLUCOSE  142*  150*  148*   BUN  9  7*  7*   CREATININE  0.35*  0.42*  0.38*   CALCIUM  8.5  8.6  8.4*     Hemodynamics:  Temp (24hrs), Av.8 °C (98.2 °F), Min:36.2 °C (97.2 °F), Max:37 °C (98.6 °F)  Temperature: 36.2 °C (97.2 °F)  Pulse  Av.4  Min: 68  Max: 118   Blood Pressure : 143/86     Respiratory:    Respiration: 18, Pulse Oximetry: 94 %        RUL Breath Sounds: Clear, RML Breath Sounds: Clear;Diminished, RLL Breath Sounds: Diminished, DEBBIE Breath Sounds: Clear, LLL Breath Sounds: Diminished  Fluids:    Intake/Output Summary (Last 24 hours) at 10/23/17 0720  Last data filed at 10/23/17 0500   Gross per 24 hour   Intake           2580.5 ml   Output                0 ml   Net           2580.5 ml     Weight: 69.7 kg (153 lb 10.6 oz)  GI/Nutrition:  Orders Placed This Encounter   Procedures   • Diet Order     Standing Status:   Standing     Number of Occurrences:   1     Order Specific Question:   Diet:     Answer:   Cardiac [6]     Medical Decision Making, by Problem:  Active Hospital Problems    Diagnosis   • *Neutropenic fever (CMS-HCC) [D70.9, R50.81]   • Thrombocytopenia (CMS-HCC) [D69.6]   • Myelodysplastic syndrome (CMS-HCC) [D46.9]   • Anemia [D64.9]   • Symptomatic anemia [D64.9]   • HLD (hyperlipidemia) [E78.5]   • HTN (hypertension) [I10]       Plan:  1.  Myelodysplastic  syndrome, RAEB-2, evolving into acute myelogenous leukemia- Vidaza/Venetoclax started  Discussed with Dr. Krishnamurthy who recommended increasing the Venetoclax dosing in an accelerated fashion unlike in CLL.  Started Vidaza/Veneto 10/24 , blasts trending down slightly  Started 20 mg dose  increase to 50 mg on 10/26. Consider accelerating 100 mg after 5 days, depending on the cell counts and tolerance. Monitor closely for TLS.    2.  TLS prophylaxis .- on IVF, Allopurinol . No evidence of TLS.  2.  Persistent pancytopenia - Transfuse prn,    3. Neutropenic fever -  on Maxipime. Currently afebrile. Cultures negative to date. We will stop Diflucan due to interaction with Venetoclax.  4.  Myocardial infarction with stent placed in January 2017.  5. Chronic unremitting Headache - MRI brain - no intracranial process. Diffuse BM infiltration which may be contributing.Improving. Status post LP ,no CSF involvement    Complex patient requiring complex decision making.   Quality-Core Measures

## 2017-10-29 NOTE — PROGRESS NOTES
Chemo infusion started at this time, port +blood return, verifications completed with second chemo RN at bedside.

## 2017-10-29 NOTE — PROGRESS NOTES
Assumed care at 0700. Received bedside report from day RN. Pt A/Ox4. Pt denied pain at this time. Pt assessed, labs and notes reviewed. Medicated per MAR. POC discussed; pt agreeable to goals. Neutropenic and chemo precautions in place. Education provided to pt and visiting family regarding precautions and hand hygiene and use of a separate bathroom. All stated understanding. Pt board updated and pt informed of phone ext's, and hourly rounding. Pt is up x1 and calls appropriately. Pt needs are met at this time. Call light and phone within reach.

## 2017-10-29 NOTE — PROGRESS NOTES
Chemotherapy Verification - SECONDARY RN       Height = 1.5m  Weight = 69.7kg  BSA = 1.75m2       Medication: Vidaza  Dose: 75mg/m2  Calculated Dose:131.2mg (131.3mg= ordered dose)                             (In mg/m2, AUC, mg/kg)         I confirm that this process was performed independently.

## 2017-10-29 NOTE — CARE PLAN
Problem: Pain Management  Goal: Pain level will decrease to patient's comfort goal  Outcome: PROGRESSING AS EXPECTED  Appropriately verbalizes 0 to 10 pain scale. Calls for medication as needed. Oxy ER in use with Oxy IR for pain management.

## 2017-10-29 NOTE — PROGRESS NOTES
Assumed care of patient. A&O X4. Calls for medication and assistance as needed. Port flushing with positive blood return.

## 2017-10-29 NOTE — PROGRESS NOTES
Chemotherapy Verification - PRIMARY RN  Cycle 1 Day 7 of 7    Height = 157.5 cm  Weight = 69.7 kg  BSA = 1.75 m2       Medication: Azacitidine (Vidaza)  Dose: 75 mg/m2  Calculated Dose: 131.25 mg (ordered dose: 131.3 mg)                             (In mg/m2, AUC, mg/kg)     I confirm this process was performed independently with the BSA and all final chemotherapy dosing calculations congruent.  Any discrepancies of 5% or greater have been addressed with the chemotherapy pharmacist. The resolution of the discrepancy has been documented in the EPIC progress notes.

## 2017-10-29 NOTE — PROGRESS NOTES
[facility] Hospitalist Progress Note    Date of Service: 10/29/2017    Chief Complaint  67 y.o. female admitted 10/21/2017 with MDS and neutropenic fever.    Interval Problem Update  10/24 Patient afebrile and on empiric cefepime, she was started on chemo yesterday for treatment of her MDS.  She complained of headache today that has been consistent x 1 month and MRI ordered as she has not had this w/u.  I also added a long acting pain medication to try and lessen her use of the oxy IR - and at this point with frequent narcotic use she could be having rebound headache.  10/25 Patient feeling better today, headaches have been less severe with start of oxycontin and her leg pain only is present during headache.  MRI reviewed with patient, no significant pathology seen other than marrow occupying issue which is her MDS.  Given her symptom and possibility of CNS involvement, oncology is requesting a lumbar puncture.  I will order platelet transfusion to get her level >50 for LP and order cytology on fluid.  LP should occur tomorrow in IR.  10/26 Patient states she feels okay today but is anxious regarding lumbar puncture.  Platelets transfused yesterday and 51k this am.  Radiology wanted another draw prior to procedure and dropped to 48 so they would not due the LP.  I will give another transfusion of platelets and will have my partner do LP at bedside after they have been infused.  She wll continue the schedule of PO and IV chemotherapy.  Headaches are better with current pain medication regimen.  Patient's sister at bedside and questions answered for her also.  10/27 Patient feeling well today but states headache is still present if she doesn't take her short acting oxycodone, will increase oxycontin dose to 20 bid.  She had LP yesterday without issue and pathology report back today showing no malignant cells identified.  10/28 Patient feeling well today and headaches seem to be slightly better.  She has no complaints.   ANC is not undetectable which is expected with the current chemo regimen.  She is having a good appetite and no issues with bowel or bladder.  10/29 Patient states she feels very fatigued and worn out today, educated this is appropriate given her current blood counts and expected with the chemotherapy.  She is encouraged to get out of bed and walk with her sister at least twice today to help her feel better.  She is afebrile and has no new complaints other than fatigue.  Venetoclax and Vidaza today.  ANC 0.    Consultants/Specialty  Oncology - [place]    Disposition  tbd        Review of Systems   Constitutional: Positive for malaise/fatigue. Negative for chills and fever.   HENT: Negative for congestion.    Eyes: Negative for blurred vision and photophobia.   Respiratory: Negative for cough and shortness of breath.    Cardiovascular: Negative for chest pain, claudication and leg swelling.   Gastrointestinal: Negative for abdominal pain, constipation, diarrhea, heartburn and vomiting.   Genitourinary: Negative for dysuria and hematuria.   Musculoskeletal: Positive for myalgias (bilateral leg pain with headache). Negative for joint pain.   Skin: Negative for itching and rash.   Neurological: Positive for weakness and headaches (much better with changes ). Negative for dizziness, sensory change and speech change.   Endo/Heme/Allergies: Does not bruise/bleed easily.   Psychiatric/Behavioral: Negative for depression. The patient is not nervous/anxious and does not have insomnia.       Physical Exam  Laboratory/Imaging   Hemodynamics  Temp (24hrs), Av.8 °C (98.2 °F), Min:36.2 °C (97.2 °F), Max:37 °C (98.6 °F)   Temperature: 36.2 °C (97.2 °F)  Pulse  Av.4  Min: 68  Max: 118    Blood Pressure : 143/86      Respiratory      Respiration: 18, Pulse Oximetry: 94 %        RUL Breath Sounds: Clear, RML Breath Sounds: Clear;Diminished, RLL Breath Sounds: Diminished, DEBBIE Breath Sounds: Clear, LLL Breath Sounds:  Diminished    Fluids    Intake/Output Summary (Last 24 hours) at 10/29/17 1027  Last data filed at 10/29/17 0600   Gross per 24 hour   Intake             2312 ml   Output                0 ml   Net             2312 ml       Nutrition  Orders Placed This Encounter   Procedures   • Diet Order     Standing Status:   Standing     Number of Occurrences:   1     Order Specific Question:   Diet:     Answer:   Cardiac [6]     Physical Exam   Constitutional: She is oriented to person, place, and time. She appears well-developed and well-nourished. No distress.   HENT:   Head: Normocephalic and atraumatic.   Eyes: Conjunctivae are normal. No scleral icterus.   Neck: Neck supple. No JVD present.   Cardiovascular: Normal rate, regular rhythm and normal heart sounds.  Exam reveals no gallop and no friction rub.    No murmur heard.  Pulmonary/Chest: Effort normal and breath sounds normal. No respiratory distress. She exhibits no tenderness.   Abdominal: Soft. Bowel sounds are normal. She exhibits no distension. There is no guarding.   Musculoskeletal: She exhibits no edema or tenderness.   Neurological: She is alert and oriented to person, place, and time. No cranial nerve deficit.   Skin: Skin is warm and dry. She is not diaphoretic. No erythema. No pallor.   Psychiatric: She has a normal mood and affect. Her behavior is normal.   Nursing note and vitals reviewed.      Recent Labs      10/27/17   0005  10/28/17   0025  10/29/17   0009   WBC  0.5*  0.5*  0.4*   RBC  2.42*  2.43*  2.27*   HEMOGLOBIN  7.4*  7.1*  7.1*   HEMATOCRIT  20.5*  20.4*  19.4*   MCV  84.7  84.0  85.5   MCH  30.6  29.2  31.3   MCHC  36.1*  34.8  36.6*   RDW  42.8  40.4  41.1   PLATELETCT  81*  67*  51*   MPV  9.8  9.5  9.6     Recent Labs      10/27/17   0005  10/28/17   0025  10/29/17   0009   SODIUM  132*  134*  135   POTASSIUM  3.8  3.8  3.8   CHLORIDE  100  101  103   CO2  26  25  27   GLUCOSE  142*  150*  148*   BUN  9  7*  7*   CREATININE  0.35*  0.42*   0.38*   CALCIUM  8.5  8.6  8.4*                      Assessment/Plan     * Myelodysplastic syndrome (CMS-HCC)- (present on admission)   Assessment & Plan    Evolved into AML per oncology.  Venetoclax/Vidaza started 10/23 per Dr Krishnamurthy recommendations  ANC 0  Last day of current cycle today - Last day of Vidaza;  Venetoclax to continue.            Neutropenic fever (CMS-HCC)- (present on admission)   Assessment & Plan    Cultures showing no growth.  Empiric cefepime          Thrombocytopenia (CMS-HCC)- (present on admission)   Assessment & Plan    Keep platelet count >10 or higher if bleeding.          Headache   Assessment & Plan    States HA >1 month and associated with leg pain simultaneously  MRI brain with and without contrast done, no IC pathology, marrow abnormal as expected with MDS  Much better on current regimen - states well controlled and less need for PRN oxycodone.  LP done 10/26, no complications, pathology report - no malignant cells seen            Anemia- (present on admission)   Assessment & Plan    Transfuse if less than 7.0  Received 1 unit 10/24   Secondary to 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  Transfuse for <7.0        HLD (hyperlipidemia)- (present on admission)   Assessment & Plan    Continue current statin therapy regimen        HTN (hypertension)- (present on admission)   Assessment & Plan    Coreg - controlled              Reviewed items::  Labs reviewed, Medications reviewed and Radiology images reviewed  Gan catheter::  No Gan  DVT prophylaxis pharmacological::  Contraindicated - High bleeding risk  DVT prophylaxis - mechanical:  SCDs  Antibiotics:  Treating active infection/contamination beyond 24 hours perioperative coverage

## 2017-10-29 NOTE — PROGRESS NOTES
"Pharmacy Chemotherapy Verification    Patient Name: Cheryle Lee Koski   Dx: AML evolved from MDS     Protocol: Azacitidine + Venetoclax        *Dosing Reference*  AzaCITIDine 75 mg/m2 IV over 10 min  IV or Subcutaneous daily on Days 1-7  Venetoclax dose escalation to 800 mg/day on days 2-28 for cycle 1 and days 1-28 cycle 2 and beyond  Q28 day cycle until disease progression or unacceptable toxicity, a minimum of 4 cycles  Theo NEWMAN et al. A Phase 1b Study of Venetoclax (ABT-199/GDC-0199) in Combination with Decitabine or Azacitidine in Treatment-Naive Patients with Acute Myelogenous Leukemia Who Are ? to 65 Years and Not Eligible for Standard Induction Therapy. Blood, 126(60), 327. Accessed October 22, 2017. Retrieved from http://[email or web address]/[email or web address]/23/327.      Allergies:  Review of patient's allergies indicates no known allergies.    /86   Pulse 91   Temp 36.2 °C (97.2 °F)   Resp 18   Ht 1.575 m (5' 2.01\")   Wt 69.7 kg (153 lb 10.6 oz)   LMP 10/18/1993   SpO2 94%   Breastfeeding? No   BMI 28.10 kg/m²  Body surface area is 1.75 meters squared.     10/29/17:  ANC ~ 0 (WBC 0.4)     Plt = 51k     Hgb = 7.1       [place] MAX aware of labs, OK to continue treatment. Transfusion orders in place.   SCr = 0.38       CrCl ~ 83ml/min (min Cr 0.7)   AST/ALT/AP = 10/7/59          Tbili = 0.6     Drug Order   (Drug name, dose, route, IV Fluid & volume, frequency, number of doses) Cycle 1, Day 7 of 7       Previous treatment: Dacogen      Medication = Azacitidine (Vidaza)  Base Dose = 75 mg/m2  Calc Dose: Base Dose x 1.75m2 = 131.25mg   Final Dose = 131.3mg   Route = IV  Fluid & Volume =  mL  Admin Duration = Over 15 min           <5% difference, OK to treat with final dose       By my signature below, I confirm this process was performed independently with the BSA and all final chemotherapy dosing calculations congruent. I have reviewed the above chemotherapy order and that my calculation of " the final dose and BSA (when applicable) corroborate those calculations of the  pharmacist. Discrepancies of 5% or greater in the written dose have been addressed and documented within the EPIC Progress notes.    GAYLE Ortiz Pharm.D.

## 2017-10-30 NOTE — PROGRESS NOTES
No changes from EPIC. A&O x4, up SBA. Assessed and medicated per MAR. Pain 8/10. Educated regarding pain and trying to stay ahead of it. Pain medication administered. Pt has a large bruise on left shoulder and upper back. Pt denies any pain, no swelling. MD aware. Pt states it is due to a fall. Continue to monitor. Neutropenic precautions in place. POC discussed. Plan to give RBCs transfusion. All needs met at this time.

## 2017-10-30 NOTE — CARE PLAN
Problem: Safety  Goal: Will remain free from falls  Outcome: PROGRESSING AS EXPECTED  Pt is free from accidental injury. Fall precautions are in place. Treaded socks in use, appropriate sign on the door, personal possessions and call light within reach, bed in low, and gait assessed. Up SBA with FWW. Family helps when at bedside. Pt understands safety.     Problem: Infection  Goal: Will remain free from infection  Outcome: PROGRESSING AS EXPECTED  Neutropenic precautions in place. Antibiotics on board. Remains afebrile since yesterday.     Problem: Pain Management  Goal: Pain level will decrease to patient's comfort goal  Outcome: PROGRESSING SLOWER THAN EXPECTED  Pt's pain is being managed, see MAR. Pt calls appropriately. Hourly rounding is in place.

## 2017-10-30 NOTE — PROGRESS NOTES
[facility] Hospitalist Progress Note    Date of Service: 10/30/2017    Chief Complaint  67 y.o. female admitted 10/21/2017 with MDS and neutropenic fever.    Interval Problem Update  10/24 Patient afebrile and on empiric cefepime, she was started on chemo yesterday for treatment of her MDS.  She complained of headache today that has been consistent x 1 month and MRI ordered as she has not had this w/u.  I also added a long acting pain medication to try and lessen her use of the oxy IR - and at this point with frequent narcotic use she could be having rebound headache.  10/25 Patient feeling better today, headaches have been less severe with start of oxycontin and her leg pain only is present during headache.  MRI reviewed with patient, no significant pathology seen other than marrow occupying issue which is her MDS.  Given her symptom and possibility of CNS involvement, oncology is requesting a lumbar puncture.  I will order platelet transfusion to get her level >50 for LP and order cytology on fluid.  LP should occur tomorrow in IR.  10/26 Patient states she feels okay today but is anxious regarding lumbar puncture.  Platelets transfused yesterday and 51k this am.  Radiology wanted another draw prior to procedure and dropped to 48 so they would not due the LP.  I will give another transfusion of platelets and will have my partner do LP at bedside after they have been infused.  She wll continue the schedule of PO and IV chemotherapy.  Headaches are better with current pain medication regimen.  Patient's sister at bedside and questions answered for her also.  10/27 Patient feeling well today but states headache is still present if she doesn't take her short acting oxycodone, will increase oxycontin dose to 20 bid.  She had LP yesterday without issue and pathology report back today showing no malignant cells identified.  10/28 Patient feeling well today and headaches seem to be slightly better.  She has no complaints.   ANC is not undetectable which is expected with the current chemo regimen.  She is having a good appetite and no issues with bowel or bladder.  10/29 Patient states she feels very fatigued and worn out today, educated this is appropriate given her current blood counts and expected with the chemotherapy.  She is encouraged to get out of bed and walk with her sister at least twice today to help her feel better.  She is afebrile and has no new complaints other than fatigue.  Venetoclax and Vidaza today.  ANC 0.  10/30 Patient still feeling fatigued, did have a little more energy with walking - encouraged to continue to do so multiple times/day with her sister.  She is done with the Vidaza and should have a dose increase in Venetoclax on 11/1.  Next dose of Vidaza is 11/20.  She is afebrile and other than fatigue is doing well, headaches are better.  She has a bruise on her left shoulder that is slowly healing.      Consultants/Specialty  Oncology - Rizwana pagan        Review of Systems   Constitutional: Positive for malaise/fatigue. Negative for chills and fever.   HENT: Negative for congestion.    Eyes: Negative for blurred vision and photophobia.   Respiratory: Negative for cough and shortness of breath.    Cardiovascular: Negative for chest pain, claudication and leg swelling.   Gastrointestinal: Negative for abdominal pain, constipation, diarrhea, heartburn and vomiting.   Genitourinary: Negative for dysuria and hematuria.   Musculoskeletal: Positive for myalgias (bilateral leg pain with headache). Negative for joint pain.   Skin: Negative for itching and rash.   Neurological: Positive for weakness and headaches (much better with changes ). Negative for dizziness, sensory change and speech change.   Endo/Heme/Allergies: Does not bruise/bleed easily.   Psychiatric/Behavioral: Negative for depression. The patient is not nervous/anxious and does not have insomnia.       Physical Exam  Laboratory/Imaging    Hemodynamics  Temp (24hrs), Av.7 °C (98 °F), Min:36.1 °C (96.9 °F), Max:37 °C (98.6 °F)   Temperature: 36.8 °C (98.3 °F)  Pulse  Av.8  Min: 68  Max: 118    Blood Pressure : 144/66      Respiratory      Respiration: 18, Pulse Oximetry: 92 %        RUL Breath Sounds: Clear, RML Breath Sounds: Clear;Diminished, RLL Breath Sounds: Diminished, DEBBIE Breath Sounds: Clear, LLL Breath Sounds: Diminished    Fluids    Intake/Output Summary (Last 24 hours) at 10/30/17 1333  Last data filed at 10/30/17 1245   Gross per 24 hour   Intake             1552 ml   Output                0 ml   Net             1552 ml       Nutrition  Orders Placed This Encounter   Procedures   • Diet Order     Standing Status:   Standing     Number of Occurrences:   1     Order Specific Question:   Diet:     Answer:   Cardiac [6]     Physical Exam   Constitutional: She is oriented to person, place, and time. She appears well-developed and well-nourished. No distress.   HENT:   Head: Normocephalic and atraumatic.   Eyes: Conjunctivae are normal. No scleral icterus.   Neck: Neck supple. No JVD present.   Cardiovascular: Normal rate, regular rhythm and normal heart sounds.  Exam reveals no gallop and no friction rub.    No murmur heard.  Pulmonary/Chest: Effort normal and breath sounds normal. No respiratory distress. She exhibits no tenderness.   Abdominal: Soft. Bowel sounds are normal. She exhibits no distension. There is no guarding.   Musculoskeletal: She exhibits no edema or tenderness.   Neurological: She is alert and oriented to person, place, and time. No cranial nerve deficit.   Skin: Skin is warm and dry. She is not diaphoretic. No erythema. No pallor.   Psychiatric: She has a normal mood and affect. Her behavior is normal.   Nursing note and vitals reviewed.      Recent Labs      10/28/17   0025  10/29/17   0009  10/30/17   0212   WBC  0.5*  0.4*  0.4*   RBC  2.43*  2.27*  2.21*   HEMOGLOBIN  7.1*  7.1*  6.8*   HEMATOCRIT  20.4*   19.4*  18.9*   MCV  84.0  85.5  85.5   MCH  29.2  31.3  30.8   MCHC  34.8  36.6*  36.0*   RDW  40.4  41.1  40.9   PLATELETCT  67*  51*  37*   MPV  9.5  9.6  9.4     Recent Labs      10/28/17   0025  10/29/17   0009  10/30/17   0212   SODIUM  134*  135  134*   POTASSIUM  3.8  3.8  3.7   CHLORIDE  101  103  103   CO2  25  27  26   GLUCOSE  150*  148*  137*   BUN  7*  7*  7*   CREATININE  0.42*  0.38*  0.41*   CALCIUM  8.6  8.4*  8.6                      Assessment/Plan     * Myelodysplastic syndrome (CMS-HCC)- (present on admission)   Assessment & Plan    Evolved into AML per oncology.  Venetoclax/Vidaza started 10/23 per Dr Krishnamurthy recommendations  ANC 0  Completed 7 days Vidaza;    Venetoclax to continue at increasing doses per oncology.  Recommended patient call company to order more soon.            Neutropenic fever (CMS-HCC)- (present on admission)   Assessment & Plan    Cultures showing no growth.  Empiric cefepime          Thrombocytopenia (CMS-HCC)- (present on admission)   Assessment & Plan    Keep platelet count >10 or higher if bleeding.          Headache   Assessment & Plan    States HA >1 month and associated with leg pain simultaneously  MRI brain with and without contrast done, no IC pathology, marrow abnormal as expected with MDS  Much better on current regimen - states well controlled and less need for PRN oxycodone.  LP done 10/26, no complications, pathology report - no malignant cells seen            Anemia- (present on admission)   Assessment & Plan    Transfuse if less than 7.0  Received 1 unit 10/24, 10/30  Secondary to 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  Transfuse for <7.0        HLD (hyperlipidemia)- (present on admission)   Assessment & Plan    Continue current statin therapy regimen        HTN (hypertension)- (present on admission)   Assessment & Plan    Coreg - controlled              Reviewed items::   Labs reviewed, Medications reviewed and Radiology images reviewed  Gan catheter::  No Gan  DVT prophylaxis pharmacological::  Contraindicated - High bleeding risk  DVT prophylaxis - mechanical:  SCDs  Antibiotics:  Treating active infection/contamination beyond 24 hours perioperative coverage

## 2017-10-30 NOTE — PROGRESS NOTES
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 She hasn't noticed any side effects from the chemotherapy thus far. She worries that she will be running out of the Venetoclax pills soon, and is not sure how to go about getting more. She has no more fevers or chills. She has no cough or shortness of breath. Some chronic headaches as well as leg pains over the last 3 months which have not changed. She has no nausea vomiting or diarrhea.        PMHx, PSurgHx, SocHx, FAMHx:  All reviewed and unchanged    Review of Systems:  Review of Systems   Constitutional: Positive for malaise/fatigue. Negative for chills and fever.   HENT: Negative for congestion, nosebleeds and sore throat.    Eyes: Negative for blurred vision, double vision and photophobia.   Respiratory: Negative for cough, hemoptysis, sputum production and shortness of breath.    Cardiovascular: Positive for leg swelling. Negative for chest pain, palpitations and orthopnea.   Gastrointestinal: Negative for abdominal pain, constipation, diarrhea, heartburn, nausea and vomiting.   Genitourinary: Negative for dysuria, frequency and urgency.   Skin: Negative for itching and rash.   Neurological: Positive for headaches.       Physical Exam:  Physical Exam   Constitutional: She is oriented to person, place, and time. She appears well-developed and well-nourished.   HENT:   Head: Normocephalic and atraumatic.   Mouth/Throat: Oropharynx is clear and moist. No oropharyngeal exudate.   Eyes: Conjunctivae and EOM are normal. Right eye exhibits no discharge. Left eye exhibits no discharge. No scleral icterus.   Neck: Normal range of motion. Neck supple. No thyromegaly present.   Cardiovascular: Normal rate, regular rhythm and normal heart sounds.  Exam reveals no gallop and no friction rub.    No murmur  heard.  Pulmonary/Chest: Effort normal and breath sounds normal. No respiratory distress. She has no wheezes. She has no rales.   Abdominal: Soft. Bowel sounds are normal. She exhibits no distension. There is no tenderness. There is no rebound and no guarding.   Musculoskeletal: Normal range of motion. She exhibits edema. She exhibits no tenderness.   1+ bilat LE edema   Lymphadenopathy:     She has no cervical adenopathy.   Neurological: She is alert and oriented to person, place, and time.   Skin: Skin is warm and dry. No rash noted. No erythema.   Psychiatric: She has a normal mood and affect. Her behavior is normal. Thought content normal.       Labs:        Invalid input(s): [id number]      Recent Labs      10/28/17   0025  10/29/17   0009  10/30/17   0212   SODIUM  134*  135  134*   POTASSIUM  3.8  3.8  3.7   CHLORIDE  101  103  103   CO2  25  27  26   BUN  7*  7*  7*   CREATININE  0.42*  0.38*  0.41*   CALCIUM  8.6  8.4*  8.6     Recent Labs      10/28/17   0025  10/29/17   0009  10/30/17   0212   ALTSGPT  7  7   --    ASTSGOT  11*  10*   --    ALKPHOSPHAT  59  59   --    TBILIRUBIN  0.7  0.6   --    GLUCOSE  150*  148*  137*     Recent Labs      10/28/17   0025  10/29/17   0009  10/30/17   0212   RBC  2.43*  2.27*  2.21*   HEMOGLOBIN  7.1*  7.1*  6.8*   HEMATOCRIT  20.4*  19.4*  18.9*   PLATELETCT  67*  51*  37*     Recent Labs      10/28/17   0025  10/29/17   0009  10/30/17   0212   WBC  0.5*  0.4*  0.4*   ASTSGOT  11*  10*   --    ALTSGPT  7  7   --    ALKPHOSPHAT  59  59   --    TBILIRUBIN  0.7  0.6   --      Recent Labs      10/28/17   0025  10/29/17   0009  10/30/17   0212   SODIUM  134*  135  134*   POTASSIUM  3.8  3.8  3.7   CHLORIDE  101  103  103   CO2  25  27  26   GLUCOSE  150*  148*  137*   BUN  7*  7*  7*   CREATININE  0.42*  0.38*  0.41*   CALCIUM  8.6  8.4*  8.6     Hemodynamics:  Temp (24hrs), Av.6 °C (97.8 °F), Min:36.1 °C (96.9 °F), Max:36.9 °C (98.5 °F)  Temperature: 36.5 °C (97.7  °F)  Pulse  Av.6  Min: 68  Max: 118   Blood Pressure : 149/87     Respiratory:    Respiration: 18, Pulse Oximetry: 91 %        RUL Breath Sounds: Clear, RML Breath Sounds: Clear;Diminished, RLL Breath Sounds: Diminished, DEBBIE Breath Sounds: Clear, LLL Breath Sounds: Diminished  Fluids:    Intake/Output Summary (Last 24 hours) at 10/23/17 0720  Last data filed at 10/23/17 0500   Gross per 24 hour   Intake           2580.5 ml   Output                0 ml   Net           2580.5 ml     Weight: 68.5 kg (151 lb 0.2 oz)  GI/Nutrition:  Orders Placed This Encounter   Procedures   • Diet Order     Standing Status:   Standing     Number of Occurrences:   1     Order Specific Question:   Diet:     Answer:   Cardiac [6]     Medical Decision Making, by Problem:  Active Hospital Problems    Diagnosis   • *Neutropenic fever (CMS-HCC) [D70.9, R50.81]   • Thrombocytopenia (CMS-HCC) [D69.6]   • Myelodysplastic syndrome (CMS-HCC) [D46.9]   • Anemia [D64.9]   • Symptomatic anemia [D64.9]   • HLD (hyperlipidemia) [E78.5]   • HTN (hypertension) [I10]       Plan:  1. AML-- Myelodysplastic syndrome, RAEB-2, evolving into acute myelogenous leukemia.   Discussed with Dr. Krishnamurthy who recommended Vidaza/Venetoclax started on 2017. Increasing the Venetoclax dosing in an accelerated fashion unlike in CLL, per Dr. Krishnamurthy's recommendations.        Started 20 mg dose of Venetoclax. Increase to 50 mg on 10/26. After 5 days of this dose, on , consider accelerating 100 mg Qday, depending on the cell counts and tolerance. Monitor closely for TLS.  --Day 7 of chemotherapy today  --Consider increasing Venetoclax to 100mg po Qday on , and tolerating it  --Will discuss case with his oncologist, and figure out the up titration schedule moving forward from there.    2.  TLS prophylaxis -- on IVF, Allopurinol . No evidence of TLS.  --Daily uric acid  --Daily BMP  --Continue IV fluids    3. Anemia-- related to AML. Hemoglobin was 6.8, and  she is getting one unit of blood today.  --Transfuse if hemoglobin less than 7     4.  Neutropenic fever--  on Maxipime. Currently afebrile. Cultures negative to date. We will stop Diflucan due to interaction with Venetoclax.  --Continue antibiotics    5. History of Myocardial infarction-- stent placed in January 2017.    6. Chronic unremitting Headache--  MRI brain with no acute intracranial process. Diffuse BM infiltration which may be contributing.  Improving. Status post LP ,no CSF involvement    7. Thrombocytopenia--related to AML.  --Transfuse if bleeding, or if platelets are below 10K        Complex patient requiring complex decision making. High risk of morbidity and mortality.    Reviewed items::  Labs reviewed and Medications reviewed  Gan catheter::  No Gan  DVT prophylaxis pharmacological::  Contraindicated - High bleeding risk

## 2017-10-30 NOTE — PROGRESS NOTES
Trino from Lab called with critical result of WBC of 0.4 and platelets of 37 at 0456. Critical lab result read back to Trino. These results within parameters for this patient established by the hospitalist.

## 2017-10-30 NOTE — PROGRESS NOTES
Received report from day shift RN, assumed care of patient at 1900. AOx4. Stand by assist. Denies pain or nausea at this time. Port in place with positive blood return. Call light within reach, bed in low and locked position. No other needs at this time.

## 2017-10-30 NOTE — PROGRESS NOTES
Met with patient this pm.  Known to this writer when she was initially diagnosed with MDS.  No current needs from Navigation.  She is receiving her oral chemo from patient assistance program and does not have to pay for the drug at this time.  She is aware that they contact her when it is time for refills, and she talked with the company today and her next cycle is being sent.  She will be living full time in [facility] with her son and has good support from him, a daughter and another son who live locally.  She still has some personal belongings in [place], where she was living with her SO, and would like to get these at some point in the future.  Her  has been coming to visit and this is a source of comfort for her.  She voiced frustration with her sister Nancy, who she feels is trying to manage her care and support system, and in fact became tearful when speaking about this.  She did not want me to put my contact information on the white board in her room because she does not want Nancy contacting me about her.  I did give her my business card and encouraged her to call me with any concerns.  Will continue to follow as needed.

## 2017-10-31 NOTE — PROGRESS NOTES
Assumed pt care at 0715.  Received report from pollo RN.  Assessment completed.  Pt AAOx4.  Pt complains of leg pain.  Medicated per MAR.  No other s/s of discomfort or distress. Pt ambulates with standby assist.  Bed in lowest position and locked.  Pt calls appropriately.  Treaded socks in place, call light within reach and staff numbers provided.  Pt needs met at this time.

## 2017-10-31 NOTE — CARE PLAN
Problem: Safety  Goal: Will remain free from injury    Intervention: Provide assistance with mobility  Call light within reach, treaded socks in place, bed in lowest position and locked.  Hourly rounding in progress.       Problem: Pain Management  Goal: Pain level will decrease to patient's comfort goal  Pt medicated per MAR.

## 2017-10-31 NOTE — CARE PLAN
Problem: Communication  Goal: The ability to communicate needs accurately and effectively will improve  Outcome: PROGRESSING AS EXPECTED  Patient has been informed on the plan of care for the shift, all questions answered.     Problem: Pain Management  Goal: Pain level will decrease to patient's comfort goal  Outcome: PROGRESSING AS EXPECTED  Pt with adequate pain control. Encouraged pt to use non pharmacological interventions as well. Will continue to assess through out shift.

## 2017-10-31 NOTE — DISCHARGE PLANNING
Medical SW    Referral: Sw spoke to Dr Cantor yesterday, Monday 10/30/17.    Intervention: Pt finished IV chemo Sunday. Pt will be placed on oral chemo in future. Pt currently on IV Cephopen. Discrepancy between two different ONC MDs as to d/c plan location because currently white counts have dropped significantly and need to be higher before d/c.    IMM report indicates d/c date of 10/24. Miracle emailed supervisors and completed LLOS, per supervisor Stacey's direction.    Plan: Sw to assist w/ d/c planning as needed.

## 2017-10-31 NOTE — PROGRESS NOTES
[facility] Hospitalist Progress Note    Date of Service: 10/31/2017    Chief Complaint  67 y.o. female admitted 10/21/2017 with MDS and neutropenic fever.    Interval Problem Update  10/24 Patient afebrile and on empiric cefepime, she was started on chemo yesterday for treatment of her MDS.  She complained of headache today that has been consistent x 1 month and MRI ordered as she has not had this w/u.  I also added a long acting pain medication to try and lessen her use of the oxy IR - and at this point with frequent narcotic use she could be having rebound headache.  10/25 Patient feeling better today, headaches have been less severe with start of oxycontin and her leg pain only is present during headache.  MRI reviewed with patient, no significant pathology seen other than marrow occupying issue which is her MDS.  Given her symptom and possibility of CNS involvement, oncology is requesting a lumbar puncture.  I will order platelet transfusion to get her level >50 for LP and order cytology on fluid.  LP should occur tomorrow in IR.  10/26 Patient states she feels okay today but is anxious regarding lumbar puncture.  Platelets transfused yesterday and 51k this am.  Radiology wanted another draw prior to procedure and dropped to 48 so they would not due the LP.  I will give another transfusion of platelets and will have my partner do LP at bedside after they have been infused.  She wll continue the schedule of PO and IV chemotherapy.  Headaches are better with current pain medication regimen.  Patient's sister at bedside and questions answered for her also.  10/27 Patient feeling well today but states headache is still present if she doesn't take her short acting oxycodone, will increase oxycontin dose to 20 bid.  She had LP yesterday without issue and pathology report back today showing no malignant cells identified.  10/28 Patient feeling well today and headaches seem to be slightly better.  She has no complaints.   ANC is not undetectable which is expected with the current chemo regimen.  She is having a good appetite and no issues with bowel or bladder.  10/29 Patient states she feels very fatigued and worn out today, educated this is appropriate given her current blood counts and expected with the chemotherapy.  She is encouraged to get out of bed and walk with her sister at least twice today to help her feel better.  She is afebrile and has no new complaints other than fatigue.  Venetoclax and Vidaza today.  ANC 0.  10/30 Patient still feeling fatigued, did have a little more energy with walking - encouraged to continue to do so multiple times/day with her sister.  She is done with the Vidaza and should have a dose increase in Venetoclax on .  Next dose of Vidaza is .  She is afebrile and other than fatigue is doing well, headaches are better.  She has a bruise on her left shoulder that is slowly healing.  10/31 Labs stable, no new complaints    Consultants/Specialty  Oncology - [place]    Disposition  tbd      Review of Systems   Constitutional: Positive for malaise/fatigue. Negative for chills and fever.   HENT: Negative for congestion.    Respiratory: Negative for cough and shortness of breath.    Cardiovascular: Negative for chest pain, claudication and leg swelling.   Gastrointestinal: Negative for abdominal pain, constipation, diarrhea, heartburn and vomiting.   Genitourinary: Negative for dysuria and hematuria.   Musculoskeletal: Positive for joint pain (shoulder) and myalgias (bilateral leg pain with headache).   Skin: Negative for itching and rash.   Neurological: Positive for weakness and headaches (much better with changes ). Negative for dizziness.   Endo/Heme/Allergies: Does not bruise/bleed easily.   Psychiatric/Behavioral: Negative for depression. The patient is not nervous/anxious.       Physical Exam  Laboratory/Imaging   Hemodynamics  Temp (24hrs), Av °C (98.6 °F), Min:36.8 °C (98.2 °F),  Max:37.3 °C (99.1 °F)   Temperature: 37.3 °C (99.1 °F)  Pulse  Av.1  Min: 68  Max: 118    Blood Pressure : 138/81      Respiratory      Respiration: 16, Pulse Oximetry: 94 %        RUL Breath Sounds: Diminished, RML Breath Sounds: Diminished, RLL Breath Sounds: Diminished, DEBBIE Breath Sounds: Diminished, LLL Breath Sounds: Diminished    Fluids    Intake/Output Summary (Last 24 hours) at 10/31/17 1254  Last data filed at 10/30/17 1800   Gross per 24 hour   Intake              443 ml   Output                0 ml   Net              443 ml       Nutrition  Orders Placed This Encounter   Procedures   • Diet Order     Standing Status:   Standing     Number of Occurrences:   1     Order Specific Question:   Diet:     Answer:   Cardiac [6]     Physical Exam   Constitutional: She is oriented to person, place, and time. She appears well-developed and well-nourished. No distress.   HENT:   Head: Normocephalic and atraumatic.   Eyes: Conjunctivae are normal.   Cardiovascular: Normal rate, regular rhythm and normal heart sounds.  Exam reveals no gallop and no friction rub.    Pulmonary/Chest: Effort normal and breath sounds normal. No respiratory distress. She exhibits no tenderness.   Abdominal: Soft. Bowel sounds are normal. She exhibits no distension. There is no guarding.   Musculoskeletal: She exhibits no edema or tenderness.   Neurological: She is alert and oriented to person, place, and time. No cranial nerve deficit.   Skin: Skin is warm and dry. She is not diaphoretic. No erythema. No pallor.   Psychiatric: She has a normal mood and affect. Her behavior is normal.   Nursing note and vitals reviewed.      Recent Labs      10/29/17   0009  10/30/17   0212  10/31/17   0219   WBC  0.4*  0.4*  0.5*   RBC  2.27*  2.21*  2.59*   HEMOGLOBIN  7.1*  6.8*  7.8*   HEMATOCRIT  19.4*  18.9*  21.6*   MCV  85.5  85.5  83.4   MCH  31.3  30.8  30.1   MCHC  36.6*  36.0*  36.1*   RDW  41.1  40.9  39.5   PLATELETCT  51*  37*  28*   MPV   9.6  9.4  10.5     Recent Labs      10/29/17   0009  10/30/17   0212  10/31/17   0219   SODIUM  135  134*  133*   POTASSIUM  3.8  3.7  3.9   CHLORIDE  103  103  102   CO2  27  26  27   GLUCOSE  148*  137*  143*   BUN  7*  7*  7*   CREATININE  0.38*  0.41*  0.41*   CALCIUM  8.4*  8.6  8.8                      Assessment/Plan     * Myelodysplastic syndrome (CMS-HCC)- (present on admission)   Assessment & Plan    Evolved into AML per oncology.  Venetoclax/Vidaza started 10/23 per Dr Krishnamurthy recommendations  ANC 0  Completed 7 days Vidaza;    Venetoclax to continue at increasing doses per oncology.  Recommended patient call company to order more soon.            Neutropenic fever (CMS-HCC)- (present on admission)   Assessment & Plan    Cultures showing no growth.  Empiric cefepime  Still neutropenic        Thrombocytopenia (CMS-[facility])- (present on admission)   Assessment & Plan    Keep platelet count >10 or higher if bleeding.          Headache   Assessment & Plan    States HA >1 month and associated with leg pain simultaneously  MRI brain with and without contrast done, no IC pathology, marrow abnormal as expected with MDS  Much better on current regimen - states well controlled and less need for PRN oxycodone.  LP done 10/26, no complications, pathology report - no malignant cells seen            Anemia- (present on admission)   Assessment & Plan    Transfuse if less than 7.0  Received 1 unit 10/24, 10/30  Secondary to 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  Transfuse for <7.0        HLD (hyperlipidemia)- (present on admission)   Assessment & Plan    Continue current statin therapy regimen        HTN (hypertension)- (present on admission)   Assessment & Plan    Coreg - controlled              Reviewed items::  Labs reviewed and Medications reviewed  Gan catheter::  No Gan  DVT prophylaxis pharmacological::  Contraindicated - High  bleeding risk  DVT prophylaxis - mechanical:  SCDs  Antibiotics:  Treating active infection/contamination beyond 24 hours perioperative coverage

## 2017-10-31 NOTE — PROGRESS NOTES
Received report from day shift RN, assumed care of patient at 1900. AOx4. Stand by assist. Denies pain or nausea. Pt states she is very fatigued tonight. Port in place with positive blood return. Neutropenic precautions in place. Call light within reach, bed in low and locked position. No other needs at this time.

## 2017-10-31 NOTE — PROGRESS NOTES
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 She has no nausea or vomiting. Her breathing is good. She has her chronic headaches, but these have gotten much less. She is tolerating the chemotherapy fairly well so far. She does have baseline fatigue.      PMHx, PSurgHx, SocHx, FAMHx:  All reviewed and unchanged    Review of Systems:  Review of Systems   Constitutional: Positive for malaise/fatigue. Negative for chills and fever.   HENT: Negative for congestion, nosebleeds and sore throat.    Eyes: Negative for blurred vision, double vision and photophobia.   Respiratory: Negative for cough, hemoptysis, sputum production and shortness of breath.    Cardiovascular: Positive for leg swelling. Negative for chest pain, palpitations and orthopnea.   Gastrointestinal: Negative for abdominal pain, constipation, diarrhea, heartburn, nausea and vomiting.   Genitourinary: Negative for dysuria, frequency and urgency.   Skin: Negative for itching and rash.   Neurological: Positive for headaches.       Physical Exam:  Physical Exam   Constitutional: She is oriented to person, place, and time. She appears well-developed and well-nourished.   HENT:   Head: Normocephalic and atraumatic.   Mouth/Throat: Oropharynx is clear and moist. No oropharyngeal exudate.   Eyes: Conjunctivae and EOM are normal. Right eye exhibits no discharge. Left eye exhibits no discharge. No scleral icterus.   Neck: Normal range of motion. Neck supple. No thyromegaly present.   Cardiovascular: Normal rate, regular rhythm and normal heart sounds.  Exam reveals no gallop and no friction rub.    No murmur heard.  Pulmonary/Chest: Effort normal and breath sounds normal. No respiratory distress. She has no wheezes. She has no rales.   Abdominal: Soft. Bowel sounds are normal. She exhibits no distension. There is  no tenderness. There is no rebound and no guarding.   Musculoskeletal: Normal range of motion. She exhibits edema. She exhibits no tenderness.   trace bilat LE edema   Lymphadenopathy:     She has no cervical adenopathy.   Neurological: She is alert and oriented to person, place, and time.   Skin: Skin is warm and dry. No rash noted. No erythema.   Psychiatric: She has a normal mood and affect. Her behavior is normal. Thought content normal.       Labs:        Invalid input(s): [id number]      Recent Labs      10/29/17   0009  10/30/17   0212  10/31/17   0219   SODIUM  135  134*  133*   POTASSIUM  3.8  3.7  3.9   CHLORIDE  103  103  102   CO2  27    27   BUN  7*  7*  7*   CREATININE  0.38*  0.41*  0.41*   CALCIUM  8.4*  8.6  8.8     Recent Labs      10/29/17   0009  10/30/17   0212  10/31/17   0219   ALTSGPT  7   --    --    ASTSGOT  10*   --    --    ALKPHOSPHAT  59   --    --    TBILIRUBIN  0.6   --    --    GLUCOSE  148*  137*  143*     Recent Labs      10/29/17   0009  10/30/17   0212  10/31/17   0219   RBC  2.27*  2.21*  2.59*   HEMOGLOBIN  7.1*  6.8*  7.8*   HEMATOCRIT  19.4*  18.9*  21.6*   PLATELETCT  51*  37*  28*     Recent Labs      10/29/17   0009  10/30/17   0212  10/31/17   0219   WBC  0.4*  0.4*  0.5*   ASTSGOT  10*   --    --    ALTSGPT  7   --    --    ALKPHOSPHAT  59   --    --    TBILIRUBIN  0.6   --    --      Recent Labs      10/29/17   0009  10/30/17   0212  10/31/17   0219   SODIUM  135  134*  133*   POTASSIUM  3.8  3.7  3.9   CHLORIDE  103  103  102   CO2  27  26  27   GLUCOSE  148*  137*  143*   BUN  7*  7*  7*   CREATININE  0.38*  0.41*  0.41*   CALCIUM  8.4*  8.6  8.8     Hemodynamics:  Temp (24hrs), Av °C (98.6 °F), Min:36.8 °C (98.2 °F), Max:37.3 °C (99.1 °F)  Temperature: 37.3 °C (99.1 °F)  Pulse  Av.1  Min: 68  Max: 118   Blood Pressure : 138/81     Respiratory:    Respiration: 16, Pulse Oximetry: 94 %        RUL Breath Sounds: Diminished, RML Breath Sounds: Diminished,  RLL Breath Sounds: Diminished, DEBBIE Breath Sounds: Diminished, LLL Breath Sounds: Diminished  Fluids:    Intake/Output Summary (Last 24 hours) at 10/23/17 0720  Last data filed at 10/23/17 0500   Gross per 24 hour   Intake           2580.5 ml   Output                0 ml   Net           2580.5 ml     Weight: 68.2 kg (150 lb 5.7 oz)  GI/Nutrition:  Orders Placed This Encounter   Procedures   • Diet Order     Standing Status:   Standing     Number of Occurrences:   1     Order Specific Question:   Diet:     Answer:   Cardiac [6]     Medical Decision Making, by Problem:  Active Hospital Problems    Diagnosis   • *Neutropenic fever (CMS-HCC) [D70.9, R50.81]   • Thrombocytopenia (CMS-HCC) [D69.6]   • Myelodysplastic syndrome (CMS-HCC) [D46.9]   • Anemia [D64.9]   • Symptomatic anemia [D64.9]   • HLD (hyperlipidemia) [E78.5]   • HTN (hypertension) [I10]       Plan:  1. AML-- Myelodysplastic syndrome, RAEB-2, evolving into acute myelogenous leukemia.   Discussed with Dr. Krishnamurthy who recommended Vidaza/Venetoclax started on October 24, 2017. Increasing the Venetoclax dosing in an accelerated fashion unlike in CLL, per Dr. Krishnamurthy's recommendations.        Started 20 mg dose of Venetoclax. Increase to 50 mg on 10/26. After 5 days of this dose, on 11/1, consider accelerating 100 mg Qday, depending on the cell counts and tolerance. Monitor closely for TLS.  --Day 8 of chemotherapy today  --Consider increasing Venetoclax to 100mg po Qday on 11/1, and tolerating it  --plan is to increase the Ven[place] every 4 days by 100mg, titrating up to a dose of 600-800mg Qday.  -- so, on 11/5 go to 200mg, on 11/9 go to 300mg, on 11/13 got to 400mg, etc. As long as she is tolerating it.    2.  TLS prophylaxis -- on IVF, Allopurinol . No evidence of TLS.  --Daily uric acid  --Daily BMP  --Continue IV fluids    3. Anemia-- related to AML. Hemoglobin was 6.8, but improved to 7.8 after transfusion of one unit of blood on October 30.  --Transfuse if  hemoglobin less than 7     4.  Neutropenic fever--  on cefepime. Currently afebrile. Cultures negative to date. No Diflucan due to interaction with Venetoclax.  --Continue current antibiotics    5. History of Myocardial infarction-- stent placed in January 2017.    6. Chronic unremitting Headache--  MRI brain with no acute intracranial process. Diffuse BM infiltration which may be contributing.  Improving. Status post LP ,no CSF involvement  --Headache is now only mild and continues to improve.    7. Thrombocytopenia--related to AML.  --Transfuse if bleeding, or if platelets are below 10K        Complex patient requiring complex decision making. High risk of morbidity and mortality.    Reviewed items::  Labs reviewed and Medications reviewed  Gan catheter::  No Gan  DVT prophylaxis pharmacological::  Contraindicated - High bleeding risk

## 2017-10-31 NOTE — PROGRESS NOTES
Laura from Lab called with critical result of WBC of 0.5 and platelets of 28  at 0424. Critical lab result read back to Laura. These results within parameters for this patient established by the hospitalist.

## 2017-11-01 NOTE — PROGRESS NOTES
Cynthia from Lab called with critical result of WBC at 0.6 ad platelets at 19 and ANC at 0.05. Critical lab result read back to Inocencia.   This critical lab result is within parameters established by the hospitalists for this patient

## 2017-11-01 NOTE — PROGRESS NOTES
Johnna from Lab called with critical result of ANC at 0.01. Critical lab result read back to Johnna   .   This critical lab result is within parameters established by  for this patient

## 2017-11-01 NOTE — CARE PLAN
Problem: Acute Care of the Chemotherapy Patient  Goal: Optimal Outcome for the Chemotherapy Patient    Intervention: Review WBC, ANC, platelet count, kidney function and liver function tests before chemotherapy administration  Pt. On oral chemo therapy at this time. Reviewed Labs, pt. Is neutropenic

## 2017-11-01 NOTE — PROGRESS NOTES
Patient alert and oriented up with 1 assist. Patient encourage to call for assistance to ambulate. Patient's left shoulder has significant bruising, from a fall in the bathroom per patient. Pt. Denies pain at this time. Reviewed POC with patient. Call light within reach, hourly rounding in practice. Patient is on oral chemo at this time. Neutropenic precautions in place.

## 2017-11-01 NOTE — CARE PLAN
Problem: Pain Management  Goal: Pain level will decrease to patient's comfort goal    Intervention: Follow pain managment plan developed in collaboration with patient and Interdisciplinary Team  Pt. Is on scheduled pain medication will continue to monitor pain level

## 2017-11-01 NOTE — PROGRESS NOTES
Pt is calling for help to bathroom but then immediately getting out of bed and going without staff there. Pt is reminded that if she does not wait for staff we will have to turn on bed alarm as patient had an unwitnessed, undocumented fall last Monday.

## 2017-11-01 NOTE — PROGRESS NOTES
[facility] Hospitalist Progress Note    Date of Service: 11/1/2017    Chief Complaint  67 y.o. female admitted 10/21/2017 with MDS and neutropenic fever.    Interval Problem Update  10/24 Patient afebrile and on empiric cefepime, she was started on chemo yesterday for treatment of her MDS.  She complained of headache today that has been consistent x 1 month and MRI ordered as she has not had this w/u.  I also added a long acting pain medication to try and lessen her use of the oxy IR - and at this point with frequent narcotic use she could be having rebound headache.  10/25 Patient feeling better today, headaches have been less severe with start of oxycontin and her leg pain only is present during headache.  MRI reviewed with patient, no significant pathology seen other than marrow occupying issue which is her MDS.  Given her symptom and possibility of CNS involvement, oncology is requesting a lumbar puncture.  I will order platelet transfusion to get her level >50 for LP and order cytology on fluid.  LP should occur tomorrow in IR.  10/26 Patient states she feels okay today but is anxious regarding lumbar puncture.  Platelets transfused yesterday and 51k this am.  Radiology wanted another draw prior to procedure and dropped to 48 so they would not due the LP.  I will give another transfusion of platelets and will have my partner do LP at bedside after they have been infused.  She wll continue the schedule of PO and IV chemotherapy.  Headaches are better with current pain medication regimen.  Patient's sister at bedside and questions answered for her also.  10/27 Patient feeling well today but states headache is still present if she doesn't take her short acting oxycodone, will increase oxycontin dose to 20 bid.  She had LP yesterday without issue and pathology report back today showing no malignant cells identified.  10/28 Patient feeling well today and headaches seem to be slightly better.  She has no complaints.  ANC  is not undetectable which is expected with the current chemo regimen.  She is having a good appetite and no issues with bowel or bladder.  10/29 Patient states she feels very fatigued and worn out today, educated this is appropriate given her current blood counts and expected with the chemotherapy.  She is encouraged to get out of bed and walk with her sister at least twice today to help her feel better.  She is afebrile and has no new complaints other than fatigue.  Venetoclax and Vidaza today.  ANC 0.  10/30 Patient still feeling fatigued, did have a little more energy with walking - encouraged to continue to do so multiple times/day with her sister.  She is done with the Vidaza and should have a dose increase in Venetoclax on .  Next dose of Vidaza is .  She is afebrile and other than fatigue is doing well, headaches are better.  She has a bruise on her left shoulder that is slowly healing.  10/31 Labs stable, no new complaints  : ANC increased to 0.01. Venetoclax increased today.    Consultants/Specialty  Oncology - [place]    Disposition  tbd      Review of Systems   Constitutional: Positive for malaise/fatigue. Negative for chills and fever.   HENT: Negative for congestion.    Respiratory: Negative for cough, shortness of breath and wheezing.    Cardiovascular: Negative for chest pain.   Gastrointestinal: Negative for abdominal pain, constipation, diarrhea, heartburn and vomiting.   Genitourinary: Negative for dysuria and hematuria.   Musculoskeletal: Positive for joint pain (shoulder) and myalgias (bilateral leg pain with headache).   Skin: Negative for itching and rash.   Neurological: Positive for weakness. Negative for dizziness.   Endo/Heme/Allergies: Does not bruise/bleed easily.   Psychiatric/Behavioral: Negative for depression. The patient is not nervous/anxious.       Physical Exam  Laboratory/Imaging   Hemodynamics  Temp (24hrs), Av.8 °C (98.3 °F), Min:36.4 °C (97.5 °F), Max:37.4 °C  (99.4 °F)   Temperature: 37.2 °C (98.9 °F)  Pulse  Av.3  Min: 68  Max: 118    Blood Pressure : 135/63      Respiratory      Respiration: 18, Pulse Oximetry: 96 %        RUL Breath Sounds: Clear, RML Breath Sounds: Diminished, RLL Breath Sounds: Diminished, DEBBIE Breath Sounds: Clear, LLL Breath Sounds: Diminished    Fluids    Intake/Output Summary (Last 24 hours) at 17 1509  Last data filed at 10/31/17 1800   Gross per 24 hour   Intake             1300 ml   Output                0 ml   Net             1300 ml       Nutrition  Orders Placed This Encounter   Procedures   • Diet Order     Standing Status:   Standing     Number of Occurrences:   1     Order Specific Question:   Diet:     Answer:   Cardiac [6]     Physical Exam   Constitutional: She is oriented to person, place, and time. She appears well-developed and well-nourished. No distress.   HENT:   Head: Normocephalic and atraumatic.   Eyes: Conjunctivae are normal.   Cardiovascular: Normal rate, regular rhythm and normal heart sounds.  Exam reveals no gallop.    Pulmonary/Chest: Effort normal and breath sounds normal. No respiratory distress.   Abdominal: Soft. Bowel sounds are normal. She exhibits no distension. There is no guarding.   Musculoskeletal: She exhibits no edema or tenderness.   Neurological: She is alert and oriented to person, place, and time.   Skin: Skin is warm and dry. She is not diaphoretic. No erythema. No pallor.   Psychiatric: She has a normal mood and affect. Her behavior is normal.   Nursing note and vitals reviewed.      Recent Labs      10/30/17   0212  10/31/17   0219  11/01/17   0334   WBC  0.4*  0.5*  0.6*   RBC  2.21*  2.59*  2.85*   HEMOGLOBIN  6.8*  7.8*  8.4*   HEMATOCRIT  18.9*  21.6*  23.5*   MCV  85.5  83.4  82.5   MCH  30.8  30.1  29.5   MCHC  36.0*  36.1*  35.7*   RDW  40.9  39.5  38.8   PLATELETCT  37*  28*  19*   MPV  9.4  10.5  9.8     Recent Labs      10/30/17   0212  10/31/17   0219  11/01/17   0334   SODIUM   134*  133*  136   POTASSIUM  3.7  3.9  3.9   CHLORIDE  103  102  104   CO2  26  27  25   GLUCOSE  137*  143*  136*   BUN  7*  7*  5*   CREATININE  0.41*  0.41*  0.34*   CALCIUM  8.6  8.8  8.5                      Assessment/Plan     * Myelodysplastic syndrome (CMS-HCC)- (present on admission)   Assessment & Plan    Evolved into AML per oncology.  Venetoclax/Vidaza started 10/23 per Dr Krishnamurthy recommendations  Completed 7 days Vidaza;    Venetoclax to continue at increasing doses per oncology.  Monitoring ANC  Recommended patient call company to order more soon.            Neutropenic fever (CMS-HCC)- (present on admission)   Assessment & Plan    Cultures showing no growth.  Empiric cefepime and acyclovir  Still neutropenic        Thrombocytopenia (CMS-HCC)- (present on admission)   Assessment & Plan    Keep platelet count >10 or higher if bleeding.          Headache   Assessment & Plan    States HA >1 month and associated with leg pain simultaneously  MRI brain with and without contrast done, no IC pathology, marrow abnormal as expected with MDS  Much better on current regimen - states well controlled and less need for PRN oxycodone.  LP done 10/26, no complications, pathology report - no malignant cells seen            Anemia- (present on admission)   Assessment & Plan    Transfuse if less than 7.0  Received 1 unit 10/24, 10/30  Secondary to 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  Transfuse for <7.0        HLD (hyperlipidemia)- (present on admission)   Assessment & Plan    Continue current statin therapy regimen        HTN (hypertension)- (present on admission)   Assessment & Plan    Coreg - controlled              Reviewed items::  Labs reviewed and Medications reviewed  Gan catheter::  No Gan  DVT prophylaxis pharmacological::  Contraindicated - High bleeding risk  DVT prophylaxis - mechanical:  SCDs  Antibiotics:  Treating  active infection/contamination beyond 24 hours perioperative coverage

## 2017-11-02 NOTE — PROGRESS NOTES
Pt. Alert and oriented up with one person assist. Family at bedside. Pt. Requested to see Dr. Magallon. I informed Dr. Magallon when he arrived on the [facility]. Reviewed POC with pt. . Encourage pt. To call for assistance. Call light within reach. Pt. Did receive 1  Pack of platelets, Pt. Tolerated well.

## 2017-11-02 NOTE — PROGRESS NOTES
Merry from Lab called with critical result of WBC, , ANC-0.01platelets-7at 0815. Critical lab result read back to Merry.   Dr. Womack notified of critical lab result at 0815.  Critical lab result read back by Dr. Womack.

## 2017-11-02 NOTE — CARE PLAN
Problem: Safety  Goal: Will remain free from falls    Intervention: Assess risk factors for falls  Pt. Has a history of falls educated pt. On calling for assistance. Pt. Refuses bed alarm at this time      Problem: Pain Management  Goal: Pain level will decrease to patient's comfort goal    Intervention: Follow pain managment plan developed in collaboration with patient and Interdisciplinary Team  Pt. Has some pain in her legs but did not want any pain medication at this time

## 2017-11-02 NOTE — PROGRESS NOTES
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 She has a little bit of nausea with the first dose of the 100 mg Venetoclax.  She got some nausea medicine which is taken this away. She otherwise is feeling the same. She has no fevers chills or night sweats. Her fatigue is the same.      PMHx, PSurgHx, SocHx, FAMHx:  All reviewed and unchanged    Review of Systems:  Review of Systems   Constitutional: Positive for malaise/fatigue. Negative for chills and fever.   HENT: Negative for congestion, nosebleeds and sore throat.    Eyes: Negative for blurred vision, double vision and photophobia.   Respiratory: Negative for cough, hemoptysis, sputum production and shortness of breath.    Cardiovascular: Positive for leg swelling. Negative for chest pain, palpitations and orthopnea.   Gastrointestinal: Negative for abdominal pain, constipation, diarrhea, heartburn, nausea and vomiting.   Genitourinary: Negative for dysuria, frequency and urgency.   Skin: Negative for itching and rash.   Neurological: Positive for headaches.       Physical Exam:  Physical Exam   Constitutional: She is oriented to person, place, and time. She appears well-developed and well-nourished.   HENT:   Head: Normocephalic and atraumatic.   Mouth/Throat: Oropharynx is clear and moist. No oropharyngeal exudate.   Eyes: Conjunctivae and EOM are normal. Right eye exhibits no discharge. Left eye exhibits no discharge. No scleral icterus.   Neck: Normal range of motion. Neck supple. No thyromegaly present.   Cardiovascular: Normal rate, regular rhythm and normal heart sounds.  Exam reveals no gallop and no friction rub.    No murmur heard.  Pulmonary/Chest: Effort normal and breath sounds normal. No respiratory distress. She has no wheezes. She has no rales.   Abdominal: Soft. Bowel sounds are normal. She exhibits no  distension. There is no tenderness. There is no rebound and no guarding.   Musculoskeletal: Normal range of motion. She exhibits edema. She exhibits no tenderness.   1+ bilat LE edema   Lymphadenopathy:     She has no cervical adenopathy.   Neurological: She is alert and oriented to person, place, and time.   Skin: Skin is warm and dry. No rash noted. No erythema.   Psychiatric: She has a normal mood and affect. Her behavior is normal. Thought content normal.       Labs:        Invalid input(s): [id number]      Recent Labs      10/31/17   0219  11/01/17   0334  11/02/17   0338   SODIUM  133*  136  136   POTASSIUM  3.9  3.9  3.6   CHLORIDE  102  104  106   CO2  27  25  24   BUN  7*  5*  5*   CREATININE  0.41*  0.34*  0.31*   CALCIUM  8.8  8.5  8.5     Recent Labs      10/31/17   0219  11/01/17   0334  11/02/17   0338   GLUCOSE  143*  136*  143*     Recent Labs      10/31/17   0219  11/01/17   0334  11/02/17   0338   RBC  2.59*  2.85*  2.64*   HEMOGLOBIN  7.8*  8.4*  8.0*   HEMATOCRIT  21.6*  23.5*  21.8*   PLATELETCT  28*  19*  7*     Recent Labs      10/31/17   0219  11/01/17   0334  11/02/17   0338   WBC  0.5*  0.6*  0.4*   NEUTSPOLYS   --   1.10*  2.80*   LYMPHOCYTES   --   94.60*  91.70*   MONOCYTES   --   0.00  0.90   EOSINOPHILS   --   0.00  0.00   BASOPHILS   --   0.00  0.00     Recent Labs      10/31/17   0219  11/01/17   0334  11/02/17   0338   SODIUM  133*  136  136   POTASSIUM  3.9  3.9  3.6   CHLORIDE  102  104  106   CO2  27  25  24   GLUCOSE  143*  136*  143*   BUN  7*  5*  5*   CREATININE  0.41*  0.34*  0.31*   CALCIUM  8.8  8.5  8.5     Hemodynamics:  Temp (24hrs), Av.7 °C (98.1 °F), Min:36.4 °C (97.5 °F), Max:37.1 °C (98.7 °F)  Temperature: 36.6 °C (97.9 °F)  Pulse  Av.8  Min: 68  Max: 118   Blood Pressure : 116/70     Respiratory:    Respiration: 18, Pulse Oximetry: 90 %        RUL Breath Sounds: Clear, RML Breath Sounds: Diminished, RLL Breath Sounds: Diminished, DEBBIE Breath Sounds:  Clear, LLL Breath Sounds: Diminished  Fluids:    Intake/Output Summary (Last 24 hours) at 10/23/17 0720  Last data filed at 10/23/17 0500   Gross per 24 hour   Intake           2580.5 ml   Output                0 ml   Net           2580.5 ml        GI/Nutrition:  Orders Placed This Encounter   Procedures   • Diet Order     Standing Status:   Standing     Number of Occurrences:   1     Order Specific Question:   Diet:     Answer:   Cardiac [6]     Medical Decision Making, by Problem:  Active Hospital Problems    Diagnosis   • *Neutropenic fever (CMS-HCC) [D70.9, R50.81]   • Thrombocytopenia (CMS-HCC) [D69.6]   • Myelodysplastic syndrome (CMS-HCC) [D46.9]   • Anemia [D64.9]   • Symptomatic anemia [D64.9]   • HLD (hyperlipidemia) [E78.5]   • HTN (hypertension) [I10]       Plan:  1. AML-- Myelodysplastic syndrome, RAEB-2, evolving into acute myelogenous leukemia.   Discussed with Dr. Krishnamurthy who recommended Vidaza/Venetoclax started on October 24, 2017. Increasing the Venetoclax dosing in an accelerated fashion unlike in CLL, per Dr. Krishnamurthy's recommendations.        Started 20 mg dose of Venetoclax. Increased to 50 mg on 10/26. On 11/1, increased to 100 mg Qday.  Titrating up on dose now by 100mg every 4 days, depending on the cell counts and tolerance. Monitor closely for TLS.  --Day 10 of chemotherapy today  --plan is to increase the Ven[place] every 4 days by 100mg, titrating up to a dose of 600-800mg Qday.  -- so, on 11/5 go to 200mg, on 11/9 go to 300mg, on 11/13 got to 400mg, etc. As long as she is tolerating it.    2.  TLS prophylaxis -- on IVF, Allopurinol . No evidence of TLS.  --Change uric acid monitoring to every other day  --Daily BMP, with periodic CMP  --Continue IV fluids    3. Anemia-- related to AML. Hemoglobin was 6.8, but improved to 7.8 after transfusion of one unit of blood on October 30.  --Transfuse if hemoglobin less than 7     4.  Neutropenic fever--  on cefepime. Currently afebrile. Cultures negative  to date. No Diflucan due to interaction with Venetoclax.  --Continue current antibiotics    5. History of Myocardial infarction-- stent placed in January 2017.    6. Chronic unremitting Headache--  MRI brain with no acute intracranial process. Diffuse BM infiltration which may be contributing.  Improving. Status post LP ,no CSF involvement  --Headache is now only mild and continues to improve.    7. Thrombocytopenia--related to AML. Will get transfused one unit of platelets today.  --Transfuse if bleeding, or if platelets are below 10K        Complex patient requiring complex decision making. High risk of morbidity and mortality.    Reviewed items::  Labs reviewed and Medications reviewed  Gan catheter::  No Gan  DVT prophylaxis pharmacological::  Contraindicated - High bleeding risk

## 2017-11-02 NOTE — CARE PLAN
Problem: Safety  Goal: Will remain free from injury  Outcome: MET Date Met: 11/02/17  Patient has remained free from falls when ambulating. Fall precautions are in place. Treaded slipper socks are on feet, belonging are within reach and patient has been educated about the importance of calling for assistance. Call light within reach.     Problem: Respiratory:  Goal: Respiratory status will improve  Outcome: PROGRESSING AS EXPECTED  Patient is breathing adequately without needing supplemental oxygen. Lung sounds are clear.

## 2017-11-02 NOTE — PROGRESS NOTES
[facility] Hospitalist Progress Note    Date of Service: 11/2/2017    Chief Complaint  67 y.o. female admitted 10/21/2017 with MDS and neutropenic fever.    Interval Problem Update  10/24 Patient afebrile and on empiric cefepime, she was started on chemo yesterday for treatment of her MDS.  She complained of headache today that has been consistent x 1 month and MRI ordered as she has not had this w/u.  I also added a long acting pain medication to try and lessen her use of the oxy IR - and at this point with frequent narcotic use she could be having rebound headache.  10/25 Patient feeling better today, headaches have been less severe with start of oxycontin and her leg pain only is present during headache.  MRI reviewed with patient, no significant pathology seen other than marrow occupying issue which is her MDS.  Given her symptom and possibility of CNS involvement, oncology is requesting a lumbar puncture.  I will order platelet transfusion to get her level >50 for LP and order cytology on fluid.  LP should occur tomorrow in IR.  10/26 Patient states she feels okay today but is anxious regarding lumbar puncture.  Platelets transfused yesterday and 51k this am.  Radiology wanted another draw prior to procedure and dropped to 48 so they would not due the LP.  I will give another transfusion of platelets and will have my partner do LP at bedside after they have been infused.  She wll continue the schedule of PO and IV chemotherapy.  Headaches are better with current pain medication regimen.  Patient's sister at bedside and questions answered for her also.  10/27 Patient feeling well today but states headache is still present if she doesn't take her short acting oxycodone, will increase oxycontin dose to 20 bid.  She had LP yesterday without issue and pathology report back today showing no malignant cells identified.  10/28 Patient feeling well today and headaches seem to be slightly better.  She has no complaints.  ANC  is not undetectable which is expected with the current chemo regimen.  She is having a good appetite and no issues with bowel or bladder.  10/29 Patient states she feels very fatigued and worn out today, educated this is appropriate given her current blood counts and expected with the chemotherapy.  She is encouraged to get out of bed and walk with her sister at least twice today to help her feel better.  She is afebrile and has no new complaints other than fatigue.  Venetoclax and Vidaza today.  ANC 0.  10/30 Patient still feeling fatigued, did have a little more energy with walking - encouraged to continue to do so multiple times/day with her sister.  She is done with the Vidaza and should have a dose increase in Venetoclax on .  Next dose of Vidaza is .  She is afebrile and other than fatigue is doing well, headaches are better.  She has a bruise on her left shoulder that is slowly healing.  10/31 Labs stable, no new complaints  : ANC increased to 0.01. Venetoclax increased today.  : Platelets decreased to seven. Transfused. ANC stable at 0.01.    Consultants/Specialty  Oncology - Rizwana Sanford  tbd    Review of Systems   Constitutional: Positive for malaise/fatigue. Negative for chills and fever.   HENT: Negative for congestion.    Respiratory: Negative for cough, shortness of breath and wheezing.    Cardiovascular: Negative for chest pain.   Gastrointestinal: Negative for abdominal pain, blood in stool, constipation, diarrhea, melena and vomiting.   Genitourinary: Negative for dysuria and hematuria.   Musculoskeletal: Positive for joint pain (shoulder) and myalgias (bilateral leg pain with headache).   Neurological: Positive for weakness. Negative for dizziness.   Endo/Heme/Allergies: Does not bruise/bleed easily.   Psychiatric/Behavioral: Negative for depression. The patient is not nervous/anxious.       Physical Exam  Laboratory/Imaging   Hemodynamics  Temp (24hrs), Av.7 °C (98.1  °F), Min:36.4 °C (97.5 °F), Max:37.1 °C (98.7 °F)   Temperature: 36.6 °C (97.9 °F)  Pulse  Av.8  Min: 68  Max: 118    Blood Pressure : 116/70      Respiratory      Respiration: 18, Pulse Oximetry: 90 %        RUL Breath Sounds: Clear, RML Breath Sounds: Diminished, RLL Breath Sounds: Diminished, DEBBIE Breath Sounds: Clear, LLL Breath Sounds: Diminished    Fluids    Intake/Output Summary (Last 24 hours) at 17 1508  Last data filed at 17 1116   Gross per 24 hour   Intake             1392 ml   Output                0 ml   Net             1392 ml       Nutrition  Orders Placed This Encounter   Procedures   • Diet Order     Standing Status:   Standing     Number of Occurrences:   1     Order Specific Question:   Diet:     Answer:   Cardiac [6]     Physical Exam   Constitutional: She is oriented to person, place, and time. She appears well-developed and well-nourished. No distress.   HENT:   Head: Normocephalic and atraumatic.   Eyes: Conjunctivae are normal.   Cardiovascular: Normal rate and regular rhythm.  Exam reveals no gallop.    Pulmonary/Chest: Effort normal and breath sounds normal. No respiratory distress.   Abdominal: Soft. Bowel sounds are normal. She exhibits no distension. There is no tenderness. There is no rebound and no guarding.   Musculoskeletal: She exhibits no edema or tenderness.   Neurological: She is alert and oriented to person, place, and time.   Skin: Skin is warm and dry. She is not diaphoretic. No erythema. No pallor.   Psychiatric: She has a normal mood and affect. Her behavior is normal.   Nursing note and vitals reviewed.      Recent Labs      10/31/17   0219  17   0334  17   0338   WBC  0.5*  0.6*  0.4*   RBC  2.59*  2.85*  2.64*   HEMOGLOBIN  7.8*  8.4*  8.0*   HEMATOCRIT  21.6*  23.5*  21.8*   MCV  83.4  82.5  82.6   MCH  30.1  29.5  30.3   MCHC  36.1*  35.7*  36.7*   RDW  39.5  38.8  38.6   PLATELETCT  28*  19*  7*   MPV  10.5  9.8  12.3     Recent Labs       10/31/17   0219  11/01/17   0334  11/02/17   0338   SODIUM  133*  136  136   POTASSIUM  3.9  3.9  3.6   CHLORIDE  102  104  106   CO2  27  25  24   GLUCOSE  143*  136*  143*   BUN  7*  5*  5*   CREATININE  0.41*  0.34*  0.31*   CALCIUM  8.8  8.5  8.5                      Assessment/Plan     * Myelodysplastic syndrome (CMS-HCC)- (present on admission)   Assessment & Plan    Evolved into AML per oncology.  Venetoclax/Vidaza started 10/23 per Dr Krishnamurthy recommendations  Venetoclax to continue at increasing doses per oncology, still titrating up  Monitoring ANC  Recommended patient call company to order more soon.            Neutropenic fever (CMS-HCC)- (present on admission)   Assessment & Plan    Cultures showing no growth.  Empiric cefepime and acyclovir  Still neutropenic        Thrombocytopenia (CMS-HCC)- (present on admission)   Assessment & Plan    Keep platelet count >10 or higher if bleeding.          Headache   Assessment & Plan    States HA >1 month and associated with leg pain simultaneously  MRI brain with and without contrast done, no IC pathology, marrow abnormal as expected with MDS  Much better on current regimen - states well controlled and less need for PRN oxycodone.  LP done 10/26, no complications, pathology report - no malignant cells seen            Anemia- (present on admission)   Assessment & Plan    Transfuse if less than 7.0  Received 1 unit 10/24, 10/30  Secondary to 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  Transfuse for <7.0        HLD (hyperlipidemia)- (present on admission)   Assessment & Plan    Continue current statin therapy regimen        HTN (hypertension)- (present on admission)   Assessment & Plan    Coreg - controlled          Patient is critically ill.   The patient continues to have:Thrombocytopenia requiring transfusion  The vital organ system that is affected is the: Circulation  If untreated  there is a high chance of deterioration into: Shock  And eventually death.   The critical care that I am providing today is: Exam, medication  The critical that has been undertaken is medically complex.   There has been no overlap in critical care time.   Critical Care Time not including procedures: 33 mins      Reviewed items::  Labs reviewed and Medications reviewed  Gan catheter::  No Gan  DVT prophylaxis pharmacological::  Contraindicated - High bleeding risk  DVT prophylaxis - mechanical:  SCDs  Antibiotics:  Treating active infection/contamination beyond 24 hours perioperative coverage

## 2017-11-03 NOTE — PROGRESS NOTES
Elizabeth from Lab called with critical result of WBC at 0.4 and platelets at 31. Critical lab result read back to Elizabeth.   This critical lab result is within parameters established by  for this patient

## 2017-11-03 NOTE — PROGRESS NOTES
Pt. Alert and oriented  Up with SBA,  PRBC infusing at this time. Family at bedside. Reviewed POC with pt. Pt. Denies pain at this time. Call light  Within reach hourly rounding in practice.

## 2017-11-03 NOTE — CARE PLAN
Problem: Pain Management  Goal: Pain level will decrease to patient's comfort goal    Intervention: Follow pain managment plan developed in collaboration with patient and Interdisciplinary Team  Pt. Denies pain at this time      Problem: Skin Integrity  Goal: Risk for impaired skin integrity will decrease    Intervention: Assess risk factors for impaired skin integrity and/or pressure ulcers  Skin is intact pt. Turns often when in bed

## 2017-11-03 NOTE — PROGRESS NOTES
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 Her headaches have completely resolved. She did get transfused one unit of blood, and she had some achiness in the legs requiring a pain medication. This has now gone away. She has no fevers or chills. She did have a little nausea, but the nausea medicine is helping.      PMHx, PSurgHx, SocHx, FAMHx:  All reviewed and unchanged    Review of Systems:  Review of Systems   Constitutional: Positive for malaise/fatigue. Negative for chills and fever.   HENT: Negative for congestion, nosebleeds and sore throat.    Eyes: Negative for blurred vision, double vision and photophobia.   Respiratory: Negative for cough, hemoptysis, sputum production and shortness of breath.    Cardiovascular: Positive for leg swelling. Negative for chest pain, palpitations and orthopnea.   Gastrointestinal: Negative for abdominal pain, constipation, diarrhea, heartburn, nausea and vomiting.   Genitourinary: Negative for dysuria, frequency and urgency.   Skin: Negative for itching and rash.   Neurological: Negative for headaches.       Physical Exam:  Physical Exam   Constitutional: She is oriented to person, place, and time. She appears well-developed and well-nourished.   HENT:   Head: Normocephalic and atraumatic.   Mouth/Throat: Oropharynx is clear and moist. No oropharyngeal exudate.   Eyes: Conjunctivae and EOM are normal. Right eye exhibits no discharge. Left eye exhibits no discharge. No scleral icterus.   Neck: Normal range of motion. Neck supple. No thyromegaly present.   Cardiovascular: Normal rate, regular rhythm and normal heart sounds.  Exam reveals no gallop and no friction rub.    No murmur heard.  Pulmonary/Chest: Effort normal and breath sounds normal. No respiratory distress. She has no wheezes. She has no rales.   Abdominal: Soft.  Bowel sounds are normal. She exhibits no distension. There is no tenderness. There is no rebound and no guarding.   Musculoskeletal: Normal range of motion. She exhibits edema. She exhibits no tenderness.   1+ bilat LE edema   Lymphadenopathy:     She has no cervical adenopathy.   Neurological: She is alert and oriented to person, place, and time.   Skin: Skin is warm and dry. No rash noted. No erythema.   Psychiatric: She has a normal mood and affect. Her behavior is normal. Thought content normal.       Labs:        Invalid input(s): [id number]      Recent Labs      17   SODIUM  136  136  138   POTASSIUM  3.9  3.6  3.9   CHLORIDE  104  106  107   CO2  25  24  26   BUN  5*  5*  6*   CREATININE  0.34*  0.31*  0.31*   CALCIUM  8.5  8.5  8.6     Recent Labs      17   GLUCOSE  136*  143*  127*     Recent Labs      17   RBC  2.85*  2.64*  2.21*   HEMOGLOBIN  8.4*  8.0*  6.5*   HEMATOCRIT  23.5*  21.8*  18.6*   PLATELETCT  19*  7*  31*     Recent Labs      17   WBC  0.6*  0.4*  0.4*   NEUTSPOLYS  1.10*  2.80*   --    LYMPHOCYTES  94.60*  91.70*   --    MONOCYTES  0.00  0.90   --    EOSINOPHILS  0.00  0.00   --    BASOPHILS  0.00  0.00   --      Recent Labs      17   035   SODIUM  136  136  138   POTASSIUM  3.9  3.6  3.9   CHLORIDE  104  106  107   CO2  25  24  26   GLUCOSE  136*  143*  127*   BUN  5*  5*  6*   CREATININE  0.34*  0.31*  0.31*   CALCIUM  8.5  8.5  8.6     Hemodynamics:  Temp (24hrs), Av.8 °C (98.3 °F), Min:36.7 °C (98.1 °F), Max:37.2 °C (98.9 °F)  Temperature: 37.2 °C (98.9 °F)  Pulse  Av.7  Min: 68  Max: 118   Blood Pressure : 121/69     Respiratory:    Respiration: 18, Pulse Oximetry: 91 %        RUL Breath Sounds: Clear, RML Breath Sounds: Diminished, RLL Breath  Sounds: Diminished, DEBBIE Breath Sounds: Clear, LLL Breath Sounds: Diminished  Fluids:    Intake/Output Summary (Last 24 hours) at 10/23/17 0720  Last data filed at 10/23/17 0500   Gross per 24 hour   Intake           2580.5 ml   Output                0 ml   Net           2580.5 ml        GI/Nutrition:  Orders Placed This Encounter   Procedures   • Diet Order     Standing Status:   Standing     Number of Occurrences:   1     Order Specific Question:   Diet:     Answer:   Cardiac [6]     Medical Decision Making, by Problem:  Active Hospital Problems    Diagnosis   • *Neutropenic fever (CMS-HCC) [D70.9, R50.81]   • Thrombocytopenia (CMS-HCC) [D69.6]   • Myelodysplastic syndrome (CMS-HCC) [D46.9]   • Anemia [D64.9]   • Symptomatic anemia [D64.9]   • HLD (hyperlipidemia) [E78.5]   • HTN (hypertension) [I10]       Plan:  1. AML-- Myelodysplastic syndrome, RAEB-2, evolving into acute myelogenous leukemia.   Discussed with Dr. Krishnamurthy who recommended Vidaza/Venetoclax started on October 24, 2017. Increasing the Venetoclax dosing in an accelerated fashion unlike in CLL, per Dr. Krishnamutrhy's recommendations.        Started 20 mg dose of Venetoclax. Increased to 50 mg on 10/26. On 11/1, increased to 100 mg Qday.  Titrating up on dose now by 100mg every 4 days, depending on the cell counts and tolerance. Monitor closely for TLS.    --Day 11 of chemotherapy today  --plan is to increase the Veneto every 4 days by 100mg, titrating up to a dose of 600-800mg Qday.  -- so, on 11/5 go to 200mg, on 11/9 go to 300mg, on 11/13 got to 400mg, etc. As long as she is tolerating it.    2.  TLS prophylaxis -- on IVF, Allopurinol . No evidence of TLS.  --Change uric acid monitoring to every 3 days  --Daily BMP, with periodic CMP  --Continue IV fluids    3. Anemia-- related to AML. She was transfused one unit of blood today.  --Transfuse if hemoglobin less than 7     4.  Neutropenic fever--  on cefepime. Currently afebrile. Cultures negative to date. No  Diflucan due to interaction with Venetoclax.  --Continue current antibiotics    5. History of Myocardial infarction-- stent placed in January 2017.    6. Chronic Headaches--  MRI brain with no acute intracranial process. Diffuse BM infiltration which may be contributing.  Improving. Status post LP ,no CSF involvement  --Headache is now gone    7. Thrombocytopenia--related to AML. Will get transfused one unit of platelets today.  --Transfuse if bleeding, or if platelets are below 10K        Complex patient requiring complex decision making. High risk of morbidity and mortality.    Reviewed items::  Labs reviewed and Medications reviewed  Gan catheter::  No Gan  DVT prophylaxis pharmacological::  Contraindicated - High bleeding risk

## 2017-11-03 NOTE — CARE PLAN
Problem: Safety  Goal: Will remain free from falls  Outcome: PROGRESSING AS EXPECTED  Patient has remained free from falls this shift when ambulating to the restroom. Fall precautions in place, yellow nonskid socks on feet, clutter free room. Patient has been educated on calling for staff to assist to the bathroom. Call light within reach.    Problem: Psychosocial Needs:  Goal: Level of anxiety will decrease  Outcome: MET Date Met: 11/03/17  Patient has expressed no anxiety this shift and is resting easily.

## 2017-11-04 NOTE — PROGRESS NOTES
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 She continues to have the chronic achiness in her legs from the knees down. She has dealt with this for about 3 months. She has good and bad days with this. She has no fevers or chills. She has no cough or shortness of breath. She continues to have fatigue.      PMHx, PSurgHx, SocHx, FAMHx:  All reviewed and unchanged    Review of Systems:  Review of Systems   Constitutional: Positive for malaise/fatigue. Negative for chills and fever.   HENT: Negative for congestion, nosebleeds and sore throat.    Eyes: Negative for blurred vision, double vision and photophobia.   Respiratory: Negative for cough, hemoptysis, sputum production and shortness of breath.    Cardiovascular: Positive for leg swelling. Negative for chest pain, palpitations and orthopnea.   Gastrointestinal: Negative for abdominal pain, constipation, diarrhea, heartburn, nausea and vomiting.   Genitourinary: Negative for dysuria, frequency and urgency.   Skin: Negative for itching and rash.   Neurological: Negative for headaches.       Physical Exam:  Physical Exam   Constitutional: She is oriented to person, place, and time. She appears well-developed and well-nourished.   HENT:   Head: Normocephalic and atraumatic.   Mouth/Throat: Oropharynx is clear and moist. No oropharyngeal exudate.   Eyes: Conjunctivae and EOM are normal. Right eye exhibits no discharge. Left eye exhibits no discharge. No scleral icterus.   Neck: Normal range of motion. Neck supple. No thyromegaly present.   Cardiovascular: Normal rate, regular rhythm and normal heart sounds.  Exam reveals no gallop and no friction rub.    No murmur heard.  Pulmonary/Chest: Effort normal and breath sounds normal. No respiratory distress. She has no wheezes. She has no rales.   Abdominal: Soft. Bowel sounds are normal. She  exhibits no distension. There is no tenderness. There is no rebound and no guarding.   Musculoskeletal: Normal range of motion. She exhibits edema. She exhibits no tenderness.   1+ bilat LE edema   Lymphadenopathy:     She has no cervical adenopathy.   Neurological: She is alert and oriented to person, place, and time.   Skin: Skin is warm and dry. No rash noted. No erythema.   Psychiatric: She has a normal mood and affect. Her behavior is normal. Thought content normal.       Labs:        Invalid input(s): [id number]      Recent Labs      17   SODIUM  136  138  137   POTASSIUM  3.6  3.9  3.9   CHLORIDE  106  107  104   CO2  24  26  25   BUN  5*  6*  6*   CREATININE  0.31*  0.31*  0.29*   CALCIUM  8.5  8.6  8.6     Recent Labs      17   ALTSGPT   --    --   9   ASTSGOT   --    --   9*   ALKPHOSPHAT   --    --   87   TBILIRUBIN   --    --   0.9   GLUCOSE  143*  127*  145*     Recent Labs      17   RBC  2.64*  2.21*  2.66*   HEMOGLOBIN  8.0*  6.5*  7.8*   HEMATOCRIT  21.8*  18.6*  21.7*   PLATELETCT  7*  31*  25*     Recent Labs      17   WBC  0.4*  0.4*  0.5*   NEUTSPOLYS  2.80*   --   0.00*   LYMPHOCYTES  91.70*   --   95.10*   MONOCYTES  0.90   --   4.90   EOSINOPHILS  0.00   --   0.00   BASOPHILS  0.00   --   0.00   ASTSGOT   --    --   9*   ALTSGPT   --    --   9   ALKPHOSPHAT   --    --   87   TBILIRUBIN   --    --   0.9     Recent Labs      17   SODIUM  136  138  137   POTASSIUM  3.6  3.9  3.9   CHLORIDE  106  107  104   CO2  24  26  25   GLUCOSE  143*  127*  145*   BUN  5*  6*  6*   CREATININE  0.31*  0.31*  0.29*   CALCIUM  8.5  8.6  8.6     Hemodynamics:  Temp (24hrs), Av.8 °C (98.2 °F), Min:36.7 °C (98 °F), Max:37 °C (98.6 °F)  Temperature: 36.7 °C (98 °F)  Pulse  Avg:  86.8  Min: 68  Max: 118   Blood Pressure : 141/80     Respiratory:    Respiration: 17, Pulse Oximetry: 97 %        RUL Breath Sounds: Clear, RML Breath Sounds: Clear, RLL Breath Sounds: Diminished, DEBBIE Breath Sounds: Clear, LLL Breath Sounds: Diminished  Fluids:    Intake/Output Summary (Last 24 hours) at 10/23/17 0720  Last data filed at 10/23/17 0500   Gross per 24 hour   Intake           2580.5 ml   Output                0 ml   Net           2580.5 ml     Weight: 66.5 kg (146 lb 9.7 oz)  GI/Nutrition:  Orders Placed This Encounter   Procedures   • Diet Order     Standing Status:   Standing     Number of Occurrences:   1     Order Specific Question:   Diet:     Answer:   Regular [1]     Medical Decision Making, by Problem:  Active Hospital Problems    Diagnosis   • *Neutropenic fever (CMS-HCC) [D70.9, R50.81]   • Thrombocytopenia (CMS-HCC) [D69.6]   • Myelodysplastic syndrome (CMS-HCC) [D46.9]   • Anemia [D64.9]   • Symptomatic anemia [D64.9]   • HLD (hyperlipidemia) [E78.5]   • HTN (hypertension) [I10]       Plan:  1. AML-- Myelodysplastic syndrome, RAEB-2, evolving into acute myelogenous leukemia.   Discussed with Dr. Krishnamurthy who recommended Vidaza/Venetoclax started on October 24, 2017. Increasing the Venetoclax dosing in an accelerated fashion unlike in CLL, per Dr. Krishnamurthy's recommendations.        Started 20 mg dose of Venetoclax. Increased to 50 mg on 10/26. On 11/1, increased to 100 mg Qday.  Titrating up on dose now by 100mg every 4 days, depending on the cell counts and tolerance. Monitor closely for TLS.    --Day 12 of chemotherapy today  --plan is to increase the Veneto every 4 days by 100mg, titrating up to a dose of 600-800mg Qday.  -- so, on 11/5 go to 200mg, on 11/9 go to 300mg, on 11/13 got to 400mg, etc. As long as she is tolerating it.    2.  TLS prophylaxis -- on IVF, Allopurinol . No evidence of TLS.  --Change uric acid monitoring to every 3 days  --Daily BMP, with periodic CMP  --Continue IV  fluids    3. Anemia-- related to AML. She was transfused one unit of blood today.  --Transfuse if hemoglobin less than 7     4.  Neutropenic fever--  on cefepime. Currently afebrile. Cultures negative to date. No Diflucan due to interaction with Venetoclax.  --Continue current antibiotics    5. History of Myocardial infarction-- stent placed in January 2017.    6. Chronic Headaches--  MRI brain with no acute intracranial process. Diffuse BM infiltration which may be contributing.  Improving. Status post LP ,no CSF involvement  --Headache is now gone    7. Thrombocytopenia--related to AML. Will get transfused one unit of platelets today.  --Transfuse if bleeding, or if platelets are below 10K        Complex patient requiring complex decision making. High risk of morbidity and mortality.    Reviewed items::  Labs reviewed and Medications reviewed  Gan catheter::  No Gan  DVT prophylaxis pharmacological::  Contraindicated - High bleeding risk

## 2017-11-04 NOTE — PROGRESS NOTES
[facility] Hospitalist Progress Note    Date of Service: 11/4/2017    Chief Complaint  67 y.o. female admitted 10/21/2017 with MDS and neutropenic fever.    Interval Problem Update  10/24 Patient afebrile and on empiric cefepime, she was started on chemo yesterday for treatment of her MDS.  She complained of headache today that has been consistent x 1 month and MRI ordered as she has not had this w/u.  I also added a long acting pain medication to try and lessen her use of the oxy IR - and at this point with frequent narcotic use she could be having rebound headache.  10/25 Patient feeling better today, headaches have been less severe with start of oxycontin and her leg pain only is present during headache.  MRI reviewed with patient, no significant pathology seen other than marrow occupying issue which is her MDS.  Given her symptom and possibility of CNS involvement, oncology is requesting a lumbar puncture.  I will order platelet transfusion to get her level >50 for LP and order cytology on fluid.  LP should occur tomorrow in IR.  10/26 Patient states she feels okay today but is anxious regarding lumbar puncture.  Platelets transfused yesterday and 51k this am.  Radiology wanted another draw prior to procedure and dropped to 48 so they would not due the LP.  I will give another transfusion of platelets and will have my partner do LP at bedside after they have been infused.  She wll continue the schedule of PO and IV chemotherapy.  Headaches are better with current pain medication regimen.  Patient's sister at bedside and questions answered for her also.  10/27 Patient feeling well today but states headache is still present if she doesn't take her short acting oxycodone, will increase oxycontin dose to 20 bid.  She had LP yesterday without issue and pathology report back today showing no malignant cells identified.  10/28 Patient feeling well today and headaches seem to be slightly better.  She has no complaints.  ANC  is not undetectable which is expected with the current chemo regimen.  She is having a good appetite and no issues with bowel or bladder.  10/29 Patient states she feels very fatigued and worn out today, educated this is appropriate given her current blood counts and expected with the chemotherapy.  She is encouraged to get out of bed and walk with her sister at least twice today to help her feel better.  She is afebrile and has no new complaints other than fatigue.  Venetoclax and Vidaza today.  ANC 0.  10/30 Patient still feeling fatigued, did have a little more energy with walking - encouraged to continue to do so multiple times/day with her sister.  She is done with the Vidaza and should have a dose increase in Venetoclax on 11/1.  Next dose of Vidaza is 11/20.  She is afebrile and other than fatigue is doing well, headaches are better.  She has a bruise on her left shoulder that is slowly healing.  10/31 Labs stable, no new complaints  11/1: ANC increased to 0.01. Venetoclax increased today.  11/2: Platelets decreased to seven. Transfused. ANC stable at 0.01.  11/3: Platelets increased to 31. However, hemoglobin dropped to 6.5. Patient denies any bleeding. Transfusing one unit of RBCs. Patient feeling fatigued today however.  11/4: Has more energy today but still fatigued    Consultants/Specialty  Oncology - Riz[place]ana    Disposition  tbd    Review of Systems   Constitutional: Negative for chills and fever.   Respiratory: Negative for cough, shortness of breath and wheezing.    Cardiovascular: Negative for chest pain.   Gastrointestinal: Negative for abdominal pain, blood in stool, constipation, diarrhea, melena and vomiting.   Genitourinary: Negative for hematuria.   Musculoskeletal: Positive for joint pain (shoulder) and myalgias (bilateral leg pain with headache).   Neurological: Positive for weakness.   Psychiatric/Behavioral: Negative for depression. The patient is not nervous/anxious.       Physical Exam   Laboratory/Imaging   Hemodynamics  Temp (24hrs), Av.8 °C (98.2 °F), Min:36.7 °C (98 °F), Max:37 °C (98.6 °F)   Temperature: 36.7 °C (98 °F)  Pulse  Av.8  Min: 68  Max: 118    Blood Pressure : 141/80      Respiratory      Respiration: 17, Pulse Oximetry: 97 %        RUL Breath Sounds: Clear, RML Breath Sounds: Clear, RLL Breath Sounds: Diminished, DEBBIE Breath Sounds: Clear, LLL Breath Sounds: Diminished    Fluids    Intake/Output Summary (Last 24 hours) at 17 1529  Last data filed at 17 2100   Gross per 24 hour   Intake             1960 ml   Output                0 ml   Net             1960 ml       Nutrition  Orders Placed This Encounter   Procedures   • Diet Order     Standing Status:   Standing     Number of Occurrences:   1     Order Specific Question:   Diet:     Answer:   Regular [1]     Physical Exam   Constitutional: She is oriented to person, place, and time. She appears well-developed and well-nourished. No distress.   HENT:   Head: Normocephalic.   Cardiovascular: Normal rate and regular rhythm.  Exam reveals no gallop.    Pulmonary/Chest: Effort normal and breath sounds normal. No respiratory distress. She has no wheezes. She has no rales.   Abdominal: Soft. Bowel sounds are normal. She exhibits no distension. There is no tenderness. There is no rebound and no guarding.   Musculoskeletal: She exhibits no edema or tenderness.   Neurological: She is alert and oriented to person, place, and time.   Skin: Skin is warm and dry. No rash noted. She is not diaphoretic. No erythema.   Psychiatric: She has a normal mood and affect. Her behavior is normal.   Nursing note and vitals reviewed.      Recent Labs      17   0338  17   0350  17   0041   WBC  0.4*  0.4*  0.5*   RBC  2.64*  2.21*  2.66*   HEMOGLOBIN  8.0*  6.5*  7.8*   HEMATOCRIT  21.8*  18.6*  21.7*   MCV  82.6  84.2  81.6   MCH  30.3  29.4  29.3   MCHC  36.7*  34.9  35.9*   RDW  38.6  38.6  38.7   PLATELETCT  7*  31*   25*   MPV  12.3  9.6  9.6     Recent Labs      11/02/17   0338  11/03/17   0350  11/04/17   0041   SODIUM  136  138  137   POTASSIUM  3.6  3.9  3.9   CHLORIDE  106  107  104   CO2  24  26  25   GLUCOSE  143*  127*  145*   BUN  5*  6*  6*   CREATININE  0.31*  0.31*  0.29*   CALCIUM  8.5  8.6  8.6                      Assessment/Plan     * Myelodysplastic syndrome (CMS-HCC)- (present on admission)   Assessment & Plan    Evolved into AML per oncology.  Venetoclax/Vidaza started 10/23 per Dr Krishnamurthy recommendations  Venetoclax to continue at increasing doses per oncology, still titrating up every four days  Monitoring ANC  Recommended patient call company to order more soon.            Neutropenic fever (CMS-HCC)- (present on admission)   Assessment & Plan    Cultures showing no growth.  Empiric cefepime and acyclovir  Still neutropenic        Thrombocytopenia (CMS-[facility])- (present on admission)   Assessment & Plan    Keep platelet count >10 or higher if bleeding.          Headache   Assessment & Plan    States HA >1 month and associated with leg pain simultaneously  MRI brain with and without contrast done, no IC pathology, marrow abnormal as expected with MDS  Now improved with meds  LP done 10/26, no complications, pathology report - no malignant cells seen            Anemia- (present on admission)   Assessment & Plan    Transfuse if less than 7.0  Received 1 unit 10/24, 10/30  Secondary to 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  Transfuse for <7.0        HLD (hyperlipidemia)- (present on admission)   Assessment & Plan    Continue current statin therapy regimen        HTN (hypertension)- (present on admission)   Assessment & Plan    Coreg - controlled                Reviewed items::  Labs reviewed and Medications reviewed  Gan catheter::  No Gan  DVT prophylaxis pharmacological::  Contraindicated - High bleeding risk  DVT prophylaxis  - mechanical:  SCDs  Antibiotics:  Treating active infection/contamination beyond 24 hours perioperative coverage

## 2017-11-04 NOTE — PROGRESS NOTES
Rec'd report & assumed care of pt at 0700. Assessment completed. Pt is A&Ox4. Patient reports pain of 7/10 at this time, medications provided per MAR. Pt calls appropriately. Plan of care discussed & questions answered. Bed locked and in lowest position, bed alarm is not on. Call light within reach, non-skid socks in place, hourly rounding. Pt reports no further needs at this time.

## 2017-11-04 NOTE — PROGRESS NOTES
Assumed care of pt @ 0700.  POC discussed w/ night nurse and pt, pain control, daily weights, titrate Ventaclax every 4 days, call for assistance; pt in agreement w/ goals.  Pt AAOx4, VSS, c/o 7/10 pain from BLE, pt given oxycodone and oxycontin for pain.  Pt's skin assessed CDI.  Pt educated re: increased fall risk, use of call light, hourly rounding, and pain scale; pt verbalizes her understanding.  Personal possessions and call light w/n reach, bed in lowest position.  Bed alarm off, pt up self, calls appropriately.

## 2017-11-04 NOTE — PROGRESS NOTES
[facility] Hospitalist Progress Note    Date of Service: 11/3/2017    Chief Complaint  67 y.o. female admitted 10/21/2017 with MDS and neutropenic fever.    Interval Problem Update  10/24 Patient afebrile and on empiric cefepime, she was started on chemo yesterday for treatment of her MDS.  She complained of headache today that has been consistent x 1 month and MRI ordered as she has not had this w/u.  I also added a long acting pain medication to try and lessen her use of the oxy IR - and at this point with frequent narcotic use she could be having rebound headache.  10/25 Patient feeling better today, headaches have been less severe with start of oxycontin and her leg pain only is present during headache.  MRI reviewed with patient, no significant pathology seen other than marrow occupying issue which is her MDS.  Given her symptom and possibility of CNS involvement, oncology is requesting a lumbar puncture.  I will order platelet transfusion to get her level >50 for LP and order cytology on fluid.  LP should occur tomorrow in IR.  10/26 Patient states she feels okay today but is anxious regarding lumbar puncture.  Platelets transfused yesterday and 51k this am.  Radiology wanted another draw prior to procedure and dropped to 48 so they would not due the LP.  I will give another transfusion of platelets and will have my partner do LP at bedside after they have been infused.  She wll continue the schedule of PO and IV chemotherapy.  Headaches are better with current pain medication regimen.  Patient's sister at bedside and questions answered for her also.  10/27 Patient feeling well today but states headache is still present if she doesn't take her short acting oxycodone, will increase oxycontin dose to 20 bid.  She had LP yesterday without issue and pathology report back today showing no malignant cells identified.  10/28 Patient feeling well today and headaches seem to be slightly better.  She has no complaints.  ANC  is not undetectable which is expected with the current chemo regimen.  She is having a good appetite and no issues with bowel or bladder.  10/29 Patient states she feels very fatigued and worn out today, educated this is appropriate given her current blood counts and expected with the chemotherapy.  She is encouraged to get out of bed and walk with her sister at least twice today to help her feel better.  She is afebrile and has no new complaints other than fatigue.  Venetoclax and Vidaza today.  ANC 0.  10/30 Patient still feeling fatigued, did have a little more energy with walking - encouraged to continue to do so multiple times/day with her sister.  She is done with the Vidaza and should have a dose increase in Venetoclax on 11/1.  Next dose of Vidaza is 11/20.  She is afebrile and other than fatigue is doing well, headaches are better.  She has a bruise on her left shoulder that is slowly healing.  10/31 Labs stable, no new complaints  11/1: ANC increased to 0.01. Venetoclax increased today.  11/2: Platelets decreased to seven. Transfused. ANC stable at 0.01.  11/3: Platelets increased to 31. However, hemoglobin dropped to 6.5. Patient denies any bleeding. Transfusing one unit of RBCs. Patient feeling fatigued today however.    Consultants/Specialty  Oncology - Rizwana pagan    Review of Systems   Constitutional: Positive for malaise/fatigue. Negative for chills and fever.   Respiratory: Negative for cough, shortness of breath and wheezing.    Cardiovascular: Negative for chest pain.   Gastrointestinal: Negative for abdominal pain, blood in stool, constipation, diarrhea, melena and vomiting.   Genitourinary: Negative for hematuria.   Musculoskeletal: Positive for joint pain (shoulder) and myalgias (bilateral leg pain with headache).   Neurological: Positive for weakness. Negative for dizziness.   Endo/Heme/Allergies: Does not bruise/bleed easily.   Psychiatric/Behavioral: Negative for depression.  The patient is not nervous/anxious.       Physical Exam  Laboratory/Imaging   Hemodynamics  Temp (24hrs), Av.9 °C (98.4 °F), Min:36.7 °C (98.1 °F), Max:37.2 °C (98.9 °F)   Temperature: 37 °C (98.6 °F)  Pulse  Av.7  Min: 68  Max: 118    Blood Pressure : 127/72      Respiratory      Respiration: 17, Pulse Oximetry: 94 %        RUL Breath Sounds: Clear, RML Breath Sounds: Diminished, RLL Breath Sounds: Diminished, DEBBIE Breath Sounds: Clear, LLL Breath Sounds: Diminished    Fluids    Intake/Output Summary (Last 24 hours) at 17 1734  Last data filed at 17 1216   Gross per 24 hour   Intake             2114 ml   Output                0 ml   Net             2114 ml       Nutrition  Orders Placed This Encounter   Procedures   • Diet Order     Standing Status:   Standing     Number of Occurrences:   1     Order Specific Question:   Diet:     Answer:   Cardiac [6]     Physical Exam   Constitutional: She is oriented to person, place, and time. She appears well-developed and well-nourished. No distress.   HENT:   Head: Normocephalic.   Eyes: Conjunctivae are normal.   Cardiovascular: Normal rate and regular rhythm.  Exam reveals no gallop.    Pulmonary/Chest: Effort normal and breath sounds normal. No respiratory distress. She has no wheezes.   Abdominal: Soft. Bowel sounds are normal. She exhibits no distension. There is no tenderness. There is no rebound and no guarding.   Musculoskeletal: She exhibits no edema or tenderness.   Neurological: She is alert and oriented to person, place, and time.   Skin: Skin is warm and dry. She is not diaphoretic. No erythema. No pallor.   Psychiatric: She has a normal mood and affect. Her behavior is normal.   Nursing note and vitals reviewed.      Recent Labs      17   0334  17   0338  17   0350   WBC  0.6*  0.4*  0.4*   RBC  2.85*  2.64*  2.21*   HEMOGLOBIN  8.4*  8.0*  6.5*   HEMATOCRIT  23.5*  21.8*  18.6*   MCV  82.5  82.6  84.2   MCH  29.5  30.3   29.4   MCHC  35.7*  36.7*  34.9   RDW  38.8  38.6  38.6   PLATELETCT  19*  7*  31*   MPV  9.8  12.3  9.6     Recent Labs      11/01/17   0334  11/02/17   0338  11/03/17   0350   SODIUM  136  136  138   POTASSIUM  3.9  3.6  3.9   CHLORIDE  104  106  107   CO2  25  24  26   GLUCOSE  136*  143*  127*   BUN  5*  5*  6*   CREATININE  0.34*  0.31*  0.31*   CALCIUM  8.5  8.5  8.6                      Assessment/Plan     * Myelodysplastic syndrome (CMS-HCC)- (present on admission)   Assessment & Plan    Evolved into AML per oncology.  Venetoclax/Vidaza started 10/23 per Dr Krishnamurthy recommendations  Venetoclax to continue at increasing doses per oncology, still titrating up every four days  Monitoring ANC  Recommended patient call company to order more soon.            Neutropenic fever (CMS-HCC)- (present on admission)   Assessment & Plan    Cultures showing no growth.  Empiric cefepime and acyclovir  Still neutropenic        Thrombocytopenia (CMS-HCC)- (present on admission)   Assessment & Plan    Keep platelet count >10 or higher if bleeding.          Headache   Assessment & Plan    States HA >1 month and associated with leg pain simultaneously  MRI brain with and without contrast done, no IC pathology, marrow abnormal as expected with MDS  Much better on current regimen - states well controlled and less need for PRN oxycodone.  LP done 10/26, no complications, pathology report - no malignant cells seen            Anemia- (present on admission)   Assessment & Plan    Transfuse if less than 7.0  Received 1 unit 10/24, 10/30  Secondary to 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  Transfuse for <7.0        HLD (hyperlipidemia)- (present on admission)   Assessment & Plan    Continue current statin therapy regimen        HTN (hypertension)- (present on admission)   Assessment & Plan    Coreg - controlled          Patient is critically ill.   The  patient continues to have:Thrombocytopenia requiring transfusion  The vital organ system that is affected is the: Circulation  If untreated there is a high chance of deterioration into: Shock  And eventually death.   The critical care that I am providing today is: Exam, medication  The critical that has been undertaken is medically complex.   There has been no overlap in critical care time.   Critical Care Time not including procedures: 32 mins      Reviewed items::  Labs reviewed and Medications reviewed  Gan catheter::  No Gan  DVT prophylaxis pharmacological::  Contraindicated - High bleeding risk  DVT prophylaxis - mechanical:  SCDs  Antibiotics:  Treating active infection/contamination beyond 24 hours perioperative coverage

## 2017-11-04 NOTE — PROGRESS NOTES
Pt care assumed, discussed plan of care, report received, Pt has no pain or further needs at this time, will monitor, call light within reach and safety precautions in place.

## 2017-11-05 NOTE — PROGRESS NOTES
No changes from epic. AOx4 VSS afebrile. Up self and steady. Medicating per MAR for pain. No nausea. Good PO intake. Reviewed POC with pt- increase in venetoclax dose today, pain control, discharge planning. Unknown Dc date at this time, per hospitalist it will be deferred to oncology. Pt ordered more venetolcax and will arrived Tuesday. Call light and belongings within reach, able to make needs known, rounding in place, will monitor.

## 2017-11-05 NOTE — PROGRESS NOTES
[facility] Hospitalist Progress Note    Date of Service: 11/5/2017    Chief Complaint  67 y.o. female admitted 10/21/2017 with MDS and neutropenic fever.    Interval Problem Update  10/24 Patient afebrile and on empiric cefepime, she was started on chemo yesterday for treatment of her MDS.  She complained of headache today that has been consistent x 1 month and MRI ordered as she has not had this w/u.  I also added a long acting pain medication to try and lessen her use of the oxy IR - and at this point with frequent narcotic use she could be having rebound headache.  10/25 Patient feeling better today, headaches have been less severe with start of oxycontin and her leg pain only is present during headache.  MRI reviewed with patient, no significant pathology seen other than marrow occupying issue which is her MDS.  Given her symptom and possibility of CNS involvement, oncology is requesting a lumbar puncture.  I will order platelet transfusion to get her level >50 for LP and order cytology on fluid.  LP should occur tomorrow in IR.  10/26 Patient states she feels okay today but is anxious regarding lumbar puncture.  Platelets transfused yesterday and 51k this am.  Radiology wanted another draw prior to procedure and dropped to 48 so they would not due the LP.  I will give another transfusion of platelets and will have my partner do LP at bedside after they have been infused.  She wll continue the schedule of PO and IV chemotherapy.  Headaches are better with current pain medication regimen.  Patient's sister at bedside and questions answered for her also.  10/27 Patient feeling well today but states headache is still present if she doesn't take her short acting oxycodone, will increase oxycontin dose to 20 bid.  She had LP yesterday without issue and pathology report back today showing no malignant cells identified.  10/28 Patient feeling well today and headaches seem to be slightly better.  She has no complaints.  ANC  is not undetectable which is expected with the current chemo regimen.  She is having a good appetite and no issues with bowel or bladder.  10/29 Patient states she feels very fatigued and worn out today, educated this is appropriate given her current blood counts and expected with the chemotherapy.  She is encouraged to get out of bed and walk with her sister at least twice today to help her feel better.  She is afebrile and has no new complaints other than fatigue.  Venetoclax and Vidaza today.  ANC 0.  10/30 Patient still feeling fatigued, did have a little more energy with walking - encouraged to continue to do so multiple times/day with her sister.  She is done with the Vidaza and should have a dose increase in Venetoclax on 11/1.  Next dose of Vidaza is 11/20.  She is afebrile and other than fatigue is doing well, headaches are better.  She has a bruise on her left shoulder that is slowly healing.  10/31 Labs stable, no new complaints  11/1: ANC increased to 0.01. Venetoclax increased today.  11/2: Platelets decreased to seven. Transfused. ANC stable at 0.01.  11/3: Platelets increased to 31. However, hemoglobin dropped to 6.5. Patient denies any bleeding. Transfusing one unit of RBCs. Patient feeling fatigued today however.  11/4: Has more energy today but still fatigued  11/5: Counts stable, having some left musculoskeletal chest pain. Onc trialling maalox.    Consultants/Specialty  Oncology - [place]    Juvenal  tbd    Review of Systems   Constitutional: Negative for chills and fever.   Respiratory: Negative for cough, shortness of breath and wheezing.    Cardiovascular: Positive for chest pain.   Gastrointestinal: Negative for abdominal pain, blood in stool, constipation, diarrhea, melena and vomiting.   Genitourinary: Negative for hematuria.   Musculoskeletal: Positive for joint pain (shoulder) and myalgias (bilateral leg pain with headache).   Neurological: Positive for weakness.    Psychiatric/Behavioral: Negative for depression. The patient is not nervous/anxious.       Physical Exam  Laboratory/Imaging   Hemodynamics  Temp (24hrs), Av.8 °C (98.2 °F), Min:36.7 °C (98.1 °F), Max:36.9 °C (98.5 °F)   Temperature: 36.8 °C (98.3 °F)  Pulse  Av  Min: 68  Max: 118    Blood Pressure : 143/73      Respiratory      Respiration: 17, Pulse Oximetry: 90 %        RUL Breath Sounds: Clear, RML Breath Sounds: Diminished, RLL Breath Sounds: Diminished, DEBBIE Breath Sounds: Clear, LLL Breath Sounds: Diminished    Fluids    Intake/Output Summary (Last 24 hours) at 17 1424  Last data filed at 17 0900   Gross per 24 hour   Intake             3973 ml   Output                0 ml   Net             3973 ml       Nutrition  Orders Placed This Encounter   Procedures   • Diet Order     Standing Status:   Standing     Number of Occurrences:   1     Order Specific Question:   Diet:     Answer:   Regular [1]     Physical Exam   Constitutional: She is oriented to person, place, and time. She appears well-developed and well-nourished. No distress.   HENT:   Head: Normocephalic.   Cardiovascular: Normal rate and regular rhythm.  Exam reveals no gallop.    Pulmonary/Chest: Effort normal and breath sounds normal. No respiratory distress. She has no wheezes. She has no rales. She exhibits tenderness.   Abdominal: Soft. Bowel sounds are normal. She exhibits no distension. There is no tenderness. There is no rebound and no guarding.   Musculoskeletal: She exhibits no edema or tenderness.   Neurological: She is alert and oriented to person, place, and time.   Skin: Skin is warm and dry. No rash noted. She is not diaphoretic. No erythema.   Psychiatric: She has a normal mood and affect. Her behavior is normal.   Nursing note and vitals reviewed.      Recent Labs      17   0350  17   0041  17   0052   WBC  0.4*  0.5*  0.4*   RBC  2.21*  2.66*  2.82*   HEMOGLOBIN  6.5*  7.8*  8.4*   HEMATOCRIT   18.6*  21.7*  23.0*   MCV  84.2  81.6  81.6   MCH  29.4  29.3  29.8   MCHC  34.9  35.9*  36.5*   RDW  38.6  38.7  37.5   PLATELETCT  31*  25*  19*   MPV  9.6  9.6  9.5     Recent Labs      11/03/17   0350  11/04/17   0041   SODIUM  138  137   POTASSIUM  3.9  3.9   CHLORIDE  107  104   CO2  26  25   GLUCOSE  127*  145*   BUN  6*  6*   CREATININE  0.31*  0.29*   CALCIUM  8.6  8.6                      Assessment/Plan     * Myelodysplastic syndrome (CMS-HCC)- (present on admission)   Assessment & Plan    Evolved into AML per oncology.  Venetoclax/Vidaza started 10/23 per Dr Krishnamurthy recommendations  Venetoclax to continue at increasing doses per oncology, still titrating up every four days  Monitoring ANC, currently low  Recommended patient call company to order more soon.        Neutropenic fever (CMS-HCC)- (present on admission)   Assessment & Plan    Cultures showing no growth.  Empiric cefepime and acyclovir  Still neutropenic        Thrombocytopenia (CMS-HCC)- (present on admission)   Assessment & Plan    Keep platelet count >10 or higher if bleeding.        Headache   Assessment & Plan    States HA >1 month and associated with leg pain simultaneously  MRI brain with and without contrast done, no IC pathology, marrow abnormal as expected with MDS  Now improved with meds  LP done 10/26, no complications, pathology report - no malignant cells seen            Anemia- (present on admission)   Assessment & Plan    Transfuse if less than 7.0  Received 1 unit 10/24, 10/30  Secondary to 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  Transfuse for <7.0        HLD (hyperlipidemia)- (present on admission)   Assessment & Plan    Continue current statin therapy regimen        HTN (hypertension)- (present on admission)   Assessment & Plan    Coreg - controlled                Reviewed items::  Labs reviewed and Medications reviewed  Gan catheter::  No  Gan  DVT prophylaxis pharmacological::  Contraindicated - High bleeding risk  DVT prophylaxis - mechanical:  SCDs  Antibiotics:  Treating active infection/contamination beyond 24 hours perioperative coverage

## 2017-11-05 NOTE — CARE PLAN
Problem: Fluid Volume:  Goal: Will maintain balanced intake and output    Intervention: Monitor, educate, and encourage compliance with therapeutic intake of liquids  Pt educated about fluid intake importance, Pt gave verbal understanding       Problem: Skin Integrity  Goal: Risk for impaired skin integrity will decrease    Intervention: Assess and monitor skin integrity, appearance and/or temperature  Pt skin intact, educated pt about turning from side to side, Pt verbalized understanding.

## 2017-11-05 NOTE — PROGRESS NOTES
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 This been going on for about an hour. It's not associated with shortness of breath or nausea or nausea or sweating. She has no fevers or chills. She continues to have the chronic achiness in her legs from the knees down. She has dealt with this for about 3 months. She has good and bad days with this.         PMHx, PSurgHx, SocHx, FAMHx:  All reviewed and unchanged    Review of Systems:  Review of Systems   Constitutional: Positive for malaise/fatigue. Negative for chills and fever.   HENT: Negative for congestion, nosebleeds and sore throat.    Eyes: Negative for blurred vision, double vision and photophobia.   Respiratory: Negative for cough, hemoptysis, sputum production and shortness of breath.    Cardiovascular: Positive for leg swelling. Negative for chest pain, palpitations and orthopnea.   Gastrointestinal: Positive for heartburn. Negative for abdominal pain, constipation, diarrhea, nausea and vomiting.   Genitourinary: Negative for dysuria, frequency and urgency.   Skin: Negative for itching and rash.   Neurological: Negative for headaches.       Physical Exam:  Physical Exam   Constitutional: She is oriented to person, place, and time. She appears well-developed and well-nourished.   HENT:   Head: Normocephalic and atraumatic.   Mouth/Throat: Oropharynx is clear and moist. No oropharyngeal exudate.   Eyes: Conjunctivae and EOM are normal. Right eye exhibits no discharge. Left eye exhibits no discharge. No scleral icterus.   Neck: Normal range of motion. Neck supple. No thyromegaly present.   Cardiovascular: Normal rate, regular rhythm and normal heart sounds.  Exam reveals no gallop and no friction rub.    No murmur heard.  Pulmonary/Chest: Effort normal and breath sounds  normal. No respiratory distress. She has no wheezes. She has no rales.   Abdominal: Soft. Bowel sounds are normal. She exhibits no distension. There is no tenderness. There is no rebound and no guarding.   Musculoskeletal: Normal range of motion. She exhibits edema. She exhibits no tenderness.   1+ bilat LE edema   Lymphadenopathy:     She has no cervical adenopathy.   Neurological: She is alert and oriented to person, place, and time.   Skin: Skin is warm and dry. No rash noted. No erythema.   Psychiatric: She has a normal mood and affect. Her behavior is normal. Thought content normal.       Labs:        Invalid input(s): [id number]      Recent Labs      17   SODIUM  138  137   POTASSIUM  3.9  3.9   CHLORIDE  107  104   CO2  26  25   BUN  6*  6*   CREATININE  0.31*  0.29*   CALCIUM  8.6  8.6     Recent Labs      17   ALTSGPT   --   9   ASTSGOT   --   9*   ALKPHOSPHAT   --   87   TBILIRUBIN   --   0.9   GLUCOSE  127*  145*     Recent Labs      17   0052   RBC  2.21*  2.66*  2.82*   HEMOGLOBIN  6.5*  7.8*  8.4*   HEMATOCRIT  18.6*  21.7*  23.0*   PLATELETCT  31*  25*  19*     Recent Labs      17   0052   WBC  0.4*  0.5*  0.4*   NEUTSPOLYS   --   0.00*   --    LYMPHOCYTES   --   95.10*   --    MONOCYTES   --   4.90   --    EOSINOPHILS   --   0.00   --    BASOPHILS   --   0.00   --    ASTSGOT   --   9*   --    ALTSGPT   --   9   --    ALKPHOSPHAT   --   87   --    TBILIRUBIN   --   0.9   --      Recent Labs      17   SODIUM  138  137   POTASSIUM  3.9  3.9   CHLORIDE  107  104   CO2  26  25   GLUCOSE  127*  145*   BUN  6*  6*   CREATININE  0.31*  0.29*   CALCIUM  8.6  8.6     Hemodynamics:  Temp (24hrs), Av.8 °C (98.2 °F), Min:36.7 °C (98 °F), Max:36.9 °C (98.5 °F)  Temperature: 36.8 °C (98.3 °F)  Pulse  Av  Min: 68  Max: 118   Blood  Pressure : 143/73     Respiratory:    Respiration: 17, Pulse Oximetry: 90 %        RUL Breath Sounds: Clear, RML Breath Sounds: Diminished, RLL Breath Sounds: Diminished, DEBBIE Breath Sounds: Clear, LLL Breath Sounds: Diminished  Fluids:    Intake/Output Summary (Last 24 hours) at 10/23/17 0720  Last data filed at 10/23/17 0500   Gross per 24 hour   Intake           2580.5 ml   Output                0 ml   Net           2580.5 ml     Weight: 66.5 kg (146 lb 9.7 oz)  GI/Nutrition:  Orders Placed This Encounter   Procedures   • Diet Order     Standing Status:   Standing     Number of Occurrences:   1     Order Specific Question:   Diet:     Answer:   Regular [1]     Medical Decision Making, by Problem:  Active Hospital Problems    Diagnosis   • *Neutropenic fever (CMS-HCC) [D70.9, R50.81]   • Thrombocytopenia (CMS-HCC) [D69.6]   • Myelodysplastic syndrome (CMS-HCC) [D46.9]   • Anemia [D64.9]   • Symptomatic anemia [D64.9]   • HLD (hyperlipidemia) [E78.5]   • HTN (hypertension) [I10]       Plan:  1. AML-- Myelodysplastic syndrome, RAEB-2, evolving into acute myelogenous leukemia.   Discussed with Dr. Krishnamurthy who recommended Vidaza/Venetoclax started on October 24, 2017. Increasing the Venetoclax dosing in an accelerated fashion unlike in CLL, per Dr. Krishnamurthy's recommendations.        Started 20 mg dose of Venetoclax. Increased to 50 mg on 10/26. On 11/1, increased to 100 mg Qday.  Titrating up on dose now by 100mg every 4 days, depending on the cell counts and tolerance. Monitor closely for TLS.    --Day 13 of chemotherapy today  --plan is to increase the Veneto every 4 days by 100mg, titrating up to a dose of 600-800mg Qday.  -- today she will go up to  200mg Qday  -- on 11/9 go up to 300mg, on 11/13 got to 400mg, etc. As long as she is tolerating it.    2.  TLS prophylaxis -- on IVF, Allopurinol . No evidence of TLS so far.  --Change uric acid monitoring to every 3 days  --Daily BMP, with periodic CMP  --Continue IV  fluids    3. Anemia-- related to AML. She was transfused one unit of blood today.  --Transfuse if hemoglobin less than 7     4.  Neutropenic fever--  on cefepime. Currently afebrile. Cultures negative to date. No Diflucan due to interaction with Venetoclax.  --Continue current antibiotics    5. History of Myocardial infarction-- stent placed in January 2017.    6. Chronic Headaches--  MRI brain with no acute intracranial process. Diffuse BM infiltration which may be contributing.  Improving. Status post LP ,no CSF involvement  --Headache is now gone    7. Thrombocytopenia--related to AML. Will get transfused one unit of platelets today.  --Transfuse if bleeding, or if platelets are below 10K    8. Chest discomfort--the symptoms very atypical, and sounds more related to GI issues. We will try an antacid and see if that helps.        Complex patient requiring complex decision making. High risk of morbidity and mortality.    Reviewed items::  Labs reviewed and Medications reviewed  Gan catheter::  No Gan  DVT prophylaxis pharmacological::  Contraindicated - High bleeding risk

## 2017-11-05 NOTE — PROGRESS NOTES
Pt care assumed, discussed plan of care, report received, Pt is free of pain at this time, no further needs will continue to monitor, call light within reach and safety precautions in place.

## 2017-11-06 NOTE — PROGRESS NOTES
Trino from Lab called with critical result of WBC 0.5 and PLT of 11. Critical lab result read back to Trino.   This critical lab result is within parameters established by Dr. Magallon for this patient

## 2017-11-06 NOTE — PROGRESS NOTES
Pt care assumed, discussed plan of care, report received, Pt dinner tray hadnt arrived yet, call nutrition for delivery, no pain at this time, no further needs at this time will continue to monitor, call light within reach and safety precautions in place.

## 2017-11-06 NOTE — PROGRESS NOTES
[facility] Hospitalist Progress Note    Date of Service: 11/6/2017    Chief Complaint  67 y.o. female admitted 10/21/2017 with MDS and neutropenic fever.    Interval Problem Update  10/24 Patient afebrile and on empiric cefepime, she was started on chemo yesterday for treatment of her MDS.  She complained of headache today that has been consistent x 1 month and MRI ordered as she has not had this w/u.  I also added a long acting pain medication to try and lessen her use of the oxy IR - and at this point with frequent narcotic use she could be having rebound headache.  10/25 Patient feeling better today, headaches have been less severe with start of oxycontin and her leg pain only is present during headache.  MRI reviewed with patient, no significant pathology seen other than marrow occupying issue which is her MDS.  Given her symptom and possibility of CNS involvement, oncology is requesting a lumbar puncture.  I will order platelet transfusion to get her level >50 for LP and order cytology on fluid.  LP should occur tomorrow in IR.  10/26 Patient states she feels okay today but is anxious regarding lumbar puncture.  Platelets transfused yesterday and 51k this am.  Radiology wanted another draw prior to procedure and dropped to 48 so they would not due the LP.  I will give another transfusion of platelets and will have my partner do LP at bedside after they have been infused.  She wll continue the schedule of PO and IV chemotherapy.  Headaches are better with current pain medication regimen.  Patient's sister at bedside and questions answered for her also.  10/27 Patient feeling well today but states headache is still present if she doesn't take her short acting oxycodone, will increase oxycontin dose to 20 bid.  She had LP yesterday without issue and pathology report back today showing no malignant cells identified.  10/28 Patient feeling well today and headaches seem to be slightly better.  She has no complaints.  ANC  is not undetectable which is expected with the current chemo regimen.  She is having a good appetite and no issues with bowel or bladder.  10/29 Patient states she feels very fatigued and worn out today, educated this is appropriate given her current blood counts and expected with the chemotherapy.  She is encouraged to get out of bed and walk with her sister at least twice today to help her feel better.  She is afebrile and has no new complaints other than fatigue.  Venetoclax and Vidaza today.  ANC 0.  10/30 Patient still feeling fatigued, did have a little more energy with walking - encouraged to continue to do so multiple times/day with her sister.  She is done with the Vidaza and should have a dose increase in Venetoclax on 11/1.  Next dose of Vidaza is 11/20.  She is afebrile and other than fatigue is doing well, headaches are better.  She has a bruise on her left shoulder that is slowly healing.  10/31 Labs stable, no new complaints  11/1: ANC increased to 0.01. Venetoclax increased today.  11/2: Platelets decreased to seven. Transfused. ANC stable at 0.01.  11/3: Platelets increased to 31. However, hemoglobin dropped to 6.5. Patient denies any bleeding. Transfusing one unit of RBCs. Patient feeling fatigued today however.  11/4: Has more energy today but still fatigued  11/5: Counts stable, having some left musculoskeletal chest pain. Onc trialling maalox.  11/6: Patient feeling slightly lightheaded but otherwise okay. Counts stable.    Consultants/Specialty  Oncology - [facility]    Disposition  Will continue to evaluate with oncology with possible discharge home later this week with outpatient antibiotics.     Review of Systems   Constitutional: Negative for chills and fever.   Respiratory: Negative for cough, shortness of breath and wheezing.    Cardiovascular: Negative for chest pain.   Gastrointestinal: Negative for abdominal pain, blood in stool, constipation, diarrhea, melena and vomiting.    Genitourinary: Negative for hematuria.   Musculoskeletal: Positive for joint pain (shoulder). Negative for myalgias.   Neurological: Positive for dizziness and weakness.   Psychiatric/Behavioral: Negative for depression. The patient is not nervous/anxious.       Physical Exam  Laboratory/Imaging   Hemodynamics  Temp (24hrs), Av.8 °C (98.2 °F), Min:36.6 °C (97.8 °F), Max:36.9 °C (98.4 °F)   Temperature: 36.8 °C (98.2 °F)  Pulse  Av.3  Min: 68  Max: 118    Blood Pressure : 125/58      Respiratory      Respiration: 20, Pulse Oximetry: 90 %        RUL Breath Sounds: Clear, RML Breath Sounds: Clear, RLL Breath Sounds: Diminished, DEBBIE Breath Sounds: Clear, LLL Breath Sounds: Diminished    Fluids    Intake/Output Summary (Last 24 hours) at 17 1512  Last data filed at 17 0745   Gross per 24 hour   Intake             3327 ml   Output                0 ml   Net             3327 ml       Nutrition  Orders Placed This Encounter   Procedures   • Diet Order     Standing Status:   Standing     Number of Occurrences:   1     Order Specific Question:   Diet:     Answer:   Regular [1]     Physical Exam   Constitutional: She is oriented to person, place, and time. She appears well-developed and well-nourished. No distress.   HENT:   Head: Normocephalic.   Eyes: Conjunctivae are normal.   Cardiovascular: Normal rate and regular rhythm.  Exam reveals no gallop.    Pulmonary/Chest: Effort normal and breath sounds normal. No respiratory distress. She has no wheezes. She has no rales. She exhibits tenderness.   Abdominal: Soft. Bowel sounds are normal. She exhibits no distension. There is no tenderness. There is no rebound and no guarding.   Musculoskeletal: She exhibits no edema.   Neurological: She is alert and oriented to person, place, and time.   Skin: Skin is warm and dry. She is not diaphoretic. No erythema.   Psychiatric: She has a normal mood and affect. Her behavior is normal.   Nursing note and vitals  reviewed.      Recent Labs      11/04/17   0041  11/05/17   0052  11/06/17   0045   WBC  0.5*  0.4*  0.5*   RBC  2.66*  2.82*  2.54*   HEMOGLOBIN  7.8*  8.4*  7.6*   HEMATOCRIT  21.7*  23.0*  20.9*   MCV  81.6  81.6  82.3   MCH  29.3  29.8  29.9   MCHC  35.9*  36.5*  36.4*   RDW  38.7  37.5  37.6   PLATELETCT  25*  19*  11*   MPV  9.6  9.5  9.5     Recent Labs      11/04/17   0041  11/06/17   0045   SODIUM  137  135  136   POTASSIUM  3.9  3.8  3.9   CHLORIDE  104  103  104   CO2  25  25  25   GLUCOSE  145*  147*  150*   BUN  6*  7*  7*   CREATININE  0.29*  0.33*  0.34*   CALCIUM  8.6  8.7  8.6                      Assessment/Plan     * Myelodysplastic syndrome (CMS-HCC)- (present on admission)   Assessment & Plan    Evolved into AML per oncology.  Venetoclax/Vidaza started 10/23 per Dr Krishnamurthy recommendations  Venetoclax to continue at increasing doses per oncology, still titrating up every four days  Monitoring ANC, currently low  Recommended patient call company to order more soon.        Neutropenic fever (CMS-[facility])- (present on admission)   Assessment & Plan    Cultures showing no growth.  Not having any current fevers  Empiric cefepime and acyclovir  Still neutropenic and likely will be so for the next 3-6 months per oncology        Thrombocytopenia (CMS-HCC)- (present on admission)   Assessment & Plan    Keep platelet count >10 or higher if bleeding.        Headache   Assessment & Plan    States HA >1 month and associated with leg pain simultaneously  MRI brain with and without contrast done, no IC pathology, marrow abnormal as expected with MDS  Now improved with meds  LP done 10/26, no complications, pathology report - no malignant cells seen            Anemia- (present on admission)   Assessment & Plan    Transfuse if less than 7.0  Received 1 unit 10/24, 10/30  Secondary to MDS to AML        Symptomatic anemia- (present on admission)   Assessment & Plan    Monitor in observation for  transfusion.  Transfuse for <7.0        HLD (hyperlipidemia)- (present on admission)   Assessment & Plan    Continue current statin therapy regimen        HTN (hypertension)- (present on admission)   Assessment & Plan    Coreg - controlled                Reviewed items::  Labs reviewed and Medications reviewed  Gan catheter::  No Gan  DVT prophylaxis pharmacological::  Contraindicated - High bleeding risk  DVT prophylaxis - mechanical:  SCDs  Antibiotics:  Treating active infection/contamination beyond 24 hours perioperative coverage

## 2017-11-06 NOTE — PROGRESS NOTES
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 Increased to 50 mg on 10/26. On 11/1, increased to 100 mg Qday.  Titrating up on dose now by 100mg every 4 days, depending on the cell counts and tolerance. Monitor closely for TLS.  - 11/5/17 - 200 mg po daily   - Day 13 today     Subjective:   No overnight events. She denies any nausea or vomiting.   She denies any fevers or chills.     Objective:  Medications reviewed and notable for:  Current Facility-Administered Medications   Medication Dose   • Venetoclax TABS 200 mg  200 mg   • mag hydrox-al hydrox-simeth (MAALOX PLUS ES or MYLANTA DS) suspension 10 mL  10 mL   • oxyCODONE CR (OXYCONTIN) tablet 20 mg  20 mg   • Pharmacy Consult Request ...Pain Management Review      And   • oxycodone immediate-release (ROXICODONE) tablet 5 mg  5 mg    And   • oxycodone immediate release (ROXICODONE) tablet 10 mg  10 mg    And   • morphine (pf) 4 mg/ml injection 2 mg  2 mg   • allopurinol (ZYLOPRIM) tablet 300 mg  300 mg   • NS infusion     • cefepime (MAXIPIME) 2 g in  mL IVPB  2 g   • acyclovir (ZOVIRAX) tablet 400 mg  400 mg   • atorvastatin (LIPITOR) tablet 40 mg  40 mg   • carvedilol (COREG) tablet 3.125 mg  3.125 mg   • omeprazole (PRILOSEC) capsule 20 mg  20 mg   • ondansetron (ZOFRAN) syringe/vial injection 4 mg  4 mg   • ondansetron (ZOFRAN ODT) dispertab 4 mg  4 mg   • haloperidol lactate (HALDOL) injection 5 mg  5 mg   • senna-docusate (PERICOLACE or SENOKOT S) 8.6-50 MG per tablet 2 Tab  2 Tab    And   • polyethylene glycol/lytes (MIRALAX) PACKET 1 Packet  1 Packet    And   • magnesium hydroxide (MILK OF MAGNESIA) suspension 30 mL  30 mL    And   • bisacodyl (DULCOLAX) suppository 10 mg  10 mg   • acetaminophen (TYLENOL) tablet 650 mg  650 mg       ROS:   Constitutional: +  fatigue, no fevers or chills,  "no night sweats  Resp: No cough or SOB  Cardio:No chest pain or palpitations  Pschy: No depression or anxiety   Neuro: No headaches, no seizure, no vision changes  GI: no abdominal pain, nausea or vomiting. No diarrhea or constipation   All other ROS negative    Blood pressure 109/54, pulse 86, temperature 36.9 °C (98.4 °F), resp. rate 18, height 1.575 m (5' 2\"), weight 66.5 kg (146 lb 9.7 oz), last menstrual period 10/18/1993, SpO2 90 %, not currently breastfeeding.    General:  comfortable, NAD  HEENT:  sclera anicteric, pupils equal, round, reactive to light, oral cavity and oropharynx clear, mucous membranes moist  Neck:   supple, no lymphadenopathy  Cor:   regular rate and rhythm, no murmurs, rubs, or gallops  Pulm:   clear to auscultation bilaterally  Abd:   bowel sounds present, soft, nontender, nondistended, no palpable masses or organomegaly  Extremities:  Warm trace edema bilaterally   Neurologic:  A&O x 3  Pyschiatric:  Appropriate mood and affect    Labs reviewed and notable for:  Recent Labs      11/04/17   0041  11/05/17   0052  11/06/17   0045   WBC  0.5*  0.4*  0.5*   RBC  2.66*  2.82*  2.54*   HEMOGLOBIN  7.8*  8.4*  7.6*   HEMATOCRIT  21.7*  23.0*  20.9*   MCV  81.6  81.6  82.3   MCH  29.3  29.8  29.9   MCHC  35.9*  36.5*  36.4*   RDW  38.7  37.5  37.6   PLATELETCT  25*  19*  11*   MPV  9.6  9.5  9.5         .@CMP  Recent Results (from the past 24 hour(s))   CBC WITH DIFFERENTIAL    Collection Time: 11/06/17 12:45 AM   Result Value Ref Range    WBC 0.5 (LL) 4.8 - 10.8 K/uL    RBC 2.54 (L) 4.20 - 5.40 M/uL    Hemoglobin 7.6 (L) 12.0 - 16.0 g/dL    Hematocrit 20.9 (L) 37.0 - 47.0 %    MCV 82.3 81.4 - 97.8 fL    MCH 29.9 27.0 - 33.0 pg    MCHC 36.4 (H) 33.6 - 35.0 g/dL    RDW 37.6 35.9 - 50.0 fL    Platelet Count 11 (LL) 164 - 446 K/uL    MPV 9.5 9.0 - 12.9 fL    Nucleated RBC 0.00 /100 WBC    NRBC (Absolute) 0.00 K/uL   BASIC METABOLIC PANEL    Collection Time: 11/06/17 12:45 AM   Result Value Ref " Range    Sodium 136 135 - 145 mmol/L    Potassium 3.9 3.6 - 5.5 mmol/L    Chloride 104 96 - 112 mmol/L    Co2 25 20 - 33 mmol/L    Glucose 150 (H) 65 - 99 mg/dL    Bun 7 (L) 8 - 22 mg/dL    Creatinine 0.34 (L) 0.50 - 1.40 mg/dL    Calcium 8.6 8.5 - 10.5 mg/dL    Anion Gap 7.0 0.0 - 11.9   RENAL FUNCTION PANEL    Collection Time: 11/06/17 12:45 AM   Result Value Ref Range    Sodium 135 135 - 145 mmol/L    Potassium 3.8 3.6 - 5.5 mmol/L    Chloride 103 96 - 112 mmol/L    Co2 25 20 - 33 mmol/L    Glucose 147 (H) 65 - 99 mg/dL    Creatinine 0.33 (L) 0.50 - 1.40 mg/dL    Bun 7 (L) 8 - 22 mg/dL    Calcium 8.7 8.5 - 10.5 mg/dL    Phosphorus 3.5 2.5 - 4.5 mg/dL    Albumin 2.6 (L) 3.2 - 4.9 g/dL   ESTIMATED GFR    Collection Time: 11/06/17 12:45 AM   Result Value Ref Range    GFR If African American >60 >60 mL/min/1.73 m 2    GFR If Non African American >60 >60 mL/min/1.73 m 2   ESTIMATED GFR    Collection Time: 11/06/17 12:45 AM   Result Value Ref Range    GFR If African American >60 >60 mL/min/1.73 m 2    GFR If Non African American >60 >60 mL/min/1.73 m 2           Assessment and Recommendations:  -  AML-- Myelodysplastic syndrome, RAEB-2, evolving into acute myelogenous leukemia.   Discussed with Dr. Krishnamurthy who recommended Vidaza/Venetoclax started on October 24, 2017. Increasing the Venetoclax dosing in an accelerated fashion unlike in CLL, per Dr. Krishnamurthy's recommendations.        Started 20 mg dose of Venetoclax. Increased to 50 mg on 10/26. On 11/1, increased to 100 mg Qday.  Titrating up on dose now by 100mg every 4 days, depending on the cell counts and tolerance. Monitor closely for TLS.     --Day 13 of chemotherapy today  --plan is to increase the Veneto every 4 days by 100mg, titrating up to a dose of 600-800mg Qday.  -- so, on 11/5/17  go to 200mg, on 11/9/17  go to 300mg, on 11/13/17 got to 400mg, etc. As long as she is tolerating it.    - TLS prophylaxis on IV on allopurinol - monitor TLS  - Chronic HA - MRI  brain with no acute intracranial process. Diffuse BM infiltration which may be contributing.  Improving. Status post LP ,no CSF involvement--Headache is now gone.  - Cytopenias due to # 1 , transfuse if HGB < 7.0 or PLT < 10 K and no bleeding     Plan:   - She is tolerating treatment well with no side effects   - Proceed with 200 mg po daily dosing today   - Monitor for TLS   - She will have prolonged cytopenias and usually with hypomethylating agents it will take atleast 3 months to assess response and once she starts tolerating 400 mg po daily dosing without any trouble which will be started on 11/8/17. She can be discharged home with outpatient labs CBC, CMP, LDH , Uric acid twice a week and also arrange at [facility] for transfusions as needed.   - Discussed with patient and she decided to stay in [place] area with her son and agrees with the plan.   - She prefers to go home.     High complexicity/Drug monitoring     We will continue to follow with you; please call with any questions, [phone].      Gayathri Ahumada MD  [facility]   [phone]

## 2017-11-06 NOTE — PROGRESS NOTES
No changes from epic. AOx4 VSs afebrile. Up self and steady. No c/o nausea. Medicating per MAR for pain. Port infusing IVF. Reviewed POC with pt. Possible blood transfusion tomorrow and DC home end of week. Call light and belongings within reach, able to make needs known, rounding in place, will monitor.

## 2017-11-07 NOTE — PROGRESS NOTES
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 Increased to 50 mg on 10/26. On 11/1, increased to 100 mg Qday.  Titrating up on dose now by 100mg every 4 days, depending on the cell counts and tolerance , goal to go up to 600-800 . Monitor closely for TLS.  - 11/5/17 - 200 mg po daily   - Day 14 today        Subjective:  She complains of being fatigued. No chest pain. No bleeding problems. No fevers or chills.   She reports no recurrence of HA     Objective:  Medications reviewed and notable for:  Current Facility-Administered Medications   Medication Dose   • Venetoclax TABS 200 mg  200 mg   • mag hydrox-al hydrox-simeth (MAALOX PLUS ES or MYLANTA DS) suspension 10 mL  10 mL   • oxyCODONE CR (OXYCONTIN) tablet 20 mg  20 mg   • Pharmacy Consult Request ...Pain Management Review      And   • oxycodone immediate-release (ROXICODONE) tablet 5 mg  5 mg    And   • oxycodone immediate release (ROXICODONE) tablet 10 mg  10 mg    And   • morphine (pf) 4 mg/ml injection 2 mg  2 mg   • allopurinol (ZYLOPRIM) tablet 300 mg  300 mg   • NS infusion     • cefepime (MAXIPIME) 2 g in  mL IVPB  2 g   • acyclovir (ZOVIRAX) tablet 400 mg  400 mg   • atorvastatin (LIPITOR) tablet 40 mg  40 mg   • carvedilol (COREG) tablet 3.125 mg  3.125 mg   • omeprazole (PRILOSEC) capsule 20 mg  20 mg   • ondansetron (ZOFRAN) syringe/vial injection 4 mg  4 mg   • ondansetron (ZOFRAN ODT) dispertab 4 mg  4 mg   • haloperidol lactate (HALDOL) injection 5 mg  5 mg   • senna-docusate (PERICOLACE or SENOKOT S) 8.6-50 MG per tablet 2 Tab  2 Tab    And   • polyethylene glycol/lytes (MIRALAX) PACKET 1 Packet  1 Packet    And   • magnesium hydroxide (MILK OF MAGNESIA) suspension 30 mL  30 mL    And   • bisacodyl (DULCOLAX) suppository 10 mg  10 mg   • acetaminophen (TYLENOL) tablet 650 mg   "650 mg       ROS:   Constitutional: + fatigue and malaise , no fevers or chills, no night sweats  Resp: No cough or SOB  Cardio:No chest pain or palpitations  Pschy: No depression or anxiety   Neuro: No headaches, no seizure, no vision changes  GI: no abdominal pain, nausea or vomiting. No diarrhea or constipation   All other ROS negative    Blood pressure 145/63, pulse 100, temperature 36.6 °C (97.8 °F), resp. rate 18, height 1.575 m (5' 2\"), weight 66.5 kg (146 lb 9.7 oz), last menstrual period 10/18/1993, SpO2 95 %, not currently breastfeeding.    General:  comfortable, NAD  HEENT:  sclera anicteric, pupils equal, round, reactive to light, oral cavity and oropharynx clear, mucous membranes moist  Neck:   supple, no lymphadenopathy  Cor:   regular rate and rhythm, no murmurs, rubs, or gallops  Pulm:   clear to auscultation bilaterally  Abd:   bowel sounds present, soft, nontender, nondistended, no palpable masses or organomegaly  Extremities:  warm, no lower extremity edema  Neurologic:  A&O x 3  Pyschiatric:  Appropriate mood and affect    Labs reviewed and notable for:  Recent Labs      11/05/17   0052  11/06/17   0045  11/07/17   0012   WBC  0.4*  0.5*  0.4*   RBC  2.82*  2.54*  2.28*   HEMOGLOBIN  8.4*  7.6*  6.8*   HEMATOCRIT  23.0*  20.9*  18.9*   MCV  81.6  82.3  82.9   MCH  29.8  29.9  29.8   MCHC  36.5*  36.4*  36.0*   RDW  37.5  37.6  37.4   PLATELETCT  19*  11*  5*   MPV  9.5  9.5  10.0         .@CMP  Recent Results (from the past 24 hour(s))   CBC WITH DIFFERENTIAL    Collection Time: 11/07/17 12:12 AM   Result Value Ref Range    WBC 0.4 (LL) 4.8 - 10.8 K/uL    RBC 2.28 (L) 4.20 - 5.40 M/uL    Hemoglobin 6.8 (L) 12.0 - 16.0 g/dL    Hematocrit 18.9 (L) 37.0 - 47.0 %    MCV 82.9 81.4 - 97.8 fL    MCH 29.8 27.0 - 33.0 pg    MCHC 36.0 (H) 33.6 - 35.0 g/dL    RDW 37.4 35.9 - 50.0 fL    Platelet Count 5 (LL) 164 - 446 K/uL    MPV 10.0 9.0 - 12.9 fL    Nucleated RBC 0.00 /100 WBC    NRBC (Absolute) 0.00 K/uL "   URIC ACID    Collection Time: 11/07/17 12:12 AM   Result Value Ref Range    Uric Acid <1.5 (L) 1.9 - 8.2 mg/dL   BASIC METABOLIC PANEL    Collection Time: 11/07/17 12:12 AM   Result Value Ref Range    Sodium 137 135 - 145 mmol/L    Potassium 4.0 3.6 - 5.5 mmol/L    Chloride 105 96 - 112 mmol/L    Co2 26 20 - 33 mmol/L    Glucose 136 (H) 65 - 99 mg/dL    Bun 7 (L) 8 - 22 mg/dL    Creatinine 0.31 (L) 0.50 - 1.40 mg/dL    Calcium 8.5 8.5 - 10.5 mg/dL    Anion Gap 6.0 0.0 - 11.9   RENAL FUNCTION PANEL    Collection Time: 11/07/17 12:12 AM   Result Value Ref Range    Phosphorus 3.0 2.5 - 4.5 mg/dL    Albumin 2.5 (L) 3.2 - 4.9 g/dL   ESTIMATED GFR    Collection Time: 11/07/17 12:12 AM   Result Value Ref Range    GFR If African American >60 >60 mL/min/1.73 m 2    GFR If Non African American >60 >60 mL/min/1.73 m 2   COD (ADULT)    Collection Time: 11/07/17 12:12 AM   Result Value Ref Range    ABO Grouping Only A     Rh Grouping Only POS     Antibody Screen-Cod NEG     Component R       RI3                 RedBloodCellsIRR    [id number]   issued       11/07/17   06:28      Product Type Red Blood Cells IRR LR  AS-3     Dispense Status Issued     Unit Number (Barcoded) [id number]     Product Code (Barcoded) T7073A22     Blood Type (Barcoded) 6200    PLATELETS REQUEST    Collection Time: 11/07/17  4:12 AM   Result Value Ref Range    Component P       P0I                 Plts,PheresisIRR    [id number]   issued       11/07/17   04:14      Product Type Platelets Pheresis IRR LR     Dispense Status Issued     Unit Number (Barcoded) [id number]     Product Code (Barcoded) E4770Z44     Blood Type (Barcoded) 9500        Assessment and Recommendations:  -  AML-- Myelodysplastic syndrome, RAEB-2, evolving into acute myelogenous leukemia.   Discussed with Dr. Krishnamurthy who recommended Vidaza/Venetoclax started on October 24, 2017. Increasing the Venetoclax dosing in an accelerated fashion unlike in CLL, per Dr. Krishnamurthy's  recommendations.        Started 20 mg dose of Venetoclax. Increased to 50 mg on 10/26. On 11/1, increased to 100 mg Qday.  Titrating up on dose now by 100mg every 4 days, depending on the cell counts and tolerance. Monitor closely for TLS.     --Day 14 of chemotherapy today  --plan is to increase the Veneto every 4 days by 100mg, titrating up to a dose of 600-800mg Qday.  -- so, on 11/5/17  go to 200mg, on 11/9/17  go to 300mg, on 11/13/17 got to 400mg, etc. As long as she is tolerating it.     - TLS prophylaxis on IV on allopurinol - monitor TLS  - Chronic HA - MRI brain with no acute intracranial process. Diffuse BM infiltration which may be contributing.  Improving. Status post LP ,no CSF involvement--Headache is now gone.  - Cytopenias due to # 1 , transfuse if HGB < 7.0 or PLT < 10 K and no bleeding   - Neutropenia on IV cefepime since hospitalization. Cultures uptodate neg , started prophylaxis acyclovir 400 mg po BID on 10/21/17      Plan:   - She is tolerating Venetoclax well with no major side effects   - Continue supportive measures until we a response , Transfuse blood and PLT today   - No signs of tumor lysis   - I did mention to her that we will observe her this week what her pattern is with transfusions so that we can plan as outpatient.   - She was little disappointed to hear that she will need to stay in hospital until weekend.   - Since she has prolonged cytopenias , also continue  acyclovir 400 mg po BID as prophylaxis     High complexicity/Drug monitoring     We will continue to follow with you; please call with any questions, [phone].      Gayathri Ahumada MD  [facility]   [phone]

## 2017-11-07 NOTE — CARE PLAN
Problem: Safety  Goal: Will remain free from falls    Intervention: Assess risk factors for falls  Pt. Has a strong and steady gait at this time      Problem: Pain Management  Goal: Pain level will decrease to patient's comfort goal    Intervention: Follow pain managment plan developed in collaboration with patient and Interdisciplinary Team  Pt. Denies pain at this time will continue to monitor. Pt. Gets leg pain with sudden onset

## 2017-11-07 NOTE — PROGRESS NOTES
Greta from Lab called with critical result of WBC of 0.4 and Platelets of 5 at 0400. Critical lab result read back to [facility]. This critical lab result is within parameters established by [facility] oncology policy.

## 2017-11-07 NOTE — PROGRESS NOTES
Pt. Alert and oriented. Pt. Finished up first unit of PRBC. No orders for second unit of PRBC call out to Dr. Ahumada new orders for second unit of PRBC. Second unit of PRBC infusing at this time. Reviewed POC with pt.. Pt. Denies pain at this time. Call light within reach hourly rounding in practice.

## 2017-11-07 NOTE — DISCHARGE SUMMARY
Medical SW    Referral: Sw met w/ Dr Cantor to staff pt's d/c.    Intervention: Pt's ANC is zero, and medical will continue to monitor. Pt received both platelets and blood today. Pt's ABX will be de-escalated.     Plan: Sw to assist w/ d/c planning as needed.

## 2017-11-08 NOTE — PROGRESS NOTES
[facility] Hospitalist Progress Note    Date of Service: 11/8/2017    Chief Complaint  67 y.o. 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  ANC zero and will be low for months.  She has been afebrile for many days and no evidence of infection during this hospitalization.  For now will stop cefepime to see how she responds and discuss with oncology for what their oral recommendation would be (ie bactrim tiw?)  Patient states she is fatigued and her legs hurt but no headache.  She is markedly edematous in BLE and this could be causing her symptoms.  OUT of bed with ambulation encouraged and patient agrees.  11/8 Patient feeling much better today after her multiple transfusions yesterday, she doesn't have a headache unless you ask her about it.  She states she has had a lot of trouble sleeping in the hospital and would like to try something to help at night - I ordered PRN restoril to see if this helps.  She is afebrile.  Cefepime stopped yesterday, after my discussion with oncology today okay to start tiw bactrim while neutropenic if she remains afebrile.    Consultants/Specialty  Oncology - [facility]    Disposition  tbd        Review of Systems   Constitutional: Positive for malaise/fatigue. Negative for chills and fever.   HENT: Negative for congestion.    Eyes: Negative for blurred vision and photophobia.   Respiratory: Negative for cough and shortness of breath.    Cardiovascular: Negative for chest pain, claudication and leg swelling.   Gastrointestinal: Negative for abdominal pain, constipation, diarrhea, heartburn and vomiting.   Genitourinary: Negative for dysuria and hematuria.   Musculoskeletal: Negative for joint pain and myalgias.   Skin: Negative for itching and rash.   Neurological: Positive for weakness. Negative for dizziness, sensory change, speech change and headaches.    Psychiatric/Behavioral: Negative for depression. The patient is not nervous/anxious and does not have insomnia.       Physical Exam  Laboratory/Imaging   Hemodynamics  Temp (24hrs), Av.7 °C (98 °F), Min:36.4 °C (97.6 °F), Max:36.9 °C (98.4 °F)   Temperature: 36.7 °C (98 °F)  Pulse  Av.8  Min: 68  Max: 118    Blood Pressure : 147/65      Respiratory      Respiration: 18, Pulse Oximetry: 90 %        RUL Breath Sounds: Clear, RML Breath Sounds: Clear, RLL Breath Sounds: Diminished, DEBBIE Breath Sounds: Clear, LLL Breath Sounds: Diminished    Fluids    Intake/Output Summary (Last 24 hours) at 17 1456  Last data filed at 17 0520   Gross per 24 hour   Intake             1722 ml   Output                0 ml   Net             1722 ml       Nutrition  Orders Placed This Encounter   Procedures   • Diet Order     Standing Status:   Standing     Number of Occurrences:   1     Order Specific Question:   Diet:     Answer:   Regular [1]     Physical Exam   Constitutional: She is oriented to person, place, and time. She appears well-developed and well-nourished. No distress.   HENT:   Head: Normocephalic and atraumatic.   Eyes: Conjunctivae are normal. No scleral icterus.   Neck: Neck supple. No JVD present.   Cardiovascular: Normal rate, regular rhythm and normal heart sounds.  Exam reveals no gallop and no friction rub.    No murmur heard.  Pulmonary/Chest: Effort normal and breath sounds normal. No respiratory distress. She exhibits no tenderness.   Abdominal: Soft. Bowel sounds are normal. She exhibits no distension. There is no guarding.   Musculoskeletal: She exhibits edema (both feet swollen). She exhibits no tenderness.   Neurological: She is alert and oriented to person, place, and time. No cranial nerve deficit.   Skin: Skin is warm and dry. She is not diaphoretic. No erythema. No pallor.   Psychiatric: She has a normal mood and affect. Her behavior is normal.   Nursing note and vitals reviewed.       Recent Labs      11/06/17   0045  11/07/17   0012  11/08/17   0020   WBC  0.5*  0.4*  1.0*   RBC  2.54*  2.28*  3.76*   HEMOGLOBIN  7.6*  6.8*  11.3*   HEMATOCRIT  20.9*  18.9*  30.4*   MCV  82.3  82.9  80.9*   MCH  29.9  29.8  30.1   MCHC  36.4*  36.0*  37.2*   RDW  37.6  37.4  36.7   PLATELETCT  11*  5*  47*   MPV  9.5  10.0  10.2     Recent Labs      11/06/17   0045  11/07/17   0012  11/08/17   0020   SODIUM  135  136  137  135   POTASSIUM  3.8  3.9  4.0  4.1   CHLORIDE  103  104  105  102   CO2  25  25  26  23   GLUCOSE  147*  150*  136*  155*   BUN  7*  7*  7*  8   CREATININE  0.33*  0.34*  0.31*  0.27*   CALCIUM  8.7  8.6  8.5  8.8                      Assessment/Plan     * Myelodysplastic syndrome (CMS-HCC)- (present on admission)   Assessment & Plan    Evolved into AML per oncology.  Venetoclax/Vidaza started 10/23 per Dr Krishnamurthy recommendations  Venetoclax to continue at increasing doses per oncology, still titrating up every four days  Monitoring ANC, currently 0        Neutropenic fever (CMS-[facility])- (present on admission)   Assessment & Plan    Cultures showing no growth.  Not having any current fevers  Empiric cefepime and acyclovir - dc cefepime and monitor for fevers  Consider bactrim mw - will discuss further with oncology  Still neutropenic and likely will be so for the next 3-6 months per oncology        Thrombocytopenia (CMS-HCC)- (present on admission)   Assessment & Plan    Keep platelet count >10 or higher if bleeding.        Headache   Assessment & Plan    States HA >1 month and associated with leg pain simultaneously  MRI brain with and without contrast done, no IC pathology, marrow abnormal as expected with MDS  Now improved with meds  LP done 10/26, no complications, pathology report - no malignant cells seen            Anemia- (present on admission)   Assessment & Plan    Transfuse if less than 7.0  Received 1 unit 10/24, 10/30, 2 units 11/7  Secondary to MDS to AML        Symptomatic  anemia- (present on admission)   Assessment & Plan    Monitor in observation for transfusion.  Transfuse for <7.0        HLD (hyperlipidemia)- (present on admission)   Assessment & Plan    Continue current statin therapy regimen        HTN (hypertension)- (present on admission)   Assessment & Plan    Coreg - controlled              Reviewed items::  Labs reviewed, Medications reviewed and Radiology images reviewed  Gan catheter::  No Gan  DVT prophylaxis pharmacological::  Contraindicated - High bleeding risk  DVT prophylaxis - mechanical:  SCDs  Antibiotics:  Treating active infection/contamination beyond 24 hours perioperative coverage

## 2017-11-08 NOTE — DISCHARGE PLANNING
Medical SW    Referral: sw spoke to MD Dr Cantor both 11/7 and 11/8     Intervention: 11/7: Pt's ANC is zero, and medical will continue to monitor. Pt received both platelets and blood today. Pt's ABX will be de-escalated.     11/8: Pt will stay at least through weekend. Pt is probably neutropenic. Pt needs a transfusion.    Plan: Sw to assist w/ d/c planning as needed.

## 2017-11-08 NOTE — PROGRESS NOTES
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 Increased to 50 mg on 10/26. On 11/1, increased to 100 mg Qday.  Titrating up on dose now by 100mg every 4 days, depending on the cell counts and tolerance , goal to go up to 600-800 . Monitor closely for TLS.  - 11/5/17 - 200 mg po daily   - Day 15 today     Subjective:    Objective:  Medications reviewed and notable for:  Current Facility-Administered Medications   Medication Dose   • sulfamethoxazole-trimethoprim (BACTRIM DS) 800-160 MG tablet 1 Tab  1 Tab   • Venetoclax TABS 200 mg  200 mg   • mag hydrox-al hydrox-simeth (MAALOX PLUS ES or MYLANTA DS) suspension 10 mL  10 mL   • oxyCODONE CR (OXYCONTIN) tablet 20 mg  20 mg   • Pharmacy Consult Request ...Pain Management Review      And   • oxycodone immediate-release (ROXICODONE) tablet 5 mg  5 mg    And   • oxycodone immediate release (ROXICODONE) tablet 10 mg  10 mg    And   • morphine (pf) 4 mg/ml injection 2 mg  2 mg   • allopurinol (ZYLOPRIM) tablet 300 mg  300 mg   • NS infusion     • acyclovir (ZOVIRAX) tablet 400 mg  400 mg   • atorvastatin (LIPITOR) tablet 40 mg  40 mg   • carvedilol (COREG) tablet 3.125 mg  3.125 mg   • omeprazole (PRILOSEC) capsule 20 mg  20 mg   • ondansetron (ZOFRAN) syringe/vial injection 4 mg  4 mg   • ondansetron (ZOFRAN ODT) dispertab 4 mg  4 mg   • haloperidol lactate (HALDOL) injection 5 mg  5 mg   • senna-docusate (PERICOLACE or SENOKOT S) 8.6-50 MG per tablet 2 Tab  2 Tab    And   • polyethylene glycol/lytes (MIRALAX) PACKET 1 Packet  1 Packet    And   • magnesium hydroxide (MILK OF MAGNESIA) suspension 30 mL  30 mL    And   • bisacodyl (DULCOLAX) suppository 10 mg  10 mg   • acetaminophen (TYLENOL) tablet 650 mg  650 mg       ROS:   Constitutional: +  fatigue, no fevers or chills, no night sweats  Resp: No cough or  "SOB  Cardio:No chest pain or palpitations  Pschy: No depression or anxiety   Neuro: No headaches, no seizure, no vision changes  GI: no abdominal pain, nausea or vomiting. No diarrhea or constipation   All other ROS negative    Blood pressure 137/71, pulse 93, temperature 36.7 °C (98 °F), resp. rate 18, height 1.575 m (5' 2\"), weight 66.5 kg (146 lb 9.7 oz), last menstrual period 10/18/1993, SpO2 91 %, not currently breastfeeding.    General:  comfortable, NAD  HEENT:  sclera anicteric, pupils equal, round, reactive to light, oral cavity and oropharynx clear, mucous membranes moist  Neck:   supple, no lymphadenopathy  Cor:   regular rate and rhythm, no murmurs, rubs, or gallops  Pulm:   clear to auscultation bilaterally  Abd:   bowel sounds present, soft, nontender, nondistended, no palpable masses or organomegaly  Extremities:  warm, no lower extremity edema  Neurologic:  A&O x 3  Pyschiatric:  Appropriate mood and affect    Labs reviewed and notable for:  Recent Labs      11/06/17   0045  11/07/17   0012  11/08/17   0020   WBC  0.5*  0.4*  1.0*   RBC  2.54*  2.28*  3.76*   HEMOGLOBIN  7.6*  6.8*  11.3*   HEMATOCRIT  20.9*  18.9*  30.4*   MCV  82.3  82.9  80.9*   MCH  29.9  29.8  30.1   MCHC  36.4*  36.0*  37.2*   RDW  37.6  37.4  36.7   PLATELETCT  11*  5*  47*   MPV  9.5  10.0  10.2         .@CMP  Recent Results (from the past 24 hour(s))   CBC WITH DIFFERENTIAL    Collection Time: 11/08/17 12:20 AM   Result Value Ref Range    WBC 1.0 (LL) 4.8 - 10.8 K/uL    RBC 3.76 (L) 4.20 - 5.40 M/uL    Hemoglobin 11.3 (L) 12.0 - 16.0 g/dL    Hematocrit 30.4 (L) 37.0 - 47.0 %    MCV 80.9 (L) 81.4 - 97.8 fL    MCH 30.1 27.0 - 33.0 pg    MCHC 37.2 (H) 33.6 - 35.0 g/dL    RDW 36.7 35.9 - 50.0 fL    Platelet Count 47 (LL) 164 - 446 K/uL    MPV 10.2 9.0 - 12.9 fL    Nucleated RBC 0.00 /100 WBC    NRBC (Absolute) 0.00 K/uL    Neutrophils-Polys 0.00 (L) 44.00 - 72.00 %    Lymphocytes 93.70 (H) 22.00 - 41.00 %    Monocytes 0.90 0.00 " - 13.40 %    Eosinophils 0.00 0.00 - 6.90 %    Basophils 0.00 0.00 - 1.80 %    Neutrophils (Absolute) 0.00 (LL) 2.00 - 7.15 K/uL    Lymphs (Absolute) 0.94 (L) 1.00 - 4.80 K/uL    Monos (Absolute) 0.01 0.00 - 0.85 K/uL    Eos (Absolute) 0.00 0.00 - 0.51 K/uL    Baso (Absolute) 0.00 0.00 - 0.12 K/uL    Anisocytosis 2+     Microcytosis 2+    BASIC METABOLIC PANEL    Collection Time: 11/08/17 12:20 AM   Result Value Ref Range    Sodium 135 135 - 145 mmol/L    Potassium 4.1 3.6 - 5.5 mmol/L    Chloride 102 96 - 112 mmol/L    Co2 23 20 - 33 mmol/L    Glucose 155 (H) 65 - 99 mg/dL    Bun 8 8 - 22 mg/dL    Creatinine 0.27 (L) 0.50 - 1.40 mg/dL    Calcium 8.8 8.5 - 10.5 mg/dL    Anion Gap 10.0 0.0 - 11.9   ESTIMATED GFR    Collection Time: 11/08/17 12:20 AM   Result Value Ref Range    GFR If African American >60 >60 mL/min/1.73 m 2    GFR If Non African American >60 >60 mL/min/1.73 m 2   DIFFERENTIAL MANUAL    Collection Time: 11/08/17 12:20 AM   Result Value Ref Range    Blasts 5.40 %    Manual Diff Status PERFORMED    PERIPHERAL SMEAR REVIEW    Collection Time: 11/08/17 12:20 AM   Result Value Ref Range    Peripheral Smear Review see below    PLATELET ESTIMATE    Collection Time: 11/08/17 12:20 AM   Result Value Ref Range    Plt Estimation Marked Decrease    MORPHOLOGY    Collection Time: 11/08/17 12:20 AM   Result Value Ref Range    RBC Morphology Present     Poikilocytosis 1+     Ovalocytes 1+    IMMATURE PLT FRACTION    Collection Time: 11/08/17 12:20 AM   Result Value Ref Range    Imm. Plt Fraction 2.8 0.6 - 13.1 K/uL       Diagnostic imaging:      Assessment and Recommendations:  -  AML-- Myelodysplastic syndrome, RAEB-2, evolving into acute myelogenous leukemia.   Discussed with Dr. Krishnamurthy who recommended Vidaza/Venetoclax started on October 24, 2017. Increasing the Venetoclax dosing in an accelerated fashion unlike in CLL, per Dr. Krishnamurthy's recommendations.        Started 20 mg dose of Venetoclax. Increased to 50 mg on  10/26. On 11/1, increased to 100 mg Qday.  Titrating up on dose now by 100mg every 4 days, depending on the cell counts and tolerance. Monitor closely for TLS.     --Day 15 of chemotherapy today  --plan is to increase the [place] every 4 days by 100mg, titrating up to a dose of 600-800mg Qday.  -- so, on 11/5/17  go to 200mg, on 11/9/17  go to 300mg, on 11/13/17 got to 400mg, etc. As long as she is tolerating it.     - TLS prophylaxis on IV on allopurinol - monitor TLS  - Chronic HA - MRI brain with no acute intracranial process. Diffuse BM infiltration which may be contributing.  Improving. Status post LP ,no CSF involvement--Headache is now gone.  - Cytopenias due to # 1 , transfuse if HGB < 7.0 or PLT < 10 K and no bleeding   - Neutropenia on IV cefepime since hospitalization. Cultures uptodate neg , started prophylaxis acyclovir 400 mg po BID on 10/21/17      Plan:   - She responded well to transfusions yesterday   - Clinically stable  - Day 15 today   - Continue current dose of Venetoclax       High complexicity/Drug monitoring     We will continue to follow with you; please call with any questions, [phone].      Gayathri Ahumada MD  [facility]   [phone]

## 2017-11-08 NOTE — PROGRESS NOTES
[facility] Hospitalist Progress Note    Date of Service: 2017    Chief Complaint  67 y.o. 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  ANC zero and will be low for months.  She has been afebrile for many days and no evidence of infection during this hospitalization.  For now will stop cefepime to see how she responds and discuss with oncology for what their oral recommendation would be (ie bactrim tiw?)  Patient states she is fatigued and her legs hurt but no headache.  She is markedly edematous in BLE and this could be causing her symptoms.  OUT of bed with ambulation encouraged and patient agrees.    Consultants/Specialty  Oncology - [facility]    Disposition  tbd        Review of Systems   Constitutional: Positive for malaise/fatigue. Negative for chills and fever.   HENT: Negative for congestion.    Eyes: Negative for blurred vision and photophobia.   Respiratory: Negative for cough and shortness of breath.    Cardiovascular: Negative for chest pain, claudication and leg swelling.   Gastrointestinal: Negative for abdominal pain, constipation, diarrhea, heartburn and vomiting.   Genitourinary: Negative for dysuria and hematuria.   Musculoskeletal: Negative for joint pain and myalgias.   Skin: Negative for itching and rash.   Neurological: Positive for weakness. Negative for dizziness, sensory change, speech change and headaches.   Psychiatric/Behavioral: Negative for depression. The patient is not nervous/anxious and does not have insomnia.       Physical Exam  Laboratory/Imaging   Hemodynamics  Temp (24hrs), Av.7 °C (98.1 °F), Min:36.4 °C (97.6 °F), Max:37.3 °C (99.1 °F)   Temperature: 36.4 °C (97.6 °F)  Pulse  Av.6  Min: 68  Max: 118    Blood Pressure : 115/81      Respiratory      Respiration: 15, Pulse Oximetry: 92 %        RUL Breath Sounds: Clear, RML Breath Sounds:  Clear, RLL Breath Sounds: Diminished, DEBBIE Breath Sounds: Clear, LLL Breath Sounds: Diminished    Fluids    Intake/Output Summary (Last 24 hours) at 11/07/17 1730  Last data filed at 11/07/17 1215   Gross per 24 hour   Intake             4293 ml   Output                0 ml   Net             4293 ml       Nutrition  Orders Placed This Encounter   Procedures   • Diet Order     Standing Status:   Standing     Number of Occurrences:   1     Order Specific Question:   Diet:     Answer:   Regular [1]     Physical Exam   Constitutional: She is oriented to person, place, and time. She appears well-developed and well-nourished. No distress.   HENT:   Head: Normocephalic and atraumatic.   Eyes: Conjunctivae are normal. No scleral icterus.   Neck: Neck supple. No JVD present.   Cardiovascular: Normal rate, regular rhythm and normal heart sounds.  Exam reveals no gallop and no friction rub.    No murmur heard.  Pulmonary/Chest: Effort normal and breath sounds normal. No respiratory distress. She exhibits no tenderness.   Abdominal: Soft. Bowel sounds are normal. She exhibits no distension. There is no guarding.   Musculoskeletal: She exhibits edema (both feet swollen). She exhibits no tenderness.   Neurological: She is alert and oriented to person, place, and time. No cranial nerve deficit.   Skin: Skin is warm and dry. She is not diaphoretic. No erythema. No pallor.   Psychiatric: She has a normal mood and affect. Her behavior is normal.   Nursing note and vitals reviewed.      Recent Labs      11/05/17   0052  11/06/17   0045  11/07/17   0012   WBC  0.4*  0.5*  0.4*   RBC  2.82*  2.54*  2.28*   HEMOGLOBIN  8.4*  7.6*  6.8*   HEMATOCRIT  23.0*  20.9*  18.9*   MCV  81.6  82.3  82.9   MCH  29.8  29.9  29.8   MCHC  36.5*  36.4*  36.0*   RDW  37.5  37.6  37.4   PLATELETCT  19*  11*  5*   MPV  9.5  9.5  10.0     Recent Labs      11/06/17   0045  11/07/17   0012   SODIUM  135  136  137   POTASSIUM  3.8  3.9  4.0   CHLORIDE  103   104  105   CO2  25  25  26   GLUCOSE  147*  150*  136*   BUN  7*  7*  7*   CREATININE  0.33*  0.34*  0.31*   CALCIUM  8.7  8.6  8.5                      Assessment/Plan     * Myelodysplastic syndrome (CMS-HCC)- (present on admission)   Assessment & Plan    Evolved into AML per oncology.  Venetoclax/Vidaza started 10/23 per Dr Krishnamurthy recommendations  Venetoclax to continue at increasing doses per oncology, still titrating up every four days  Monitoring ANC, currently 0        Neutropenic fever (CMS-HCC)- (present on admission)   Assessment & Plan    Cultures showing no growth.  Not having any current fevers  Empiric cefepime and acyclovir - dc cefepime and monitor for fevers  Consider bactrim mwf - will discuss further with oncology  Still neutropenic and likely will be so for the next 3-6 months per oncology        Thrombocytopenia (CMS-HCC)- (present on admission)   Assessment & Plan    Keep platelet count >10 or higher if bleeding.        Headache   Assessment & Plan    States HA >1 month and associated with leg pain simultaneously  MRI brain with and without contrast done, no IC pathology, marrow abnormal as expected with MDS  Now improved with meds  LP done 10/26, no complications, pathology report - no malignant cells seen            Anemia- (present on admission)   Assessment & Plan    Transfuse if less than 7.0  Received 1 unit 10/24, 10/30, 2 units 11/7  Secondary to 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  Transfuse for <7.0        HLD (hyperlipidemia)- (present on admission)   Assessment & Plan    Continue current statin therapy regimen        HTN (hypertension)- (present on admission)   Assessment & Plan    Coreg - controlled              Reviewed items::  Labs reviewed, Medications reviewed and Radiology images reviewed  Gan catheter::  No Gan  DVT prophylaxis pharmacological::  Contraindicated - High bleeding  risk  DVT prophylaxis - mechanical:  SCDs  Antibiotics:  Treating active infection/contamination beyond 24 hours perioperative coverage

## 2017-11-08 NOTE — PROGRESS NOTES
Laura from Lab called with critical result of WBC of 1.0 and Platelets of 47 at 0125. Critical lab result read back to Laura.  This critical lab result is within parameters established by [facility] oncology policy.

## 2017-11-09 NOTE — PROGRESS NOTES
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 Increased to 50 mg on 10/26. On 11/1, increased to 100 mg Qday.  Titrating up on dose now by 100mg every 4 days, depending on the cell counts and tolerance , goal to go up to 600-800 . Monitor closely for TLS.  - 11/5/17 - 200 mg po daily , increased to 300 mg on 11/9/17   - Day 16 today     Subjective:  She denies any fevers or chills. She denies any abdominal pain, nausea or vomiting.   She denies any bleeding     Objective:  Medications reviewed and notable for:  Current Facility-Administered Medications   Medication Dose   • sulfamethoxazole-trimethoprim (BACTRIM DS) 800-160 MG tablet 1 Tab  1 Tab   • temazepam (RESTORIL) capsule 15 mg  15 mg   • Venetoclax TABS 200 mg  200 mg   • mag hydrox-al hydrox-simeth (MAALOX PLUS ES or MYLANTA DS) suspension 10 mL  10 mL   • oxyCODONE CR (OXYCONTIN) tablet 20 mg  20 mg   • Pharmacy Consult Request ...Pain Management Review      And   • oxycodone immediate-release (ROXICODONE) tablet 5 mg  5 mg    And   • oxycodone immediate release (ROXICODONE) tablet 10 mg  10 mg    And   • morphine (pf) 4 mg/ml injection 2 mg  2 mg   • allopurinol (ZYLOPRIM) tablet 300 mg  300 mg   • NS infusion     • acyclovir (ZOVIRAX) tablet 400 mg  400 mg   • atorvastatin (LIPITOR) tablet 40 mg  40 mg   • carvedilol (COREG) tablet 3.125 mg  3.125 mg   • omeprazole (PRILOSEC) capsule 20 mg  20 mg   • ondansetron (ZOFRAN) syringe/vial injection 4 mg  4 mg   • ondansetron (ZOFRAN ODT) dispertab 4 mg  4 mg   • haloperidol lactate (HALDOL) injection 5 mg  5 mg   • senna-docusate (PERICOLACE or SENOKOT S) 8.6-50 MG per tablet 2 Tab  2 Tab    And   • polyethylene glycol/lytes (MIRALAX) PACKET 1 Packet  1 Packet    And   • magnesium hydroxide (MILK OF MAGNESIA) suspension 30 mL  30 mL    And   •  "bisacodyl (DULCOLAX) suppository 10 mg  10 mg   • acetaminophen (TYLENOL) tablet 650 mg  650 mg       ROS:   Constitutional: + fatigue, no fevers or chills, no night sweats  Resp: No cough or SOB  Cardio:No chest pain or palpitations  Pschy: No depression or anxiety   Neuro: No headaches, no seizure, no vision changes  GI: no abdominal pain, nausea or vomiting. No diarrhea or constipation   All other ROS negative    Blood pressure 153/73, pulse 97, temperature 36.5 °C (97.7 °F), resp. rate 18, height 1.575 m (5' 2\"), weight 66.5 kg (146 lb 9.7 oz), last menstrual period 10/18/1993, SpO2 92 %, not currently breastfeeding.    General:  comfortable, NAD  HEENT:  sclera anicteric, pupils equal, round, reactive to light, oral cavity and oropharynx clear, mucous membranes moist  Neck:   supple, no lymphadenopathy  Cor:   regular rate and rhythm, no murmurs, rubs, or gallops  Pulm:   clear to auscultation bilaterally  Abd:   bowel sounds present, soft, nontender, nondistended, no palpable masses or organomegaly  Extremities:  1+ edema bilaterally   Neurologic:  A&O x 3  Pyschiatric:  Appropriate mood and affect    Labs reviewed and notable for:  Recent Labs      11/07/17   0012  11/08/17   0020  11/09/17   0335   WBC  0.4*  1.0*  0.3*   RBC  2.28*  3.76*  3.19*   HEMOGLOBIN  6.8*  11.3*  9.5*   HEMATOCRIT  18.9*  30.4*  26.3*   MCV  82.9  80.9*  82.4   MCH  29.8  30.1  29.8   MCHC  36.0*  37.2*  36.1*   RDW  37.4  36.7  38.4   PLATELETCT  5*  47*  26*   MPV  10.0  10.2  10.5         .@CMP  Recent Results (from the past 24 hour(s))   CBC WITH DIFFERENTIAL    Collection Time: 11/09/17  3:35 AM   Result Value Ref Range    WBC 0.3 (LL) 4.8 - 10.8 K/uL    RBC 3.19 (L) 4.20 - 5.40 M/uL    Hemoglobin 9.5 (L) 12.0 - 16.0 g/dL    Hematocrit 26.3 (L) 37.0 - 47.0 %    MCV 82.4 81.4 - 97.8 fL    MCH 29.8 27.0 - 33.0 pg    MCHC 36.1 (H) 33.6 - 35.0 g/dL    RDW 38.4 35.9 - 50.0 fL    Platelet Count 26 (LL) 164 - 446 K/uL    MPV 10.5 9.0 " - 12.9 fL    Nucleated RBC 0.00 /100 WBC    NRBC (Absolute) 0.00 K/uL   BASIC METABOLIC PANEL    Collection Time: 11/09/17  3:35 AM   Result Value Ref Range    Sodium 136 135 - 145 mmol/L    Potassium 3.7 3.6 - 5.5 mmol/L    Chloride 102 96 - 112 mmol/L    Co2 25 20 - 33 mmol/L    Glucose 127 (H) 65 - 99 mg/dL    Bun 9 8 - 22 mg/dL    Creatinine 0.30 (L) 0.50 - 1.40 mg/dL    Calcium 8.3 (L) 8.5 - 10.5 mg/dL    Anion Gap 9.0 0.0 - 11.9   ESTIMATED GFR    Collection Time: 11/09/17  3:35 AM   Result Value Ref Range    GFR If African American >60 >60 mL/min/1.73 m 2    GFR If Non African American >60 >60 mL/min/1.73 m 2         Assessment and Recommendations:  -  AML-- Myelodysplastic syndrome, RAEB-2, evolving into acute myelogenous leukemia.   Discussed with Dr. Krishnamurthy who recommended Vidaza/Venetoclax started on October 24, 2017. Increasing the Venetoclax dosing in an accelerated fashion unlike in CLL, per Dr. Krishnamurthy's recommendations.        Started 20 mg dose of Venetoclax. Increased to 50 mg on 10/26. On 11/1, increased to 100 mg Qday.  Titrating up on dose now by 100mg every 4 days, depending on the cell counts and tolerance. Monitor closely for TLS.     --Day 16 of chemotherapy today  --plan is to increase the Veneto every 4 days by 100mg, titrating up to a dose of 600-800mg Qday.  -- so, on 11/5/17  go to 200mg, on 11/9/17  go to 300mg, on 11/13/17 got to 400mg, etc. As long as she is tolerating it.     - TLS prophylaxis on IV on allopurinol - monitor TLS  - Chronic HA - MRI brain with no acute intracranial process. Diffuse BM infiltration which may be contributing.  Improving. Status post LP ,no CSF involvement--Headache is now gone.  - Cytopenias due to # 1 , transfuse if HGB < 7.0 or PLT < 10 K and no bleeding   - Neutropenia on IV cefepime since hospitalization. Cultures uptodate neg , started prophylaxis acyclovir 400 mg po BID on 10/21/17     Plan:   - Day 16 today   - Increased dose to 300 mg po daily as  planned  - No TLS - monitor   - Monitor side effects   - Check CMP AM       High complexicity/Drug monitoring     We will continue to follow with you; please call with any questions, [phone].      Gayathri Ahumada MD  [facility]   [phone]

## 2017-11-09 NOTE — PROGRESS NOTES
[facility] Hospitalist Progress Note    Date of Service: 11/9/2017    Chief Complaint  67 y.o. 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  ANC zero and will be low for months.  She has been afebrile for many days and no evidence of infection during this hospitalization.  For now will stop cefepime to see how she responds and discuss with oncology for what their oral recommendation would be (ie bactrim tiw?)  Patient states she is fatigued and her legs hurt but no headache.  She is markedly edematous in BLE and this could be causing her symptoms.  OUT of bed with ambulation encouraged and patient agrees.  11/8 Patient feeling much better today after her multiple transfusions yesterday, she doesn't have a headache unless you ask her about it.  She states she has had a lot of trouble sleeping in the hospital and would like to try something to help at night - I ordered PRN restoril to see if this helps.  She is afebrile.  Cefepime stopped yesterday, after my discussion with oncology today okay to start tiw bactrim while neutropenic if she remains afebrile.  11/9 Patient with HA today, will trial fioricet PRN.  She states she isn't sure if she slept well last night and doesn't want to take medications to help her sleep as she is already taking too many meds as it is.  Her dose of venetoclax was also increased today by oncology.  She will likely need platelet transfusion by this weekend.  BP has been high again - increased dose of coreg.    Consultants/Specialty  Oncology - [facility]    Disposition  tbd        Review of Systems   Constitutional: Positive for malaise/fatigue. Negative for chills and fever.   HENT: Negative for congestion.    Eyes: Negative for blurred vision and photophobia.   Respiratory: Negative for cough and shortness of breath.    Cardiovascular: Negative for chest pain,  claudication and leg swelling.   Gastrointestinal: Negative for abdominal pain, constipation, diarrhea, heartburn and vomiting.   Genitourinary: Negative for dysuria and hematuria.   Musculoskeletal: Negative for joint pain and myalgias.   Skin: Negative for itching and rash.   Neurological: Positive for weakness and headaches (back again). Negative for dizziness, sensory change and speech change.   Psychiatric/Behavioral: Negative for depression. The patient is not nervous/anxious and does not have insomnia.       Physical Exam  Laboratory/Imaging   Hemodynamics  Temp (24hrs), Av.7 °C (98 °F), Min:36.4 °C (97.5 °F), Max:36.8 °C (98.3 °F)   Temperature: 36.8 °C (98.3 °F)  Pulse  Av.3  Min: 68  Max: 118    Blood Pressure : 151/72      Respiratory      Respiration: 18, Pulse Oximetry: 90 %        RUL Breath Sounds: Clear, RML Breath Sounds: Clear, RLL Breath Sounds: Diminished, DEBBIE Breath Sounds: Clear, LLL Breath Sounds: Diminished    Fluids    Intake/Output Summary (Last 24 hours) at 17 1442  Last data filed at 17 1700   Gross per 24 hour   Intake             1227 ml   Output                0 ml   Net             1227 ml       Nutrition  Orders Placed This Encounter   Procedures   • Diet Order     Standing Status:   Standing     Number of Occurrences:   1     Order Specific Question:   Diet:     Answer:   Regular [1]     Physical Exam   Constitutional: She is oriented to person, place, and time. She appears well-developed and well-nourished. No distress.   HENT:   Head: Normocephalic and atraumatic.   Eyes: Conjunctivae are normal. No scleral icterus.   Neck: Neck supple. No JVD present.   Cardiovascular: Normal rate, regular rhythm and normal heart sounds.  Exam reveals no gallop and no friction rub.    No murmur heard.  Pulmonary/Chest: Effort normal and breath sounds normal. No respiratory distress. She exhibits no tenderness.   Abdominal: Soft. Bowel sounds are normal. She exhibits no  distension. There is no guarding.   Musculoskeletal: She exhibits edema (both feet swollen). She exhibits no tenderness.   Neurological: She is alert and oriented to person, place, and time. No cranial nerve deficit.   Skin: Skin is warm and dry. She is not diaphoretic. No erythema. No pallor.   Psychiatric: She has a normal mood and affect. Her behavior is normal.   Nursing note and vitals reviewed.      Recent Labs      11/07/17   0012  11/08/17   0020  11/09/17   0335   WBC  0.4*  1.0*  0.3*   RBC  2.28*  3.76*  3.19*   HEMOGLOBIN  6.8*  11.3*  9.5*   HEMATOCRIT  18.9*  30.4*  26.3*   MCV  82.9  80.9*  82.4   MCH  29.8  30.1  29.8   MCHC  36.0*  37.2*  36.1*   RDW  37.4  36.7  38.4   PLATELETCT  5*  47*  26*   MPV  10.0  10.2  10.5     Recent Labs      11/07/17   0012  11/08/17 0020  11/09/17   0335   SODIUM  137  135  136   POTASSIUM  4.0  4.1  3.7   CHLORIDE  105  102  102   CO2  26  23  25   GLUCOSE  136*  155*  127*   BUN  7*  8  9   CREATININE  0.31*  0.27*  0.30*   CALCIUM  8.5  8.8  8.3*                      Assessment/Plan     * Myelodysplastic syndrome (CMS-[facility])- (present on admission)   Assessment & Plan    Evolved into AML per oncology.  Venetoclax/Vidaza started 10/23 per Dr Krishnamurthy recommendations  Venetoclax to continue at increasing doses per oncology, still titrating up every four days  Monitoring ANC, currently 0        Neutropenic fever (CMS-[facility])- (present on admission)   Assessment & Plan    Cultures showing no growth.  Not having any current fevers  Empiric cefepime and acyclovir - dc cefepime and monitor for fevers  Consider bactrim mwf - will discuss further with oncology  Still neutropenic and likely will be so for the next 3-6 months per oncology        Thrombocytopenia (CMS-[facility])- (present on admission)   Assessment & Plan    Keep platelet count >10 or higher if bleeding.        Headache   Assessment & Plan    PRN fioricet  States HA >1 month and associated with leg pain  simultaneously  MRI brain with and without contrast done, no IC pathology, marrow abnormal as expected with MDS  Now improved with meds  LP done 10/26, no complications, pathology report - no malignant cells seen            Anemia- (present on admission)   Assessment & Plan    Transfuse if less than 7.0  Received 1 unit 10/24, 10/30, 2 units 11/7  Secondary to 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  Transfuse for <7.0        HLD (hyperlipidemia)- (present on admission)   Assessment & Plan    Continue current statin therapy regimen        HTN (hypertension)- (present on admission)   Assessment & Plan    Coreg - increased as sbp trending back up.                Reviewed items::  Labs reviewed, Medications reviewed and Radiology images reviewed  Gan catheter::  No Gan  DVT prophylaxis pharmacological::  Contraindicated - High bleeding risk  DVT prophylaxis - mechanical:  SCDs  Antibiotics:  Treating active infection/contamination beyond 24 hours perioperative coverage

## 2017-11-09 NOTE — CARE PLAN
Problem: Skin Integrity  Goal: Risk for impaired skin integrity will decrease  Outcome: PROGRESSING AS EXPECTED  Patient can turn themselves in bed and ambulate to the bathroom with ease, decreasing the risk of skin breakdown    Problem: Respiratory:  Goal: Respiratory status will improve  Outcome: PROGRESSING AS EXPECTED  Patients respiratory status has remained positive without needing supplemental oxygen and showing no signs of shortness of breath.

## 2017-11-09 NOTE — PROGRESS NOTES
Trino from Lab called with critical result of WBC at 0.3 and Platelets at 26. Critical lab result read back to Trino.   This critical lab result is within parameters established by  for this patient

## 2017-11-10 PROBLEM — R53.81 MALAISE: Status: ACTIVE | Noted: 2017-01-01

## 2017-11-10 NOTE — PROGRESS NOTES
"Patient provided with senior low cost housing list.  She is thinking about possibly living on her own after she is discharged and gets her strength back.  She only is in the \"thninking\" phase right now, as I expressed concern about her living alone.  Will continue to follow.  "

## 2017-11-10 NOTE — DIETARY
Nutrition Services:  Brief Update    Pt with variable PO intake, but avg ~50%.  Pt with current supplement order and receiving Boost Plus.  Pt denied any other nutritional needs at this time.    RD will continue to follow.

## 2017-11-10 NOTE — PROGRESS NOTES
Mechelle from Lab called with critical result of platelets at 18 and WBC at 0.3. Critical lab result read back to Mechelle.   This critical lab result is within parameters established by  for this patient

## 2017-11-10 NOTE — DISCHARGE PLANNING
Medical SW    Referral: sw spoke to Dr Cantor regarding d/c plan and IMM     Intervention: Pt has leukemia and may possibly d/c this weekend if she is stable and she may receive a blood transfusion.     Sw left report w/ Friday Sw requesting IMM be given if pt leaving by Sunday.    Plan: Sw to assist w/ d/c planning as needed.

## 2017-11-10 NOTE — PROGRESS NOTES
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 Increased to 50 mg on 10/26. On 11/1, increased to 100 mg Qday.  Titrating up on dose now by 100mg every 4 days, depending on the cell counts and tolerance , goal to go up to 600-800 . Monitor closely for TLS.  - 11/5/17 - 200 mg po daily , increased to 300 mg on 11/9/17   - Day 17 today     Subjective:  No overnight events   No fevers or chills. No diarrhea.   She has chronic fatigue. She does mention usually during the day she is sitting up in chair   She is on stable oxygen requirements mainly at night time 3 L and during day on room air  No bleeding     Objective:  Medications reviewed and notable for:  Current Facility-Administered Medications   Medication Dose   • Venetoclax TABS 300 mg  300 mg   • carvedilol (COREG) tablet 6.25 mg  6.25 mg   • acetaminophen/caffeine/butalbital 325-40-50 mg (FIORICET) -40 MG per tablet 1 Tab  1 Tab   • sulfamethoxazole-trimethoprim (BACTRIM DS) 800-160 MG tablet 1 Tab  1 Tab   • temazepam (RESTORIL) capsule 15 mg  15 mg   • mag hydrox-al hydrox-simeth (MAALOX PLUS ES or MYLANTA DS) suspension 10 mL  10 mL   • oxyCODONE CR (OXYCONTIN) tablet 20 mg  20 mg   • Pharmacy Consult Request ...Pain Management Review      And   • oxycodone immediate-release (ROXICODONE) tablet 5 mg  5 mg    And   • oxycodone immediate release (ROXICODONE) tablet 10 mg  10 mg    And   • morphine (pf) 4 mg/ml injection 2 mg  2 mg   • allopurinol (ZYLOPRIM) tablet 300 mg  300 mg   • NS infusion     • acyclovir (ZOVIRAX) tablet 400 mg  400 mg   • atorvastatin (LIPITOR) tablet 40 mg  40 mg   • omeprazole (PRILOSEC) capsule 20 mg  20 mg   • ondansetron (ZOFRAN) syringe/vial injection 4 mg  4 mg   • ondansetron (ZOFRAN ODT) dispertab 4 mg  4 mg   • haloperidol lactate (HALDOL) injection 5 mg  5 mg  "  • senna-docusate (PERICOLACE or SENOKOT S) 8.6-50 MG per tablet 2 Tab  2 Tab    And   • polyethylene glycol/lytes (MIRALAX) PACKET 1 Packet  1 Packet    And   • magnesium hydroxide (MILK OF MAGNESIA) suspension 30 mL  30 mL    And   • bisacodyl (DULCOLAX) suppository 10 mg  10 mg   • acetaminophen (TYLENOL) tablet 650 mg  650 mg       ROS:   Constitutional: +  Fatigue and malaise , no fevers or chills, no night sweats  Resp: No cough or SOB  Cardio:No chest pain or palpitations  Pschy: No depression or anxiety   Neuro: No headaches, no seizure, no vision changes  GI: no abdominal pain, nausea or vomiting. No diarrhea or constipation   All other ROS negative    Blood pressure 134/60, pulse 92, temperature 36.7 °C (98 °F), resp. rate 16, height 1.575 m (5' 2\"), weight 66.5 kg (146 lb 9.7 oz), last menstrual period 10/18/1993, SpO2 92 %, not currently breastfeeding.    General:  comfortable, NAD  HEENT:  sclera anicteric, pupils equal, round, reactive to light, oral cavity and oropharynx clear, mucous membranes moist  Neck:   supple, no lymphadenopathy  Cor:   regular rate and rhythm, no murmurs, rubs, or gallops  Pulm:   clear to auscultation bilaterally  Abd:   bowel sounds present, soft, nontender, nondistended, no palpable masses or organomegaly  Extremities:  warm, no lower extremity edema  Neurologic:  A&O x 3  Pyschiatric:  Appropriate mood and affect    Labs reviewed and notable for:  Recent Labs      11/08/17   0020  11/09/17   0335  11/10/17   0025   WBC  1.0*  0.3*  0.3*   RBC  3.76*  3.19*  3.05*   HEMOGLOBIN  11.3*  9.5*  9.3*   HEMATOCRIT  30.4*  26.3*  25.2*   MCV  80.9*  82.4  82.6   MCH  30.1  29.8  30.5   MCHC  37.2*  36.1*  36.9*   RDW  36.7  38.4  37.3   PLATELETCT  47*  26*  18*   MPV  10.2  10.5  10.2         .@CMP  Recent Results (from the past 24 hour(s))   CBC WITH DIFFERENTIAL    Collection Time: 11/10/17 12:25 AM   Result Value Ref Range    WBC 0.3 (LL) 4.8 - 10.8 K/uL    RBC 3.05 (L) 4.20 " - 5.40 M/uL    Hemoglobin 9.3 (L) 12.0 - 16.0 g/dL    Hematocrit 25.2 (L) 37.0 - 47.0 %    MCV 82.6 81.4 - 97.8 fL    MCH 30.5 27.0 - 33.0 pg    MCHC 36.9 (H) 33.6 - 35.0 g/dL    RDW 37.3 35.9 - 50.0 fL    Platelet Count 18 (LL) 164 - 446 K/uL    MPV 10.2 9.0 - 12.9 fL    Nucleated RBC 0.00 /100 WBC    NRBC (Absolute) 0.00 K/uL    Neutrophils-Polys 6.20 (L) 44.00 - 72.00 %    Lymphocytes 68.80 (H) 22.00 - 41.00 %    Monocytes 25.00 (H) 0.00 - 13.40 %    Eosinophils 0.00 0.00 - 6.90 %    Basophils 0.00 0.00 - 1.80 %    Immature Granulocytes 0.00 0.00 - 0.90 %    Neutrophils (Absolute) 0.02 (LL) 2.00 - 7.15 K/uL    Lymphs (Absolute) 0.22 (L) 1.00 - 4.80 K/uL    Monos (Absolute) 0.08 0.00 - 0.85 K/uL    Eos (Absolute) 0.00 0.00 - 0.51 K/uL    Baso (Absolute) 0.00 0.00 - 0.12 K/uL    Immature Granulocytes (abs) 0.00 0.00 - 0.11 K/uL   URIC ACID    Collection Time: 11/10/17 12:25 AM   Result Value Ref Range    Uric Acid 1.8 (L) 1.9 - 8.2 mg/dL   COMP METABOLIC PANEL    Collection Time: 11/10/17 12:25 AM   Result Value Ref Range    Sodium 136 135 - 145 mmol/L    Potassium 3.6 3.6 - 5.5 mmol/L    Chloride 102 96 - 112 mmol/L    Co2 26 20 - 33 mmol/L    Anion Gap 8.0 0.0 - 11.9    Glucose 147 (H) 65 - 99 mg/dL    Bun 7 (L) 8 - 22 mg/dL    Creatinine 0.27 (L) 0.50 - 1.40 mg/dL    Calcium 8.3 (L) 8.5 - 10.5 mg/dL    AST(SGOT) 10 (L) 12 - 45 U/L    ALT(SGPT) 10 2 - 50 U/L    Alkaline Phosphatase 128 (H) 30 - 99 U/L    Total Bilirubin 0.9 0.1 - 1.5 mg/dL    Albumin 2.6 (L) 3.2 - 4.9 g/dL    Total Protein 5.1 (L) 6.0 - 8.2 g/dL    Globulin 2.5 1.9 - 3.5 g/dL    A-G Ratio 1.0 g/dL   ESTIMATED GFR    Collection Time: 11/10/17 12:25 AM   Result Value Ref Range    GFR If African American >60 >60 mL/min/1.73 m 2    GFR If Non African American >60 >60 mL/min/1.73 m 2             Assessment and Recommendations:  -  AML-- Myelodysplastic syndrome, RAEB-2, evolving into acute myelogenous leukemia.   Discussed with Dr. Krishnamurthy who  recommended Vidaza/Venetoclax started on October 24, 2017. Increasing the Venetoclax dosing in an accelerated fashion unlike in CLL, per Dr. Krishnamurthy's recommendations.        Started 20 mg dose of Venetoclax. Increased to 50 mg on 10/26. On 11/1, increased to 100 mg Qday.  Titrating up on dose now by 100mg every 4 days, depending on the cell counts and tolerance. Monitor closely for TLS.     --Day 17 of chemotherapy today  --plan is to increase the Veneto every 4 days by 100mg, titrating up to a dose of 600-800mg Qday.  -- so, on 11/5/17  go to 200mg, on 11/9/17  go to 300mg, on 11/13/17 got to 400mg, etc. As long as she is tolerating it.     - TLS prophylaxis on IV on allopurinol - monitor TLS  - Chronic HA - MRI brain with no acute intracranial process. Diffuse BM infiltration which may be contributing.  Improving. Status post LP ,no CSF involvement--Headache is now gone.  - Cytopenias due to # 1 , transfuse if HGB < 7.0 or PLT < 10 K and no bleeding   - Neutropenia on IV cefepime since hospitalization. Cultures uptodate neg , started prophylaxis acyclovir 400 mg po BID on 10/21/17 . She is afebrile     Plan:   - Day 17 today   - Continue treatment as planned , She is tolerating increasing doses of Venetoclax   - No evidence of TLS   - Monitor side effects   - No transfusions today       High complexicity/Drug monitoring     We will continue to follow with you; please call with any questions, [phone].      Gayathri Ahumada MD  [facility]   [phone]

## 2017-11-10 NOTE — PROGRESS NOTES
[facility] Hospitalist Progress Note    Date of Service: 11/10/2017    Chief Complaint  67 y.o. 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  ANC zero and will be low for months.  She has been afebrile for many days and no evidence of infection during this hospitalization.  For now will stop cefepime to see how she responds and discuss with oncology for what their oral recommendation would be (ie bactrim tiw?)  Patient states she is fatigued and her legs hurt but no headache.  She is markedly edematous in BLE and this could be causing her symptoms.  OUT of bed with ambulation encouraged and patient agrees.  11/8 Patient feeling much better today after her multiple transfusions yesterday, she doesn't have a headache unless you ask her about it.  She states she has had a lot of trouble sleeping in the hospital and would like to try something to help at night - I ordered PRN restoril to see if this helps.  She is afebrile.  Cefepime stopped yesterday, after my discussion with oncology today okay to start tiw bactrim while neutropenic if she remains afebrile.  11/9 Patient with HA today, will trial fioricet PRN.  She states she isn't sure if she slept well last night and doesn't want to take medications to help her sleep as she is already taking too many meds as it is.  Her dose of venetoclax was also increased today by oncology.  She will likely need platelet transfusion by this weekend.  BP has been high again - increased dose of coreg.  11/10 Patient without complaints of pain other than her legs when she gets out of bed - which she hasn't been doing often.  She has refused a shower past 2 days and states its due to being tired.  Yesterday she inquired about going to a rehab when she gets out of here.  I've requested PT come to re-evaluate her and told her she needs to get out of her  bed much more frequently.  She has notably become more depressed since her sister has left and become more disconnected.  She agrees that she needs to do more for herself and get more active.  Will continue to encourage her.    Consultants/Specialty  Oncology - [facility]    Disposition  tbd        Review of Systems   Constitutional: Positive for malaise/fatigue. Negative for chills and fever.   HENT: Negative for congestion.    Eyes: Negative for blurred vision and photophobia.   Respiratory: Negative for cough and shortness of breath.    Cardiovascular: Negative for chest pain, claudication and leg swelling.   Gastrointestinal: Negative for abdominal pain, constipation, diarrhea, heartburn and vomiting.   Genitourinary: Negative for dysuria and hematuria.   Musculoskeletal: Negative for joint pain and myalgias.   Skin: Negative for itching and rash.   Neurological: Positive for weakness and headaches (back again). Negative for dizziness, sensory change and speech change.   Psychiatric/Behavioral: Negative for depression. The patient is not nervous/anxious and does not have insomnia.       Physical Exam  Laboratory/Imaging   Hemodynamics  Temp (24hrs), Av.9 °C (98.4 °F), Min:36.7 °C (98 °F), Max:37.4 °C (99.3 °F)   Temperature: 36.7 °C (98 °F)  Pulse  Av.4  Min: 68  Max: 118    Blood Pressure : 132/81      Respiratory      Respiration: 16, Pulse Oximetry: 95 %        RUL Breath Sounds: Clear, RML Breath Sounds: Clear, RLL Breath Sounds: Diminished, DEBBIE Breath Sounds: Clear, LLL Breath Sounds: Diminished    Fluids    Intake/Output Summary (Last 24 hours) at 11/10/17 1534  Last data filed at 17 1807   Gross per 24 hour   Intake             2425 ml   Output                0 ml   Net             2425 ml       Nutrition  Orders Placed This Encounter   Procedures   • Diet Order     Standing Status:   Standing     Number of Occurrences:   1     Order Specific Question:   Diet:     Answer:   Regular [1]      Physical Exam   Constitutional: She is oriented to person, place, and time. She appears well-developed and well-nourished. No distress.   HENT:   Head: Normocephalic and atraumatic.   Eyes: Conjunctivae are normal. No scleral icterus.   Neck: Neck supple. No JVD present.   Cardiovascular: Normal rate, regular rhythm and normal heart sounds.  Exam reveals no gallop and no friction rub.    No murmur heard.  Pulmonary/Chest: Effort normal and breath sounds normal. No respiratory distress. She exhibits no tenderness.   Abdominal: Soft. Bowel sounds are normal. She exhibits no distension. There is no guarding.   Musculoskeletal: She exhibits edema (both feet swollen). She exhibits no tenderness.   Neurological: She is alert and oriented to person, place, and time. No cranial nerve deficit.   Skin: Skin is warm and dry. She is not diaphoretic. No erythema. No pallor.   Psychiatric: She has a normal mood and affect. Her behavior is normal.   Nursing note and vitals reviewed.      Recent Labs      11/08/17   0020  11/09/17   0335  11/10/17   0025   WBC  1.0*  0.3*  0.3*   RBC  3.76*  3.19*  3.05*   HEMOGLOBIN  11.3*  9.5*  9.3*   HEMATOCRIT  30.4*  26.3*  25.2*   MCV  80.9*  82.4  82.6   MCH  30.1  29.8  30.5   MCHC  37.2*  36.1*  36.9*   RDW  36.7  38.4  37.3   PLATELETCT  47*  26*  18*   MPV  10.2  10.5  10.2     Recent Labs      11/08/17 0020  11/09/17   0335  11/10/17   0025   SODIUM  135  136  136   POTASSIUM  4.1  3.7  3.6   CHLORIDE  102  102  102   CO2  23  25  26   GLUCOSE  155*  127*  147*   BUN  8  9  7*   CREATININE  0.27*  0.30*  0.27*   CALCIUM  8.8  8.3*  8.3*                      Assessment/Plan     * Myelodysplastic syndrome (CMS-HCC)- (present on admission)   Assessment & Plan    Evolved into AML per oncology.  Venetoclax/Vidaza started 10/23 per Dr Krishnamurthy recommendations  Venetoclax to continue at increasing doses per oncology, still titrating up every four days  Monitoring ANC, currently 0         Neutropenic fever (CMS-HCC)- (present on admission)   Assessment & Plan    Cultures showing no growth.  Not having any current fevers  Empiric cefepime and acyclovir - dc cefepime and monitor for fevers  Consider bactrim mwf - will discuss further with oncology  Still neutropenic and likely will be so for the next 3-6 months per oncology        Thrombocytopenia (CMS-HCC)- (present on admission)   Assessment & Plan    Keep platelet count >10 or higher if bleeding.        Headache   Assessment & Plan    PRN fioricet  States HA >1 month and associated with leg pain simultaneously  MRI brain with and without contrast done, no IC pathology, marrow abnormal as expected with MDS  Now improved with meds  LP done 10/26, no complications, pathology report - no malignant cells seen            Anemia- (present on admission)   Assessment & Plan    Transfuse if less than 7.0  Received 1 unit 10/24, 10/30, 2 units 11/7  Secondary to 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  Transfuse for <7.0        HLD (hyperlipidemia)- (present on admission)   Assessment & Plan    Continue current statin therapy regimen        HTN (hypertension)- (present on admission)   Assessment & Plan    Coreg - increased as sbp trending back up.                Reviewed items::  Labs reviewed, Medications reviewed and Radiology images reviewed  Gan catheter::  No Gan  DVT prophylaxis pharmacological::  Contraindicated - High bleeding risk  DVT prophylaxis - mechanical:  SCDs  Antibiotics:  Treating active infection/contamination beyond 24 hours perioperative coverage

## 2017-11-10 NOTE — CARE PLAN
Problem: Safety  Goal: Will remain free from falls  Outcome: PROGRESSING AS EXPECTED  Patient has remained free from falls when ambulating to the bathroom and around the room. Fall precautions are in place including non-skid socks, clutter free room and call light within reach.

## 2017-11-11 NOTE — PROGRESS NOTES
Assumed care of pt at 1850. Rc'd report from MARLENY Hensley. Pt is Aox4. Pt is up self.  Denies pain and nausea. Hourly rounding in place. Call light within reach. Bed in lowest, locked position.

## 2017-11-11 NOTE — PROGRESS NOTES
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 Increased to 50 mg on 10/26. On 11/1, increased to 100 mg Qday.  Titrating up on dose now by 100mg every 4 days, depending on the cell counts and tolerance , goal to go up to 600-800 . Monitor closely for TLS.  - 11/5/17 - 200 mg po daily , increased to 300 mg on 11/9/17   - Day 18 today     Subjective:  Lizette is clinically stable. She denies any cough or SOB. She denies any fevers  She is lying in bed comfortably.   No bleeding     Objective:  Medications reviewed and notable for:  Current Facility-Administered Medications   Medication Dose   • Venetoclax TABS 300 mg  300 mg   • carvedilol (COREG) tablet 6.25 mg  6.25 mg   • acetaminophen/caffeine/butalbital 325-40-50 mg (FIORICET) -40 MG per tablet 1 Tab  1 Tab   • sulfamethoxazole-trimethoprim (BACTRIM DS) 800-160 MG tablet 1 Tab  1 Tab   • temazepam (RESTORIL) capsule 15 mg  15 mg   • mag hydrox-al hydrox-simeth (MAALOX PLUS ES or MYLANTA DS) suspension 10 mL  10 mL   • oxyCODONE CR (OXYCONTIN) tablet 20 mg  20 mg   • Pharmacy Consult Request ...Pain Management Review      And   • oxycodone immediate-release (ROXICODONE) tablet 5 mg  5 mg    And   • oxycodone immediate release (ROXICODONE) tablet 10 mg  10 mg    And   • morphine (pf) 4 mg/ml injection 2 mg  2 mg   • allopurinol (ZYLOPRIM) tablet 300 mg  300 mg   • NS infusion     • acyclovir (ZOVIRAX) tablet 400 mg  400 mg   • atorvastatin (LIPITOR) tablet 40 mg  40 mg   • omeprazole (PRILOSEC) capsule 20 mg  20 mg   • ondansetron (ZOFRAN) syringe/vial injection 4 mg  4 mg   • ondansetron (ZOFRAN ODT) dispertab 4 mg  4 mg   • haloperidol lactate (HALDOL) injection 5 mg  5 mg   • senna-docusate (PERICOLACE or SENOKOT S) 8.6-50 MG per tablet 2 Tab  2 Tab    And   • polyethylene glycol/lytes (MIRALAX)  "PACKET 1 Packet  1 Packet    And   • magnesium hydroxide (MILK OF MAGNESIA) suspension 30 mL  30 mL    And   • bisacodyl (DULCOLAX) suppository 10 mg  10 mg   • acetaminophen (TYLENOL) tablet 650 mg  650 mg       ROS:   Constitutional: + fatigue, no fevers or chills, no night sweats  Resp: No cough or SOB  Cardio:No chest pain or palpitations  Pschy: No depression or anxiety   Neuro: HA resolved.  no seizure, no vision changes  GI: no abdominal pain, nausea or vomiting. No diarrhea or constipation   All other ROS negative    Blood pressure 130/66, pulse 90, temperature 36.6 °C (97.8 °F), resp. rate 18, height 1.575 m (5' 2\"), weight 66.5 kg (146 lb 9.7 oz), last menstrual period 10/18/1993, SpO2 93 %, not currently breastfeeding.    General:  comfortable, NAD  HEENT:  sclera anicteric, pupils equal, round, reactive to light, oral cavity and oropharynx clear, mucous membranes moist  Neck:   supple, no lymphadenopathy  Cor:   regular rate and rhythm, no murmurs, rubs, or gallops  Pulm:   clear to auscultation bilaterally  Abd:   bowel sounds present, soft, nontender, nondistended  Extremities:  warm, no lower extremity edema  Neurologic:  A&O x 3  Pyschiatric:  Appropriate mood and affect    Labs reviewed and notable for:  Recent Labs      11/09/17   0335  11/10/17   0025  11/11/17   0311   WBC  0.3*  0.3*  0.2*   RBC  3.19*  3.05*  2.99*   HEMOGLOBIN  9.5*  9.3*  9.1*   HEMATOCRIT  26.3*  25.2*  25.0*   MCV  82.4  82.6  83.6   MCH  29.8  30.5  30.4   MCHC  36.1*  36.9*  36.4*   RDW  38.4  37.3  38.3   PLATELETCT  26*  18*  10*   MPV  10.5  10.2  11.7         .@CMP  Recent Results (from the past 24 hour(s))   CBC WITH DIFFERENTIAL    Collection Time: 11/11/17  3:11 AM   Result Value Ref Range    WBC 0.2 (LL) 4.8 - 10.8 K/uL    RBC 2.99 (L) 4.20 - 5.40 M/uL    Hemoglobin 9.1 (L) 12.0 - 16.0 g/dL    Hematocrit 25.0 (L) 37.0 - 47.0 %    MCV 83.6 81.4 - 97.8 fL    MCH 30.4 27.0 - 33.0 pg    MCHC 36.4 (H) 33.6 - 35.0 g/dL "    RDW 38.3 35.9 - 50.0 fL    Platelet Count 10 (LL) 164 - 446 K/uL    MPV 11.7 9.0 - 12.9 fL    Nucleated RBC 0.00 /100 WBC    NRBC (Absolute) 0.00 K/uL    Neutrophils (Absolute) Cancel 2.00 - 7.15 K/uL   BASIC METABOLIC PANEL    Collection Time: 11/11/17  3:11 AM   Result Value Ref Range    Sodium 134 (L) 135 - 145 mmol/L    Potassium 3.7 3.6 - 5.5 mmol/L    Chloride 102 96 - 112 mmol/L    Co2 27 20 - 33 mmol/L    Glucose 137 (H) 65 - 99 mg/dL    Bun 9 8 - 22 mg/dL    Creatinine 0.22 (L) 0.50 - 1.40 mg/dL    Calcium 8.3 (L) 8.5 - 10.5 mg/dL    Anion Gap 5.0 0.0 - 11.9   ESTIMATED GFR    Collection Time: 11/11/17  3:11 AM   Result Value Ref Range    GFR If African American >60 >60 mL/min/1.73 m 2    GFR If Non African American >60 >60 mL/min/1.73 m 2       Diagnostic imaging:      Assessment and Recommendations:  -  AML-- Myelodysplastic syndrome, RAEB-2, evolving into acute myelogenous leukemia.   Discussed with Dr. Krishnamurthy who recommended Vidaza/Venetoclax started on October 24, 2017. Increasing the Venetoclax dosing in an accelerated fashion unlike in CLL, per Dr. Krishnamurthy's recommendations.        Started 20 mg dose of Venetoclax. Increased to 50 mg on 10/26. On 11/1, increased to 100 mg Qday.  Titrating up on dose now by 100mg every 4 days, depending on the cell counts and tolerance. Monitor closely for TLS.     --Day 18 of chemotherapy today  --plan is to increase the [place] every 4 days by 100mg, titrating up to a dose of 600-800mg Qday.  -- so, on 11/5/17  go to 200mg, on 11/9/17  go to 300mg, on 11/13/17 got to 400mg, etc. As long as she is tolerating it.     - TLS prophylaxis on IV on allopurinol - monitor TLS  - Chronic HA - MRI brain with no acute intracranial process. Diffuse BM infiltration which may be contributing.  Improving. Status post LP ,no CSF involvement--Headache is now gone.  - Cytopenias due to # 1 , transfuse if HGB < 7.0 or PLT < 10 K and no bleeding   - Neutropenia on IV cefepime since  hospitalization. Cultures uptodate neg , started prophylaxis acyclovir 400 mg po BID on 10/21/17 . She is afebrile     Plan:   - Day 18 today   - She is tolerating Venetoclax well   - Transfuse PLT today   - Continue to monitor side effects   - Encouraged her to sit up in chair       High complexicity/Drug monitoring     We will continue to follow with you; please call with any questions, [phone].      Gayathri Ahumada MD  [facility]   [phone]

## 2017-11-11 NOTE — PROGRESS NOTES
[facility] Hospitalist Progress Note    Date of Service: 11/11/2017    Chief Complaint  67 y.o. 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  ANC zero and will be low for months.  She has been afebrile for many days and no evidence of infection during this hospitalization.  For now will stop cefepime to see how she responds and discuss with oncology for what their oral recommendation would be (ie bactrim tiw?)  Patient states she is fatigued and her legs hurt but no headache.  She is markedly edematous in BLE and this could be causing her symptoms.  OUT of bed with ambulation encouraged and patient agrees.  11/8 Patient feeling much better today after her multiple transfusions yesterday, she doesn't have a headache unless you ask her about it.  She states she has had a lot of trouble sleeping in the hospital and would like to try something to help at night - I ordered PRN restoril to see if this helps.  She is afebrile.  Cefepime stopped yesterday, after my discussion with oncology today okay to start tiw bactrim while neutropenic if she remains afebrile.  11/9 Patient with HA today, will trial fioricet PRN.  She states she isn't sure if she slept well last night and doesn't want to take medications to help her sleep as she is already taking too many meds as it is.  Her dose of venetoclax was also increased today by oncology.  She will likely need platelet transfusion by this weekend.  BP has been high again - increased dose of coreg.  11/10 Patient without complaints of pain other than her legs when she gets out of bed - which she hasn't been doing often.  She has refused a shower past 2 days and states its due to being tired.  Yesterday she inquired about going to a rehab when she gets out of here.  I've requested PT come to re-evaluate her and told her she needs to get out of her  bed much more frequently.  She has notably become more depressed since her sister has left and become more disconnected.  She agrees that she needs to do more for herself and get more active.  Will continue to encourage her.   Patient in much better spirits today, had showered and ambulated yesterday as requested.  Per oncology to receive platelets today - ordered.  Denies fevers and feels like she has a better outlook today.  No complaints of pain.    Consultants/Specialty  Oncology - [facility]    Disposition  tbd        Review of Systems   Constitutional: Positive for malaise/fatigue. Negative for chills and fever.   HENT: Negative for congestion.    Eyes: Negative for blurred vision and photophobia.   Respiratory: Negative for cough and shortness of breath.    Cardiovascular: Negative for chest pain, claudication and leg swelling.   Gastrointestinal: Negative for abdominal pain, constipation, diarrhea, heartburn and vomiting.   Genitourinary: Negative for dysuria and hematuria.   Musculoskeletal: Negative for joint pain and myalgias.   Skin: Negative for itching and rash.   Neurological: Positive for weakness and headaches (back again). Negative for dizziness, sensory change and speech change.   Psychiatric/Behavioral: Negative for depression. The patient is not nervous/anxious and does not have insomnia.       Physical Exam  Laboratory/Imaging   Hemodynamics  Temp (24hrs), Av.7 °C (98.1 °F), Min:36.6 °C (97.8 °F), Max:36.9 °C (98.4 °F)   Temperature: 36.8 °C (98.3 °F)  Pulse  Av.4  Min: 68  Max: 118    Blood Pressure : 131/67      Respiratory      Respiration: 18, Pulse Oximetry: 93 %        RUL Breath Sounds: Clear, RML Breath Sounds: Clear, RLL Breath Sounds: Diminished, DEBBIE Breath Sounds: Clear, LLL Breath Sounds: Diminished    Fluids    Intake/Output Summary (Last 24 hours) at 17 1437  Last data filed at 11/10/17 1700   Gross per 24 hour   Intake             2333 ml   Output                 0 ml   Net             2333 ml       Nutrition  Orders Placed This Encounter   Procedures   • Diet Order     Standing Status:   Standing     Number of Occurrences:   1     Order Specific Question:   Diet:     Answer:   Regular [1]     Physical Exam   Constitutional: She is oriented to person, place, and time. She appears well-developed and well-nourished. No distress.   HENT:   Head: Normocephalic and atraumatic.   Eyes: Conjunctivae are normal. No scleral icterus.   Neck: Neck supple. No JVD present.   Cardiovascular: Normal rate, regular rhythm and normal heart sounds.  Exam reveals no gallop and no friction rub.    No murmur heard.  Pulmonary/Chest: Effort normal and breath sounds normal. No respiratory distress. She exhibits no tenderness.   Abdominal: Soft. Bowel sounds are normal. She exhibits no distension. There is no guarding.   Musculoskeletal: She exhibits edema (both feet swollen). She exhibits no tenderness.   Neurological: She is alert and oriented to person, place, and time. No cranial nerve deficit.   Skin: Skin is warm and dry. She is not diaphoretic. No erythema. No pallor.   Psychiatric: She has a normal mood and affect. Her behavior is normal.   Nursing note and vitals reviewed.      Recent Labs      11/09/17   0335  11/10/17   0025  11/11/17 0311   WBC  0.3*  0.3*  0.2*   RBC  3.19*  3.05*  2.99*   HEMOGLOBIN  9.5*  9.3*  9.1*   HEMATOCRIT  26.3*  25.2*  25.0*   MCV  82.4  82.6  83.6   MCH  29.8  30.5  30.4   MCHC  36.1*  36.9*  36.4*   RDW  38.4  37.3  38.3   PLATELETCT  26*  18*  10*   MPV  10.5  10.2  11.7     Recent Labs      11/09/17 0335  11/10/17   0025  11/11/17 0311   SODIUM  136  136  134*   POTASSIUM  3.7  3.6  3.7   CHLORIDE  102  102  102   CO2  25  26  27   GLUCOSE  127*  147*  137*   BUN  9  7*  9   CREATININE  0.30*  0.27*  0.22*   CALCIUM  8.3*  8.3*  8.3*                      Assessment/Plan     * Myelodysplastic syndrome (CMS-HCC)- (present on admission)   Assessment &  Plan    Evolved into AML per oncology.  Venetoclax/Vidaza started 10/23 per Dr Krishnamurthy recommendations  Venetoclax to continue at increasing doses per oncology, still titrating up every four days  Monitoring ANC, currently 0        Neutropenic fever (CMS-HCC)- (present on admission)   Assessment & Plan    Cultures showing no growth.  Not having any current fevers  Empiric cefepime and acyclovir - dc cefepime and monitor for fevers  Consider bactrim mwf - will discuss further with oncology  Still neutropenic and likely will be so for the next 3-6 months per oncology        Thrombocytopenia (CMS-HCC)- (present on admission)   Assessment & Plan    Keep platelet count >10 or higher if bleeding.  Transfuse 1 unit today          Headache   Assessment & Plan    PRN fioricet  States HA >1 month and associated with leg pain simultaneously  MRI brain with and without contrast done, no IC pathology, marrow abnormal as expected with MDS  Now improved with meds  LP done 10/26, no complications, pathology report - no malignant cells seen            Anemia- (present on admission)   Assessment & Plan    Transfuse if less than 7.0  Received 1 unit 10/24, 10/30, 2 units 11/7  Secondary to 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  Transfuse for <7.0        HLD (hyperlipidemia)- (present on admission)   Assessment & Plan    Continue current statin therapy regimen        HTN (hypertension)- (present on admission)   Assessment & Plan    Coreg - increased as sbp trending back up.                Reviewed items::  Labs reviewed, Medications reviewed and Radiology images reviewed  Gan catheter::  No Gan  DVT prophylaxis pharmacological::  Contraindicated - High bleeding risk  DVT prophylaxis - mechanical:  SCDs  Antibiotics:  Treating active infection/contamination beyond 24 hours perioperative coverage

## 2017-11-11 NOTE — CARE PLAN
Problem: Safety  Goal: Will remain free from falls  Outcome: PROGRESSING AS EXPECTED  Fall precautions in place: treaded slipper socks on, mobility signs posted, hourly rounding, bed in lowest position, belongings and call light are within reach, and near nurses station.    Problem: Venous Thromboembolism (VTW)/Deep Vein Thrombosis (DVT) Prevention:  Goal: Patient will participate in Venous Thrombosis (VTE)/Deep Vein Thrombosis (DVT)Prevention Measures  Outcome: PROGRESSING SLOWER THAN EXPECTED  SCDs refused for DVT/VTE prevention.    Problem: Pain Management  Goal: Pain level will decrease to patient's comfort goal  Outcome: PROGRESSING AS EXPECTED  Pain managed well with PRN medication at this time.

## 2017-11-11 NOTE — CARE PLAN
Problem: Communication  Goal: The ability to communicate needs accurately and effectively will improve  Outcome: PROGRESSING AS EXPECTED  Patient has been resting in bed most of day.  She was able to shower today.  Visitors have been in and out today.  She complains of pain in bilateral legs, prn oxycodone given.  Patient continues to have small amount of pitting edema in bilateral feet.

## 2017-11-12 NOTE — PROGRESS NOTES
Assumed care of pt at 1850. Rc'd report from MARLENY Min. Pt is Aox4. Pt is up self.  C/o  hip pain, medicated with scheduled pain medication per MAR. Denies nausea. Hourly rounding in place. Call light within reach. Bed in lowest, locked position.

## 2017-11-12 NOTE — PROGRESS NOTES
[facility] Hospitalist Progress Note    Date of Service: 11/12/2017    Chief Complaint  67 y.o. 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  ANC zero and will be low for months.  She has been afebrile for many days and no evidence of infection during this hospitalization.  For now will stop cefepime to see how she responds and discuss with oncology for what their oral recommendation would be (ie bactrim tiw?)  Patient states she is fatigued and her legs hurt but no headache.  She is markedly edematous in BLE and this could be causing her symptoms.  OUT of bed with ambulation encouraged and patient agrees.  11/8 Patient feeling much better today after her multiple transfusions yesterday, she doesn't have a headache unless you ask her about it.  She states she has had a lot of trouble sleeping in the hospital and would like to try something to help at night - I ordered PRN restoril to see if this helps.  She is afebrile.  Cefepime stopped yesterday, after my discussion with oncology today okay to start tiw bactrim while neutropenic if she remains afebrile.  11/9 Patient with HA today, will trial fioricet PRN.  She states she isn't sure if she slept well last night and doesn't want to take medications to help her sleep as she is already taking too many meds as it is.  Her dose of venetoclax was also increased today by oncology.  She will likely need platelet transfusion by this weekend.  BP has been high again - increased dose of coreg.  11/10 Patient without complaints of pain other than her legs when she gets out of bed - which she hasn't been doing often.  She has refused a shower past 2 days and states its due to being tired.  Yesterday she inquired about going to a rehab when she gets out of here.  I've requested PT come to re-evaluate her and told her she needs to get out of her  bed much more frequently.  She has notably become more depressed since her sister has left and become more disconnected.  She agrees that she needs to do more for herself and get more active.  Will continue to encourage her.   Patient in much better spirits today, had showered and ambulated yesterday as requested.  Per oncology to receive platelets today - ordered.  Denies fevers and feels like she has a better outlook today.  No complaints of pain.   Patient feeling well today and still a very good outlook.  She is taking in PO well so will dc IVF.  PT/OT to evaluate for therapy need on discharge as patient has been getting weaker since admission.    Consultants/Specialty  Oncology - [facility]    Disposition  tbd        Review of Systems   Constitutional: Positive for malaise/fatigue. Negative for chills and fever.   HENT: Negative for congestion.    Eyes: Negative for blurred vision and photophobia.   Respiratory: Negative for cough and shortness of breath.    Cardiovascular: Negative for chest pain, claudication and leg swelling.   Gastrointestinal: Negative for abdominal pain, constipation, diarrhea, heartburn and vomiting.   Genitourinary: Negative for dysuria and hematuria.   Musculoskeletal: Negative for joint pain and myalgias.   Skin: Negative for itching and rash.   Neurological: Positive for weakness and headaches (back again). Negative for dizziness, sensory change and speech change.   Psychiatric/Behavioral: Negative for depression. The patient is not nervous/anxious and does not have insomnia.       Physical Exam  Laboratory/Imaging   Hemodynamics  Temp (24hrs), Av.9 °C (98.5 °F), Min:36.7 °C (98.1 °F), Max:37.1 °C (98.8 °F)   Temperature: 36.9 °C (98.4 °F)  Pulse  Av.8  Min: 68  Max: 118    Blood Pressure : 131/63      Respiratory      Respiration: 18, Pulse Oximetry: 92 %        RUL Breath Sounds: Clear, RML Breath Sounds: Clear, RLL Breath Sounds: Clear, DEBBIE Breath Sounds: Clear,  LLL Breath Sounds: Clear    Fluids    Intake/Output Summary (Last 24 hours) at 11/12/17 1441  Last data filed at 11/12/17 0800   Gross per 24 hour   Intake             1550 ml   Output                0 ml   Net             1550 ml       Nutrition  Orders Placed This Encounter   Procedures   • Diet Order     Standing Status:   Standing     Number of Occurrences:   1     Order Specific Question:   Diet:     Answer:   Regular [1]     Physical Exam   Constitutional: She is oriented to person, place, and time. She appears well-developed and well-nourished. No distress.   HENT:   Head: Normocephalic and atraumatic.   Eyes: Conjunctivae are normal. No scleral icterus.   Neck: Neck supple. No JVD present.   Cardiovascular: Normal rate, regular rhythm and normal heart sounds.  Exam reveals no gallop and no friction rub.    No murmur heard.  Pulmonary/Chest: Effort normal and breath sounds normal. No respiratory distress. She exhibits no tenderness.   Abdominal: Soft. Bowel sounds are normal. She exhibits no distension. There is no guarding.   Musculoskeletal: She exhibits edema (both feet swollen). She exhibits no tenderness.   Neurological: She is alert and oriented to person, place, and time. No cranial nerve deficit.   Skin: Skin is warm and dry. She is not diaphoretic. No erythema. No pallor.   Psychiatric: She has a normal mood and affect. Her behavior is normal.   Nursing note and vitals reviewed.      Recent Labs      11/10/17   0025  11/11/17   0311  11/12/17   0256   WBC  0.3*  0.2*  0.2*   RBC  3.05*  2.99*  2.96*   HEMOGLOBIN  9.3*  9.1*  8.7*   HEMATOCRIT  25.2*  25.0*  24.9*   MCV  82.6  83.6  84.1   MCH  30.5  30.4  29.4   MCHC  36.9*  36.4*  34.9   RDW  37.3  38.3  38.0   PLATELETCT  18*  10*  55*   MPV  10.2  11.7  9.4     Recent Labs      11/10/17   0025  11/11/17 0311 11/12/17 0256   SODIUM  136  134*  132*   POTASSIUM  3.6  3.7  3.6   CHLORIDE  102  102  99   CO2  26  27  24   GLUCOSE  147*  137*   144*   BUN  7*  9  10   CREATININE  0.27*  0.22*  0.24*   CALCIUM  8.3*  8.3*  8.1*                      Assessment/Plan     * Myelodysplastic syndrome (CMS-HCC)- (present on admission)   Assessment & Plan    Evolved into AML per oncology.  Venetoclax/Vidaza started 10/23 per Dr Krishnamurthy recommendations  Venetoclax to continue at increasing doses per oncology, still titrating up every four days  Monitoring ANC, currently 0        Neutropenic fever (CMS-[facility])- (present on admission)   Assessment & Plan    Cultures showing no growth.  Not having any current fevers  Empiric cefepime and acyclovir - dc cefepime and monitor for fevers  Consider bactrim mwf - will discuss further with oncology  Still neutropenic and likely will be so for the next 3-6 months per oncology        Thrombocytopenia (CMS-HCC)- (present on admission)   Assessment & Plan    Keep platelet count >10 or higher if bleeding.  Transfuse 1 unit today          Headache   Assessment & Plan    PRN fioricet  States HA >1 month and associated with leg pain simultaneously  MRI brain with and without contrast done, no IC pathology, marrow abnormal as expected with MDS  Now improved with meds  LP done 10/26, no complications, pathology report - no malignant cells seen            Anemia- (present on admission)   Assessment & Plan    Transfuse if less than 7.0  Received 1 unit 10/24, 10/30, 2 units 11/7  Secondary to 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  Transfuse for <7.0        HLD (hyperlipidemia)- (present on admission)   Assessment & Plan    Continue current statin therapy regimen        HTN (hypertension)- (present on admission)   Assessment & Plan    Coreg dose 6.25 - better control                Reviewed items::  Labs reviewed, Medications reviewed and Radiology images reviewed  Gan catheter::  No Gan  DVT prophylaxis pharmacological::  Contraindicated - High bleeding  risk  DVT prophylaxis - mechanical:  SCDs  Antibiotics:  Treating active infection/contamination beyond 24 hours perioperative coverage

## 2017-11-12 NOTE — PROGRESS NOTES
Patient resting quietly in bed, no S/S of distress, AA&Ox4. Denies SOB, chest pain, dizziness. Call light within reach,  pt calls appropriately, up self to restroom. Pt makes needs known, pain managed with PRN Oxy 10. Educated patient about the importance of frequent repositioning to prevent skin breakdown. Patient expressed understanding of importance of repositioning.  Bed in lowest position, bed locked, RN and CNA numbers provided, no further needs at this time. No changes from EPIC. Hourly rounding in place.

## 2017-11-12 NOTE — PROGRESS NOTES
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 Increased to 50 mg on 10/26. On 11/1, increased to 100 mg Qday.  Titrating up on dose now by 100mg every 4 days, depending on the cell counts and tolerance , goal to go up to 600-800 . Monitor closely for TLS.  - 11/5/17 - 200 mg po daily , increased to 300 mg on 11/9/17   - Day 19 today     Subjective:  She remains afebrile , She feels nauseous this morning waiting for her anti nausea medication  No vomiting. No bleeding. No diarrhea     Objective:  Medications reviewed and notable for:  Current Facility-Administered Medications   Medication Dose   • Venetoclax TABS 300 mg  300 mg   • carvedilol (COREG) tablet 6.25 mg  6.25 mg   • acetaminophen/caffeine/butalbital 325-40-50 mg (FIORICET) -40 MG per tablet 1 Tab  1 Tab   • sulfamethoxazole-trimethoprim (BACTRIM DS) 800-160 MG tablet 1 Tab  1 Tab   • temazepam (RESTORIL) capsule 15 mg  15 mg   • mag hydrox-al hydrox-simeth (MAALOX PLUS ES or MYLANTA DS) suspension 10 mL  10 mL   • oxyCODONE CR (OXYCONTIN) tablet 20 mg  20 mg   • Pharmacy Consult Request ...Pain Management Review      And   • oxycodone immediate-release (ROXICODONE) tablet 5 mg  5 mg    And   • oxycodone immediate release (ROXICODONE) tablet 10 mg  10 mg    And   • morphine (pf) 4 mg/ml injection 2 mg  2 mg   • allopurinol (ZYLOPRIM) tablet 300 mg  300 mg   • NS infusion     • acyclovir (ZOVIRAX) tablet 400 mg  400 mg   • atorvastatin (LIPITOR) tablet 40 mg  40 mg   • omeprazole (PRILOSEC) capsule 20 mg  20 mg   • ondansetron (ZOFRAN) syringe/vial injection 4 mg  4 mg   • ondansetron (ZOFRAN ODT) dispertab 4 mg  4 mg   • haloperidol lactate (HALDOL) injection 5 mg  5 mg   • senna-docusate (PERICOLACE or SENOKOT S) 8.6-50 MG per tablet 2 Tab  2 Tab    And   • polyethylene glycol/lytes  "(MIRALAX) PACKET 1 Packet  1 Packet    And   • magnesium hydroxide (MILK OF MAGNESIA) suspension 30 mL  30 mL    And   • bisacodyl (DULCOLAX) suppository 10 mg  10 mg   • acetaminophen (TYLENOL) tablet 650 mg  650 mg       ROS:   Constitutional: +  Fatigue and malaise , no fevers or chills, no night sweats  Resp: No cough or SOB  Cardio:No chest pain or palpitations  Pschy: No depression or anxiety   Neuro: No headaches, no seizure, no vision changes  GI: no abdominal pain, nausea or vomiting. No diarrhea or constipation   All other ROS negative    Blood pressure 143/72, pulse 96, temperature 37.1 °C (98.7 °F), resp. rate 18, height 1.575 m (5' 2\"), weight 66.5 kg (146 lb 9.7 oz), last menstrual period 10/18/1993, SpO2 92 %, not currently breastfeeding.    General:  comfortable, NAD  HEENT:  sclera anicteric, pupils equal, round, reactive to light, oral cavity and oropharynx clear, mucous membranes moist  Neck:   supple, no lymphadenopathy  Cor:   regular rate and rhythm, no murmurs, rubs, or gallops  Pulm:   clear to auscultation bilaterally  Abd:   bowel sounds present, soft, nontender, nondistended, no palpable masses or organomegaly  Extremities:  warm, no lower extremity edema  Neurologic:  A&O x 3  Pyschiatric:  Appropriate mood and affect    Labs reviewed and notable for:  Recent Labs      11/10/17   0025  11/11/17   0311  11/12/17   0256   WBC  0.3*  0.2*  0.2*   RBC  3.05*  2.99*  2.96*   HEMOGLOBIN  9.3*  9.1*  8.7*   HEMATOCRIT  25.2*  25.0*  24.9*   MCV  82.6  83.6  84.1   MCH  30.5  30.4  29.4   MCHC  36.9*  36.4*  34.9   RDW  37.3  38.3  38.0   PLATELETCT  18*  10*  55*   MPV  10.2  11.7  9.4         .@CMP  Recent Results (from the past 24 hour(s))   PLATELETS REQUEST    Collection Time: 11/11/17  3:18 PM   Result Value Ref Range    Component P       PII                 Plts,PheresisIRR    [id number]   transfused   11/11/17   16:55      Product Type Platelets  Pheresis IRR LR     Dispense Status " Transfused     Unit Number (Barcoded) [id number]     Product Code (Barcoded) P6406B39     Blood Type (Barcoded) 5100    CBC WITH DIFFERENTIAL    Collection Time: 11/12/17  2:56 AM   Result Value Ref Range    WBC 0.2 (LL) 4.8 - 10.8 K/uL    RBC 2.96 (L) 4.20 - 5.40 M/uL    Hemoglobin 8.7 (L) 12.0 - 16.0 g/dL    Hematocrit 24.9 (L) 37.0 - 47.0 %    MCV 84.1 81.4 - 97.8 fL    MCH 29.4 27.0 - 33.0 pg    MCHC 34.9 33.6 - 35.0 g/dL    RDW 38.0 35.9 - 50.0 fL    Platelet Count 55 (L) 164 - 446 K/uL    MPV 9.4 9.0 - 12.9 fL    Nucleated RBC 0.00 /100 WBC    NRBC (Absolute) 0.00 K/uL    Neutrophils (Absolute) Cancel 2.00 - 7.15 K/uL    Anisocytosis 1+     Microcytosis 1+    BASIC METABOLIC PANEL    Collection Time: 11/12/17  2:56 AM   Result Value Ref Range    Sodium 132 (L) 135 - 145 mmol/L    Potassium 3.6 3.6 - 5.5 mmol/L    Chloride 99 96 - 112 mmol/L    Co2 24 20 - 33 mmol/L    Glucose 144 (H) 65 - 99 mg/dL    Bun 10 8 - 22 mg/dL    Creatinine 0.24 (L) 0.50 - 1.40 mg/dL    Calcium 8.1 (L) 8.5 - 10.5 mg/dL    Anion Gap 9.0 0.0 - 11.9   ESTIMATED GFR    Collection Time: 11/12/17  2:56 AM   Result Value Ref Range    GFR If African American >60 >60 mL/min/1.73 m 2    GFR If Non African American >60 >60 mL/min/1.73 m 2         Assessment and Recommendations:  -  AML-- Myelodysplastic syndrome, RAEB-2, evolving into acute myelogenous leukemia.   Discussed with Dr. Krishnamurthy who recommended Vidaza/Venetoclax started on October 24, 2017. Increasing the Venetoclax dosing in an accelerated fashion unlike in CLL, per Dr. Krishnamurthy's recommendations.        Started 20 mg dose of Venetoclax. Increased to 50 mg on 10/26. On 11/1, increased to 100 mg Qday.  Titrating up on dose now by 100mg every 4 days, depending on the cell counts and tolerance. Monitor closely for TLS.     --Day 19 of chemotherapy today  --plan is to increase the Ven[place] every 4 days by 100mg, titrating up to a dose of 600-800mg Qday.  -- so, on 11/5/17  go to 200mg, on  11/9/17  go to 300mg, on 11/13/17 got to 400mg, etc. As long as she is tolerating it.     - TLS prophylaxis on IV on allopurinol - monitor TLS  - Chronic HA - MRI brain with no acute intracranial process. Diffuse BM infiltration which may be contributing.  Improving. Status post LP ,no CSF involvement--Headache is now gone.  - Cytopenias due to # 1 , transfuse if HGB < 7.0 or PLT < 10 K and no bleeding   - Neutropenia on IV cefepime since hospitalization and was switched to Bactrum 11/8/17 . Cultures uptodate neg , started prophylaxis acyclovir 400 mg po BID on 10/21/17 . She is afebrile     Plan:   - She is tolerating treatment well with no side effects   - Continue Venetoclax - slowly increase the dose - Tomorrow would be 400 mg on 11/13/17   - No transfusions today   - C # 2 of Vidaza will be NOV 21st   - Disposition - Primary team is getting PT eval and see if she would meet criteria for SNF  ( Patient is aware if she gets discharged next week , she needs to come TIW blood work and transfusions [facility]IC)       High complexicity/Drug monitoring     We will continue to follow with you; please call with any questions, [phone].      Gaytahri Ahumada MD  [facility]   [phone]

## 2017-11-12 NOTE — CARE PLAN
Problem: Pain Management  Goal: Pain level will decrease to patient's comfort goal  Pain medication given per MAR for chronic pain in hips and legs per pt report.     Problem: Acute Care of the Chemotherapy Patient  Goal: Optimal Outcome for the Chemotherapy Patient    Intervention: If existing line or port, determine patency  Port re-accessed and dressing changed at start of shift. Port with + blood return. No signs of infection noted at insertion site.

## 2017-11-12 NOTE — CARE PLAN
Problem: Safety  Goal: Will remain free from falls  Outcome: PROGRESSING AS EXPECTED  Fall precautions in place: treaded slipper socks on, mobility signs posted, hourly rounding, bed in lowest position, belongings and call light are within reach, and near nurses station.    Problem: Pain Management  Goal: Pain level will decrease to patient's comfort goal  Outcome: PROGRESSING AS EXPECTED  Pain managed well with PRN medication at this time.

## 2017-11-13 PROBLEM — R09.02 HYPOXIA: Status: ACTIVE | Noted: 2017-01-01

## 2017-11-13 NOTE — PROGRESS NOTES
Assumed care of pt at 1850. Rc'd report from MARLENY Min. Pt is Aox4. Pt is up self.  Denies pain and nausea. Hourly rounding in place. Call light within reach. Bed in lowest, locked position.

## 2017-11-13 NOTE — PROGRESS NOTES
Oncology/Hematology Progress Note               Author: Dario Vigil Date & Time created: 11/13/2017  11:55 AM   CC:  AML     Interval History:  She has some increased sob over the last 2 days.  No sputum production.  No fever.  No chills.     Review of Systems:  Review of Systems   Constitutional: Positive for malaise/fatigue. Negative for chills and fever.   HENT: Negative.    Eyes: Negative.    Respiratory: Positive for shortness of breath.    Cardiovascular: Negative.    Gastrointestinal: Negative.  Negative for heartburn.   Genitourinary: Negative.    Musculoskeletal: Positive for myalgias.   Skin: Positive for rash.   Neurological: Positive for weakness.   Psychiatric/Behavioral: Positive for depression. The patient is nervous/anxious.        Physical Exam:  Physical Exam   Constitutional: She is oriented to person, place, and time.   HENT:   Head: Normocephalic.   Eyes: Conjunctivae are normal. Pupils are equal, round, and reactive to light.   Cardiovascular: Normal rate and regular rhythm.    Pulmonary/Chest: Effort normal. She has rales.   Abdominal: Soft. Bowel sounds are normal.   Musculoskeletal: She exhibits no edema.   Neurological: She is alert and oriented to person, place, and time.   Skin: No erythema.   Psychiatric: She has a normal mood and affect. Her behavior is normal. Judgment and thought content normal.       Labs:        Invalid input(s): [id number]      Recent Labs      11/11/17 0311 11/12/17 0256 11/13/17 0126   SODIUM  134*  132*  131*   POTASSIUM  3.7  3.6  3.7   CHLORIDE  102  99  96   CO2  27  24  29   BUN  9  10  9   CREATININE  0.22*  0.24*  0.23*   CALCIUM  8.3*  8.1*  8.9     Recent Labs      11/11/17 0311 11/12/17 0256 11/13/17 0126   GLUCOSE  137*  144*  151*     Recent Labs      11/11/17 0311 11/12/17 0256 11/13/17 0126   RBC  2.99*  2.96*  2.86*   HEMOGLOBIN  9.1*  8.7*  8.4*   HEMATOCRIT  25.0*  24.9*  23.8*   PLATELETCT  10*  55*  39*      Recent Labs      17   0311  17   0256  17   0126   WBC  0.2*  0.2*  0.2*     Recent Labs      17   0311  17   0256  17   0126   SODIUM  134*  132*  131*   POTASSIUM  3.7  3.6  3.7   CHLORIDE  102  99  96   CO2  27  24  29   GLUCOSE  137*  144*  151*   BUN  9  10  9   CREATININE  0.22*  0.24*  0.23*   CALCIUM  8.3*  8.1*  8.9     Hemodynamics:  Temp (24hrs), Av.9 °C (98.5 °F), Min:36.8 °C (98.3 °F), Max:37.3 °C (99.2 °F)  Temperature: 36.8 °C (98.3 °F)  Pulse  Av  Min: 68  Max: 118   Blood Pressure : 141/77     Respiratory:    Respiration: 18, Pulse Oximetry: 92 %        RUL Breath Sounds: Coarse Crackles, RML Breath Sounds: Diminished, RLL Breath Sounds: Diminished;Fine Crackles, DEBBIE Breath Sounds: Coarse Crackles, LLL Breath Sounds: Diminished;Fine Crackles  Fluids:    Intake/Output Summary (Last 24 hours) at 17 1155  Last data filed at 17 1800   Gross per 24 hour   Intake              150 ml   Output                0 ml   Net              150 ml        GI/Nutrition:  Orders Placed This Encounter   Procedures   • Diet Order     Standing Status:   Standing     Number of Occurrences:   1     Order Specific Question:   Diet:     Answer:   Regular [1]     Medical Decision Making, by Problem:  Active Hospital Problems    Diagnosis   • *Myelodysplastic syndrome (CMS-HCC) [D46.9]   • Neutropenic fever (CMS-HCC) [D70.9, R50.81]   • Thrombocytopenia (CMS-HCC) [D69.6]   • Malaise [R53.81]   • Headache [R51]   • Anemia [D64.9]   • Symptomatic anemia [D64.9]   • HLD (hyperlipidemia) [E78.5]   • HTN (hypertension) [I10]     PMHX unchanged    DX  1.  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  Last increase on 17.    2.  Pulmonary: edema / vs infection or both    3.  Pancytopenia : from AML / treatment.  Transfuse <7 hemoglobin and <10 k platelets if no  bleeding.  Irradiated       Plan    1.  CXR done.      2.  D/W primary team.  They believe more edema than infection.  Cefepime started.  IF no improvement then may need CT to look at closer and ID input.    3.  Will hold venetoclax at current dose    4.  Answered questions about her cancer / treatment    High complexity/ High risk drug      Reviewed items::  Labs reviewed, Radiology images reviewed and Medications reviewed

## 2017-11-13 NOTE — THERAPY
PT re-orders received. Pt declined PT re-eval at this time, having a rough day. provided pt with supine and seated exercise handouts which pt was very receptive to. Plan to reattempt PT eval as able and pt amenable to mobility. RN aware

## 2017-11-14 NOTE — PROGRESS NOTES
[facility] Hospitalist Progress Note    Date of Service: 11/13/2017    Chief Complaint  67 y.o. 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  ANC zero and will be low for months.  She has been afebrile for many days and no evidence of infection during this hospitalization.  For now will stop cefepime to see how she responds and discuss with oncology for what their oral recommendation would be (ie bactrim tiw?)  Patient states she is fatigued and her legs hurt but no headache.  She is markedly edematous in BLE and this could be causing her symptoms.  OUT of bed with ambulation encouraged and patient agrees.  11/8 Patient feeling much better today after her multiple transfusions yesterday, she doesn't have a headache unless you ask her about it.  She states she has had a lot of trouble sleeping in the hospital and would like to try something to help at night - I ordered PRN restoril to see if this helps.  She is afebrile.  Cefepime stopped yesterday, after my discussion with oncology today okay to start tiw bactrim while neutropenic if she remains afebrile.  11/9 Patient with HA today, will trial fioricet PRN.  She states she isn't sure if she slept well last night and doesn't want to take medications to help her sleep as she is already taking too many meds as it is.  Her dose of venetoclax was also increased today by oncology.  She will likely need platelet transfusion by this weekend.  BP has been high again - increased dose of coreg.  11/10 Patient without complaints of pain other than her legs when she gets out of bed - which she hasn't been doing often.  She has refused a shower past 2 days and states its due to being tired.  Yesterday she inquired about going to a rehab when she gets out of here.  I've requested PT come to re-evaluate her and told her she needs to get out of her  bed much more frequently.  She has notably become more depressed since her sister has left and become more disconnected.  She agrees that she needs to do more for herself and get more active.  Will continue to encourage her.  11/11 Patient in much better spirits today, had showered and ambulated yesterday as requested.  Per oncology to receive platelets today - ordered.  Denies fevers and feels like she has a better outlook today.  No complaints of pain.  11/12 Patient feeling well today and still a very good outlook.  She is taking in PO well so will dc IVF.  PT/OT to evaluate for therapy need on discharge as patient has been getting weaker since admission.  11/13 Patient developed hypoxia overnight and increased fatigue with ambulation.  Exam more consistent with edema than infection as cause of hypoxia.  CXR concerning for edema vs consolidation.  Patient responded well to lasix and diuresed 800cc quickly.  I stopped bactrim and resumed cefepime for more aggressive coverage.  Patient remaining afebrile at this time.  Increased dose of venetoclax not yet started - oncology awaiting r/o acute infection.    Consultants/Specialty  Oncology - Reganti/Vigil    Disposition  tbd        Review of Systems   Constitutional: Positive for malaise/fatigue. Negative for chills and fever.   HENT: Negative for congestion.    Eyes: Negative for blurred vision and photophobia.   Respiratory: Negative for cough, hemoptysis, sputum production and shortness of breath.    Cardiovascular: Negative for chest pain, claudication and leg swelling.   Gastrointestinal: Negative for abdominal pain, constipation, diarrhea, heartburn and vomiting.   Genitourinary: Negative for dysuria and hematuria.   Musculoskeletal: Negative for joint pain and myalgias.   Skin: Negative for itching and rash.   Neurological: Positive for weakness and headaches (back again). Negative for dizziness, sensory change and speech change.   Psychiatric/Behavioral:  Negative for depression. The patient is not nervous/anxious and does not have insomnia.       Physical Exam  Laboratory/Imaging   Hemodynamics  Temp (24hrs), Av °C (98.6 °F), Min:36.8 °C (98.3 °F), Max:37.6 °C (99.6 °F)   Temperature: 36.8 °C (98.3 °F)  Pulse  Av  Min: 68  Max: 118    Blood Pressure : 130/67      Respiratory      Respiration: 18, Pulse Oximetry: 92 %        RUL Breath Sounds: Coarse Crackles, RML Breath Sounds: Diminished, RLL Breath Sounds: Diminished;Fine Crackles, DEBBIE Breath Sounds: Coarse Crackles, LLL Breath Sounds: Diminished;Fine Crackles    Fluids    Intake/Output Summary (Last 24 hours) at 17 1713  Last data filed at 17 1447   Gross per 24 hour   Intake              150 ml   Output             1550 ml   Net            -1400 ml       Nutrition  Orders Placed This Encounter   Procedures   • Diet Order     Standing Status:   Standing     Number of Occurrences:   1     Order Specific Question:   Diet:     Answer:   Regular [1]     Physical Exam   Constitutional: She is oriented to person, place, and time. She appears well-developed and well-nourished. No distress.   HENT:   Head: Normocephalic and atraumatic.   Eyes: Conjunctivae are normal. No scleral icterus.   Neck: Neck supple. No JVD present.   Cardiovascular: Normal rate, regular rhythm and normal heart sounds.  Exam reveals no gallop and no friction rub.    No murmur heard.  Pulmonary/Chest: Effort normal. No respiratory distress. She has no wheezes. She has rales. She exhibits no tenderness.   Abdominal: Soft. Bowel sounds are normal. She exhibits no distension. There is no guarding.   Musculoskeletal: She exhibits edema (both feet swollen). She exhibits no tenderness.   Neurological: She is alert and oriented to person, place, and time. No cranial nerve deficit.   Skin: Skin is warm and dry. She is not diaphoretic. No erythema. No pallor.   Psychiatric: She has a normal mood and affect. Her behavior is normal.    Nursing note and vitals reviewed.      Recent Labs      11/11/17 0311 11/12/17 0256  11/13/17   0126   WBC  0.2*  0.2*  0.2*   RBC  2.99*  2.96*  2.86*   HEMOGLOBIN  9.1*  8.7*  8.4*   HEMATOCRIT  25.0*  24.9*  23.8*   MCV  83.6  84.1  83.2   MCH  30.4  29.4  29.4   MCHC  36.4*  34.9  35.3*   RDW  38.3  38.0  37.9   PLATELETCT  10*  55*  39*   MPV  11.7  9.4  8.8*     Recent Labs      11/11/17 0311 11/12/17 0256 11/13/17 0126   SODIUM  134*  132*  131*   POTASSIUM  3.7  3.6  3.7   CHLORIDE  102  99  96   CO2  27  24  29   GLUCOSE  137*  144*  151*   BUN  9  10  9   CREATININE  0.22*  0.24*  0.23*   CALCIUM  8.3*  8.1*  8.9                      Assessment/Plan     * Myelodysplastic syndrome (CMS-HCC)- (present on admission)   Assessment & Plan    Evolved into AML per oncology.  Venetoclax/Vidaza started 10/23 per Dr Krishnamurthy recommendations  Venetoclax to continue at increasing doses per oncology, still titrating up every four days  Monitoring ANC, currently 0        Neutropenic fever (CMS-[facility])- (present on admission)   Assessment & Plan    Cultures showing no growth.  Not having any current fevers  Empiric cefepime and acyclovir was off cefepime wed through Sunday, resumed today d/t hypoxia  Still neutropenic and likely will be so for the next 3-6 months per oncology        Thrombocytopenia (CMS-HCC)- (present on admission)   Assessment & Plan    Keep platelet count >10 or higher if bleeding.  Transfuse 1 unit today          Hypoxia   Assessment & Plan    New finding, had been on IVF until 2 days ago and LE swollen.   CXR showing edema on my read, patient afebrile  Responded very well to lasix and breathing easier  Empiric cefepime resumed in case infectious.  If febrile then would need ID consult, pan culture and to start vanco in addition to cefepime for MRSA coverage for HCAP.        Headache   Assessment & Plan    PRN fioricet  States HA >1 month and associated with leg pain simultaneously  MRI brain  with and without contrast done, no IC pathology, marrow abnormal as expected with MDS  Now improved with meds  LP done 10/26, no complications, pathology report - no malignant cells seen            Anemia- (present on admission)   Assessment & Plan    Transfuse if less than 7.0  Received 1 unit 10/24, 10/30, 2 units 11/7  Secondary to 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  Transfuse for <7.0        HLD (hyperlipidemia)- (present on admission)   Assessment & Plan    Continue current statin therapy regimen        HTN (hypertension)- (present on admission)   Assessment & Plan    Coreg dose 6.25 - better control                Reviewed items::  Labs reviewed, Medications reviewed and Radiology images reviewed  Gan catheter::  No Gan  DVT prophylaxis pharmacological::  Contraindicated - High bleeding risk  DVT prophylaxis - mechanical:  SCDs  Antibiotics:  Treating active infection/contamination beyond 24 hours perioperative coverage

## 2017-11-14 NOTE — CARE PLAN
Problem: Fluid Volume:  Goal: Will maintain balanced intake and output  Outcome: PROGRESSING AS EXPECTED  Patient having increased crackles in lungs this morning.  Chest xray completed and patient given 40mg of lasix Iv.  Patient was able to void at least 1500cc after lasix.  Patient continues to feel fatigued.

## 2017-11-14 NOTE — ASSESSMENT & PLAN NOTE
Possible fluid overload, tolerated Lasix. Continue diuresis  Responded very well to lasix and breathing easier  Repeat chest x-ray edema, pulmonary effusion and possible consolidation  Diuresis as tolerated.  Transferred to ICU   Pulmonology consulted  She is requiring high-flow oxygen.

## 2017-11-14 NOTE — THERAPY
PT re-eval attempted, Pt again declined this AM. Would like to get some rest. Will reattempt PT re-eval as able and pt agreeable to mobilize. Thanks

## 2017-11-14 NOTE — PROGRESS NOTES
Received report and assumed patient care at change of shift. Patient is resting in bed, A&O x4. Patient reports no pain or discomfort at this time.      Pt has PORT on R chest.      Plan of care discussed, questions answered. Bed is in the lowest position and locked, call light within reach, non-skid socks in place, hourly rounding. Patient reports no further needs and this time.

## 2017-11-14 NOTE — PROGRESS NOTES
Greta from Lab called with critical result of WBC 0.2, PLT 22 at 0350. Critical lab result read back to Greta.   This critical lab result is within parameters established by Dr.[facility] Policy for this patient

## 2017-11-14 NOTE — CARE PLAN
Problem: Communication  Goal: The ability to communicate needs accurately and effectively will improve    Intervention: Educate patient and significant other/support system about the plan of care, procedures, treatments, medications and allow for questions  Educated pt on plan of care, treatments and medications. Pt verbalized understanding.       Problem: Infection  Goal: Will remain free from infection    Intervention: Implement standard precautions and perform hand washing before and after patient contact  Pt is neutropenic. Protective precautions in place. Hand hygiene performed before and after pt care.

## 2017-11-14 NOTE — DISCHARGE SUMMARY
Medical SW    Referral: Sw spoke to Dr Cantor regarding d/c.    Intervention: Pt not medically cleared for d/c. For more information refer to Md's note. PT/OT to reassess.     Plan: Sw to assist w/ d/c planning as needed.

## 2017-11-14 NOTE — PROGRESS NOTES
[facility] Hospitalist Progress Note    Date of Service: 11/14/2017    Chief Complaint  67 y.o. 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  ANC zero and will be low for months.  She has been afebrile for many days and no evidence of infection during this hospitalization.  For now will stop cefepime to see how she responds and discuss with oncology for what their oral recommendation would be (ie bactrim tiw?)  Patient states she is fatigued and her legs hurt but no headache.  She is markedly edematous in BLE and this could be causing her symptoms.  OUT of bed with ambulation encouraged and patient agrees.  11/8 Patient feeling much better today after her multiple transfusions yesterday, she doesn't have a headache unless you ask her about it.  She states she has had a lot of trouble sleeping in the hospital and would like to try something to help at night - I ordered PRN restoril to see if this helps.  She is afebrile.  Cefepime stopped yesterday, after my discussion with oncology today okay to start tiw bactrim while neutropenic if she remains afebrile.  11/9 Patient with HA today, will trial fioricet PRN.  She states she isn't sure if she slept well last night and doesn't want to take medications to help her sleep as she is already taking too many meds as it is.  Her dose of venetoclax was also increased today by oncology.  She will likely need platelet transfusion by this weekend.  BP has been high again - increased dose of coreg.  11/10 Patient without complaints of pain other than her legs when she gets out of bed - which she hasn't been doing often.  She has refused a shower past 2 days and states its due to being tired.  Yesterday she inquired about going to a rehab when she gets out of here.  I've requested PT come to re-evaluate her and told her she needs to get out of her  bed much more frequently.  She has notably become more depressed since her sister has left and become more disconnected.  She agrees that she needs to do more for herself and get more active.  Will continue to encourage her.  11/11 Patient in much better spirits today, had showered and ambulated yesterday as requested.  Per oncology to receive platelets today - ordered.  Denies fevers and feels like she has a better outlook today.  No complaints of pain.  11/12 Patient feeling well today and still a very good outlook.  She is taking in PO well so will dc IVF.  PT/OT to evaluate for therapy need on discharge as patient has been getting weaker since admission.  11/13 Patient developed hypoxia overnight and increased fatigue with ambulation.  Exam more consistent with edema than infection as cause of hypoxia.  CXR concerning for edema vs consolidation.  Patient responded well to lasix and diuresed 800cc quickly.  I stopped bactrim and resumed cefepime for more aggressive coverage.  Patient remaining afebrile at this time.  Increased dose of venetoclax not yet started - oncology awaiting r/o acute infection.  11/14 patient is very depressed, asking for deep alone to have some rest. Breathing condition feeling better. After Lasix a lot of urination. Wants to hold Lasix today.    Consultants/Specialty  Oncology - Reganti/Vigil    Disposition  tbd        Review of Systems   Constitutional: Positive for malaise/fatigue. Negative for chills.   HENT: Negative for congestion.    Eyes: Negative for blurred vision, photophobia and pain.   Respiratory: Negative for cough, hemoptysis, sputum production and shortness of breath.    Cardiovascular: Negative for chest pain, claudication and leg swelling.   Gastrointestinal: Negative for abdominal pain, constipation, diarrhea, heartburn and nausea.   Genitourinary: Negative for dysuria, hematuria and urgency.   Musculoskeletal: Negative for joint pain and myalgias.   Skin: Negative for  itching and rash.   Neurological: Positive for weakness and headaches (back again). Negative for dizziness, sensory change and speech change.   Psychiatric/Behavioral: Positive for depression. Negative for suicidal ideas. The patient is not nervous/anxious and does not have insomnia.       Physical Exam  Laboratory/Imaging   Hemodynamics  Temp (24hrs), Av.9 °C (98.4 °F), Min:36.8 °C (98.3 °F), Max:37 °C (98.6 °F)   Temperature: 36.9 °C (98.5 °F)  Pulse  Av.1  Min: 68  Max: 118    Blood Pressure : 132/76      Respiratory      Respiration: 18, Pulse Oximetry: 92 %        RUL Breath Sounds: Clear, RML Breath Sounds: Diminished, RLL Breath Sounds: Diminished, DEBBIE Breath Sounds: Clear, LLL Breath Sounds: Diminished    Fluids    Intake/Output Summary (Last 24 hours) at 17 1539  Last data filed at 17 0934   Gross per 24 hour   Intake              450 ml   Output             1650 ml   Net            -1200 ml       Nutrition  Orders Placed This Encounter   Procedures   • Diet Order     Standing Status:   Standing     Number of Occurrences:   1     Order Specific Question:   Diet:     Answer:   Regular [1]     Physical Exam   Constitutional: She is oriented to person, place, and time. She appears well-developed.   HENT:   Head: Normocephalic and atraumatic.   Left Ear: External ear normal.   Eyes: Conjunctivae are normal. Pupils are equal, round, and reactive to light. No scleral icterus.   Neck: Neck supple. No JVD present.   Cardiovascular: Normal rate, regular rhythm, normal heart sounds and intact distal pulses.  Exam reveals no gallop and no friction rub.    Pulmonary/Chest: Effort normal. She has no wheezes. She has rales. She exhibits no tenderness.   Abdominal: Soft. Bowel sounds are normal. She exhibits no distension. There is no tenderness. There is no rebound and no guarding.   Musculoskeletal: She exhibits edema (both feet swollen). She exhibits no tenderness.   Neurological: She is alert and  oriented to person, place, and time. No cranial nerve deficit.   Skin: Skin is warm and dry. She is not diaphoretic. No erythema. No pallor.   Psychiatric: Her behavior is normal.   Patient is depressed   Nursing note and vitals reviewed.      Recent Labs      11/12/17 0256 11/13/17 0126 11/14/17   0034   WBC  0.2*  0.2*  0.2*   RBC  2.96*  2.86*  3.01*   HEMOGLOBIN  8.7*  8.4*  9.0*   HEMATOCRIT  24.9*  23.8*  25.0*   MCV  84.1  83.2  83.1   MCH  29.4  29.4  29.9   MCHC  34.9  35.3*  36.0*   RDW  38.0  37.9  37.6   PLATELETCT  55*  39*  22*   MPV  9.4  8.8*  9.1     Recent Labs      11/12/17 0256 11/13/17 0126 11/14/17   0034   SODIUM  132*  131*  132*   POTASSIUM  3.6  3.7  3.5*   CHLORIDE  99  96  94*   CO2  24  29  32   GLUCOSE  144*  151*  143*   BUN  10  9  10   CREATININE  0.24*  0.23*  0.24*   CALCIUM  8.1*  8.9  8.1*                      Assessment/Plan     * Myelodysplastic syndrome (CMS-HCC)- (present on admission)   Assessment & Plan    Evolved into AML per oncology.  Venetoclax/Vidaza started 10/23 per Dr Krishnamurthy recommendations  Venetoclax to continue at increasing doses per oncology, still titrating up every four days, cont  Monitoring ANC, currently 0        Neutropenic fever (CMS-HCC)- (present on admission)   Assessment & Plan    Cultures showing no growth.  Not having any current fevers  Empiric cefepime and acyclovir was off cefepime wed through Sunday, resumed today d/t hypoxia  Still neutropenic and likely will be so for the next 3-6 months per oncology        Thrombocytopenia (CMS-HCC)- (present on admission)   Assessment & Plan    Keep platelet count >10 or higher if bleeding.  Transfuse prn          Hypoxia   Assessment & Plan    New finding, had been on IVF until 2 days ago and LE swollen.   Possible fluid overload, tolerated Lasix. We'll also hold Lasix due to frequent urination. Feedings stable.  Responded very well to lasix and breathing easier  Empiric cefepime resumed in case  infectious. pan culture pending result        Malaise- (present on admission)   Assessment & Plan    Cont PT OT as needed        Headache   Assessment & Plan    PRN fioricet  States HA >1 month and associated with leg pain simultaneously  MRI brain with and without contrast done, no IC pathology, marrow abnormal as expected with MDS  Now improved with meds  LP done 10/26, no complications, pathology report - no malignant cells seen            Anemia- (present on admission)   Assessment & Plan    Transfuse if less than 7.0  Received 1 unit 10/24, 10/30, 2 units 11/7  Secondary to 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  Transfuse for <7.0        HLD (hyperlipidemia)- (present on admission)   Assessment & Plan    Continue current statin therapy regimen        HTN (hypertension)- (present on admission)   Assessment & Plan    Coreg dose 6.25 - better control                Reviewed items::  Labs reviewed, Medications reviewed and Radiology images reviewed  Gan catheter::  No Gan  DVT prophylaxis pharmacological::  Contraindicated - High bleeding risk  DVT prophylaxis - mechanical:  SCDs  Antibiotics:  Treating active infection/contamination beyond 24 hours perioperative coverage   For complexity-based billing, please refer to the history, exam, and decison making above. In addition, I spent >35 minutes caring for the patient today. More than 50% of the time was spent counseling and coordinating care.    I have discussed with RN and BEATRICE and GARETT and other consultants about patient's plan.

## 2017-11-14 NOTE — PROGRESS NOTES
Oncology/Hematology Progress Note               Author: Dario Vigil Date & Time created: 11/14/2017  12:25 PM   CC:  AML     Interval History:  She feels tired.  She wants to get some rest without a lot of interruptions.  She does feel her breathing is better.    Review of Systems:  Review of Systems   Constitutional: Positive for malaise/fatigue. Negative for chills and fever.   HENT: Negative.    Eyes: Negative.    Respiratory: Positive for shortness of breath. Negative for sputum production.    Cardiovascular: Negative.    Gastrointestinal: Negative.  Negative for heartburn.   Genitourinary: Negative.    Musculoskeletal: Positive for myalgias.   Neurological: Positive for weakness.   Endo/Heme/Allergies: Negative for environmental allergies. Does not bruise/bleed easily.   Psychiatric/Behavioral: Positive for depression. The patient is nervous/anxious.        Physical Exam:  Physical Exam   Constitutional: She is oriented to person, place, and time.   HENT:   Head: Normocephalic.   Eyes: Conjunctivae are normal. Pupils are equal, round, and reactive to light.   Cardiovascular: Normal rate and regular rhythm.    Pulmonary/Chest: Effort normal. She has rales.   Abdominal: Soft. Bowel sounds are normal.   Musculoskeletal: She exhibits no edema.   Neurological: She is alert and oriented to person, place, and time.   Skin: No erythema.   Psychiatric: She has a normal mood and affect. Her behavior is normal. Judgment and thought content normal.       Labs:        Invalid input(s): [id number]      Recent Labs      11/12/17 0256 11/13/17 0126 11/14/17 0034   SODIUM  132*  131*  132*   POTASSIUM  3.6  3.7  3.5*   CHLORIDE  99  96  94*   CO2  24  29  32   BUN  10  9  10   CREATININE  0.24*  0.23*  0.24*   CALCIUM  8.1*  8.9  8.1*     Recent Labs      11/12/17 0256 11/13/17 0126 11/14/17   0034   GLUCOSE  144*  151*  143*     Recent Labs      11/12/17 0256 11/13/17 0126 11/14/17   0034   RBC   2.96*  2.86*  3.01*   HEMOGLOBIN  8.7*  8.4*  9.0*   HEMATOCRIT  24.9*  23.8*  25.0*   PLATELETCT  55*  39*  22*     Recent Labs      17   0034   WBC  0.2*  0.2*  0.2*   NEUTSPOLYS   --    --   CANCEL   LYMPHOCYTES   --    --   CANCEL   MONOCYTES   --    --   CANCEL   EOSINOPHILS   --    --   CANCEL   BASOPHILS   --    --   CANCEL     Recent Labs      17   SODIUM  132*  131*  132*   POTASSIUM  3.6  3.7  3.5*   CHLORIDE  99  96  94*   CO2  24  29  32   GLUCOSE  144*  151*  143*   BUN  10  9  10   CREATININE  0.24*  0.23*  0.24*   CALCIUM  8.1*  8.9  8.1*     Hemodynamics:  Temp (24hrs), Av.9 °C (98.4 °F), Min:36.8 °C (98.3 °F), Max:37 °C (98.6 °F)  Temperature: 36.9 °C (98.5 °F)  Pulse  Av.1  Min: 68  Max: 118   Blood Pressure : 132/76     Respiratory:    Respiration: 18, Pulse Oximetry: 92 %        RUL Breath Sounds: Clear, RML Breath Sounds: Diminished, RLL Breath Sounds: Diminished, DEBBIE Breath Sounds: Clear, LLL Breath Sounds: Diminished  Fluids:    Intake/Output Summary (Last 24 hours) at 17 1155  Last data filed at 17 1800   Gross per 24 hour   Intake              150 ml   Output                0 ml   Net              150 ml        GI/Nutrition:  Orders Placed This Encounter   Procedures   • Diet Order     Standing Status:   Standing     Number of Occurrences:   1     Order Specific Question:   Diet:     Answer:   Regular [1]     Medical Decision Making, by Problem:  Active Hospital Problems    Diagnosis   • *Myelodysplastic syndrome (CMS-HCC) [D46.9]   • Neutropenic fever (CMS-HCC) [D70.9, R50.81]   • Thrombocytopenia (CMS-HCC) [D69.6]   • Malaise [R53.81]   • Headache [R51]   • Anemia [D64.9]   • Symptomatic anemia [D64.9]   • HLD (hyperlipidemia) [E78.5]   • HTN (hypertension) [I10]     PMHX unchanged    DX  1.  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  Last increase on 11/9/17.    2.  Pulmonary: edema / vs infection or both on cefepime    3.  Pancytopenia : from AML / treatment.  Transfuse <7 hemoglobin and <10 k platelets if no bleeding.  Irradiated       Plan    1.  She is not ready to go up on her venetoclax.   I will leave things the same for today.    2.  Continue pulmonary support    3.  No transfusions today    High complexity/ High risk medication      Reviewed items::  Labs reviewed, Radiology images reviewed and Medications reviewed

## 2017-11-15 NOTE — CARE PLAN
Problem: Safety  Goal: Will remain free from falls  Outcome: PROGRESSING SLOWER THAN EXPECTED  Patient has become more fatigued and needing some assistance with activity.  Have educated patient to call for assistance when mobile.  She has bed alarm in place to remind her to call for help.      Problem: Pain Management  Goal: Pain level will decrease to patient's comfort goal  Outcome: PROGRESSING AS EXPECTED  Patient using scheduled pain medication for leg pain.  No prns given today.     Problem: Respiratory:  Goal: Respiratory status will improve  Outcome: PROGRESSING SLOWER THAN EXPECTED  Patient still requiring oxygen.  Patient dropped to 82% on room air, placed back on 2L nasal cannula and oxygen saturation 92%.  No crackles heard today.      Problem: Psychosocial Needs:  Goal: Level of anxiety will decrease  Outcome: PROGRESSING SLOWER THAN EXPECTED  Patient has been irritable today, stating she is frustrated with her need to wait for medication to work on her bone marrow and frustrated that she is feeling so fatigued.

## 2017-11-15 NOTE — PROGRESS NOTES
Oncology/Hematology Progress Note               Author: Dario Vigil Date & Time created: 11/15/2017  12:35 PM   CC:  AML     Interval History:  She feels tired.  Daughter at bedside.  She is in better spirits today.    Review of Systems:  Review of Systems   Constitutional: Positive for malaise/fatigue. Negative for chills and fever.   HENT: Negative.    Eyes: Negative.    Respiratory: Positive for shortness of breath. Negative for sputum production.    Cardiovascular: Negative.  Negative for chest pain and palpitations.   Gastrointestinal: Negative.  Negative for heartburn.   Genitourinary: Negative.    Musculoskeletal: Positive for myalgias.   Skin: Negative for rash.   Neurological: Positive for weakness. Negative for dizziness and headaches.   Endo/Heme/Allergies: Negative for environmental allergies. Does not bruise/bleed easily.   Psychiatric/Behavioral: Positive for depression.       Physical Exam:  Physical Exam   Constitutional: She is oriented to person, place, and time. She appears well-developed.   HENT:   Head: Normocephalic.   Eyes: Conjunctivae are normal. Pupils are equal, round, and reactive to light.   Cardiovascular: Normal rate and regular rhythm.    Pulmonary/Chest: Effort normal.   Abdominal: Soft. Bowel sounds are normal.   Musculoskeletal: She exhibits no edema.   Neurological: She is alert and oriented to person, place, and time. No cranial nerve deficit.   Skin: No erythema.   Psychiatric: She has a normal mood and affect. Her behavior is normal. Judgment and thought content normal.       Labs:        Invalid input(s): [id number]      Recent Labs      11/13/17 0126 11/14/17 0034  11/15/17   0300   SODIUM  131*  132*  133*   POTASSIUM  3.7  3.5*  3.8   CHLORIDE  96  94*  93*   CO2  29  32  29   BUN  9  10  13   CREATININE  0.23*  0.24*  0.27*   MAGNESIUM   --    --   1.6   CALCIUM  8.9  8.1*  8.3*     Recent Labs      11/13/17 0126 11/14/17 0034  11/15/17   0300   GLUCOSE   151*  143*  156*     Recent Labs      17   0126  17   0034  11/15/17   0300   RBC  2.86*  3.01*  2.90*   HEMOGLOBIN  8.4*  9.0*  8.4*   HEMATOCRIT  23.8*  25.0*  23.9*   PLATELETCT  39*  22*  15*     Recent Labs      17   0126  17   0034  11/15/17   0300   WBC  0.2*  0.2*  0.3*   NEUTSPOLYS   --   CANCEL  0.00*   LYMPHOCYTES   --   CANCEL  51.90*   MONOCYTES   --   CANCEL  40.70*   EOSINOPHILS   --   CANCEL  0.00   BASOPHILS   --   CANCEL  0.00     Recent Labs      176  17   0034  11/15/17   0300   SODIUM  131*  132*  133*   POTASSIUM  3.7  3.5*  3.8   CHLORIDE  96  94*  93*   CO2  29  32  29   GLUCOSE  151*  143*  156*   BUN  9  10  13   CREATININE  0.23*  0.24*  0.27*   CALCIUM  8.9  8.1*  8.3*     Hemodynamics:  Temp (24hrs), Av.9 °C (98.4 °F), Min:36.7 °C (98.1 °F), Max:37.2 °C (98.9 °F)  Temperature: 37 °C (98.6 °F)  Pulse  Av.3  Min: 68  Max: 118   Blood Pressure : 138/54     Respiratory:    Respiration: 20, Pulse Oximetry: 90 %        RUL Breath Sounds: Clear, RML Breath Sounds: Diminished, RLL Breath Sounds: Diminished, DEBBIE Breath Sounds: Clear, LLL Breath Sounds: Diminished  Fluids:    Intake/Output Summary (Last 24 hours) at 17 1155  Last data filed at 17 1800   Gross per 24 hour   Intake              150 ml   Output                0 ml   Net              150 ml        GI/Nutrition:  Orders Placed This Encounter   Procedures   • Diet Order     Standing Status:   Standing     Number of Occurrences:   1     Order Specific Question:   Diet:     Answer:   Regular [1]     Medical Decision Making, by Problem:  Active Hospital Problems    Diagnosis   • *Myelodysplastic syndrome (CMS-HCC) [D46.9]   • Neutropenic fever (CMS-HCC) [D70.9, R50.81]   • Thrombocytopenia (CMS-HCC) [D69.6]   • Malaise [R53.81]   • Headache [R51]   • Anemia [D64.9]   • Symptomatic anemia [D64.9]   • HLD (hyperlipidemia) [E78.5]   • HTN (hypertension) [I10]     PMHX  unchanged    DX  1.  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  Last increase on 11/9/17 of 300mg.  11/15/17 increase dose to 400 mg.    2.  Pulmonary: edema / vs infection or both on cefepime: improved    3.  Pancytopenia : from AML / treatment.  Transfuse <7 hemoglobin and <10 k platelets if no bleeding.  Irradiated       Plan  I had a long discussion with her and daughter today.  We went through her diagnosis, prognosis, and plan that Dr Sanabria has put forth for her.  She understands that it is a long process.  She understands that she needs too much medical support to go home.  She did decide that she wants to continue treatment and go up on her dose.    She understands that a repeat bone marrow in the future after she has been on medicine at appropriate doses to make a judgement on response.    She is feeling ok overall.  Her breathing is better.  She wants to continue and understands short term and long term goals.    High complexity/ High risk medication/ Guarded prognosis      Reviewed items::  Labs reviewed, Radiology images reviewed and Medications reviewed

## 2017-11-15 NOTE — DISCHARGE PLANNING
Medical SW    Referral: Sw noted order for SNF. Sw noted PT/OT notes from Oct indicate home w/ HH, and Nov pt refused to participate.     Intervention: Sw spoke to Md Dr Little and bedside RNErika. RN will request therapy evaluate pt again to acquire updated notes. MD completed order for HH.    Sw noted assessment by [facility] indicates pt lives at home w/ her adult son. He has been transporting her and she has been fully independent w/ I/ADLs prior to admit except uses F[facility].     Sw met w/ pt at bedside. Pt agitated and has confusion. Pt admits to some memory loss and agitated cannot remember what she wants to. Pt states she is essentially homeless because she was living w/ her SO in [place] until she became sick. They have since parted ways.     She is tangential and speaks about how her family does not want anything to do w/ her because she will most likely die. She states she came to town to visit w/ her son but he works full time and she is only visiting. She feels her dtr who also lives locally is too busy because she has 6-7 children and works full time. She has a sister who lives out of state but believes no one wants to see her because of her pending death. She states Sw can call her family. The emergency contacts appear to be her son and dtr. She states she does have visitors at the hospital. She indicates she will allow PT to work w/ her for an assessment to support appropriate placement.    PT met w/ Sw at office and pt indicating she needs to speak w/ Sw first. Sw met w/ pt and PT at bedside. Pt was able to bring her knees to side of bed but stated she was in pain. Pt placed her feet over the side then stated she was unable to put weight on her feet because it would hurt. She stated she refused to participate any further.     She recalls she does have an income including: Social Security of $1070.00 per month since she was 62yo, 401k account of $68,000, and [employer] of $144.00 per month. Her dtr appeared to call  pt on pt's cell phone and pt discussed her concern regarding MD orders for chemo meds and MARLENY James's providing the meds to pt.    Sw spoke to PT later and he indicates pt has flat refused to participate w/ anything to do w/ standing up. Pt appears to have circular thinking and fear. He will speak w/ MARLENY James about a possible psych eval.           Plan: Sw to assist w/ d/c planning as needed.

## 2017-11-15 NOTE — PROGRESS NOTES
[facility] Hospitalist Progress Note    Date of Service: 11/15/2017    Chief Complaint  67 y.o. 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  ANC zero and will be low for months.  She has been afebrile for many days and no evidence of infection during this hospitalization.  For now will stop cefepime to see how she responds and discuss with oncology for what their oral recommendation would be (ie bactrim tiw?)  Patient states she is fatigued and her legs hurt but no headache.  She is markedly edematous in BLE and this could be causing her symptoms.  OUT of bed with ambulation encouraged and patient agrees.  11/8 Patient feeling much better today after her multiple transfusions yesterday, she doesn't have a headache unless you ask her about it.  She states she has had a lot of trouble sleeping in the hospital and would like to try something to help at night - I ordered PRN restoril to see if this helps.  She is afebrile.  Cefepime stopped yesterday, after my discussion with oncology today okay to start tiw bactrim while neutropenic if she remains afebrile.  11/9 Patient with HA today, will trial fioricet PRN.  She states she isn't sure if she slept well last night and doesn't want to take medications to help her sleep as she is already taking too many meds as it is.  Her dose of venetoclax was also increased today by oncology.  She will likely need platelet transfusion by this weekend.  BP has been high again - increased dose of coreg.  11/10 Patient without complaints of pain other than her legs when she gets out of bed - which she hasn't been doing often.  She has refused a shower past 2 days and states its due to being tired.  Yesterday she inquired about going to a rehab when she gets out of here.  I've requested PT come to re-evaluate her and told her she needs to get out of her  bed much more frequently.  She has notably become more depressed since her sister has left and become more disconnected.  She agrees that she needs to do more for herself and get more active.  Will continue to encourage her.  11/11 Patient in much better spirits today, had showered and ambulated yesterday as requested.  Per oncology to receive platelets today - ordered.  Denies fevers and feels like she has a better outlook today.  No complaints of pain.  11/12 Patient feeling well today and still a very good outlook.  She is taking in PO well so will dc IVF.  PT/OT to evaluate for therapy need on discharge as patient has been getting weaker since admission.  11/13 Patient developed hypoxia overnight and increased fatigue with ambulation.  Exam more consistent with edema than infection as cause of hypoxia.  CXR concerning for edema vs consolidation.  Patient responded well to lasix and diuresed 800cc quickly.  I stopped bactrim and resumed cefepime for more aggressive coverage.  Patient remaining afebrile at this time.  Increased dose of venetoclax not yet started - oncology awaiting r/o acute infection.  11/14 patient is very depressed, asking for deep alone to have some rest. Breathing condition feeling better. After Lasix a lot of urination. Wants to hold Lasix today.  11/15 patient still has some shortness of breath but improved. Less overloaded. Restart by mouth Lasix today.    Consultants/Specialty  Oncology - Reganti/Vigil    Disposition  tbd        Review of Systems   Constitutional: Positive for malaise/fatigue. Negative for chills.   HENT: Negative for congestion.    Eyes: Negative for blurred vision, photophobia and pain.   Respiratory: Positive for shortness of breath. Negative for cough, hemoptysis and sputum production.    Cardiovascular: Negative for chest pain, claudication and leg swelling.   Gastrointestinal: Negative for constipation, heartburn and nausea.   Genitourinary: Negative for dysuria,  hematuria and urgency.   Musculoskeletal: Negative for joint pain and myalgias.   Skin: Negative for itching and rash.   Neurological: Positive for weakness and headaches (back again). Negative for dizziness, sensory change and speech change.   Psychiatric/Behavioral: Positive for depression. Negative for suicidal ideas. The patient is not nervous/anxious and does not have insomnia.       Physical Exam  Laboratory/Imaging   Hemodynamics  Temp (24hrs), Av.9 °C (98.4 °F), Min:36.7 °C (98.1 °F), Max:37.2 °C (98.9 °F)   Temperature: 37.2 °C (98.9 °F)  Pulse  Av.3  Min: 68  Max: 118    Blood Pressure : 143/65      Respiratory      Respiration: 20, Pulse Oximetry: 94 %        RUL Breath Sounds: Clear, RML Breath Sounds: Diminished, RLL Breath Sounds: Diminished, DEBBIE Breath Sounds: Clear, LLL Breath Sounds: Diminished    Fluids    Intake/Output Summary (Last 24 hours) at 11/15/17 0840  Last data filed at 11/15/17 0526   Gross per 24 hour   Intake              460 ml   Output             1500 ml   Net            -1040 ml       Nutrition  Orders Placed This Encounter   Procedures   • Diet Order     Standing Status:   Standing     Number of Occurrences:   1     Order Specific Question:   Diet:     Answer:   Regular [1]     Physical Exam   Constitutional: She is oriented to person, place, and time. She appears well-developed. No distress.   HENT:   Head: Normocephalic and atraumatic.   Left Ear: External ear normal.   Eyes: Conjunctivae are normal. Pupils are equal, round, and reactive to light. No scleral icterus.   Neck: Neck supple. No JVD present.   Cardiovascular: Normal rate, regular rhythm, normal heart sounds and intact distal pulses.  Exam reveals no gallop and no friction rub.    Pulmonary/Chest: Effort normal. She has no wheezes. She has no rales. She exhibits no tenderness.   Abdominal: Soft. Bowel sounds are normal. She exhibits no distension and no mass. There is no tenderness. There is no rebound.    Musculoskeletal: She exhibits edema (both feet swollen). She exhibits no tenderness.   Neurological: She is alert and oriented to person, place, and time. No cranial nerve deficit.   Skin: Skin is warm and dry. She is not diaphoretic. No erythema. No pallor.   Psychiatric: Her behavior is normal.   Patient is depressed   Nursing note and vitals reviewed.      Recent Labs      11/13/17   0126  11/14/17   0034  11/15/17   0300   WBC  0.2*  0.2*  0.3*   RBC  2.86*  3.01*  2.90*   HEMOGLOBIN  8.4*  9.0*  8.4*   HEMATOCRIT  23.8*  25.0*  23.9*   MCV  83.2  83.1  82.4   MCH  29.4  29.9  29.0   MCHC  35.3*  36.0*  35.1*   RDW  37.9  37.6  37.2   PLATELETCT  39*  22*  15*   MPV  8.8*  9.1  8.7*     Recent Labs      11/13/17   0126  11/14/17   0034  11/15/17   0300   SODIUM  131*  132*  133*   POTASSIUM  3.7  3.5*  3.8   CHLORIDE  96  94*  93*   CO2  29  32  29   GLUCOSE  151*  143*  156*   BUN  9  10  13   CREATININE  0.23*  0.24*  0.27*   CALCIUM  8.9  8.1*  8.3*                      Assessment/Plan     * Myelodysplastic syndrome (CMS-[facility])- (present on admission)   Assessment & Plan    Evolved into AML per oncology.  Venetoclax/Vidaza started 10/23 per Dr Krishnamurthy recommendations  Venetoclax to continue at increasing doses per oncology, still titrating up every four days, cont  Monitoring ANC, currently 0        Neutropenic fever (CMS-[facility])- (present on admission)   Assessment & Plan    Cultures showing no growth.  Not having any current fevers  Empiric cefepime and acyclovir was off cefepime wed through Sunday, resumed today d/t hypoxia  Still neutropenic and likely will be so for the next 3-6 months per oncology        Thrombocytopenia (CMS-[facility])- (present on admission)   Assessment & Plan    Keep platelet count >10 or higher if bleeding.  Transfuse prn          Hypoxia   Assessment & Plan    New finding, had been on IVF until 2 days ago and LE swollen.   Possible fluid overload, tolerated Lasix. We'll also cont po  Lasix.  Responded very well to lasix and breathing easier  Empiric cefepime resumed in case infectious. pan culture pending result        Malaise- (present on admission)   Assessment & Plan    Cont PT OT as needed        Headache   Assessment & Plan    PRN fioricet  States HA >1 month and associated with leg pain simultaneously  MRI brain with and without contrast done, no IC pathology, marrow abnormal as expected with MDS  Now improved with meds  LP done 10/26, no complications, pathology report - no malignant cells seen            Anemia- (present on admission)   Assessment & Plan    Transfuse if less than 7.0  Received 1 unit 10/24, 10/30, 2 units 11/7  Secondary to 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  Transfuse for <7.0        HLD (hyperlipidemia)- (present on admission)   Assessment & Plan    Continue current statin therapy regimen        HTN (hypertension)- (present on admission)   Assessment & Plan    Coreg dose 6.25 - better control                Reviewed items::  Labs reviewed, Medications reviewed and Radiology images reviewed  Gan catheter::  No Gan  DVT prophylaxis pharmacological::  Contraindicated - High bleeding risk  DVT prophylaxis - mechanical:  SCDs  Antibiotics:  Treating active infection/contamination beyond 24 hours perioperative coverage   For complexity-based billing, please refer to the history, exam, and decison making above. In addition, I spent >35 minutes caring for the patient today. More than 50% of the time was spent counseling and coordinating care.    I have discussed with RN and BEATRICE and SW and other consultants about patient's plan.

## 2017-11-15 NOTE — FACE TO FACE
Face to Face Supporting Documentation - Home Health    The encounter with this patient was in whole or in part the primary reason for home health admission.    Date of encounter:   Patient:                    MRN:                       YOB: 2019  Cheryle Lee Koski  [id number]  1950     Home health to see patient for:  Skilled Nursing care for assessment, interventions & education and Physical Therapy evaluation and treatment    Skilled need for:  Exacerbation of Chronic Disease State debilitation and cancer    Skilled nursing interventions to include:  Comment: RN PT    Homebound status evidenced by:  Need the aid of supportive devices such as crutches, canes, wheelchairs or walkers. Leaving home requires a considerable and taxing effort. There is a normal inability to leave the home.    Community Physician to provide follow up care: Viviana Nowak M.D.     Optional Interventions? No      I certify the face to face encounter for this home health care referral meets the CMS requirements and the encounter/clinical assessment with the patient was, in whole, or in part, for the medical condition(s) listed above, which is the primary reason for home health care. Based on my clinical findings: the service(s) are medically necessary, support the need for home health care, and the homebound criteria are met.  I certify that this patient has had a face to face encounter by myself.  Keren Little M.D. - NPI: [id number]

## 2017-11-15 NOTE — PROGRESS NOTES
Received report and assumed patient care at change of shift. Patient is resting in bed, A&O x4. Patient reports 8/10 pain at this time, medications provided per MAR.     Pt has a PORT.      Plan of care discussed, questions answered. Bed is in the lowest position and locked, call light within reach, non-skid socks in place, hourly rounding. Patient reports no further needs and this time.

## 2017-11-15 NOTE — CARE PLAN
Problem: Safety  Goal: Will remain free from falls    Intervention: Implement fall precautions  PT is up sba. Pt educated on use of call light. Pt verbalized understanding.       Problem: Pain Management  Goal: Pain level will decrease to patient's comfort goal    Intervention: Follow pain managment plan developed in collaboration with patient and Interdisciplinary Team  Pt has c/o pain. Pt medicated per MAR.

## 2017-11-15 NOTE — PROGRESS NOTES
Chiquis from Lab called with critical result of WBC 0.3, PLT 15 at 0401. Critical lab result read back to Chiquis.   This critical lab result is within parameters established by Dr.[facility] Policy for this patient

## 2017-11-16 NOTE — DISCHARGE PLANNING
Medical SW    Referral: Sw called pt's son and dtr via numbers on face sheet. Pt advised yesterday Sw could contact them.     Intervention: Miracle left VMs w/ both adult children: Serjio Carbajal @ [phone] & Bonnie Hernandez @ [phone]. Sw requesting to discuss d/c planning w/ family.    Sw received VM from dtr, Bonnie. She and her brothers will be at hospital this AM. Sw met w/ pt and adult children at bedside. Sw discussed need for PT assessment and difficulty yesterday. Pt states again she will participate. Son, Praful @ [phone] indicates he is available on Friday to be in the room w/ pt and PT and assist w/ assessment completion.     Pt repeatedly stated she did not want to be burden on family and they were too busy for her to push herself on them. Her son, Serjio, states she can stay at his home anytime she wants to. Pt may receive HH at Serjio's home after d/c. Praful and Bonnie discussed resources available to pt such as CT placement and rep payee support. Praful mentions he would like to acquire POA for pt. Miracle explained Cert of Comp and hospital notary role w/ pts.    Miracle provided Cert of Comp to Dr Little. Miracle faxed completed doc to kymberly and left VM w/ her to please see pt and family.     Miracle provided list of [facility] HH choices to Bonnie.    Family will f/u w/ GH and ALFs to see what pt will qualify for so eventually she can move into a supportive environment. Serjio admits she is blunt w/ conversation and pt does not always appreciate this form of communication. Bonnie states Praful will p/u the resources Friday. Also, Bonnie's dtr is in school to be a CNA and not working. She may be able to support pt at Serjio's home as well.     Miracle provided 15 resources including: shelter, GH, senior apts, transportation resources, [employer], day programs, [facility], [facility], caregiver support, [facility], PCA, and respite programs.     Miracle spoke to bedside RN Erika and pt may be confused due to chemo tx. She is aware pt's son wants to come to  hospital on Friday to support pt completing PT evaluation w/ him involved.     Miracle spoke to Aristides w/ PT unit. He will tell the PT to call pt's son Praful to be w/ pt and PT in room for PT assessment tomorrow, Friday 11/16/17.    Miracle left report for Miracle Mann who will be on CNU Friday.      Plan: Miracle to assist w/ d/c planning as needed.

## 2017-11-16 NOTE — PROGRESS NOTES
Received report and assumed patient care at change of shift. Patient is resting in bed, A&Ox4. Patient reports 8/10 pain at this time, medications provided per MAR.     Pt has a Port     Plan of care discussed, questions answered. Bed is in the lowest position and locked, call light within reach, non-skid socks in place, hourly rounding. Patient reports no further needs at this time.

## 2017-11-16 NOTE — PROGRESS NOTES
Pt. Alert and oriented. Pt. Is anxious at times, Family at bedside today will return tomorrow. Did have a talk with pt. About continuing chemo therapy. We talked about quality of life. Reviewed POC with pt. Call light within reach hourly rounding in practice.

## 2017-11-16 NOTE — PROGRESS NOTES
Elizabeth from Lab called with critical result of WBC 0.3, PLT 9 at 0232. Critical lab result read back to Elizabeth.   This critical lab result is within parameters established by Dr.[facility] Policy for this patient

## 2017-11-16 NOTE — PROGRESS NOTES
Pts PLT is 9.  Order to transfuse PLT has been answered. Per Elizabeth at blood bank, no PLT available at this time until around 0700.

## 2017-11-16 NOTE — THERAPY
"Occupational Therapy Evaluation completed.   Functional Status:  Total A ADLs and Max a mobility with failed gait attempts with FWW.   Plan of Care: Will benefit from Occupational Therapy 3 times per week  Discharge Recommendations:  Equipment: Will Continue to Assess for Equipment Needs. Post-acute therapy Discharge to a transitional care facility for continued skilled therapy services.    Patient presents on oncology unit originally admitted for anemia and fever. Patient re-eval 2/2 decline in functional status. Patient reports I with ADLs, IADLs, and mobility PTA. Upon re-evel, presents with decreased cognition, balance, endurance, tolerance, strength, ADLs, and mobility necessitating Total A ADLs and Max a mobility with failed gait attempts with FWW. Patient would benefit from skilled OT in this setting followed by rehab.     See \"Rehab Therapy-Acute\" Patient Summary Report for complete documentation.    "

## 2017-11-16 NOTE — CARE PLAN
Problem: Infection  Goal: Will remain free from infection    Intervention: Implement standard precautions and perform hand washing before and after patient contact  Pt is neutropenic. Educated pt on importance of hand hygiene.  Pt performed hand hygiene. Pt verbalized understanding.       Problem: Pain Management  Goal: Pain level will decrease to patient's comfort goal    Intervention: Follow pain managment plan developed in collaboration with patient and Interdisciplinary Team  Pt has c/o pain. Pt medicated per MAR.

## 2017-11-16 NOTE — THERAPY
"Physical Therapy Evaluation completed.   Bed Mobility:  Supine to Sit: Minimal Assist  Transfers: Sit to Stand: Contact Guard Assist to FWW  Gait: Level Of Assist: Refuses; actively resists transfers and gait/ambulation     Plan of Care: Will benefit from Physical Therapy 3 times per week; appears capable of increased activity; self limiting at time of eval; possible cog issues  Discharge Recommendations: Equipment: Will Continue to Assess for Equipment Needs. See below    Pt was seen for re-assessment after functional decline in status. It appears her current behavior/cognitive deficits are exacerbating her functional impairements. She is able to demonstrate bed mobiliyt with min A and sit<>stands with CGA to FWW. She adamently declines and actively resists any further mobility. When prompted, pt unable to clarify reasoning and logic is circular. Given current observed mobility, she appears functionally capable of increased levels of activity though as above declines/refuses. She will benefit from continued skilled acute PT while here. Currently would recommend skilled PT/placemnent prior to medical d/c to home (though pt unclear as to final d/c location). WIll continue to visit. Discussed concerns with SW.     See \"Rehab Therapy-Acute\" Patient Summary Report for complete documentation.     "

## 2017-11-16 NOTE — PROGRESS NOTES
Oncology/Hematology Progress Note               Author: Dario Vigil Date & Time created: 11/16/2017  1:32 PM   CC:  AML     Interval History:  She does have a particular complaints. She is sitting up in a chair. She seems alert and oriented. She is very appropriate. She has no headache. She has no focal symptoms. She said her family left.    Review of Systems:  Review of Systems   Constitutional: Positive for malaise/fatigue. Negative for chills and fever.   HENT: Negative.    Eyes: Negative.    Respiratory: Positive for shortness of breath. Negative for sputum production.    Cardiovascular: Negative.  Negative for chest pain and palpitations.   Gastrointestinal: Negative.  Negative for heartburn.   Genitourinary: Negative.    Musculoskeletal: Positive for myalgias.   Skin: Negative for rash.   Neurological: Positive for weakness. Negative for dizziness and headaches.   Endo/Heme/Allergies: Negative for environmental allergies. Does not bruise/bleed easily.   Psychiatric/Behavioral: Positive for depression.       Physical Exam:  Physical Exam   Constitutional: She is oriented to person, place, and time. She appears well-developed.   HENT:   Head: Normocephalic.   Eyes: Conjunctivae are normal. Pupils are equal, round, and reactive to light.   Cardiovascular: Normal rate, regular rhythm and normal heart sounds.    Pulmonary/Chest: Effort normal.   Continued decreased breath sound at the left base.   Abdominal: Soft. Bowel sounds are normal.   Musculoskeletal: She exhibits no edema.   Neurological: She is alert and oriented to person, place, and time. No cranial nerve deficit.   Skin: No erythema.   Psychiatric: She has a normal mood and affect. Her behavior is normal. Judgment and thought content normal.       Labs:        Invalid input(s): [id number]      Recent Labs      11/14/17   0034  11/15/17   0300  11/16/17   0205   SODIUM  132*  133*  130*   POTASSIUM  3.5*  3.8  3.4*   CHLORIDE  94*  93*  90*   CO2   32  29  31   BUN  10  13  13   CREATININE  0.24*  0.27*  0.29*   MAGNESIUM   --   1.6   --    CALCIUM  8.1*  8.3*  7.8*     Recent Labs      11/14/17   0034  11/15/17   0300  11/16/17   020   ALTSGPT   --    --   11   ASTSGOT   --    --   22   ALKPHOSPHAT   --    --   113*   TBILIRUBIN   --    --   2.6*   DBILIRUBIN   --    --   1.5*   GLUCOSE  143*  156*  152*     Recent Labs      11/14/17   0034  11/15/17   0300  11/16/17   020   RBC  3.01*  2.90*  2.83*   HEMOGLOBIN  9.0*  8.4*  8.4*   HEMATOCRIT  25.0*  23.9*  23.4*   PLATELETCT  22*  15*  9*     Recent Labs      11/14/17   0034  11/15/17   0300  11/16/17   020   WBC  0.2*  0.3*  0.3*   NEUTSPOLYS  CANCEL  0.00*   --    LYMPHOCYTES  CANCEL  51.90*   --    MONOCYTES  CANCEL  40.70*   --    EOSINOPHILS  CANCEL  0.00   --    BASOPHILS  CANCEL  0.00   --    ASTSGOT   --    --   22   ALTSGPT   --    --   11   ALKPHOSPHAT   --    --   113*   TBILIRUBIN   --    --   2.6*     Recent Labs      11/14/17   0034  11/15/17   0300  11/16/17   020   SODIUM  132*  133*  130*   POTASSIUM  3.5*  3.8  3.4*   CHLORIDE  94*  93*  90*   CO2  32  29  31   GLUCOSE  143*  156*  152*   BUN  10  13  13   CREATININE  0.24*  0.27*  0.29*   CALCIUM  8.1*  8.3*  7.8*     Hemodynamics:  Temp (24hrs), Av.1 °C (98.8 °F), Min:37 °C (98.6 °F), Max:37.3 °C (99.1 °F)  Temperature: 37.2 °C (99 °F)  Pulse  Av.4  Min: 68  Max: 118   Blood Pressure : 138/71     Respiratory:    Respiration: 18, Pulse Oximetry: 94 %        RUL Breath Sounds: Clear, RML Breath Sounds: Diminished, RLL Breath Sounds: Diminished, DEBBIE Breath Sounds: Clear, LLL Breath Sounds: Diminished  Fluids:    Intake/Output Summary (Last 24 hours) at 17 1155  Last data filed at 17 1800   Gross per 24 hour   Intake              150 ml   Output                0 ml   Net              150 ml        GI/Nutrition:  Orders Placed This Encounter   Procedures   • Diet Order     Standing Status:   Standing     Number of  Occurrences:   1     Order Specific Question:   Diet:     Answer:   Regular [1]     Medical Decision Making, by Problem:  Active Hospital Problems    Diagnosis   • *Myelodysplastic syndrome (CMS-HCC) [D46.9]   • Neutropenic fever (CMS-HCC) [D70.9, R50.81]   • Thrombocytopenia (CMS-HCC) [D69.6]   • Malaise [R53.81]   • Headache [R51]   • Anemia [D64.9]   • Symptomatic anemia [D64.9]   • HLD (hyperlipidemia) [E78.5]   • HTN (hypertension) [I10]     PMHX unchanged    DX  1.  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  Last increase on 11/9/17 of 300mg.  11/15/17 increase dose to 400 mg.    2.  Pulmonary: edema / vs infection or both on cefepime: improved    3.  Pancytopenia : from AML / treatment.  Transfuse <7 hemoglobin and <10 k platelets if no bleeding.  Irradiated       Plan  We will continue with her current plan. She will stay on her venetoclax 400 mg dose. Her next dose escalation potentially could be on 11/20/2017. We will continue with supportive care and transfusions.    Discussed with the primary service we will repeat chest x-ray.      High complexity/ High risk medication/ Guarded prognosis      Reviewed items::  Labs reviewed, Radiology images reviewed and Medications reviewed

## 2017-11-16 NOTE — PROGRESS NOTES
[facility] Hospitalist Progress Note    Date of Service: 11/16/2017    Chief Complaint  67 y.o. 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  ANC zero and will be low for months.  She has been afebrile for many days and no evidence of infection during this hospitalization.  For now will stop cefepime to see how she responds and discuss with oncology for what their oral recommendation would be (ie bactrim tiw?)  Patient states she is fatigued and her legs hurt but no headache.  She is markedly edematous in BLE and this could be causing her symptoms.  OUT of bed with ambulation encouraged and patient agrees.  11/8 Patient feeling much better today after her multiple transfusions yesterday, she doesn't have a headache unless you ask her about it.  She states she has had a lot of trouble sleeping in the hospital and would like to try something to help at night - I ordered PRN restoril to see if this helps.  She is afebrile.  Cefepime stopped yesterday, after my discussion with oncology today okay to start tiw bactrim while neutropenic if she remains afebrile.  11/9 Patient with HA today, will trial fioricet PRN.  She states she isn't sure if she slept well last night and doesn't want to take medications to help her sleep as she is already taking too many meds as it is.  Her dose of venetoclax was also increased today by oncology.  She will likely need platelet transfusion by this weekend.  BP has been high again - increased dose of coreg.  11/10 Patient without complaints of pain other than her legs when she gets out of bed - which she hasn't been doing often.  She has refused a shower past 2 days and states its due to being tired.  Yesterday she inquired about going to a rehab when she gets out of here.  I've requested PT come to re-evaluate her and told her she needs to get out of her  bed much more frequently.  She has notably become more depressed since her sister has left and become more disconnected.  She agrees that she needs to do more for herself and get more active.  Will continue to encourage her.  11/11 Patient in much better spirits today, had showered and ambulated yesterday as requested.  Per oncology to receive platelets today - ordered.  Denies fevers and feels like she has a better outlook today.  No complaints of pain.  11/12 Patient feeling well today and still a very good outlook.  She is taking in PO well so will dc IVF.  PT/OT to evaluate for therapy need on discharge as patient has been getting weaker since admission.  11/13 Patient developed hypoxia overnight and increased fatigue with ambulation.  Exam more consistent with edema than infection as cause of hypoxia.  CXR concerning for edema vs consolidation.  Patient responded well to lasix and diuresed 800cc quickly.  I stopped bactrim and resumed cefepime for more aggressive coverage.  Patient remaining afebrile at this time.  Increased dose of venetoclax not yet started - oncology awaiting r/o acute infection.  11/14 patient is very depressed, asking for deep alone to have some rest. Breathing condition feeling better. After Lasix a lot of urination. Wants to hold Lasix today.  11/15 patient still has some shortness of breath but improved. Less overloaded. Restart by mouth Lasix today.  11/16 patient's hemodynamically stable. Make good urine output. No potassium today. Breathing condition stable still have some mild crackles. Otherwise remained no fever chills nausea vomiting diarrhea.    Consultants/Specialty  Oncology - Reganti/Vigil    Disposition  tbd        Review of Systems   Constitutional: Positive for malaise/fatigue. Negative for chills.   HENT: Negative for congestion.    Eyes: Negative for blurred vision, photophobia and pain.   Respiratory: Positive for shortness of breath. Negative for cough, hemoptysis and  sputum production.    Cardiovascular: Negative for chest pain, claudication and leg swelling.   Gastrointestinal: Negative for constipation, heartburn and nausea.   Genitourinary: Negative for dysuria, hematuria and urgency.   Musculoskeletal: Negative for joint pain and myalgias.   Skin: Negative for itching and rash.   Neurological: Positive for weakness and headaches (back again). Negative for dizziness, sensory change and speech change.   Psychiatric/Behavioral: Positive for depression. Negative for suicidal ideas. The patient is not nervous/anxious and does not have insomnia.       Physical Exam  Laboratory/Imaging   Hemodynamics  Temp (24hrs), Av.1 °C (98.8 °F), Min:37 °C (98.6 °F), Max:37.3 °C (99.1 °F)   Temperature: 37.2 °C (99 °F)  Pulse  Av.4  Min: 68  Max: 118    Blood Pressure : 138/71      Respiratory      Respiration: 18, Pulse Oximetry: 94 %        RUL Breath Sounds: Clear, RML Breath Sounds: Diminished, RLL Breath Sounds: Diminished, DEBBIE Breath Sounds: Clear, LLL Breath Sounds: Diminished    Fluids    Intake/Output Summary (Last 24 hours) at 17 0751  Last data filed at 17 0615   Gross per 24 hour   Intake              348 ml   Output              750 ml   Net             -402 ml       Nutrition  Orders Placed This Encounter   Procedures   • Diet Order     Standing Status:   Standing     Number of Occurrences:   1     Order Specific Question:   Diet:     Answer:   Regular [1]     Physical Exam   Constitutional: She is oriented to person, place, and time. She appears well-developed. No distress.   HENT:   Head: Normocephalic and atraumatic.   Left Ear: External ear normal.   Eyes: Conjunctivae are normal. Pupils are equal, round, and reactive to light. No scleral icterus.   Neck: Neck supple. No JVD present.   Cardiovascular: Normal rate, regular rhythm, normal heart sounds and intact distal pulses.  Exam reveals no gallop and no friction rub.    Pulmonary/Chest: Effort normal.  She has no wheezes. She has no rales. She exhibits no tenderness.   Abdominal: Soft. Bowel sounds are normal. She exhibits no distension and no mass. There is no tenderness. There is no rebound.   Musculoskeletal: She exhibits edema (both feet swollen). She exhibits no tenderness.   Neurological: She is alert and oriented to person, place, and time. No cranial nerve deficit.   Skin: Skin is warm and dry. She is not diaphoretic. No erythema. No pallor.   Psychiatric: Her behavior is normal.   Patient is depressed   Nursing note and vitals reviewed.      Recent Labs      11/14/17   0034  11/15/17   0300  11/16/17   0205   WBC  0.2*  0.3*  0.3*   RBC  3.01*  2.90*  2.83*   HEMOGLOBIN  9.0*  8.4*  8.4*   HEMATOCRIT  25.0*  23.9*  23.4*   MCV  83.1  82.4  82.7   MCH  29.9  29.0  29.7   MCHC  36.0*  35.1*  35.9*   RDW  37.6  37.2  37.4   PLATELETCT  22*  15*  9*   MPV  9.1  8.7*  8.4*     Recent Labs      11/14/17   0034  11/15/17   0300  11/16/17   0205   SODIUM  132*  133*  130*   POTASSIUM  3.5*  3.8  3.4*   CHLORIDE  94*  93*  90*   CO2  32  29  31   GLUCOSE  143*  156*  152*   BUN  10  13  13   CREATININE  0.24*  0.27*  0.29*   CALCIUM  8.1*  8.3*  7.8*                      Assessment/Plan     * Myelodysplastic syndrome (CMS-[facility])- (present on admission)   Assessment & Plan    Evolved into AML per oncology.  Venetoclax/Vidaza started 10/23 per Dr Krishnamurthy recommendations  Venetoclax to continue at increasing doses per oncology, still titrating up every four days, cont  Monitoring ANC, currently 0        Neutropenic fever (CMS-[facility])- (present on admission)   Assessment & Plan    Cultures showing no growth.  Not having any current fevers  Empiric cefepime and acyclovir was off cefepime wed through Sunday, resumed today d/t hypoxia  Still neutropenic and likely will be so for the next 3-6 months per oncology        Thrombocytopenia (CMS-[facility])- (present on admission)   Assessment & Plan    Keep platelet count >10 or higher if  bleeding.  Transfuse prn          Hypoxia   Assessment & Plan    New finding, had been on IVF until 2 days ago and LE swollen.   Possible fluid overload, tolerated Lasix. We'll also cont po Lasix.  Responded very well to lasix and breathing easier  Empiric cefepime resumed in case infectious. pan culture pending result  Repeat chest x-ray showing slightly improved palmar edema but still having left lower lobe infiltrates stable.        Malaise- (present on admission)   Assessment & Plan    Cont PT OT as needed        Headache   Assessment & Plan    PRN fioricet  States HA >1 month and associated with leg pain simultaneously  MRI brain with and without contrast done, no IC pathology, marrow abnormal as expected with MDS  Now improved with meds  LP done 10/26, no complications, pathology report - no malignant cells seen            Anemia- (present on admission)   Assessment & Plan    Transfuse if less than 7.0  Received 1 unit 10/24, 10/30, 2 units 11/7  Secondary to 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  Transfuse for <7.0        HLD (hyperlipidemia)- (present on admission)   Assessment & Plan    Continue current statin therapy regimen        HTN (hypertension)- (present on admission)   Assessment & Plan    Coreg dose 6.25 - better control                Reviewed items::  Labs reviewed, Medications reviewed and Radiology images reviewed  Gan catheter::  No Gan  DVT prophylaxis pharmacological::  Contraindicated - High bleeding risk  DVT prophylaxis - mechanical:  SCDs  Antibiotics:  Treating active infection/contamination beyond 24 hours perioperative coverage   For complexity-based billing, please refer to the history, exam, and decison making above. In addition, I spent >35 minutes caring for the patient today. More than 50% of the time was spent counseling and coordinating care.    I have discussed with RN and BEATRICE and SW and  other consultants about patient's plan.

## 2017-11-16 NOTE — CARE PLAN
Problem: Pain Management  Goal: Pain level will decrease to patient's comfort goal    Intervention: Follow pain managment plan developed in collaboration with patient and Interdisciplinary Team  Pain medication given as needed pt. States some pain improvement      Problem: Psychosocial Needs:  Goal: Level of anxiety will decrease    Intervention: Identify and develop with patient strategies to cope with anxiety triggers  Emotional support provided

## 2017-11-17 PROBLEM — R74.8 ELEVATED LIVER ENZYMES: Status: ACTIVE | Noted: 2017-01-01

## 2017-11-17 NOTE — PROGRESS NOTES
Received report from day RN, assumed care of PT at 1900. PT A&Ox3 with some confusion on date/time of day (pt is asking RN if it is day or nighttime), discussed plan of care for this shift.  Pain managed with meds per MAR. PT up x1 SBA, right sided port patent with blood return, safety precautions in place. Bed alarm set, call light and personal possessions within reach. Hourly rounding in place.

## 2017-11-17 NOTE — PROGRESS NOTES
Pt. Very confused this am. She was very forgetful and continue to ask what is going on. Report to Dr. Little and Dr. Vigil. When Dr. Vigil assess the patient she was alert and orient. Platelets infusing at this time will continue to monitor.

## 2017-11-17 NOTE — CARE PLAN
Problem: Safety  Goal: Will remain free from falls    Intervention: Assess risk factors for falls  Bed alarm in place for pt. safety

## 2017-11-17 NOTE — PROGRESS NOTES
[facility] Hospitalist Progress Note    Date of Service: 11/17/2017    Chief Complaint  67 y.o. 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  ANC zero and will be low for months.  She has been afebrile for many days and no evidence of infection during this hospitalization.  For now will stop cefepime to see how she responds and discuss with oncology for what their oral recommendation would be (ie bactrim tiw?)  Patient states she is fatigued and her legs hurt but no headache.  She is markedly edematous in BLE and this could be causing her symptoms.  OUT of bed with ambulation encouraged and patient agrees.  11/8 Patient feeling much better today after her multiple transfusions yesterday, she doesn't have a headache unless you ask her about it.  She states she has had a lot of trouble sleeping in the hospital and would like to try something to help at night - I ordered PRN restoril to see if this helps.  She is afebrile.  Cefepime stopped yesterday, after my discussion with oncology today okay to start tiw bactrim while neutropenic if she remains afebrile.  11/9 Patient with HA today, will trial fioricet PRN.  She states she isn't sure if she slept well last night and doesn't want to take medications to help her sleep as she is already taking too many meds as it is.  Her dose of venetoclax was also increased today by oncology.  She will likely need platelet transfusion by this weekend.  BP has been high again - increased dose of coreg.  11/10 Patient without complaints of pain other than her legs when she gets out of bed - which she hasn't been doing often.  She has refused a shower past 2 days and states its due to being tired.  Yesterday she inquired about going to a rehab when she gets out of here.  I've requested PT come to re-evaluate her and told her she needs to get out of her  bed much more frequently.  She has notably become more depressed since her sister has left and become more disconnected.  She agrees that she needs to do more for herself and get more active.  Will continue to encourage her.  11/11 Patient in much better spirits today, had showered and ambulated yesterday as requested.  Per oncology to receive platelets today - ordered.  Denies fevers and feels like she has a better outlook today.  No complaints of pain.  11/12 Patient feeling well today and still a very good outlook.  She is taking in PO well so will dc IVF.  PT/OT to evaluate for therapy need on discharge as patient has been getting weaker since admission.  11/13 Patient developed hypoxia overnight and increased fatigue with ambulation.  Exam more consistent with edema than infection as cause of hypoxia.  CXR concerning for edema vs consolidation.  Patient responded well to lasix and diuresed 800cc quickly.  I stopped bactrim and resumed cefepime for more aggressive coverage.  Patient remaining afebrile at this time.  Increased dose of venetoclax not yet started - oncology awaiting r/o acute infection.  11/14 patient is very depressed, asking for deep alone to have some rest. Breathing condition feeling better. After Lasix a lot of urination. Wants to hold Lasix today.  11/15 patient still has some shortness of breath but improved. Less overloaded. Restart by mouth Lasix today.  11/16 patient's hemodynamically stable. Make good urine output. No potassium today. Breathing condition stable still have some mild crackles. Otherwise remained no fever chills nausea vomiting diarrhea.  11/17 patient definitely feels better today than yesterday. Mood stable. Less edema. Ultrasound abdominal showing fatty liver. We'll need to recheck liver function test tomorrow.    Consultants/Specialty  Oncology - Reganti/Vigil    Disposition  tbd        Review of Systems   Constitutional: Positive for malaise/fatigue. Negative for  chills, diaphoresis and fever.   HENT: Negative for congestion.    Eyes: Negative for blurred vision and pain.   Respiratory: Negative for cough, sputum production and shortness of breath.    Cardiovascular: Negative for chest pain, claudication and leg swelling.   Gastrointestinal: Negative for constipation, heartburn and nausea.   Genitourinary: Negative for dysuria and hematuria.   Musculoskeletal: Negative for joint pain and myalgias.   Skin: Negative for itching and rash.   Neurological: Positive for weakness and headaches (back again). Negative for dizziness, sensory change and speech change.   Psychiatric/Behavioral: Positive for depression. Negative for suicidal ideas. The patient is not nervous/anxious and does not have insomnia.       Physical Exam  Laboratory/Imaging   Hemodynamics  Temp (24hrs), Av °C (98.6 °F), Min:36.6 °C (97.8 °F), Max:37.5 °C (99.5 °F)   Temperature: 37 °C (98.6 °F)  Pulse  Av.6  Min: 68  Max: 118    Blood Pressure : 130/74      Respiratory      Respiration: 18, Pulse Oximetry: 92 %        RUL Breath Sounds: Clear, RML Breath Sounds: Diminished, RLL Breath Sounds: Diminished, DEBBIE Breath Sounds: Clear, LLL Breath Sounds: Diminished    Fluids    Intake/Output Summary (Last 24 hours) at 17 08  Last data filed at 17 2017   Gross per 24 hour   Intake              584 ml   Output                0 ml   Net              584 ml       Nutrition  Orders Placed This Encounter   Procedures   • DIET ORDER     Standing Status:   Standing     Number of Occurrences:   1     Order Specific Question:   Diet:     Answer:   Regular [1]     Physical Exam   Constitutional: She is oriented to person, place, and time. She appears well-developed. No distress.   HENT:   Head: Normocephalic and atraumatic.   Left Ear: External ear normal.   Eyes: Conjunctivae are normal. Pupils are equal, round, and reactive to light. Scleral icterus is present.   Neck: Neck supple. No JVD present. No  thyromegaly present.   Cardiovascular: Normal rate, normal heart sounds and intact distal pulses.  Exam reveals no gallop and no friction rub.    No murmur heard.  Pulmonary/Chest: Effort normal. She has no wheezes. She has no rales. She exhibits no tenderness.   Abdominal: Soft. Bowel sounds are normal. She exhibits no distension and no mass. There is no tenderness. There is no rebound.   Musculoskeletal: She exhibits edema (both feet swollen, less thean before). She exhibits no tenderness.   Neurological: She is alert and oriented to person, place, and time. No cranial nerve deficit.   Skin: Skin is warm and dry. She is not diaphoretic. No erythema. No pallor.   Psychiatric: Her behavior is normal.   Patient is depressed   Nursing note and vitals reviewed.      Recent Labs      11/15/17   0300 11/16/17   0205 11/17/17   0115   WBC  0.3*  0.3*  0.3*   RBC  2.90*  2.83*  2.74*   HEMOGLOBIN  8.4*  8.4*  8.1*   HEMATOCRIT  23.9*  23.4*  22.9*   MCV  82.4  82.7  83.6   MCH  29.0  29.7  29.6   MCHC  35.1*  35.9*  35.4*   RDW  37.2  37.4  37.7   PLATELETCT  15*  9*  47*   MPV  8.7*  8.4*  9.1     Recent Labs      11/15/17   0300  11/16/17   0205  11/17/17   0115   SODIUM  133*  130*  131*   POTASSIUM  3.8  3.4*  3.5*   CHLORIDE  93*  90*  92*   CO2  29  31  30   GLUCOSE  156*  152*  156*   BUN  13  13  14   CREATININE  0.27*  0.29*  0.33*   CALCIUM  8.3*  7.8*  8.2*                      Assessment/Plan     * Myelodysplastic syndrome (CMS-HCC)- (present on admission)   Assessment & Plan    Evolved into AML per oncology.  Venetoclax/Vidaza started 10/23 per Dr Krishnamurthy recommendations  Venetoclax to continue at increasing doses per oncology, still titrating up every four days, cont  Monitoring ANC, currently 0        Neutropenic fever (CMS-HCC)- (present on admission)   Assessment & Plan    Cultures showing no growth.  Not having any current fevers  Empiric cefepime and acyclovir was off cefepime wed through Sunday, resumed  today d/t hypoxia  Still neutropenic and likely will be so for the next 3-6 months per oncology        Thrombocytopenia (CMS-HCC)- (present on admission)   Assessment & Plan    Keep platelet count >10 or higher if bleeding.  Transfuse prn          Elevated liver enzymes   Assessment & Plan    US showing CBD no dilation fatty liver  Repeat liver function tests tomorrow currently stable.         Hypoxia   Assessment & Plan    New finding, had been on IVF until 2 days ago and LE swollen.   Possible fluid overload, tolerated Lasix. We'll also cont po Lasix.  Responded very well to lasix and breathing easier  Empiric cefepime resumed in case infectious. pan culture pending result  Repeat chest x-ray showing slightly improved palmar edema but still having left lower lobe infiltrates stable.  Patient's lower extremity edema improved, DC Lasix today.        Malaise- (present on admission)   Assessment & Plan    Cont PT OT as needed        Headache   Assessment & Plan    PRN fioricet  States HA >1 month and associated with leg pain simultaneously  MRI brain with and without contrast done, no IC pathology, marrow abnormal as expected with MDS  Now improved with meds  LP done 10/26, no complications, pathology report - no malignant cells seen            Anemia- (present on admission)   Assessment & Plan    Transfuse if less than 7.0  Received 1 unit 10/24, 10/30, 2 units 11/7  Secondary to 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  Transfuse for <7.0        HLD (hyperlipidemia)- (present on admission)   Assessment & Plan    Continue current statin therapy regimen        HTN (hypertension)- (present on admission)   Assessment & Plan    Coreg dose 6.25 - better control                Reviewed items::  Labs reviewed, Medications reviewed and Radiology images reviewed  Gan catheter::  No Gan  DVT prophylaxis pharmacological::  Contraindicated -  High bleeding risk  DVT prophylaxis - mechanical:  SCDs  Antibiotics:  Treating active infection/contamination beyond 24 hours perioperative coverage   For complexity-based billing, please refer to the history, exam, and decison making above. In addition, I spent >35 minutes caring for the patient today. More than 50% of the time was spent counseling and coordinating care.    I have discussed with RN and CM and SW and other consultants about patient's plan.

## 2017-11-17 NOTE — PROGRESS NOTES
Oncology/Hematology Progress Note               Author: Dario Vigil Date & Time created: 11/17/2017  1:50 PM   CC:  AML     Interval History:  She does have a particular complaints. She feels better.  No new issues.      Review of Systems:  Review of Systems   Constitutional: Positive for malaise/fatigue. Negative for chills and fever.   HENT: Negative.    Eyes: Negative.    Respiratory: Positive for shortness of breath. Negative for sputum production.    Cardiovascular: Negative.  Negative for chest pain and palpitations.   Gastrointestinal: Negative.  Negative for heartburn.   Genitourinary: Negative.    Musculoskeletal: Positive for myalgias.   Skin: Negative for rash.   Neurological: Positive for weakness. Negative for dizziness and headaches.   Endo/Heme/Allergies: Negative for environmental allergies. Does not bruise/bleed easily.   Psychiatric/Behavioral: Positive for depression.       Physical Exam:  Physical Exam   Constitutional: She is oriented to person, place, and time. She appears well-developed.   HENT:   Head: Normocephalic.   Eyes: Conjunctivae are normal. Pupils are equal, round, and reactive to light.   Cardiovascular: Normal rate, regular rhythm and normal heart sounds.    Pulmonary/Chest: Effort normal.   Continued decreased breath sound at the left base.   Abdominal: Soft. Bowel sounds are normal.   Musculoskeletal: She exhibits no edema.   Neurological: She is alert and oriented to person, place, and time. No cranial nerve deficit.   Skin: No erythema.   Psychiatric: She has a normal mood and affect. Her behavior is normal. Judgment and thought content normal.       Labs:        Invalid input(s): [id number]      Recent Labs      11/15/17   0300  11/16/17   0205  11/17/17   0115   SODIUM  133*  130*  131*   POTASSIUM  3.8  3.4*  3.5*   CHLORIDE  93*  90*  92*   CO2  29  31  30   BUN  13  13  14   CREATININE  0.27*  0.29*  0.33*   MAGNESIUM  1.6   --    --    CALCIUM  8.3*  7.8*  8.2*      Recent Labs      11/15/17   0300  11/16/17   0205  11/17/17   0115   ALTSGPT   --   11   --    ASTSGOT   --   22   --    ALKPHOSPHAT   --   113*   --    TBILIRUBIN   --   2.6*   --    DBILIRUBIN   --   1.5*   --    GLUCOSE  156*  152*  156*     Recent Labs      11/15/17   0300  11/16/17   0205  11/17/17   0115   RBC  2.90*  2.83*  2.74*   HEMOGLOBIN  8.4*  8.4*  8.1*   HEMATOCRIT  23.9*  23.4*  22.9*   PLATELETCT  15*  9*  47*     Recent Labs      11/15/17   0300  11/16/17   0205  11/17/17   0115   WBC  0.3*  0.3*  0.3*   NEUTSPOLYS  0.00*   --    --    LYMPHOCYTES  51.90*   --    --    MONOCYTES  40.70*   --    --    EOSINOPHILS  0.00   --    --    BASOPHILS  0.00   --    --    ASTSGOT   --   22   --    ALTSGPT   --   11   --    ALKPHOSPHAT   --   113*   --    TBILIRUBIN   --   2.6*   --      Recent Labs      11/15/17   0300  11/16/17   0205  11/17/17   0115   SODIUM  133*  130*  131*   POTASSIUM  3.8  3.4*  3.5*   CHLORIDE  93*  90*  92*   CO2  29  31  30   GLUCOSE  156*  152*  156*   BUN  13  13  14   CREATININE  0.27*  0.29*  0.33*   CALCIUM  8.3*  7.8*  8.2*     Hemodynamics:  Temp (24hrs), Av.1 °C (98.7 °F), Min:36.6 °C (97.8 °F), Max:37.5 °C (99.5 °F)  Temperature: 37.2 °C (98.9 °F)  Pulse  Av.6  Min: 68  Max: 118   Blood Pressure : 126/59     Respiratory:    Respiration: 18, Pulse Oximetry: 95 %        RUL Breath Sounds: Clear, RML Breath Sounds: Diminished, RLL Breath Sounds: Diminished, DEBBIE Breath Sounds: Clear, LLL Breath Sounds: Diminished  Fluids:    Intake/Output Summary (Last 24 hours) at 17 1155  Last data filed at 17 1800   Gross per 24 hour   Intake              150 ml   Output                0 ml   Net              150 ml        GI/Nutrition:  Orders Placed This Encounter   Procedures   • DIET ORDER     Standing Status:   Standing     Number of Occurrences:   1     Order Specific Question:   Diet:     Answer:   Regular [1]     Medical Decision Making, by Problem:  Active  Hospital Problems    Diagnosis   • *Myelodysplastic syndrome (CMS-HCC) [D46.9]   • Neutropenic fever (CMS-HCC) [D70.9, R50.81]   • Thrombocytopenia (CMS-HCC) [D69.6]   • Malaise [R53.81]   • Headache [R51]   • Anemia [D64.9]   • Symptomatic anemia [D64.9]   • HLD (hyperlipidemia) [E78.5]   • HTN (hypertension) [I10]     PMHX unchanged    DX  1.  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  Last increase on 11/9/17 of 300mg.  11/15/17 increase dose to 400 mg.    2.  Pulmonary: edema / vs infection or both on cefepime: improved CXR 11/16/17    3.  Pancytopenia : from AML / treatment.  Transfuse <7 hemoglobin and <10 k platelets if no bleeding.  Irradiated     4.  GI/Liver:  Elevated bilirubin.  US normal.        Plan 11/17/17    She will stay on her venetoclax 400 mg dose. Her next dose escalation potentially could be on 11/20/2017 depending how patient is doing    We will continue with supportive care and transfusions.    US ok of liver.  Follow CMP in am        High complexity/ High risk medication/ Guarded prognosis      Reviewed items::  Labs reviewed, Radiology images reviewed and Medications reviewed

## 2017-11-17 NOTE — CARE PLAN
Problem: Nutritional:  Goal: Achieve adequate nutritional intake  Patient will consume >50% of meals and snacks    Outcome: PROGRESSING SLOWER THAN EXPECTED  PO intake ~ 25-50%.

## 2017-11-17 NOTE — DIETARY
Nutrition Services:  Brief Update    Re-screen pt today for PO intake.  PO intake is low.  Spoke with pt at bedside.  Pt reports a low appetite d/t not feeling well.  Obtained food/snack preferences for pt and added to daily menu.  Adjusted supplements per pt preference.    RD will continue to follow.

## 2017-11-17 NOTE — THERAPY
"Pt demonstrated slightly improved functional mobility and activity tolerance today. Pt is much stronger and capable than she states. Needs prompting and encouragement throughout therapy session to participate in activity and to remain OOB. Pt's son was present throughout therapy session, very helpful in reinforcing therapy goals for activity and mobility. Educated pt and son on goal to be OOB and in chair for at least 2 meals per day. Plan to increase acute PT frequency to 5x/wk to continue to reinforce need to mobility and progress PT POC. PLEASE CONTINUE to mobilize to BSC and chair with nursing staff, ambulate to bathroom or in hallways as tolerated. Based on CLOF, PT would recommend post acute placement to address present deficits PRIOR to D/C home.       Physical Therapy Treatment completed.   Bed Mobility:  Supine to Sit: Contact Guard Assist (HOB Flat, railing used)  Transfers: Sit to Stand: Stand by Assist  Gait: Level Of Assist: Contact Guard Assist with Front-Wheel Walker x3 steps at EOB      Plan of Care: Will benefit from Physical Therapy 5 times per week  Discharge Recommendations: Equipment: Will Continue to Assess for Equipment Needs. Post-acute therapy Discharge to a transitional care facility for continued skilled therapy services.     See \"Rehab Therapy-Acute\" Patient Summary Report for complete documentation.         "

## 2017-11-17 NOTE — DISCHARGE PLANNING
Medical Social Work    SW received vm from pt's daughter Bonnie Hernandez ([phone]) stating that they did not get a messaged from PT giving them a time to come in to work with pt. SW called PT dept and they stated that they will f/u with family this morning. PT requested that we review her notes prior to calling. SW stated she would pass message along.     Plan: SW to obtain choice from pt's family.

## 2017-11-17 NOTE — CARE PLAN
Problem: Acute Care of the Chemotherapy Patient  Goal: Optimal Outcome for the Chemotherapy Patient    Intervention: Review WBC, ANC, platelet count, kidney function and liver function tests before chemotherapy administration  Pt. Continues on oral chemo therapy monitoring labs

## 2017-11-17 NOTE — PROGRESS NOTES
Pt. Alert and oriented up with one assist. Pt. Has had some mild confusion at times today. Family at bedside with physical therapy today. Review plan care call light within reach hourly.

## 2017-11-17 NOTE — PROGRESS NOTES
Pt woke up very confused having to go to the bathroom. Pt O2 saturation was 81%, increased O2 to 6 L N/C and was still sating at <90% with HR at 108. Applied mask and worked pt through breathing exercises to slow respirations. Pt currently resting comfortably in bed, mask still on per pt request at 3L, saturation is 97%, hr 95 and respirations are 18. Pt was febrile at 99.5 temporal and was refusing oral temperature assessment. Pt given tylenol per MAR.

## 2017-11-17 NOTE — CARE PLAN
Problem: Pain Management  Goal: Pain level will decrease to patient's comfort goal    Intervention: Follow pain managment plan developed in collaboration with patient and Interdisciplinary Team  Pt. harjit pain at this time will continue to monitor      Problem: Psychosocial Needs:  Goal: Level of anxiety will decrease    Intervention: Identify and develop with patient strategies to cope with anxiety triggers  Emotional support provided to pt. Encourage with conversation

## 2017-11-18 NOTE — PROGRESS NOTES
Received report from day RN, assumed care of PT at 1915. PT A&Ox 3 with some confusion to time/time of day/year, discussed plan of care for this shift.  Pain managed with meds per MAR, pt has no complaints of pain at this time. PT up x1, safety precautions in place. Bed alarm is set, call light and personal possessions within reach.

## 2017-11-18 NOTE — CARE PLAN
Problem: Bowel/Gastric:  Goal: Normal bowel function is maintained or improved  Patient reports that she had a normal bowel movement yesterday.  Administered stool softeners.    Problem: Pain Management  Goal: Pain level will decrease to patient's comfort goal  Patient refused scheduled oxycontin during med pass this am.  Received call at 1130 that patient is in pain.  Discussed pain and pain medication with patient.  Patient would prefer to take oxycontin now.  Administered oxycontin.    Problem: Respiratory:  Goal: Respiratory status will improve  Patient continues to require continuous oxygen at this time.  Was not on oxygen at home.    Problem: Mobility  Goal: Risk for activity intolerance will decrease  Patient requires standby to 1 person assistance for safety when ambulating.

## 2017-11-18 NOTE — PROGRESS NOTES
Received bedside report from night shift RN. Assumed care of patient. Pt assessed and stable. VSS. Patient reports 0/10 pain at this time. Discussed plan of care for day with patient and received verbal understanding. Call light within reach, strip alarm active, bed in low position.

## 2017-11-18 NOTE — PROGRESS NOTES
Oncology/Hematology Progress Note               Author: Dario Vigil Date & Time created: 11/18/2017  2:38 PM   CC:  AML     Interval History:  She does have a particular complaints. She feels better.  No new issues.      Review of Systems:  Review of Systems   Constitutional: Positive for malaise/fatigue. Negative for chills and fever.   HENT: Negative.    Eyes: Negative.    Respiratory: Positive for shortness of breath. Negative for sputum production.    Cardiovascular: Negative.  Negative for chest pain and palpitations.   Gastrointestinal: Negative.  Negative for heartburn.   Genitourinary: Negative.    Musculoskeletal: Positive for myalgias.   Skin: Negative for rash.   Neurological: Positive for weakness. Negative for dizziness and headaches.   Endo/Heme/Allergies: Negative for environmental allergies. Does not bruise/bleed easily.   Psychiatric/Behavioral: Positive for depression.       Physical Exam:  Physical Exam   Constitutional: She is oriented to person, place, and time. She appears well-developed.   HENT:   Head: Normocephalic.   Eyes: Conjunctivae are normal. Pupils are equal, round, and reactive to light.   Cardiovascular: Normal rate, regular rhythm and normal heart sounds.    Pulmonary/Chest: Effort normal.   Continued decreased breath sound at the left base.   Abdominal: Soft. Bowel sounds are normal.   Musculoskeletal: She exhibits no edema.   Neurological: She is alert and oriented to person, place, and time. No cranial nerve deficit.   Skin: No erythema.   Psychiatric: She has a normal mood and affect. Her behavior is normal. Judgment and thought content normal.       Labs:        Invalid input(s): [id number]      Recent Labs      11/16/17   0205  11/17/17   0115  11/18/17   0125   SODIUM  130*  131*  131*   POTASSIUM  3.4*  3.5*  4.1   CHLORIDE  90*  92*  94*   CO2  31  30  30   BUN  13  14  15   CREATININE  0.29*  0.33*  0.32*   CALCIUM  7.8*  8.2*  8.9     Recent Labs      11/16/17    0205  17   0115  17   0125   ALTSGPT  11  18  23   ASTSGOT  22  34  45   ALKPHOSPHAT  113*  120*  136*   TBILIRUBIN  2.6*  2.8*  2.8*   DBILIRUBIN  1.5*  1.8*   --    GLUCOSE  152*  156*  150*     Recent Labs      17   0205  17   0115  17   0125   RBC  2.83*  2.74*  2.87*   HEMOGLOBIN  8.4*  8.1*  8.6*   HEMATOCRIT  23.4*  22.9*  24.2*   PLATELETCT  9*  47*  33*     Recent Labs      17   0205  17   01117   012   WBC  0.3*  0.3*  0.3*   ASTSGOT  22  34  45   ALTSGPT  11  18  23   ALKPHOSPHAT  113*  120*  136*   TBILIRUBIN  2.6*  2.8*  2.8*     Recent Labs      17   02017   012   SODIUM  130*  131*  131*   POTASSIUM  3.4*  3.5*  4.1   CHLORIDE  90*  92*  94*   CO2  31  30  30   GLUCOSE  152*  156*  150*   BUN  13  14  15   CREATININE  0.29*  0.33*  0.32*   CALCIUM  7.8*  8.2*  8.9     Hemodynamics:  Temp (24hrs), Av.8 °C (98.3 °F), Min:36.4 °C (97.6 °F), Max:37.6 °C (99.6 °F)  Temperature: 36.4 °C (97.6 °F)  Pulse  Av.6  Min: 68  Max: 118   Blood Pressure : 140/77     Respiratory:    Respiration: 16, Pulse Oximetry: 97 %        RUL Breath Sounds: Clear, RML Breath Sounds: Diminished, RLL Breath Sounds: Diminished, DEBBIE Breath Sounds: Clear, LLL Breath Sounds: Diminished  Fluids:    Intake/Output Summary (Last 24 hours) at 17 1155  Last data filed at 17 1800   Gross per 24 hour   Intake              150 ml   Output                0 ml   Net              150 ml        GI/Nutrition:  Orders Placed This Encounter   Procedures   • DIET ORDER     Standing Status:   Standing     Number of Occurrences:   1     Order Specific Question:   Diet:     Answer:   Regular [1]     Medical Decision Making, by Problem:  Active Hospital Problems    Diagnosis   • *Myelodysplastic syndrome (CMS-HCC) [D46.9]   • Neutropenic fever (CMS-HCC) [D70.9, R50.81]   • Thrombocytopenia (CMS-HCC) [D69.6]   • Malaise [R53.81]   • Headache [R51]   •  Anemia [D64.9]   • Symptomatic anemia [D64.9]   • HLD (hyperlipidemia) [E78.5]   • HTN (hypertension) [I10]     PMHX unchanged    DX  1.  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  Last increase on 11/9/17 of 300mg.  11/15/17 increase dose to 400 mg.    2.  Pulmonary: edema / vs infection or both on cefepime: improved CXR 11/16/17    3.  Pancytopenia : from AML / treatment.  Transfuse <7 hemoglobin and <10 k platelets if no bleeding.  Irradiated     4.  GI/Liver:  Elevated bilirubin.  US normal.        Plan 11/18/17    She will stay on her venetoclax 400 mg dose. Her next dose escalation potentially could be on 11/20/2017 depending how patient is doing.  She will be due for her next vidaza on 11/27/2017.  However, I think Dr. Sanabria needs to be informed and updated on the patient. Does understand that she may need another bone marrow biopsy but may be more like 2 cycles. She understands that she may be in the hospital for some time. Her daughter was present in the room as well.    We will continue with supportive care and transfusions.    US ok of liver.  Follow CMP. We discussed that venetoclax should not causing liver functions to go up.  No adjustment. If remains stable that we can consider going up to the next dose up on Monday.    High complexity/ High risk medication/ Guarded prognosis/> 25 minutes      Reviewed items::  Labs reviewed, Radiology images reviewed and Medications reviewed

## 2017-11-18 NOTE — CARE PLAN
Problem: Safety  Goal: Will remain free from falls  Outcome: PROGRESSING AS EXPECTED  Educated PT on use of call light   PT oriented to room, all possessions within reach, call light within reach   Slip resistant socks on PT (yellow if fall risk)        Problem: Bowel/Gastric:  Goal: Normal bowel function is maintained or improved  Outcome: PROGRESSING AS EXPECTED  Bowel protocol given per MAR   PT and family educated about diet, fluid intake and activity to promote bowel function   Bathroom opportunities scheduled and/or offered

## 2017-11-18 NOTE — PROGRESS NOTES
[facility] Hospitalist Progress Note    Date of Service: 11/18/2017    Chief Complaint  67 y.o. 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  ANC zero and will be low for months.  She has been afebrile for many days and no evidence of infection during this hospitalization.  For now will stop cefepime to see how she responds and discuss with oncology for what their oral recommendation would be (ie bactrim tiw?)  Patient states she is fatigued and her legs hurt but no headache.  She is markedly edematous in BLE and this could be causing her symptoms.  OUT of bed with ambulation encouraged and patient agrees.  11/8 Patient feeling much better today after her multiple transfusions yesterday, she doesn't have a headache unless you ask her about it.  She states she has had a lot of trouble sleeping in the hospital and would like to try something to help at night - I ordered PRN restoril to see if this helps.  She is afebrile.  Cefepime stopped yesterday, after my discussion with oncology today okay to start tiw bactrim while neutropenic if she remains afebrile.  11/9 Patient with HA today, will trial fioricet PRN.  She states she isn't sure if she slept well last night and doesn't want to take medications to help her sleep as she is already taking too many meds as it is.  Her dose of venetoclax was also increased today by oncology.  She will likely need platelet transfusion by this weekend.  BP has been high again - increased dose of coreg.  11/10 Patient without complaints of pain other than her legs when she gets out of bed - which she hasn't been doing often.  She has refused a shower past 2 days and states its due to being tired.  Yesterday she inquired about going to a rehab when she gets out of here.  I've requested PT come to re-evaluate her and told her she needs to get out of her  bed much more frequently.  She has notably become more depressed since her sister has left and become more disconnected.  She agrees that she needs to do more for herself and get more active.  Will continue to encourage her.  11/11 Patient in much better spirits today, had showered and ambulated yesterday as requested.  Per oncology to receive platelets today - ordered.  Denies fevers and feels like she has a better outlook today.  No complaints of pain.  11/12 Patient feeling well today and still a very good outlook.  She is taking in PO well so will dc IVF.  PT/OT to evaluate for therapy need on discharge as patient has been getting weaker since admission.  11/13 Patient developed hypoxia overnight and increased fatigue with ambulation.  Exam more consistent with edema than infection as cause of hypoxia.  CXR concerning for edema vs consolidation.  Patient responded well to lasix and diuresed 800cc quickly.  I stopped bactrim and resumed cefepime for more aggressive coverage.  Patient remaining afebrile at this time.  Increased dose of venetoclax not yet started - oncology awaiting r/o acute infection.  11/14 patient is very depressed, asking for deep alone to have some rest. Breathing condition feeling better. After Lasix a lot of urination. Wants to hold Lasix today.  11/15 patient still has some shortness of breath but improved. Less overloaded. Restart by mouth Lasix today.  11/16 patient's hemodynamically stable. Make good urine output. No potassium today. Breathing condition stable still have some mild crackles. Otherwise remained no fever chills nausea vomiting diarrhea.  11/17 patient definitely feels better today than yesterday. Mood stable. Less edema. Ultrasound abdominal showing fatty liver. We'll need to recheck liver function test tomorrow.  11/18 a little confused earlier in the morning but during the daytime he improved. Possibly due to just awakened from sleeping. Patient denies fever, chills,  nausea vomiting diarrhea. She still have some cough. No sputum.    Consultants/Specialty  Oncology - Reganti/Vigil    Disposition  tbd        Review of Systems   Constitutional: Positive for malaise/fatigue. Negative for diaphoresis and fever.   HENT: Negative for congestion.    Eyes: Negative for blurred vision and pain.   Respiratory: Positive for cough. Negative for sputum production and shortness of breath.    Cardiovascular: Negative for chest pain, palpitations, claudication and leg swelling.   Gastrointestinal: Negative for constipation, heartburn and vomiting.   Genitourinary: Negative for dysuria and hematuria.   Musculoskeletal: Negative for joint pain and myalgias.   Skin: Negative for itching and rash.   Neurological: Positive for weakness and headaches (back again). Negative for dizziness, sensory change and speech change.   Psychiatric/Behavioral: Positive for depression. Negative for suicidal ideas. The patient is not nervous/anxious and does not have insomnia.       Physical Exam  Laboratory/Imaging   Hemodynamics  Temp (24hrs), Av.8 °C (98.3 °F), Min:36.4 °C (97.6 °F), Max:37.6 °C (99.6 °F)   Temperature: 36.4 °C (97.6 °F)  Pulse  Av.6  Min: 68  Max: 118    Blood Pressure : 140/77      Respiratory      Respiration: 16, Pulse Oximetry: 97 %        RUL Breath Sounds: Clear, RML Breath Sounds: Diminished, RLL Breath Sounds: Diminished, DEBBIE Breath Sounds: Clear, LLL Breath Sounds: Diminished    Fluids    Intake/Output Summary (Last 24 hours) at 17 0826  Last data filed at 17 2200   Gross per 24 hour   Intake              400 ml   Output                0 ml   Net              400 ml       Nutrition  Orders Placed This Encounter   Procedures   • DIET ORDER     Standing Status:   Standing     Number of Occurrences:   1     Order Specific Question:   Diet:     Answer:   Regular [1]     Physical Exam   Constitutional: She is oriented to person, place, and time. She appears  well-developed. No distress.   HENT:   Head: Normocephalic.   Right Ear: External ear normal.   Left Ear: External ear normal.   Eyes: Conjunctivae are normal. Pupils are equal, round, and reactive to light.   Neck: Neck supple. No tracheal deviation present. No thyromegaly present.   Cardiovascular: Normal rate, normal heart sounds and intact distal pulses.  Exam reveals no gallop and no friction rub.    No murmur heard.  Pulmonary/Chest: Effort normal. No respiratory distress. She has no rales. She exhibits no tenderness.   Abdominal: Soft. Bowel sounds are normal. She exhibits no mass. There is no tenderness. There is no rebound and no guarding.   Musculoskeletal: She exhibits edema (both feet swollen, less thean before). She exhibits no tenderness.   Neurological: She is alert and oriented to person, place, and time. No cranial nerve deficit.   Skin: Skin is warm and dry. She is not diaphoretic. No erythema. No pallor.   Psychiatric: Her behavior is normal.   Patient is depressed   Nursing note and vitals reviewed.      Recent Labs      11/16/17 0205 11/17/17 0115 11/18/17   0125   WBC  0.3*  0.3*  0.3*   RBC  2.83*  2.74*  2.87*   HEMOGLOBIN  8.4*  8.1*  8.6*   HEMATOCRIT  23.4*  22.9*  24.2*   MCV  82.7  83.6  84.3   MCH  29.7  29.6  30.0   MCHC  35.9*  35.4*  35.5*   RDW  37.4  37.7  37.6   PLATELETCT  9*  47*  33*   MPV  8.4*  9.1  9.4     Recent Labs      11/16/17 0205  11/17/17 0115 11/18/17   0125   SODIUM  130*  131*  131*   POTASSIUM  3.4*  3.5*  4.1   CHLORIDE  90*  92*  94*   CO2  31  30  30   GLUCOSE  152*  156*  150*   BUN  13  14  15   CREATININE  0.29*  0.33*  0.32*   CALCIUM  7.8*  8.2*  8.9                      Assessment/Plan     * Myelodysplastic syndrome (CMS-HCC)- (present on admission)   Assessment & Plan    Evolved into AML per oncology.  Venetoclax/Vidaza started 10/23 per Dr Krishnamurthy recommendations  Venetoclax to continue at increasing doses per oncology, still titrating up  every four days, cont  Monitoring ANC, currently 0        Neutropenic fever (CMS-HCC)- (present on admission)   Assessment & Plan    Cultures showing no growth.  Not having any current fevers  Empiric cefepime and acyclovir was off cefepime wed through Sunday, resumed today d/t hypoxia  Still neutropenic and likely will be so for the next 3-6 months per oncology        Thrombocytopenia (CMS-HCC)- (present on admission)   Assessment & Plan    Keep platelet count >10 or higher if bleeding.  Transfuse prn          Elevated liver enzymes   Assessment & Plan    US showing CBD no dilation , (+) for fatty liver  Tbil 2.3 stable  Cont to monitor daily   Likely from fatty liver + meds        Hypoxia   Assessment & Plan    New finding, had been on IVF until 2 days ago and LE swollen.   Possible fluid overload, tolerated Lasix. We'll also cont po Lasix.  Responded very well to lasix and breathing easier  Empiric cefepime resumed in case infectious. pan culture pending result  Repeat chest x-ray showing slightly improved palmar edema but still having left lower lobe infiltrates stable.  Patient's lower extremity edema improved, DC Lasix today.        Malaise- (present on admission)   Assessment & Plan    Cont PT OT as needed        Headache   Assessment & Plan    PRN fioricet  States HA >1 month and associated with leg pain simultaneously  MRI brain with and without contrast done, no IC pathology, marrow abnormal as expected with MDS  Now improved with meds  LP done 10/26, no complications, pathology report - no malignant cells seen            Anemia- (present on admission)   Assessment & Plan    Transfuse if less than 7.0, HH stable at 8-9 currently  Received 1 unit 10/24, 10/30, 2 units 11/7  Secondary to 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  Transfuse for <7.0        HLD (hyperlipidemia)- (present on admission)   Assessment & Plan     Continue current statin therapy regimen        HTN (hypertension)- (present on admission)   Assessment & Plan    Coreg dose 6.25 - better control                Reviewed items::  Labs reviewed, Medications reviewed and Radiology images reviewed  Gan catheter::  No Gan  DVT prophylaxis pharmacological::  Contraindicated - High bleeding risk  DVT prophylaxis - mechanical:  SCDs  Antibiotics:  Treating active infection/contamination beyond 24 hours perioperative coverage   For complexity-based billing, please refer to the history, exam, and decison making above. In addition, I spent >35 minutes caring for the patient today. More than 50% of the time was spent counseling and coordinating care.    I have discussed with RN and CM and SW and other consultants about patient's plan.

## 2017-11-18 NOTE — PROGRESS NOTES
"Pt. Went to commode took off oxygen mask and pulse ox meter. Asked pt. To put mask back on multiple times with refusal every time. Re-enforced RNs education on why having the mask on was important. Pt stated \"I don't care, nothing is going to happen, I will be ok!\". Pt. Went back to bed, put mask back on. RN aware.   "

## 2017-11-19 NOTE — PROGRESS NOTES
[facility] Hospitalist Progress Note    Date of Service: 11/19/2017    Chief Complaint  67 y.o. 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  ANC zero and will be low for months.  She has been afebrile for many days and no evidence of infection during this hospitalization.  For now will stop cefepime to see how she responds and discuss with oncology for what their oral recommendation would be (ie bactrim tiw?)  Patient states she is fatigued and her legs hurt but no headache.  She is markedly edematous in BLE and this could be causing her symptoms.  OUT of bed with ambulation encouraged and patient agrees.  11/8 Patient feeling much better today after her multiple transfusions yesterday, she doesn't have a headache unless you ask her about it.  She states she has had a lot of trouble sleeping in the hospital and would like to try something to help at night - I ordered PRN restoril to see if this helps.  She is afebrile.  Cefepime stopped yesterday, after my discussion with oncology today okay to start tiw bactrim while neutropenic if she remains afebrile.  11/9 Patient with HA today, will trial fioricet PRN.  She states she isn't sure if she slept well last night and doesn't want to take medications to help her sleep as she is already taking too many meds as it is.  Her dose of venetoclax was also increased today by oncology.  She will likely need platelet transfusion by this weekend.  BP has been high again - increased dose of coreg.  11/10 Patient without complaints of pain other than her legs when she gets out of bed - which she hasn't been doing often.  She has refused a shower past 2 days and states its due to being tired.  Yesterday she inquired about going to a rehab when she gets out of here.  I've requested PT come to re-evaluate her and told her she needs to get out of her  bed much more frequently.  She has notably become more depressed since her sister has left and become more disconnected.  She agrees that she needs to do more for herself and get more active.  Will continue to encourage her.  11/11 Patient in much better spirits today, had showered and ambulated yesterday as requested.  Per oncology to receive platelets today - ordered.  Denies fevers and feels like she has a better outlook today.  No complaints of pain.  11/12 Patient feeling well today and still a very good outlook.  She is taking in PO well so will dc IVF.  PT/OT to evaluate for therapy need on discharge as patient has been getting weaker since admission.  11/13 Patient developed hypoxia overnight and increased fatigue with ambulation.  Exam more consistent with edema than infection as cause of hypoxia.  CXR concerning for edema vs consolidation.  Patient responded well to lasix and diuresed 800cc quickly.  I stopped bactrim and resumed cefepime for more aggressive coverage.  Patient remaining afebrile at this time.  Increased dose of venetoclax not yet started - oncology awaiting r/o acute infection.  11/14 patient is very depressed, asking for deep alone to have some rest. Breathing condition feeling better. After Lasix a lot of urination. Wants to hold Lasix today.  11/15 patient still has some shortness of breath but improved. Less overloaded. Restart by mouth Lasix today.  11/16 patient's hemodynamically stable. Make good urine output. No potassium today. Breathing condition stable still have some mild crackles. Otherwise remained no fever chills nausea vomiting diarrhea.  11/17 patient definitely feels better today than yesterday. Mood stable. Less edema. Ultrasound abdominal showing fatty liver. We'll need to recheck liver function test tomorrow.  11/18 a little confused earlier in the morning but during the daytime he improved. Possibly due to just awakened from sleeping. Patient denies fever, chills,  nausea vomiting diarrhea. She still have some cough. No sputum.   patient is stable in the morning but become lethargic during the daytime. Otherwise no significant chief complaint. No fever or chills. No significant signs of infection other than pneumonia. Patient's breathing condition improved.    Consultants/Specialty  Oncology - Reganti/Vigil    Disposition  tbd        Review of Systems   Constitutional: Positive for malaise/fatigue. Negative for chills, diaphoresis and fever.   HENT: Negative for congestion.    Eyes: Negative for blurred vision and pain.   Respiratory: Positive for cough. Negative for sputum production and shortness of breath.    Cardiovascular: Negative for chest pain, palpitations, claudication and leg swelling.   Gastrointestinal: Negative for constipation, heartburn and vomiting.   Genitourinary: Negative for dysuria and hematuria.   Musculoskeletal: Negative for joint pain and myalgias.   Skin: Negative for itching and rash.   Neurological: Positive for weakness and headaches (back again). Negative for dizziness, sensory change, speech change and loss of consciousness.   Psychiatric/Behavioral: Positive for depression. Negative for suicidal ideas. The patient is not nervous/anxious and does not have insomnia.       Physical Exam  Laboratory/Imaging   Hemodynamics  Temp (24hrs), Av.9 °C (98.5 °F), Min:36.7 °C (98.1 °F), Max:37.2 °C (99 °F)   Temperature: 36.8 °C (98.3 °F)  Pulse  Av.5  Min: 68  Max: 118    Blood Pressure : 146/64      Respiratory      Respiration: 18, Pulse Oximetry: 94 %        RUL Breath Sounds: Clear, RML Breath Sounds: Diminished, RLL Breath Sounds: Diminished, DEBBIE Breath Sounds: Clear, LLL Breath Sounds: Diminished    Fluids    Intake/Output Summary (Last 24 hours) at 17 0813  Last data filed at 17 0512   Gross per 24 hour   Intake              862 ml   Output              400 ml   Net              462 ml       Nutrition  Orders Placed This  Encounter   Procedures   • DIET ORDER     Standing Status:   Standing     Number of Occurrences:   1     Order Specific Question:   Diet:     Answer:   Regular [1]     Physical Exam   Constitutional: She is oriented to person, place, and time. She appears well-developed and well-nourished. No distress.   HENT:   Head: Normocephalic and atraumatic.   Eyes: Conjunctivae are normal. Pupils are equal, round, and reactive to light.   Neck: Normal range of motion. No tracheal deviation present. No thyromegaly present.   Cardiovascular: Normal rate, normal heart sounds and intact distal pulses.  Exam reveals no friction rub.    No murmur heard.  Pulmonary/Chest: Effort normal. No stridor. No respiratory distress. She has no wheezes. She has rales (L). She exhibits no tenderness.   Abdominal: Soft. Bowel sounds are normal. She exhibits no mass. There is no tenderness. There is no rebound and no guarding.   Musculoskeletal: She exhibits edema (both feet swollen, less thean before). She exhibits no tenderness.   Neurological: She is alert and oriented to person, place, and time. No cranial nerve deficit.   Skin: Skin is warm and dry. No erythema. No pallor.   Psychiatric: Her behavior is normal.   Patient is depressed   Nursing note and vitals reviewed.      Recent Labs      11/17/17   0115  11/18/17   0125  11/19/17   0505   WBC  0.3*  0.3*  0.3*   RBC  2.74*  2.87*  2.81*   HEMOGLOBIN  8.1*  8.6*  8.2*   HEMATOCRIT  22.9*  24.2*  23.8*   MCV  83.6  84.3  84.7   MCH  29.6  30.0  29.2   MCHC  35.4*  35.5*  34.5   RDW  37.7  37.6  38.5   PLATELETCT  47*  33*  20*   MPV  9.1  9.4  9.7     Recent Labs      11/17/17   0115  11/18/17   0125  11/19/17   0505   SODIUM  131*  131*  131*   POTASSIUM  3.5*  4.1  4.4   CHLORIDE  92*  94*  96   CO2  30  30  30   GLUCOSE  156*  150*  161*   BUN  14  15  18   CREATININE  0.33*  0.32*  0.29*   CALCIUM  8.2*  8.9  8.5                      Assessment/Plan     * Myelodysplastic syndrome  (CMS-HCC)- (present on admission)   Assessment & Plan    Evolved into AML per oncology.  Venetoclax/Vidaza started 10/23 per Dr Krishnamurthy recommendations  Venetoclax to continue at increasing doses per oncology, still titrating up every four days, cont  Monitoring ANC, currently 0        Neutropenic fever (CMS-[facility])- (present on admission)   Assessment & Plan    Cultures showing no growth.  Not having any current fevers  Empiric cefepime and acyclovir was off cefepime wed through Sunday, resumed today d/t hypoxia  Still neutropenic and likely will be so for the next 3-6 months per oncology        Thrombocytopenia (CMS-[facility])- (present on admission)   Assessment & Plan    Keep platelet count >10 or higher if bleeding.  Transfuse prn          Elevated liver enzymes   Assessment & Plan    US showing CBD no dilation , (+) for fatty liver  Tbil 2.8->2.6 stable  Cont to monitor daily   Likely from fatty liver + meds        Hypoxia   Assessment & Plan    New finding, had been on IVF until 2 days ago and LE swollen.   Possible fluid overload, tolerated Lasix. We'll also cont po Lasix.  Responded very well to lasix and breathing easier  Empiric cefepime resumed in case infectious. pan culture pending result  Repeat chest x-ray showing slightly improved palmar edema but still having left lower lobe infiltrates stable.  Patient's lower extremity edema improved, DC Lasix today.        Malaise- (present on admission)   Assessment & Plan    Cont PT OT as needed        Headache   Assessment & Plan    PRN fioricet  States HA >1 month and associated with leg pain simultaneously  MRI brain with and without contrast done, no IC pathology, marrow abnormal as expected with MDS  Now improved with meds  LP done 10/26, no complications, pathology report - no malignant cells seen            Anemia- (present on admission)   Assessment & Plan    Transfuse if less than 7.0, HH stable at 8-9 currently  Received 1 unit 10/24, 10/30, 2 units  11/7  Secondary to 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  Transfuse for <7.0        HLD (hyperlipidemia)- (present on admission)   Assessment & Plan    Continue current statin therapy regimen        HTN (hypertension)- (present on admission)   Assessment & Plan    Coreg dose 6.25 - better control                Reviewed items::  Labs reviewed, Medications reviewed and Radiology images reviewed  Gan catheter::  No Gan  DVT prophylaxis pharmacological::  Contraindicated - High bleeding risk  DVT prophylaxis - mechanical:  SCDs  Antibiotics:  Treating active infection/contamination beyond 24 hours perioperative coverage   For complexity-based billing, please refer to the history, exam, and decison making above. In addition, I spent >35 minutes caring for the patient today. More than 50% of the time was spent counseling and coordinating care.    I have discussed with RN and BEATRICE and SW and other consultants about patient's plan.

## 2017-11-19 NOTE — CARE PLAN
Problem: Bowel/Gastric:  Goal: Will not experience complications related to bowel motility  Outcome: PROGRESSING SLOWER THAN EXPECTED  Bowel protocol will be held tonight due to looser stools (per pt request)  PT and family educated about diet, fluid intake and activity to promote bowel function   Bathroom opportunities scheduled and/or offered

## 2017-11-19 NOTE — PROGRESS NOTES
Received report from day RN, assumed care of PT at 1900. PT A&Ox4 with some confusion on time/time of day but is easily oriented, discussed plan of care for this shift.  Pain managed with with meds per MAR but has no complaints of unmet pain needs at this time pain is 2/10. PT up x1 SBA to BSC, safety precautions in place. Bed alarm is set, call light and personal possessions within reach.

## 2017-11-19 NOTE — PROGRESS NOTES
Assumed pt care at change of shift. Labs and orders noted and discussed with MD during IDT rounds. Pt aa&o x3-4, initially mildly confused when awakening, but appropriate thereafter. SR pain medication per MAR efficient for pain relief. Pt remains with moist, crackly lung sounds, but with decreased O2 requirements, now sat > 93% on 3 L O2. Plan of care updated and questions addressed. Continuing hourly rounding and assessing for additional needs

## 2017-11-20 NOTE — PROGRESS NOTES
Pharmacy Pharmacotherapy Consult for LOS >30 days    Admit Date: 10/21/2017      Medications were reviewed for appropriateness and ongoing need.     Current Facility-Administered Medications   Medication Dose Route Frequency Provider Last Rate Last Dose   • cefepime (MAXIPIME) syringe 2 g  2 g Intravenous Q12HRS Maureen Cantor D.O.   2 g at 11/20/17 0957   • carvedilol (COREG) tablet 6.25 mg  6.25 mg Oral BID WITH MEALS Maureen Cantor D.O.   6.25 mg at 11/20/17 0817   • acetaminophen/caffeine/butalbital 325-40-50 mg (FIORICET) -40 MG per tablet 1 Tab  1 Tab Oral Q6HRS PRN Maureen Cantor D.O.       • temazepam (RESTORIL) capsule 15 mg  15 mg Oral QHS PRN Maureen Cantor D.O.   15 mg at 11/08/17 2044   • mag hydrox-al hydrox-simeth (MAALOX PLUS ES or MYLANTA DS) suspension 10 mL  10 mL Oral 4X/DAY PRN Duarte Magallon M.D.   10 mL at 11/05/17 1107   • oxyCODONE CR (OXYCONTIN) tablet 20 mg  20 mg Oral Q12HRS Maureen Cantor D.O.   20 mg at 11/20/17 0816   • Pharmacy Consult Request ...Pain Management Review   Other HARPREETN Keren Little M.D.        And   • oxycodone immediate-release (ROXICODONE) tablet 5 mg  5 mg Oral Q3HRS PRN Keren Little M.D.   5 mg at 11/19/17 2338    And   • oxycodone immediate release (ROXICODONE) tablet 10 mg  10 mg Oral Q3HRS PRN Keren Little M.D.   10 mg at 11/18/17 0148    And   • morphine (pf) 4 mg/ml injection 2 mg  2 mg Intravenous Q3HRS PRN Keren Little M.D.   2 mg at 10/27/17 1621   • allopurinol (ZYLOPRIM) tablet 300 mg  300 mg Oral DAILY Gerald Wagoner M.D.   300 mg at 11/20/17 0817   • acyclovir (ZOVIRAX) tablet 400 mg  400 mg Oral BID Brandan Bailey M.D.   400 mg at 11/20/17 0816   • omeprazole (PRILOSEC) capsule 20 mg  20 mg Oral MELA Bailey M.D.   20 mg at 11/20/17 0817   • ondansetron (ZOFRAN) syringe/vial injection 4 mg  4 mg Intravenous Q4HRS PRN Brandan Bailey M.D.   4 mg at 11/08/17 0016   • ondansetron (ZOFRAN ODT) dispertab 4 mg  4 mg Oral Q4HRS PRN Brandan Bailey M.D.   4  mg at 11/18/17 0202   • haloperidol lactate (HALDOL) injection 5 mg  5 mg Intravenous Q4HRS PRN Brandan Bailey M.D.       • senna-docusate (PERICOLACE or SENOKOT S) 8.6-50 MG per tablet 2 Tab  2 Tab Oral BID Brandan Bailey M.D.   2 Tab at 11/20/17 0817    And   • polyethylene glycol/lytes (MIRALAX) PACKET 1 Packet  1 Packet Oral QDAY PRN Brandan Bailey M.D.        And   • magnesium hydroxide (MILK OF MAGNESIA) suspension 30 mL  30 mL Oral QDAY PRN Brandan Bailey M.D.        And   • bisacodyl (DULCOLAX) suppository 10 mg  10 mg Rectal QDAY PRN Brandan Bailey M.D.       • acetaminophen (TYLENOL) tablet 650 mg  650 mg Oral Q6HRS PRN Brandan Bailey M.D.   650 mg at 11/19/17 0502       Recommendations:    Patient has never used Fioricet, recommend Dc.    Maalox was last administered 11/5, recommend Dc.    Haloperidol has never been administered, recommend DC.    Patient is currently receiving Sennakot twice daily but refusing a dose every now and then. Patient has bowel protocol with miralax powder, magnesium hydroxide, or bisacodyl suppository that has not been utilized since admission. Recommend DC of miralax, magnesium hydroxide, and bisacodyl.     Day 7 of cefepime for edema or infection, patient has not spiked a fever, BP is controlled, and stable chest x-ray on 11/16. Stop date?    Signed,   Martha Hitchcock, PharmD Candidate 2018    Addendum:   -I agree with above recommendations, all of which were discussed with attending physician.   -10 day stop placed on cefepime.     Anneliese Velasquez, YasmeenD

## 2017-11-20 NOTE — PROGRESS NOTES
Poor po intake. Just took OJ w/ ice. Refused muffins for breakfast. Repositioned. Denies pain. Confused, oriented to self and place. Needs freq reorientation to time and situation. Irritable @ times. Plan of care discussed w/ pt.

## 2017-11-20 NOTE — PROGRESS NOTES
0715 Assumed care. Bedside report from MARLENY Hensley. Pt resting in bed and in no distress. CPOX in place sats >95%. Bed in low position, call light w/in reach.

## 2017-11-20 NOTE — PROGRESS NOTES
[facility] Hospitalist Progress Note    Date of Service: 11/20/2017    Chief Complaint  67 y.o. 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  ANC zero and will be low for months.  She has been afebrile for many days and no evidence of infection during this hospitalization.  For now will stop cefepime to see how she responds and discuss with oncology for what their oral recommendation would be (ie bactrim tiw?)  Patient states she is fatigued and her legs hurt but no headache.  She is markedly edematous in BLE and this could be causing her symptoms.  OUT of bed with ambulation encouraged and patient agrees.  11/8 Patient feeling much better today after her multiple transfusions yesterday, she doesn't have a headache unless you ask her about it.  She states she has had a lot of trouble sleeping in the hospital and would like to try something to help at night - I ordered PRN restoril to see if this helps.  She is afebrile.  Cefepime stopped yesterday, after my discussion with oncology today okay to start tiw bactrim while neutropenic if she remains afebrile.  11/9 Patient with HA today, will trial fioricet PRN.  She states she isn't sure if she slept well last night and doesn't want to take medications to help her sleep as she is already taking too many meds as it is.  Her dose of venetoclax was also increased today by oncology.  She will likely need platelet transfusion by this weekend.  BP has been high again - increased dose of coreg.  11/10 Patient without complaints of pain other than her legs when she gets out of bed - which she hasn't been doing often.  She has refused a shower past 2 days and states its due to being tired.  Yesterday she inquired about going to a rehab when she gets out of here.  I've requested PT come to re-evaluate her and told her she needs to get out of her  bed much more frequently.  She has notably become more depressed since her sister has left and become more disconnected.  She agrees that she needs to do more for herself and get more active.  Will continue to encourage her.  11/11 Patient in much better spirits today, had showered and ambulated yesterday as requested.  Per oncology to receive platelets today - ordered.  Denies fevers and feels like she has a better outlook today.  No complaints of pain.  11/12 Patient feeling well today and still a very good outlook.  She is taking in PO well so will dc IVF.  PT/OT to evaluate for therapy need on discharge as patient has been getting weaker since admission.  11/13 Patient developed hypoxia overnight and increased fatigue with ambulation.  Exam more consistent with edema than infection as cause of hypoxia.  CXR concerning for edema vs consolidation.  Patient responded well to lasix and diuresed 800cc quickly.  I stopped bactrim and resumed cefepime for more aggressive coverage.  Patient remaining afebrile at this time.  Increased dose of venetoclax not yet started - oncology awaiting r/o acute infection.  11/14 patient is very depressed, asking for deep alone to have some rest. Breathing condition feeling better. After Lasix a lot of urination. Wants to hold Lasix today.  11/15 patient still has some shortness of breath but improved. Less overloaded. Restart by mouth Lasix today.  11/16 patient's hemodynamically stable. Make good urine output. No potassium today. Breathing condition stable still have some mild crackles. Otherwise remained no fever chills nausea vomiting diarrhea.  11/17 patient definitely feels better today than yesterday. Mood stable. Less edema. Ultrasound abdominal showing fatty liver. We'll need to recheck liver function test tomorrow.  11/18 a little confused earlier in the morning but during the daytime he improved. Possibly due to just awakened from sleeping. Patient denies fever, chills,  nausea vomiting diarrhea. She still have some cough. No sputum.   patient is stable in the morning but become lethargic during the daytime. Otherwise no significant chief complaint. No fever or chills. No significant signs of infection other than pneumonia. Patient's breathing condition improved.   patient feeling lethargic today. Total bilirubin still 2.8. Denies fever, chills, nausea vomiting diarrhea. Denies shortness of breath. No lower extremity edema.    Consultants/Specialty  Oncology - Reganti/Vigil    Disposition  tbd        Review of Systems   Constitutional: Positive for malaise/fatigue. Negative for diaphoresis and fever.   HENT: Negative for congestion.    Eyes: Negative for blurred vision and pain.   Respiratory: Positive for cough. Negative for sputum production and shortness of breath.    Cardiovascular: Negative for chest pain, orthopnea, claudication and PND.   Gastrointestinal: Negative for constipation, heartburn, nausea and vomiting.   Genitourinary: Negative for dysuria and hematuria.   Musculoskeletal: Negative for joint pain and myalgias.   Skin: Negative for itching and rash.   Neurological: Positive for weakness and headaches (back again). Negative for dizziness, sensory change, speech change and loss of consciousness.   Psychiatric/Behavioral: Positive for depression. Negative for suicidal ideas. The patient is not nervous/anxious and does not have insomnia.       Physical Exam  Laboratory/Imaging   Hemodynamics  Temp (24hrs), Av.8 °C (98.3 °F), Min:36.6 °C (97.8 °F), Max:37.1 °C (98.7 °F)   Temperature: 37.1 °C (98.7 °F)  Pulse  Av.5  Min: 68  Max: 118    Blood Pressure : 149/68      Respiratory      Respiration: 20, Pulse Oximetry: 91 %        RUL Breath Sounds: Diminished, RML Breath Sounds: Crackles (moist), RLL Breath Sounds: Crackles, DEBBIE Breath Sounds: Diminished, LLL Breath Sounds: Crackles (moist)    Fluids    Intake/Output Summary (Last 24 hours) at 17  0752  Last data filed at 11/19/17 1700   Gross per 24 hour   Intake              398 ml   Output                0 ml   Net              398 ml       Nutrition  Orders Placed This Encounter   Procedures   • DIET ORDER     Standing Status:   Standing     Number of Occurrences:   1     Order Specific Question:   Diet:     Answer:   Regular [1]     Physical Exam   Constitutional: She is oriented to person, place, and time. No distress.   HENT:   Head: Normocephalic and atraumatic.   Eyes: Conjunctivae are normal. Pupils are equal, round, and reactive to light.   Neck: Normal range of motion. No tracheal deviation present. No thyromegaly present.   Cardiovascular: Normal rate, normal heart sounds and intact distal pulses.  Exam reveals no gallop and no friction rub.    Pulmonary/Chest: Effort normal. No stridor. No respiratory distress. She has no wheezes. She has rales (L). She exhibits no tenderness.   Abdominal: Soft. Bowel sounds are normal. She exhibits no distension and no mass. There is no tenderness. There is no guarding.   Musculoskeletal: She exhibits edema (both feet swollen, less thean before). She exhibits no tenderness.   Neurological: She is alert and oriented to person, place, and time. No cranial nerve deficit.   Skin: Skin is warm and dry. She is not diaphoretic. No erythema. No pallor.   Psychiatric: Her behavior is normal.   Patient is depressed   Nursing note and vitals reviewed.      Recent Labs      11/18/17   0125  11/19/17   0505  11/20/17   0543   WBC  0.3*  0.3*  0.3*   RBC  2.87*  2.81*  2.70*   HEMOGLOBIN  8.6*  8.2*  7.8*   HEMATOCRIT  24.2*  23.8*  22.9*   MCV  84.3  84.7  84.8   MCH  30.0  29.2  28.9   MCHC  35.5*  34.5  34.1   RDW  37.6  38.5  37.6   PLATELETCT  33*  20*  10*   MPV  9.4  9.7  9.3     Recent Labs      11/18/17   0125  11/19/17   0505  11/20/17   0543   SODIUM  131*  131*  132*   POTASSIUM  4.1  4.4  4.3   CHLORIDE  94*  96  96   CO2  30  30  29   GLUCOSE  150*  161*  155*    BUN  15  18  15   CREATININE  0.32*  0.29*  0.20*   CALCIUM  8.9  8.5  8.6                      Assessment/Plan     * Myelodysplastic syndrome (CMS-HCC)- (present on admission)   Assessment & Plan    Evolved into AML per oncology.  Venetoclax/Vidaza started 10/23 per Dr Krishnamurthy recommendations  Venetoclax to continue at increasing doses per oncology, still titrating up every four days but has been on hold 2/2 LFT elavtation and currently need repeat BM biopsy. Pending 11/20 prdered.  Monitoring ANC, currently 0        Neutropenic fever (CMS-HCC)- (present on admission)   Assessment & Plan    Cultures showing no growth.  Not having any current fevers  Empiric cefepime and acyclovir was off cefepime wed through Sunday, resumed today d/t hypoxia  Still neutropenic and likely will be so for the next 3-6 months per oncology        Thrombocytopenia (CMS-[facility])- (present on admission)   Assessment & Plan    Keep platelet count >10 or higher if bleeding.  Transfuse prn          Elevated liver enzymes   Assessment & Plan    US showing CBD no dilation , (+) for fatty liver  Tbil 2.8->2.6->2.8 stable  Cont to monitor daily   Likely from fatty liver + meds        Hypoxia   Assessment & Plan    New finding, had been on IVF until 2 days ago and LE swollen.   Possible fluid overload, tolerated Lasix. We'll also cont po Lasix.  Responded very well to lasix and breathing easier  Empiric cefepime resumed in case infectious. pan culture pending result  Repeat chest x-ray showing slightly improved palmar edema but still having left lower lobe infiltrates stable.  Patient's lower extremity edema improved, DC Lasix today.        Malaise- (present on admission)   Assessment & Plan    Cont PT OT as needed        Headache   Assessment & Plan    PRN fioricet  States HA >1 month and associated with leg pain simultaneously  MRI brain with and without contrast done, no IC pathology, marrow abnormal as expected with MDS  Now improved with meds  LP  done 10/26, no complications, pathology report - no malignant cells seen            Anemia- (present on admission)   Assessment & Plan    Transfuse if less than 7.0, HH stable at 8-9 currently  Received 1 unit 10/24, 10/30, 2 units 11/7  Secondary to 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  Transfuse for <7.0        HLD (hyperlipidemia)- (present on admission)   Assessment & Plan    Continue current statin therapy regimen        HTN (hypertension)- (present on admission)   Assessment & Plan    Coreg dose 6.25 - better control                Reviewed items::  Labs reviewed, Medications reviewed and Radiology images reviewed  Gan catheter::  No Gan  DVT prophylaxis pharmacological::  Contraindicated - High bleeding risk  DVT prophylaxis - mechanical:  SCDs  Antibiotics:  Treating active infection/contamination beyond 24 hours perioperative coverage   For complexity-based billing, please refer to the history, exam, and decison making above. In addition, I spent >35 minutes caring for the patient today. More than 50% of the time was spent counseling and coordinating care.    I have discussed with RN and BEATRICE and SW and other consultants about patient's plan.

## 2017-11-20 NOTE — PROGRESS NOTES
Oncology/Hematology Progress Note               Author: Dario JAMILAH Vigil Date & Time created: 11/19/2017  5:33 PM   CC:  AML  From MDS    Interval History:    Her sister is at the bedside.  No new issues.  A little forgetful at times.  NO other complaints.  She seems better overall.  Review of Systems:  Review of Systems   Constitutional: Positive for malaise/fatigue. Negative for chills and fever.   HENT: Negative.    Eyes: Negative.    Respiratory: Positive for shortness of breath. Negative for sputum production.    Cardiovascular: Negative.  Negative for chest pain and palpitations.   Gastrointestinal: Negative.  Negative for heartburn.   Genitourinary: Negative.    Musculoskeletal: Positive for myalgias.   Skin: Negative for rash.   Neurological: Positive for weakness. Negative for dizziness and headaches.   Endo/Heme/Allergies: Negative for environmental allergies. Does not bruise/bleed easily.   Psychiatric/Behavioral: Positive for depression.       Physical Exam:  Physical Exam   Constitutional: She is oriented to person, place, and time. She appears well-developed.   HENT:   Head: Normocephalic.   Eyes: Conjunctivae are normal. Pupils are equal, round, and reactive to light.   Cardiovascular: Normal rate, regular rhythm and normal heart sounds.    Pulmonary/Chest: Effort normal.   Continued decreased breath sound at the left base.   Abdominal: Soft. Bowel sounds are normal.   Musculoskeletal: She exhibits no edema.   Neurological: She is alert and oriented to person, place, and time. No cranial nerve deficit.   Skin: No erythema.   Psychiatric: She has a normal mood and affect. Her behavior is normal. Judgment and thought content normal.       Labs:        Invalid input(s): [id number]      Recent Labs      11/17/17   0115  11/18/17   0125  11/19/17   0505   SODIUM  131*  131*  131*   POTASSIUM  3.5*  4.1  4.4   CHLORIDE  92*  94*  96   CO2  30  30  30   BUN  14  15  18   CREATININE  0.33*  0.32*  0.29*    CALCIUM  8.2*  8.9  8.5     Recent Labs      17   01117   0125  17   0505   ALTSGPT  18  23  27   ASTSGOT  34  45  41   ALKPHOSPHAT  120*  136*  131*   TBILIRUBIN  2.8*  2.8*  2.6*   DBILIRUBIN  1.8*   --    --    GLUCOSE  156*  150*  161*     Recent Labs      17   0505   RBC  2.74*  2.87*  2.81*   HEMOGLOBIN  8.1*  8.6*  8.2*   HEMATOCRIT  22.9*  24.2*  23.8*   PLATELETCT  47*  33*  20*     Recent Labs      17   0505   WBC  0.3*  0.3*  0.3*   ASTSGOT  34  45  41   ALTSGPT  18  23  27   ALKPHOSPHAT  120*  136*  131*   TBILIRUBIN  2.8*  2.8*  2.6*     Recent Labs      17   0505   SODIUM  131*  131*  131*   POTASSIUM  3.5*  4.1  4.4   CHLORIDE  92*  94*  96   CO2  30  30  30   GLUCOSE  156*  150*  161*   BUN  14  15  18   CREATININE  0.33*  0.32*  0.29*   CALCIUM  8.2*  8.9  8.5     Hemodynamics:  Temp (24hrs), Av.8 °C (98.3 °F), Min:36.7 °C (98.1 °F), Max:37 °C (98.6 °F)  Temperature: 37 °C (98.6 °F)  Pulse  Av.5  Min: 68  Max: 118   Blood Pressure : 139/67     Respiratory:    Respiration: 17, Pulse Oximetry: 96 %        RUL Breath Sounds: Clear, RML Breath Sounds: Diminished, RLL Breath Sounds: Diminished, DEBBIE Breath Sounds: Clear, LLL Breath Sounds: Diminished  Fluids:    Intake/Output Summary (Last 24 hours) at 17 1155  Last data filed at 17 1800   Gross per 24 hour   Intake              150 ml   Output                0 ml   Net              150 ml        GI/Nutrition:  Orders Placed This Encounter   Procedures   • DIET ORDER     Standing Status:   Standing     Number of Occurrences:   1     Order Specific Question:   Diet:     Answer:   Regular [1]     Medical Decision Making, by Problem:  Active Hospital Problems    Diagnosis   • *Myelodysplastic syndrome (CMS-HCC) [D46.9]   • Neutropenic fever (CMS-HCC) [D70.9, R50.81]   • Thrombocytopenia (CMS-HCC)  [D69.6]   • Malaise [R53.81]   • Headache [R51]   • Anemia [D64.9]   • Symptomatic anemia [D64.9]   • HLD (hyperlipidemia) [E78.5]   • HTN (hypertension) [I10]     PMHX unchanged    DX  1.  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  Last increase on 11/9/17 of 300mg.  11/15/17 increase dose to 400 mg.    2.  Pulmonary: edema / vs infection or both on cefepime: improved CXR 11/16/17    3.  Pancytopenia : from AML / treatment.  Transfuse <7 hemoglobin and <10 k platelets if no bleeding.  Irradiated     4.  GI/Liver:  Elevated bilirubin.  US normal.        Plan 11/19/17    She will stay on her venetoclax 400 mg dose. Her next dose escalation potentially could be on 11/20/2017 depending how patient is doing.  She will be due for her next vidaza on 11/27/2017.      She may be going through 2 cycles with vidaza prior to next bone marrow but will need to discuss with her primary oncologist Dr Sanabria.      High complexity/ High risk medication/ Guarded prognosis/> 20 minutes      Reviewed items::  Labs reviewed, Radiology images reviewed and Medications reviewed

## 2017-11-20 NOTE — CARE PLAN
Problem: Infection  Goal: Will remain free from infection  Outcome: PROGRESSING AS EXPECTED  Neutropenic precautions in place; no s&s of infection; Q4 VS in place    Problem: Bowel/Gastric:  Goal: Normal bowel function is maintained or improved  Pt with loose, mustard colored bm this afternoon; no stool softeners administered

## 2017-11-20 NOTE — DISCHARGE PLANNING
Medical SW    Referral: Sw noted pt qualifies for SNF d/c per therapy assessment notes.    Intervention: Sw met w/ pt at bedside. Therapy was present to complete an assessment. Sw spoke to pt who seemed confused. Miracle was advised pt would like Sw to speak w/ her dtr regarding SNF choice.    Miracle called dtr, Bonnie. She requested Sw scan and email the local SNF choice list to her. Sw did so.      Plan: Sw to assist w/ d/c planning as needed.

## 2017-11-20 NOTE — PROGRESS NOTES
Repositioned routinely. Sleeping most of shift. Awakens easily w/ verbal stimuli. Confused. Poor po intake, consuming <25%. Pt hands shaky, assisted w/ po's. No swallowing problems noted. VSS, afebrile. O2 4L via nc sats >95%. Incontinent of urine, onelia care and skin care rendered. Pt's daughter in to visit this AM, updated w/ plan of care.

## 2017-11-20 NOTE — PROGRESS NOTES
Pt more fatigued throughout the afternoon; needs frequent prompting for PO intake. Reviewed with MD- per MD pt with ongoing fatigue episodes recently(last couple of days)- to address as new symptoms arise

## 2017-11-20 NOTE — THERAPY
Attempted to see pt for OT tx. Pt just got done working w/ PT and declined at this time. Will try again later as able.

## 2017-11-20 NOTE — PROGRESS NOTES
Oncology/Hematology Progress Note               Author: Johanna Ryder   Date & Time created: 11/20/2017  12:04 PM   CC:  AML  From MDS    Interval History:    No new complaints. Patient platelet count is continuing to drop. She is due for her Vidaza and increase in Venatoclax dose today  Review of Systems:  Review of Systems   Constitutional: Positive for malaise/fatigue. Negative for chills and fever.   HENT: Negative.    Eyes: Negative.    Respiratory: Negative for sputum production and shortness of breath.    Cardiovascular: Negative.  Negative for chest pain and palpitations.   Gastrointestinal: Negative.  Negative for heartburn.   Genitourinary: Negative.    Musculoskeletal: Positive for myalgias.   Skin: Negative for rash.   Neurological: Positive for weakness. Negative for dizziness and headaches.   Endo/Heme/Allergies: Negative for environmental allergies. Does not bruise/bleed easily.   Psychiatric/Behavioral: Positive for depression.       Physical Exam:  Physical Exam   Constitutional: She is oriented to person, place, and time. She appears well-developed.   HENT:   Head: Normocephalic.   Eyes: Conjunctivae are normal. Pupils are equal, round, and reactive to light.   Cardiovascular: Normal rate, regular rhythm and normal heart sounds.    Pulmonary/Chest: Effort normal.   Continued decreased breath sound at the left base.   Abdominal: Soft. Bowel sounds are normal.   Musculoskeletal: She exhibits no edema.   Neurological: She is alert and oriented to person, place, and time. No cranial nerve deficit.   Skin: No erythema.   Psychiatric: She has a normal mood and affect. Her behavior is normal. Judgment and thought content normal.       Labs:        Invalid input(s): [id number]      Recent Labs      11/18/17   0125  11/19/17   0505  11/20/17   0543   SODIUM  131*  131*  132*   POTASSIUM  4.1  4.4  4.3   CHLORIDE  94*  96  96   CO2  30  30  29   BUN  15  18  15   CREATININE  0.32*  0.29*  0.20*    CALCIUM  8.9  8.5  8.6     Recent Labs      17   0125  17   0505  17   0543   ALTSGPT  23  27  24   ASTSGOT  45  41  41   ALKPHOSPHAT  136*  131*  130*   TBILIRUBIN  2.8*  2.6*  2.8*   GLUCOSE  150*  161*  155*     Recent Labs      17   0125  17   0505  17   0543   RBC  2.87*  2.81*  2.70*   HEMOGLOBIN  8.6*  8.2*  7.8*   HEMATOCRIT  24.2*  23.8*  22.9*   PLATELETCT  33*  20*  10*     Recent Labs      17   0125  17   0505  17   0543   WBC  0.3*  0.3*  0.3*   ASTSGOT  45  41  41   ALTSGPT  23  27  24   ALKPHOSPHAT  136*  131*  130*   TBILIRUBIN  2.8*  2.6*  2.8*     Recent Labs      17   0125  17   0505  17   0543   SODIUM  131*  131*  132*   POTASSIUM  4.1  4.4  4.3   CHLORIDE  94*  96  96   CO2  30  30  29   GLUCOSE  150*  161*  155*   BUN  15  18  15   CREATININE  0.32*  0.29*  0.20*   CALCIUM  8.9  8.5  8.6     Hemodynamics:  Temp (24hrs), Av.9 °C (98.5 °F), Min:36.6 °C (97.8 °F), Max:37.2 °C (98.9 °F)  Temperature: 37 °C (98.6 °F)  Pulse  Av.5  Min: 68  Max: 118   Blood Pressure : 146/70     Respiratory:    Respiration: 18, Pulse Oximetry: 91 %        RUL Breath Sounds: Diminished, RML Breath Sounds: Crackles (moist), RLL Breath Sounds: Crackles, DEBBIE Breath Sounds: Diminished, LLL Breath Sounds: Crackles (moist)  Fluids:           GI/Nutrition:  Orders Placed This Encounter   Procedures   • DIET ORDER     Standing Status:   Standing     Number of Occurrences:   1     Order Specific Question:   Diet:     Answer:   Regular [1]     Medical Decision Making, by Problem:  Active Hospital Problems    Diagnosis   • *Myelodysplastic syndrome (CMS-HCC) [D46.9]   • Neutropenic fever (CMS-HCC) [D70.9, R50.81]   • Thrombocytopenia (CMS-HCC) [D69.6]   • Malaise [R53.81]   • Headache [R51]   • Anemia [D64.9]   • Symptomatic anemia [D64.9]   • HLD (hyperlipidemia) [E78.5]   • HTN (hypertension) [I10]     PMHX unchanged    DX  1.  AML from  MDS-RAEB-2:  Vidaza  (10/23/17 to 10/29/17) and Venetoclax 10/24/17 dose has been titrated to 300 mg (increase by 100 mg q 4 days if good to goal dose of 600 to 800 mg).  Last increase on 11/9/17 of 300mg.  11/15/17 increase dose to 400 mg.    2.  Pulmonary: edema / vs infection or both on cefepime: improved CXR 11/16/17    3.  Pancytopenia : from AML / treatment.  Transfuse <7 hemoglobin and <10 k platelets if no bleeding.  Irradiated     4.  GI/Liver:  Elevated bilirubin.  US normal.        Plan  November 20, 2017  -Hold Vidaza and Venetoclax [currently 400 mg] for thrombocytopenia  -Case discussed with Dr. Sanabria  -We'll get a bone marrow biopsy to assess for disease    High complexity/ High risk medication/ Guarded prognosis/> 20 minutes      Reviewed items::  Labs reviewed, Radiology images reviewed and Medications reviewed

## 2017-11-20 NOTE — PROGRESS NOTES
Assumed care of pt at 1915. Rc'd report from MARLENY Garcia. Pt is Aox3. Disoriented to time. At times, is disoriented to place as well. Re-orients easily. Pt is up x2 assist. Pt severely fatigued.  Denies pain and nausea. Pt demonstrates moist cough. O2 in place at 4L.  in place. Hourly rounding in place. Call light within reach. Bed in lowest, locked position.

## 2017-11-20 NOTE — THERAPY
"Pt initially agreeable to attempt between stating on repeat, \"I'm confused,\" pt not able to state why she is confused; attempting x3 supine to sit B LE off EOB however at each attempt pt raising B LE back to bed; pt w/ large amount of urine of bed and initially refusing to assist with rolling for hygiene. RN and pt's dtr - Bonnie - aware of session.     Physical Therapy Treatment completed.   Bed Mobility:  Supine to Sit: Refused  Transfers: Sit to Stand: Refused  Gait: Level Of Assist: Unable at this time        Plan of Care: Will benefit from Physical Therapy 5 times per week  Discharge Recommendations: Equipment: Will Continue to Assess for Equipment Needs. Post-acute therapy Discharge to a transitional care facility for continued skilled therapy services.    Breonna Vargas [phone]     See \"Rehab Therapy-Acute\" Patient Summary Report for complete documentation.       "

## 2017-11-21 NOTE — PROGRESS NOTES
Pt increasingly lethargic and difficult to arouse. Feels very warm to the touch but oral and temporal temperatures are not correlating. O2 sats dropped to the high 80's on 11L via mask. O2 increased to 15L with sats in the low 90's. RR 24. Dr Cantor notified of all of the above. New orders received. Lactic acid and BMP collected and sent. Family at the bedside and notified. Pt to transfer to  618. Rapid nurse at the bedside. Continued assessment in place.

## 2017-11-21 NOTE — PROGRESS NOTES
Pt lethargic but arouseable. Denies pain or nausea. Disoriented to time. Port to R chest flushing well with positive blood return. K rider infusing. Pt incontinent of urine. Voiding large amounts of kathy urine after administration of lasix. Q 2 turns in place. O2 @ 11L vi oxymask. O2 sats in the mid 90's. Generalized edema noted. Pt tachycardic. Daughter at the bedside. Pt resting comfortably at this time. Call light within reach. Room near nurse's station. Frequent assessment in place.

## 2017-11-21 NOTE — PROGRESS NOTES
Patient transferred to [facility] T618 via bed at 1430 on 10L oxymask and cardiac monitor. Granddaughter at bedside transporting belongings. Chart and medications placed in locked drawer outside of room. Patient is AAOx2, needing prompting on what brought her into the hospital and what month it is. She is lethargic but arouses easily to voice and answers questions appropriately. Complains of left shoulder pain with movement. Updated Dr. Castanon, patient placed on HFNC, satting 100% at this time with no signs of distress. Attempted BC x2, unsuccessful, called lab for draw. Discussed plan of care with daughter and granddaughter at bedside. Oriented to unit, all questions answered at this time.

## 2017-11-21 NOTE — PROGRESS NOTES
Report received from Day RN and assumed care. Patient is A&O x3, disoriented to time, resting in bed. Bed alarm on. Patient able to stay awake during conversation with RN, but appears agitated. Neutropenic precautions.     5L oxygen via NC,  in use. 96-98% oxygen saturation. No SOB noted.     Patient reports 4/10 pain, declines intervention.     Port line flushes with + blood return.     POC discussed. All needs met at this time. Call light within reach. Bed locked, in lowest position. Hourly rounding in place.

## 2017-11-21 NOTE — THERAPY
PT tx session attempted. Per RN, pt is not appropriate for therapy today. Will reattempt PT tx tomorrow as pt is appropriate. Dtr present and aware of deferment of PT and POC for this week should pt be appropriate to resume therapies. Thanks    Lexii Pack, PT, DPT Pager: [phone]

## 2017-11-21 NOTE — CARE PLAN
Problem: Pain Management  Goal: Pain level will decrease to patient's comfort goal  Outcome: PROGRESSING AS EXPECTED  Pt denies pain. Frequent assessment in place.     Problem: Respiratory:  Goal: Respiratory status will improve  Outcome: PROGRESSING SLOWER THAN EXPECTED  Pt requiring O2 2 11L. Respiratory alkalosis. Lasix given.  in place.

## 2017-11-21 NOTE — PROGRESS NOTES
Erum from Lab called with critical result of WBC 0.2, Plts 10 at 0413. Critical lab result read back to erum.   This critical lab result is within parameters established by  for this patient

## 2017-11-21 NOTE — PROGRESS NOTES
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 Patient mental status is worse today, not interactive. Dyspneic    Review of Systems:  Review of Systems   Constitutional: Positive for malaise/fatigue. Negative for chills and fever.   HENT: Negative.    Eyes: Negative.    Respiratory: Negative for sputum production and shortness of breath.    Cardiovascular: Negative.  Negative for chest pain and palpitations.   Gastrointestinal: Negative.  Negative for heartburn.   Genitourinary: Negative.    Musculoskeletal: Positive for myalgias.   Skin: Negative for rash.   Neurological: Positive for weakness. Negative for dizziness and headaches.   Endo/Heme/Allergies: Negative for environmental allergies. Does not bruise/bleed easily.   Psychiatric/Behavioral: Positive for depression.       Physical Exam:  Physical Exam   Constitutional: She appears well-developed. She appears lethargic. She is uncooperative. She is easily aroused.   HENT:   Head: Normocephalic.   Eyes: Conjunctivae are normal. Pupils are equal, round, and reactive to light.   Cardiovascular: Normal rate, regular rhythm and normal heart sounds.    Pulmonary/Chest: Effort normal.   Continued decreased breath sound at the left base.   Abdominal: Soft. Bowel sounds are normal.   Musculoskeletal: She exhibits no edema.   Neurological: She is easily aroused. She appears lethargic. No cranial nerve deficit.   Skin: No erythema.   Psychiatric: She has a normal mood and affect. Her behavior is normal. Judgment and thought content normal.       Labs:  Recent Labs      11/21/17   0841   NPGRS88L  7.51*   [id number]  38.6*   IGUCN635C  81.3   OHRW1XME  95.2   ARTHCO3  30*   ARTBE  7*     Recent Labs      11/20/17   2130   TROPONINI  0.02     Recent Labs      11/20/17   0543  11/21/17   0047  11/21/17   1339   SODIUM  132*  131*  132*    POTASSIUM  4.3  3.9  3.9   CHLORIDE  96  94*  91*   CO2  29  31  33   BUN  15  17  18   CREATININE  0.20*  0.31*  0.30*   CALCIUM  8.6  8.7  8.6     Recent Labs      17   0505  17   0543  17   1350  17   0047  17   1339   ALTSGPT  27  24   --   27   --    ASTSGOT  41  41   --   42   --    ALKPHOSPHAT  131*  130*   --   127*   --    TBILIRUBIN  2.6*  2.8*   --   3.3*   --    DBILIRUBIN   --    --   2.0*   --    --    GLUCOSE  161*  155*   --   187*  186*     Recent Labs      17   0505  17   0543  17   0249   RBC  2.81*  2.70*  3.60*   HEMOGLOBIN  8.2*  7.8*  10.4*   HEMATOCRIT  23.8*  22.9*  29.8*   PLATELETCT  20*  10*  10*     Recent Labs      17   0505  17   0543  17   0047  17   0249   WBC  0.3*  0.3*   --   0.2*   ASTSGOT  41  41  42   --    ALTSGPT  27     --    ALKPHOSPHAT  131*  130*  127*   --    TBILIRUBIN  2.6*  2.8*  3.3*   --      Recent Labs      17   0543  17   0047  17   1339   SODIUM  132*  131*  132*   POTASSIUM  4.3  3.9  3.9   CHLORIDE  96  94*  91*   CO2  29  31  33   GLUCOSE  155*  187*  186*   BUN  15  17  18   CREATININE  0.20*  0.31*  0.30*   CALCIUM  8.6  8.7  8.6     Imaging  17 CXR. Extensive airspace opacities are again noted bilaterally. Left basilar opacity is increased compared to prior.    Hemodynamics:  Temp (24hrs), Av.8 °C (98.2 °F), Min:36.4 °C (97.6 °F), Max:37.2 °C (98.9 °F)  Temperature: 37.2 °C (98.9 °F)  Pulse  Av.1  Min: 68  Max: 118   Blood Pressure : 136/64     Respiratory:    Respiration: (!) 22, Pulse Oximetry: 99 %, O2 Daily Delivery Respiratory : Highflow Nasal Cannula        RUL Breath Sounds: Diminished, RML Breath Sounds: Diminished, RLL Breath Sounds: Diminished, DEBBIE Breath Sounds: Diminished, LLL Breath Sounds: Diminished  Fluids:    .     GI/Nutrition:  Orders Placed This Encounter   Procedures   • DIET NPO     Standing Status:   Standing     Number of  Occurrences:   1     Order Specific Question:   Restrict to:     Answer:   Sips with Medications [3]     Medical Decision Making, by Problem:  Active Hospital Problems    Diagnosis   • *Myelodysplastic syndrome (CMS-HCC) [D46.9]   • Neutropenic fever (CMS-HCC) [D70.9, R50.81]   • Thrombocytopenia (CMS-HCC) [D69.6]   • Malaise [R53.81]   • Headache [R51]   • Anemia [D64.9]   • Symptomatic anemia [D64.9]   • HLD (hyperlipidemia) [E78.5]   • HTN (hypertension) [I10]     PMHX unchanged    DX  1.  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  Last increase on 11/9/17 of 300mg.  11/15/17 increase dose to 400 mg.    2.  Pulmonary: edema / vs infection or both on cefepime: improved CXR 11/16/17    3.  Pancytopenia : from AML / treatment.  Transfuse <7 hemoglobin and <10 k platelets if no bleeding.  Irradiated     4.  GI/Liver:  Elevated bilirubin.  US normal.        Plan  November 20, 2017  -Hold Vidaza and Venetoclax [currently 400 mg] for thrombocytopenia  -Case discussed with Dr. Sanabria  -We'll get a bone marrow biopsy to assess for disease    November 21, 2017  -Continue to hold Vidaza and Venetoclax  -Dyspnea is worse today. Mental status worse, needing 11 L of oxygen via facemask    High complexity/ High risk medication/ Guarded prognosis/      Reviewed items::  Labs reviewed, Radiology images reviewed and Medications reviewed

## 2017-11-21 NOTE — CONSULTS
INFECTIOUS DISEASES INPATIENT CONSULT NOTE     Date of Service: 11/20/17    Consult Requested By: Maureen Cantor D.O.    Reason for Consultation:  Sepsis, acute respiratory failure    History of Present Illness:   Cheryle Lee Koski is a 67 y.o. Woman with a history of myelodysplastic syndrome, and HTN admitted 10/21/2017 with weakness. History is obtained from the medical records and nursing at bedside as the patient is very lethargic and difficult to arouse. She was found to have AML and chronic pancytopenia. She was started on chemotherapy and remains pancytopenic. She was doing relatively well however during the past few weeks, she has become increasingly more weak. CXR was concerning for pulmonary edema verus consolidation and she was started on cefepime on 11/13. She was stable clinically until yesterday when she became more lethargic. CXR was done and she received lasix 60 mg with good urinary output however her oxygen requirement remain elevated. Her chemotherapy was suspended. She is very lethargic and minimally responding to questioning. CXR on 11/20 reveals more consolidated opacities in the DEBBIE and LLL. She remains pancytopenic and neutropenic however she has not had any recent fevers. She cannot tell me if she is short of breath or is having any pain. However her nurse states she has had intermittent abdominal pain. Patient is being transferred to the ICU. ID consulted for further recommendations.    Review Of Systems:  ROS are unobtainable as pt very lethargic and not responding to questioning    PMH:   Past Medical History:   Diagnosis Date   • Hiatus hernia syndrome     repaired    • High cholesterol    • Hypertension    • MDS (myelodysplastic syndrome) (CMS-HCC)    • Myocardial infarct 01/2017    stent in place    • Psychiatric problem     Depression         PSH:  Past Surgical History:   Procedure Laterality Date   • BONE MARROW BIOPSY, NDL/TROCAR  9/7/2017    Procedure: BONE MARROW BIOPSY,  NDL/TROCAR;  Surgeon: Angy Blanco M.D.;  Location: [facility];  Service: Orthopedics   • BONE MARROW ASPIRATION  9/7/2017    Procedure: BONE MARROW ASPIRATION;  Surgeon: Angy Blanco M.D.;  Location: ENDOSCOPY [facility];  Service: Orthopedics   • BONE MARROW ASPIRATION  3/31/2017    Procedure: BONE MARROW ASPIRATION;  Surgeon: Martha Joya M.D.;  Location: ENDOSCOPY [facility];  Service:    • BONE MARROW BIOPSY, NDL/TROCAR  3/31/2017    Procedure: BONE MARROW BIOPSY, NDL/TROCAR;  Surgeon: Martha Joya M.D.;  Location: ENDOSCOPY [facility];  Service:    • BONE MARROW ASPIRATION  9/30/2016    Procedure: BONE MARROW ASPIRATION;  Surgeon: Shayan Branch M.D.;  Location: [facility];  Service:    • BONE MARROW BIOPSY, NDL/TROCAR  9/30/2016    Procedure: BONE MARROW BIOPSY, NDL/TROCAR;  Surgeon: Shayan Branch M.D.;  Location: [facility];  Service:    • VENTRAL HERNIA REPAIR  3/26/2014    Performed by Lamine Paniagua M.D. at SURGERY [facility]   • OTHER ORTHOPEDIC SURGERY  2010    R foot surgery   • OTHER ORTHOPEDIC SURGERY  2004    Bilateral foot surgery   • OTHER ABDOMINAL SURGERY  1998    Hiatal Hernia repair   • GYN SURGERY  1996    Total Hysterectomy       FAMILY HX:  Unable to obtain as pt lethargic    SOCIAL HX:  Social History     Social History   • Marital status:      Spouse name: N/A   • Number of children: N/A   • Years of education: N/A     Occupational History   • Not on file.     Social History Main Topics   • Smoking status: Never Smoker   • Smokeless tobacco: Never Used   • Alcohol use Yes      Comment: 3-4 drinks per week    • Drug use: No   • Sexual activity: Not on file     Other Topics Concern   • Not on file     Social History Narrative   • No narrative on file     History   Smoking Status   • Never Smoker   Smokeless Tobacco   • Never Used     History   Alcohol Use   • Yes     Comment: 3-4 drinks per week         Allergies/Intolerances:  No Known Allergies      Other Current Medications:    Current Facility-Administered Medications:   •  furosemide (LASIX) injection 40 mg, 40 mg, Intravenous, BID DIURETIC, Maureen Cantor D.O.  •  meropenem (MERREM) 500 mg in  mL IVPB, 500 mg, Intravenous, Q6HRS, Sugar Quiles M.D.  •  voriconazole (VFEND) 270 mg in  mL IVPB, 4 mg/kg, Intravenous, Q12HRS, Sugar Quiles M.D.  •  carvedilol (COREG) tablet 6.25 mg, 6.25 mg, Oral, BID WITH MEALS, Keren Little M.D., 6.25 mg at 11/21/17 0633  •  temazepam (RESTORIL) capsule 15 mg, 15 mg, Oral, QHS PRN, Keren Little M.D., 15 mg at 11/08/17 2044  •  oxyCODONE CR (OXYCONTIN) tablet 20 mg, 20 mg, Oral, Q12HRS, Keren Little M.D., Stopped at 11/21/17 0835  •  Notify provider if pain remains uncontrolled, , , CONTINUOUS **AND** Use the numeric rating scale (NRS-11) on regular floors and Critical-Care Pain Observation Tool (CPOT) on ICUs/Trauma to assess pain, , , CONTINUOUS **AND** Pulse Ox (Oximetry), , , CONTINUOUS **AND** Pharmacy Consult Request ...Pain Management Review, , Other, PRN **AND** If patient difficult to arouse and/or has respiratory depression, stop any opiates that are currently infusing and call a Rapid Response., , , CONTINUOUS **AND** oxycodone immediate-release (ROXICODONE) tablet 5 mg, 5 mg, Oral, Q3HRS PRN, 5 mg at 11/19/17 2338 **AND** oxycodone immediate release (ROXICODONE) tablet 10 mg, 10 mg, Oral, Q3HRS PRN, 10 mg at 11/21/17 0633 **AND** morphine (pf) 4 mg/ml injection 2 mg, 2 mg, Intravenous, Q3HRS PRN, Keren Little M.D., 2 mg at 10/27/17 1621  •  allopurinol (ZYLOPRIM) tablet 300 mg, 300 mg, Oral, DAILY, Keren Little M.D., Stopped at 11/21/17 0835  •  acyclovir (ZOVIRAX) tablet 400 mg, 400 mg, Oral, BID, Brandan Bailey M.D., Stopped at 11/21/17 0835  •  omeprazole (PRILOSEC) capsule 20 mg, 20 mg, Oral, QAM, Keren Little M.D., Stopped at 11/21/17 0835  •  ondansetron (ZOFRAN) syringe/vial injection 4 mg, 4 mg,  "Intravenous, Q4HRS PRN, Brandan Bailey M.D., 4 mg at 17 0016  •  ondansetron (ZOFRAN ODT) dispertab 4 mg, 4 mg, Oral, Q4HRS PRN, Brandan Bailey M.D., 4 mg at 17 0202  •  senna-docusate (PERICOLACE or SENOKOT S) 8.6-50 MG per tablet 2 Tab, 2 Tab, Oral, BID, Stopped at 17 2100 **AND** [DISCONTINUED] polyethylene glycol/lytes (MIRALAX) PACKET 1 Packet, 1 Packet, Oral, QDAY PRN **AND** [DISCONTINUED] magnesium hydroxide (MILK OF MAGNESIA) suspension 30 mL, 30 mL, Oral, QDAY PRN **AND** [DISCONTINUED] bisacodyl (DULCOLAX) suppository 10 mg, 10 mg, Rectal, QDAY PRN, Brandan Bailey M.D.  •  acetaminophen (TYLENOL) tablet 650 mg, 650 mg, Oral, Q6HRS PRN, Brandan Bailey M.D., 650 mg at 17 0502  [unfilled]    Most Recent Vital Signs:  /64   Pulse 98   Temp 37.2 °C (98.9 °F)   Resp (!) 22   Ht 1.575 m (5' 2\")   Wt 66.9 kg (147 lb 7.8 oz)   LMP 10/18/1993   SpO2 93%   Breastfeeding? No   BMI 26.98 kg/m²   Temp  Av.9 °C (98.4 °F)  Min: 36.1 °C (96.9 °F)  Max: 38.1 °C (100.6 °F)    Physical Exam:  General: chronically ill-appearing, lethargic  HEENT: sclera anicteric, PERRL, EOMI, dry mucous membranes, no oral lesions  Neck: supple, no lymphadenopathy  Chest: rales, no r/r/w, normal work of breathing. Right upper chest  Port - no surrounding erythema  Cardiac: RRR, normal S1 S2, no m/r/g   Abdomen: + bowel sounds, soft, diffuse tenderness to palpation, non-distended  Extremities: WWP, bilateral LE edema, 2+ pulses  Skin: no rashes or worrisome lesions  Neuro: lethargic, opens eyes to name but does not answer questions.     Pertinent Lab Results:  Recent Labs      17   0505  17   0543  17   0249   WBC  0.3*  0.3*  0.2*      Recent Labs      17   0505  17   0543  17   0249   HEMOGLOBIN  8.2*  7.8*  10.4*   HEMATOCRIT  23.8*  22.9*  29.8*   MCV  84.7  84.8  82.8   MCH  29.2  28.9  28.9   PLATELETCT  20*  10*  10*         Recent Labs      " 11/20/17   0543  11/21/17   0047  11/21/17   1339   SODIUM  132*  131*  132*   POTASSIUM  4.3  3.9  3.9   CHLORIDE  96  94*  91*   CO2  29  31  33   CREATININE  0.20*  0.31*  0.30*        Recent Labs      11/19/17   0505  11/20/17   0543  11/21/17   0047   ALBUMIN  2.2*  2.0*  2.0*        Pertinent Micro:  Results     Procedure Component Value Units Date/Time    BLOOD CULTURE [[id number]]     Order Status:  Sent Specimen:  Blood from Peripheral     BLOOD CULTURE [[id number]]     Order Status:  Sent Specimen:  Blood from Peripheral     URINE CULTURE(NEW) [[id number]] Collected:  11/18/17 1250    Order Status:  Completed Specimen:  Urine from Urine, Clean Catch Updated:  11/20/17 0850     Significant Indicator NEG     Source UR     Site URINE, CLEAN CATCH     Urine Culture No growth at 48 hours    Narrative:       [id number] ANTONIO VERDUGO  Indication for culture:->Altered LOC    URINALYSIS [[id number]]  (Abnormal) Collected:  11/18/17 1250    Order Status:  Completed Specimen:  Urine from Urine, Clean Catch Updated:  11/18/17 1321     Color DK Yellow     Character Clear     Specific Gravity 1.035     Ph 5.5     Glucose Negative mg/dL      Ketones 15 (A) mg/dL      Protein 30 (A) mg/dL      Bilirubin Moderate (A)     Urobilinogen, Urine 0.2     Nitrite Positive (A)     Leukocyte Esterase Trace (A)     Occult Blood Negative     Micro Urine Req Microscopic    Narrative:       [id number] ANTONIO VERDUGO  Indication for culture:->Altered LOC        Blood Culture   Date Value Ref Range Status   10/21/2017 No growth after 5 days of incubation.  Final   10/21/2017 No growth after 5 days of incubation.  Final        Studies:  CXR 11/20/17  FINDINGS:    Right chest port in place.  Diffuse interstitial and airspace opacities throughout both lungs. More consolidated opacities in the left upper lobe and left lower lobe.  There may be small layering left pleural effusion. No pneumothorax.    IMPRESSION:   1. Possible  sepsis  2. Acute respiratory failure  3. Pneumonia - concern for fungal PNA  4. Pancytopenia  5. AML    PLAN:   Cheryle Lee Koski is a 67 y.o. Woman with a history of MDS admitted for weakness found to have pancytopenia and AML. She was on chemotherapy and was clinically stable until yesterday when she became more lethargic. Today, she is increasingly more lethargic with increasing oxygen requirements and repeat CXR reveals worsening consolidation. She has prolonged neutropenia but no recent fevers. Given her prolonged neutropenia, I am concerned about possible fungal pneumonia such as aspergillus. Will DC cefepime and start meropenem and voriconazole. Will check serum beta D glucan and galactomannan in addition to blood cultures. Agree with transfer to the CICU for closer monitoring. Further recommendations per clinical course.    Plan of care discussed with GEOFF Cantor D.O.. Will continue to follow    Sugar Quiles M.D.

## 2017-11-21 NOTE — PROGRESS NOTES
"Rapid response called. Patient reports chest pain. On call hospitaist paged. EKG ordered, troponins and chest xray.     Patient unable to explain chest pain. Declines pain medication.     Patient continues to state \" Is this some sort of game you are all playing\". Explained use of tests to patient, regarding c/o chest pain.   "

## 2017-11-21 NOTE — CARE PLAN
Problem: Safety  Goal: Will remain free from falls  Bed locked in lowest position. Patient room near nursing station. Bed alarm on and in use. Call light within reach. Hourly rounding in place.     Problem: Respiratory:  Goal: Respiratory status will improve  Patient needs continuous reminders to keep oxygen on. Patient de-stating with 5L NC, oxygen increased to 10L via mask. Oxygen saturation ranging from 89-92%. Patient not demonstrating any increased WOB. Will continue to monitor.

## 2017-11-21 NOTE — PROGRESS NOTES
Patient has became more sleepy.   Physical exam showing b/l lung crackles.  CXR showing diffused pulmonary edema.  Order lasix 20mg iv bid stat  Close monitor and if clinically deteriration, need to consider transfer to ICU.  Instructed nurse to contact on call physician if no improvement after lasix.

## 2017-11-21 NOTE — DISCHARGE PLANNING
Medical SW    Referral: Sw spoke to pt's dtr who indicates she is on her way to hospital to be present during pt's PT at 1030 AM today.      Intervention: Dtr and pt's sons' have decided on SNF [facility] which is near their homes. Yesterday pt asked Sw to speak w/ her dtr regarding choice.     Sw completed SNF choice and faxed to [facility].  Sw received fax confirmation.    Sw spoke to Mona w/ [facility]. Miracle discovered pt was transferred to [facility]. Mona will call Miracle Nava.    Plan: Sw to assist w/ d/c planning as needed.

## 2017-11-21 NOTE — PROGRESS NOTES
Pt seen to reposition. Increased lethargy noted, sternal rub to open eyes. Pt unable to stay awake. VS 98.4, 93, 18, 144/72. O2 5L sats 93%-94%, crackles noted. TC placed to Dr Little, called back with orders.

## 2017-11-21 NOTE — DISCHARGE PLANNING
Received choice form from GARETT Scott for SNF. Referral sent to [facility] at 1012.

## 2017-11-22 PROBLEM — R09.02 HYPOXIA: Status: RESOLVED | Noted: 2017-01-01 | Resolved: 2017-01-01

## 2017-11-22 PROBLEM — Z66 DNR (DO NOT RESUSCITATE): Status: ACTIVE | Noted: 2017-01-01

## 2017-11-22 PROBLEM — D64.9 ANEMIA: Status: RESOLVED | Noted: 2017-01-01 | Resolved: 2017-01-01

## 2017-11-22 PROBLEM — R57.9 SHOCK (HCC): Status: ACTIVE | Noted: 2017-01-01

## 2017-11-22 PROBLEM — J96.01 ACUTE RESPIRATORY FAILURE WITH HYPOXIA (HCC): Status: ACTIVE | Noted: 2017-01-01

## 2017-11-22 NOTE — DISCHARGE PLANNING
Received call from patient's daughter, Bonnie Hernandez, requesting copy of Advance Directive. She is patient's POA. Faxed copy.

## 2017-11-22 NOTE — PROGRESS NOTES
Pt sbp 70-80s. Pt lethargic, awakening for seconds at a time to verbal stimuli, then falling back to sleep. Dr Branch notified and came to bedside. Pt given 1 unit platelets as ordered. bp improved to sbp 90s. Will continue to monitor closely.

## 2017-11-22 NOTE — PROGRESS NOTES
[facility] Hospitalist Progress Note    Date of Service: 2017    Chief Complaint  67 y.o. female admitted 10/21/2017 with AML and neutropenic fever    Interval Problem Update  Patient with further decompensation today.  She was on 8L at start of day but up to 15L after giving IV lasix.  Giving her continued neutropenia and decreased mental status will get ID consultation for recommendations and move patient to ICU.  Patient is not hypotensive and has normal lactate level - not sepsis at this time.  With frequent assessment, consultations, need for higher level of care - approximately 35 minutes of critical care time spent with patient not including procedures.    Consultants/Specialty  ID - Kb  Onc - Britni    Disposition  Tbd.        Review of Systems   Constitutional: Negative for chills and fever.   HENT: Negative for congestion.    Eyes: Negative for blurred vision and photophobia.   Respiratory: Positive for shortness of breath. Negative for cough.    Cardiovascular: Negative for chest pain, claudication and leg swelling.   Gastrointestinal: Negative for abdominal pain, constipation, diarrhea, heartburn and vomiting.   Genitourinary: Negative for dysuria and hematuria.   Musculoskeletal: Negative for joint pain and myalgias.   Skin: Negative for itching and rash.   Neurological: Negative for dizziness, sensory change, speech change, weakness and headaches.   Psychiatric/Behavioral: Negative for depression. The patient is not nervous/anxious and does not have insomnia.       Physical Exam  Laboratory/Imaging   Hemodynamics  Temp (24hrs), Av.7 °C (98.1 °F), Min:36.4 °C (97.6 °F), Max:37.2 °C (98.9 °F)   Temperature: 37.2 °C (98.9 °F)  Pulse  Av.1  Min: 68  Max: 118    Blood Pressure : 136/64      Respiratory      Respiration: (!) 22, Pulse Oximetry: 99 %, O2 Daily Delivery Respiratory : Highflow Nasal Cannula        RUL Breath Sounds: Diminished, RML Breath Sounds: Diminished, RLL Breath Sounds:  Diminished, DEBBIE Breath Sounds: Diminished, LLL Breath Sounds: Diminished    Fluids    Intake/Output Summary (Last 24 hours) at 11/21/17 1638  Last data filed at 11/21/17 1100   Gross per 24 hour   Intake                0 ml   Output                0 ml   Net                0 ml       Nutrition  Orders Placed This Encounter   Procedures   • DIET NPO     Standing Status:   Standing     Number of Occurrences:   1     Order Specific Question:   Restrict to:     Answer:   Sips with Medications [3]     Physical Exam   Constitutional: She appears well-developed and well-nourished. No distress.   HENT:   Head: Normocephalic and atraumatic.   Eyes: Conjunctivae are normal. No scleral icterus.   Neck: Neck supple. No JVD present.   Cardiovascular: Normal rate, regular rhythm and normal heart sounds.  Exam reveals no gallop and no friction rub.    No murmur heard.  Pulmonary/Chest: Effort normal. No respiratory distress. She has rales. She exhibits no tenderness.   Abdominal: Soft. Bowel sounds are normal. She exhibits no distension. There is no guarding.   Musculoskeletal: She exhibits no edema or tenderness.   Neurological: She is alert. No cranial nerve deficit.   Skin: Skin is warm and dry. She is not diaphoretic. No erythema. No pallor.   Psychiatric: She has a normal mood and affect. Her behavior is normal.   Nursing note and vitals reviewed.      Recent Labs      11/19/17   0505  11/20/17   0543  11/21/17   0249   WBC  0.3*  0.3*  0.2*   RBC  2.81*  2.70*  3.60*   HEMOGLOBIN  8.2*  7.8*  10.4*   HEMATOCRIT  23.8*  22.9*  29.8*   MCV  84.7  84.8  82.8   MCH  29.2  28.9  28.9   MCHC  34.5  34.1  34.9   RDW  38.5  37.6  37.9   PLATELETCT  20*  10*  10*   MPV  9.7  9.3  11.7     Recent Labs      11/20/17   0543  11/21/17   0047  11/21/17   1339   SODIUM  132*  131*  132*   POTASSIUM  4.3  3.9  3.9   CHLORIDE  96  94*  91*   CO2  29  31  33   GLUCOSE  155*  187*  186*   BUN  15  17  18   CREATININE  0.20*  0.31*  0.30*    CALCIUM  8.6  8.7  8.6                      Assessment/Plan     * Myelodysplastic syndrome (CMS-HCC)- (present on admission)   Assessment & Plan    Evolved into AML per oncology.  (Venetoclax/Vidaza started 10/23 per Dr Krishnamurthy recommendations  Venetoclax to increasing doses per oncology)  Chemotherapy on hold d/t lethargy and decompensation.  Transferred to ICU  Monitoring ANC, currently 0        Neutropenic fever (CMS-HCC)- (present on admission)   Assessment & Plan    Cultures showing no growth.  Still no documented fever but patient is warm.  ID consulted as concern for infection and source not yet identified.  Cefepime changed to merrem and added vanco/voriconazole  Lactic Acid checked and is normal.        Thrombocytopenia (CMS-HCC)- (present on admission)   Assessment & Plan    Keep platelet count >10 or higher if bleeding.  Transfuse prn          Elevated liver enzymes   Assessment & Plan    US showing CBD no dilation , (+) for fatty liver  Tbil 2.8->2.6->2.8 ->3.3  Likely from fatty liver + meds, no complaints of pain in this area, once more stable, will consider MRCP to evaluate for obstruction, patient too unstable at this time          Hypoxia   Assessment & Plan    Possible fluid overload, tolerated Lasix. Continue diuresis  Responded very well to lasix and breathing easier  Repeat chest x-ray edema, pulmonary effusion and possible consolidation  BID lasix  ?Infection component to decompensation        Malaise- (present on admission)   Assessment & Plan    Cont PT OT as needed        Headache   Assessment & Plan    PRN fioricet  States HA >1 month and associated with leg pain simultaneously  MRI brain with and without contrast done, no IC pathology, marrow abnormal as expected with MDS  Now improved with meds  LP done 10/26, no complications, pathology report - no malignant cells seen            Anemia- (present on admission)   Assessment & Plan    Transfuse if less than 7.0, HH stable at 8-9  currently  Received 1 unit 10/24, 10/30, 2 units 11/7  Secondary to MDS to AML        Symptomatic anemia- (present on admission)   Assessment & Plan    Daily CBC  Transfuse for <7.0        HLD (hyperlipidemia)- (present on admission)   Assessment & Plan    Continue current statin therapy regimen        HTN (hypertension)- (present on admission)   Assessment & Plan    Coreg dose 6.25             Reviewed items::  Labs reviewed, Medications reviewed and Radiology images reviewed  Gan catheter::  No Gan  DVT prophylaxis pharmacological::  Contraindicated - High bleeding risk  DVT prophylaxis - mechanical:  SCDs

## 2017-11-22 NOTE — PROGRESS NOTES
Oncology/Hematology Progress Note               Author: Johanna Ryder   Date & Time created: 11/22/2017  3:21 PM   CC:  AML  From MDS    Interval History:  Patient transferred to ICU. Currently on high flow oxygen. Lethargic unable to have conversation    Review of Systems:  Review of Systems   Constitutional: Positive for malaise/fatigue. Negative for chills and fever.   HENT: Negative.    Eyes: Negative.    Respiratory: Negative for sputum production and shortness of breath.    Cardiovascular: Negative.  Negative for chest pain and palpitations.   Gastrointestinal: Negative.  Negative for heartburn.   Genitourinary: Negative.    Musculoskeletal: Positive for myalgias.   Skin: Negative for rash.   Neurological: Positive for weakness. Negative for dizziness and headaches.   Endo/Heme/Allergies: Negative for environmental allergies. Does not bruise/bleed easily.   Psychiatric/Behavioral: Positive for depression.       Physical Exam:  Physical Exam   Constitutional: She appears well-developed. She is uncooperative. She is easily aroused.   HENT:   Head: Normocephalic.   Eyes: Conjunctivae are normal. Pupils are equal, round, and reactive to light.   Cardiovascular: Normal rate, regular rhythm and normal heart sounds.    Pulmonary/Chest: Effort normal. She has decreased breath sounds.   Abdominal: Soft. Bowel sounds are normal.   Musculoskeletal: She exhibits no edema.   Neurological: She is easily aroused. No cranial nerve deficit.   Skin: No erythema.       Labs:  Recent Labs      11/21/17   0841   [id number]  7.51*   [id number]  38.6*   [id number]  81.3   [id number]  95.2   ARTHCO3  30*   ARTBE  7*     Recent Labs      11/20/17   2130   TROPONINI  0.02     Recent Labs      11/21/17   0047  11/21/17   1339  11/22/17   0327   SODIUM  131*  132*  136   POTASSIUM  3.9  3.9  3.4*   CHLORIDE  94*  91*  94*   CO2  31  33  36*   BUN  17  18  23*   CREATININE  0.31*  0.30*  0.26*   MAGNESIUM   --    --   1.9   CALCIUM  8.7   8.6  8.8     Recent Labs      17   0543  17   1350  17   0047  17   1339  17   0327   ALTSGPT  24   --   27   --   27   ASTSGOT  41   --   42   --   49*   ALKPHOSPHAT  130*   --   127*   --   115*   TBILIRUBIN  2.8*   --   3.3*   --   3.5*   DBILIRUBIN   --   2.0*   --    --    --    GLUCOSE  155*   --   187*  186*  170*     Recent Labs      17   0249  17   03217   0810   RBC  3.60*  2.26*  2.40*   HEMOGLOBIN  10.4*  6.5*  7.0*   HEMATOCRIT  29.8*  18.9*  20.0*   PLATELETCT  10*  2*  3*     Recent Labs      17   0543  17   0047  17   0249  17   0327  17   0810   WBC  0.3*   --   0.2*  0.2*  0.3*   NEUTSPOLYS   --    --    --   CANCEL  CANCEL   LYMPHOCYTES   --    --    --   CANCEL  CANCEL   MONOCYTES   --    --    --   CANCEL  CANCEL   EOSINOPHILS   --    --    --   CANCEL  CANCEL   BASOPHILS   --    --    --   CANCEL  CANCEL   ASTSGOT  41  42   --   49*   --    ALTSGPT  24     --   27   --    ALKPHOSPHAT  130*  127*   --   115*   --    TBILIRUBIN  2.8*  3.3*   --   3.5*   --      Recent Labs      17   00417   032   SODIUM  131*  132*  136   POTASSIUM  3.9  3.9  3.4*   CHLORIDE  94*  91*  94*   CO2  31  33  36*   GLUCOSE  187*  186*  170*   BUN  17  18  23*   CREATININE  0.31*  0.30*  0.26*   CALCIUM  8.7  8.6  8.8     Imaging  17 CXR. Extensive airspace opacities are again noted bilaterally. Left basilar opacity is increased compared to prior.    Hemodynamics:  Temp (24hrs), Av.9 °C (98.4 °F), Min:36.7 °C (98 °F), Max:37.2 °C (98.9 °F)  Temperature: 36.7 °C (98 °F)  Pulse  Av.4  Min: 68  Max: 118Heart Rate (Monitored): 85  Blood Pressure : 106/53, NIBP: (!) 83/44     Respiratory:    Respiration: 16, Pulse Oximetry: 97 %, O2 Daily Delivery Respiratory : Highflow Nasal Cannula     Work Of Breathing / Effort: Mild  RUL Breath Sounds: Coarse Crackles, RML Breath Sounds: Coarse Crackles, RLL  Breath Sounds: Diminished, DEBBIE Breath Sounds: Coarse Crackles, LLL Breath Sounds: Diminished  Fluids:    .     GI/Nutrition:  Orders Placed This Encounter   Procedures   • DIET NPO     Standing Status:   Standing     Number of Occurrences:   1     Order Specific Question:   Restrict to:     Answer:   Sips with Medications [3]     Medical Decision Making, by Problem:  Active Hospital Problems    Diagnosis   • *Myelodysplastic syndrome (CMS-HCC) [D46.9]   • Neutropenic fever (CMS-HCC) [D70.9, R50.81]   • Thrombocytopenia (CMS-HCC) [D69.6]   • Malaise [R53.81]   • Headache [R51]   • Anemia [D64.9]   • Symptomatic anemia [D64.9]   • HLD (hyperlipidemia) [E78.5]   • HTN (hypertension) [I10]     PMHX unchanged    DX  1.  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  Last increase on 11/9/17 of 300mg.  11/15/17 increase dose to 400 mg.    2.  Pulmonary: edema / vs infection or both on cefepime: improved CXR 11/16/17    3.  Pancytopenia : from AML / treatment.  Transfuse <7 hemoglobin and <10 k platelets if no bleeding.  Irradiated     4.  GI/Liver:  Elevated bilirubin.  US normal.        Plan  November 20, 2017  -Hold Vidaza and Venetoclax [currently 400 mg] for thrombocytopenia  -Case discussed with Dr. Sanabria  -We'll get a bone marrow biopsy to assess for disease    November 21, 2017  -Continue to hold Vidaza and Venetoclax  -Dyspnea is worse today. Mental status worse, needing 11 L of oxygen via facemask    November 22, 2017  -Continue to hold AML therapy  -Patient is requiring high flow oxygen.  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 She was not a candidate for induction therapy and had already progressed on hypomotility agent.  -Discussed with daughter patient's wishes, she stated the patient did not want to be hooked up to  life-support machines long-term  -Patient will be made DNR in accordance with her wishes.  -Bone marrow biopsy and further treatment can be considered if patient recovers from this acute illness        Reviewed items::  Labs reviewed, Radiology images reviewed and Medications reviewed

## 2017-11-22 NOTE — PROGRESS NOTES
"12 Hour chart check  SR-ST  0.12/0.06/0.36    At start of shift patient began removing high-flow nasal cannula and desaturating to 80s. When told not to remove oxygen patient stated she was tired and wanted to die. Family (Serjio Carbajal and \"bonita Blanco\") called, made aware of patient's comments/condition and asked to come in and possibly have someone stay with patient overnight, as this might help to ease her distress. Family stated they would attempt to arrange for someone to come in and stay with Ms. Carbajal.     Ms. Carbajal informed that her family would be coming in, she stated, \"good, I need to talk to them\". This seemed to appease  and she stopped removing high-flow.    Close to 8 Pm, Ms. Carbajal asked if she was in pain and replied yes. PM medications retrieved, and attempted to be given at 2006 however patient declined stated they make her drowsy and she wanted to wait until she spoke with her family before taking any medications. Ms. Carbajal assessed and found to be alert and oriented x3 (off on date by a few months).    When attempting to clean patient (patient gown and linen covered in bloody-green phlegm and pad soaked with urine) patient declined stating she wanted her family to see her like this. Ms. Carbajal reminded of her high risk for infection however continues to decline. Will inform and discuss with family when they arrive.     Bella arrived on unit at 2120, discussed above. Afterwards patient subdued, a lot more calm and cooperative. Patient medicated and given a bed bath. Bella off unit at 2200, stated she'd be back in the morning.      Daughter and son on unit at 0600, notified of changes.     At end of shift patient laying in bed, denies any complaints and does not display any signs of distress. Patient care endorsed to AM nurse.   "

## 2017-11-22 NOTE — THERAPY
Attempted OT tx. Per RN pt sat EOB, but demonstrated extremely low activity tolerance and was unable to participate in self-care or further mobility. Will continue to monitor and provide tx as appropriate.

## 2017-11-22 NOTE — CONSULTS
Critical Care/Pulmonary Consultation    Date of service: 11/22/2017    Consulting Physician: Maureen Cantor D.O.    History of Present Illness: I was called to see this patient is developing increasing respiratory distress after transfer to the intensive care unit. 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 She has profound pancytopenia she's been started on chemotherapy and is treated by oncology on the cancer nursing unit. She has developed increasing bilateral alveolar infiltrates and hypoxia. She was started on antimicrobial therapy 11/13. Echocardiogram shows preserved left ventricular ejection fraction. Her clinical picture is not consistent with worsening pneumonia and sepsis. She is profoundly neutropenic. She has not had significant fevers. She was transferred to intensive care unit and started on high flow nasal cannula 11/21/17. Throughout the night she developed increasing lethargy and respiratory distress. Today she is in significant distress respiratory standpoint and more obtunded. Infectious disease following the patient has she's been started on meropenem and voriconazole. I do prolonged discussion with the patient's daughter's morning. Consistent with the patient's prior advanced directive and discussion with the daughter we will change her CODE STATUS to DO NOT RESUSCITATE. She will not be intubated.    No current facility-administered medications on file prior to encounter.      Current Outpatient Prescriptions on File Prior to Encounter   Medication Sig Dispense Refill   • aspirin (ASA) 81 MG Chew Tab chewable tablet Take 81 mg by mouth every day.     • carvedilol (COREG) 3.125 MG Tab Take 3.125 mg by mouth 2 times a day, with meals.     • ticagrelor (BRILINTA) 90 MG Tab tablet Take 90 mg by mouth 2 Times a Day.     • atorvastatin (LIPITOR) 10 MG Tab Take 40 mg by mouth every evening. Patient states increased per Dr. Wilson     •  ciprofloxacin (CIPRO) 500 MG Tab Take 500 mg by mouth 2 times a day. Continuous.     • acyclovir (ZOVIRAX) 400 MG tablet Take 400 mg by mouth 2 times a day. Continuous.     • fluconazole (DIFLUCAN) 100 MG Tab Take 100 mg by mouth every day. Continuous.     • omeprazole (PRILOSEC) 20 MG delayed-release capsule Take 1 Cap by mouth every morning. 30 Cap 11       Social History   Substance Use Topics   • Smoking status: Never Smoker   • Smokeless tobacco: Never Used   • Alcohol use Yes      Comment: 3-4 drinks per week         Past Medical History:   Diagnosis Date   • Hiatus hernia syndrome     repaired    • High cholesterol    • Hypertension    • MDS (myelodysplastic syndrome) (CMS-HCC)    • Myocardial infarct 01/2017    stent in place    • Psychiatric problem     Depression       Past Surgical History:   Procedure Laterality Date   • BONE MARROW BIOPSY, NDL/TROCAR  9/7/2017    Procedure: BONE MARROW BIOPSY, NDL/TROCAR;  Surgeon: Angy Blanco M.D.;  Location: [facility];  Service: Orthopedics   • BONE MARROW ASPIRATION  9/7/2017    Procedure: BONE MARROW ASPIRATION;  Surgeon: Angy Blanco M.D.;  Location: [facility];  Service: Orthopedics   • BONE MARROW ASPIRATION  3/31/2017    Procedure: BONE MARROW ASPIRATION;  Surgeon: Martha Joya M.D.;  Location: [facility];  Service:    • BONE MARROW BIOPSY, NDL/TROCAR  3/31/2017    Procedure: BONE MARROW BIOPSY, NDL/TROCAR;  Surgeon: Martha Joya M.D.;  Location: [facility];  Service:    • BONE MARROW ASPIRATION  9/30/2016    Procedure: BONE MARROW ASPIRATION;  Surgeon: Shayan Branch M.D.;  Location: [facility];  Service:    • BONE MARROW BIOPSY, NDL/TROCAR  9/30/2016    Procedure: BONE MARROW BIOPSY, NDL/TROCAR;  Surgeon: Shayan Branch M.D.;  Location: [facility];  Service:    • VENTRAL HERNIA REPAIR  3/26/2014    Performed by Lamine Paniagua M.D. at [facility]  "[facility]   • OTHER ORTHOPEDIC SURGERY  2010    R foot surgery   • OTHER ORTHOPEDIC SURGERY  2004    Bilateral foot surgery   • OTHER ABDOMINAL SURGERY  1998    Hiatal Hernia repair   • GYN SURGERY  1996    Total Hysterectomy       Allergies: Patient has no known allergies.    No family history on file.    Vitals:    11/03/17 0000 11/03/17 0400 11/03/17 0800 11/03/17 0951   Height:       Weight:       Weight % change since last entry.:       BP: 109/60 102/64 132/69 131/72   Pulse: 89 80 95 80   BMI (Calculated):       Resp: 18 16 17 18   Temp: 36.9 °C (98.5 °F) 36.7 °C (98.1 °F) 36.7 °C (98.1 °F) 36.9 °C (98.5 °F)   TempSrc:        11/03/17 1030 11/03/17 1200 11/03/17 1216 11/03/17 1245   Height:       Weight:       Weight % change since last entry.:       BP: 128/78 130/81 121/69    Pulse: 87 89 83    BMI (Calculated):       Resp: 18 17 18 18   Temp: 36.8 °C (98.2 °F) 36.8 °C (98.3 °F) 37.2 °C (98.9 °F)    TempSrc:        11/03/17 1600 11/03/17 1739 11/03/17 2014 11/04/17 0012   Height:       Weight:   66.8 kg (147 lb 4.3 oz)    Weight % change since last entry.:   0.75 %    BP: 127/72  141/83 130/77   Pulse: 84  92 89   BMI (Calculated):       Resp: 17 18 18 18   Temp: 37 °C (98.6 °F)  36.8 °C (98.2 °F) 36.9 °C (98.5 °F)   TempSrc:        11/04/17 0608 11/04/17 0800 11/04/17 1200 11/04/17 1400   Height:    1.575 m (5' 2\")   Weight:    66.5 kg (146 lb 9.7 oz)   Weight % change since last entry.:    -0.45 %   BP: 118/65 139/81 141/80    Pulse: 84 88 85    BMI (Calculated):    26.81   Resp: 18 17 17    Temp: 36.7 °C (98 °F) 36.7 °C (98 °F) 36.7 °C (98 °F)    TempSrc:        11/04/17 1545 11/04/17 2108 11/05/17 0008 11/05/17 0458   Height:       Weight:       Weight % change since last entry.:       BP: 139/73 131/78 143/79 125/80   Pulse: 84 88 97 92   BMI (Calculated):       Resp: 18 18 18 18   Temp: 36.7 °C (98.1 °F) 36.9 °C (98.5 °F) 36.7 °C (98.1 °F) 36.8 °C (98.2 °F)   TempSrc:        11/05/17 0800 11/05/17 " 1600 11/05/17 2023 11/06/17 0005   Height:       Weight:       Weight % change since last entry.:       BP: 143/73 133/77 133/85 125/64   Pulse: 96 99 91 95   BMI (Calculated):       Resp: 17 17 18 18   Temp: 36.8 °C (98.3 °F) 36.8 °C (98.2 °F) 36.7 °C (98.1 °F) 36.8 °C (98.2 °F)   TempSrc:        11/06/17 0608 11/06/17 0800 11/06/17 1200 11/06/17 1555   Height:       Weight:       Weight % change since last entry.:       BP: 109/54 117/67 125/58 129/62   Pulse: 86 97 95 97   BMI (Calculated):       Resp: 18 20 20 16   Temp: 36.9 °C (98.4 °F) 36.6 °C (97.8 °F) 36.8 °C (98.2 °F) 36.8 °C (98.3 °F)   TempSrc:        11/06/17 2035 11/06/17 2337 11/07/17 0418 11/07/17 0432   Height:       Weight:       Weight % change since last entry.:       BP: 126/54 121/60 131/69 136/64   Pulse: 89 95 93 86   BMI (Calculated):       Resp: 18 18 16 16   Temp: 36.8 °C (98.3 °F) 37.1 °C (98.7 °F) 36.8 °C (98.2 °F) 36.7 °C (98 °F)   TempSrc:        11/07/17 0551 11/07/17 0631 11/07/17 0635 11/07/17 0702   Height:       Weight:       Weight % change since last entry.:       BP: 140/64  145/63 133/62   Pulse: 94  100 96   BMI (Calculated):       Resp: 16 18 16   Temp: 36.7 °C (98.1 °F) 37.3 °C (99.1 °F) 36.6 °C (97.8 °F) 36.6 °C (97.9 °F)   TempSrc:        11/07/17 0756 11/07/17 0845 11/07/17 0938 11/07/17 1007   Height:       Weight:       Weight % change since last entry.:       BP: 133/67  122/75 125/60   Pulse: (!) 101  81 79   BMI (Calculated):       Resp: 17 16 18 16   Temp: 36.6 °C (97.9 °F)  37.1 °C (98.7 °F) 36.4 °C (97.6 °F)   TempSrc:        11/07/17 1214 11/07/17 1235 11/07/17 1411 11/07/17 1556   Height:       Weight:       Weight % change since last entry.:       BP: 129/60   115/81   Pulse: 88   83   BMI (Calculated):       Resp: 18 18 18 15   Temp: 36.6 °C (97.8 °F)   36.4 °C (97.6 °F)   TempSrc:        11/07/17 1600 11/07/17 2058 11/08/17 0031 11/08/17 0038   Height:       Weight:       Weight % change since last entry.:        BP:  135/72 153/74 151/63   Pulse:  90 91    BMI (Calculated):       Resp: 16 18 18    Temp:  36.7 °C (98.1 °F) 36.9 °C (98.4 °F)    TempSrc:        11/08/17 0557 11/08/17 0800 11/08/17 1200 11/08/17 1538   Height:       Weight:       Weight % change since last entry.:       BP: 139/70 137/71 147/65 (!) 163/61   Pulse: (!) 102 93 95 93   BMI (Calculated):       Resp: 18 18 18 18   Temp: 36.7 °C (98.1 °F) 36.7 °C (98 °F) 36.7 °C (98 °F) 36.6 °C (97.8 °F)   TempSrc:        11/08/17 1741 11/08/17 2100 11/09/17 0000 11/09/17 0500   Height:       Weight:       Weight % change since last entry.:       BP: 147/66 130/71 134/70 153/73   Pulse: 98 96 93 97   BMI (Calculated):       Resp:  18 20 18   Temp:  36.8 °C (98.2 °F) 36.4 °C (97.5 °F) 36.5 °C (97.7 °F)   TempSrc:        11/09/17 0742 11/09/17 1012 11/09/17 1200 11/09/17 1600   Height:       Weight:       Weight % change since last entry.:       BP: (!) 174/87 146/77 151/72 153/72   Pulse: (!) 108 92 97 (!) 102   BMI (Calculated):       Resp: 18  18 18   Temp: 36.8 °C (98.2 °F)  36.8 °C (98.3 °F) 37 °C (98.6 °F)   TempSrc:        11/09/17 1747 11/09/17 2022 11/10/17 0005 11/10/17 0457   Height:       Weight:       Weight % change since last entry.:       BP: (!) 162/78 127/65 119/66 108/72   Pulse: 96 87 92 80   BMI (Calculated):       Resp:  16 18 18   Temp:  36.8 °C (98.2 °F) 36.8 °C (98.2 °F) 37.4 °C (99.3 °F)   TempSrc:        11/10/17 0603 11/10/17 0752 11/10/17 1157 11/10/17 1600   Height:       Weight:       Weight % change since last entry.:       BP: 134/60 134/68 132/81 151/79   Pulse: 92 85 84 87   BMI (Calculated):       Resp: 16 16 16 18   Temp: 36.7 °C (98 °F) 36.9 °C (98.4 °F) 36.7 °C (98 °F) 36.9 °C (98.4 °F)   TempSrc:        11/10/17 1730 11/10/17 1951 11/11/17 0048 11/11/17 0315   Height:       Weight:       Weight % change since last entry.:       BP: 157/69 141/72 144/64 130/66   Pulse: 96 86 92 90   BMI (Calculated):       Resp:  18 18 18    Temp:  36.7 °C (98 °F) 36.7 °C (98 °F) 36.6 °C (97.8 °F)   TempSrc:        11/11/17 0800 11/11/17 1517 11/11/17 1702 11/11/17 1725   Height:       Weight:       Weight % change since last entry.:       BP: 131/67 144/76 149/65 143/71   Pulse: 88 93 93 92   BMI (Calculated):       Resp: 18 18 18 18   Temp: 36.8 °C (98.3 °F) 37.1 °C (98.8 °F) 36.9 °C (98.5 °F) 36.7 °C (98.1 °F)   TempSrc:        11/11/17 1758 11/11/17 2107 11/11/17 2353 11/12/17 0332   Height:       Weight:       Weight % change since last entry.:       BP: 141/67 140/67 143/63 143/72   Pulse: 92 93 99 96   BMI (Calculated):       Resp: 16 18 18 18   Temp: 37.1 °C (98.8 °F) 37 °C (98.6 °F) 36.8 °C (98.3 °F) 37.1 °C (98.7 °F)   TempSrc:        11/12/17 0829 11/12/17 1200 11/12/17 1600 11/12/17 2028   Height:       Weight:       Weight % change since last entry.:       BP: 135/66 131/63 131/87 139/65   Pulse: 90 (!) 104 97 91   BMI (Calculated):       Resp: 18 18 18 18   Temp: 36.9 °C (98.4 °F) 36.9 °C (98.4 °F) 37.3 °C (99.2 °F) 36.9 °C (98.4 °F)   TempSrc:        11/12/17 2329 11/13/17 0419 11/13/17 0800 11/13/17 1200   Height:       Weight:       Weight % change since last entry.:       BP: 140/61 145/63 141/77 129/75   Pulse: 94 (!) 105 97 88   BMI (Calculated):       Resp: 18 18 18 18   Temp: 36.8 °C (98.3 °F) 36.9 °C (98.4 °F) 36.8 °C (98.3 °F) 37.6 °C (99.6 °F)   TempSrc:        11/13/17 1632 11/13/17 2024 11/13/17 2352 11/14/17 0545   Height:       Weight:       Weight % change since last entry.:       BP: 130/67 130/72 137/75 143/79   Pulse: 90 88 96 100   BMI (Calculated):       Resp: 18 18 18 18   Temp: 36.8 °C (98.3 °F) 36.9 °C (98.5 °F) 37 °C (98.6 °F) 36.9 °C (98.4 °F)   TempSrc:        11/14/17 0755 11/14/17 1152 11/14/17 1547 11/14/17 2016   Height:       Weight:       Weight % change since last entry.:       BP: 133/66 132/76 137/71 147/71   Pulse: 93 89 92 92   BMI (Calculated):       Resp: 19 18 17 18   Temp: 36.8 °C (98.3 °F) 36.9  °C (98.5 °F) 36.8 °C (98.3 °F) 36.8 °C (98.2 °F)   TempSrc:        11/15/17 0015 11/15/17 0526 11/15/17 0756 11/15/17 0900   Height:       Weight:       Weight % change since last entry.:       BP: 143/71 147/79 143/65    Pulse: (!) 103 100 91    BMI (Calculated):       Resp: 18 18 20 18   Temp: 36.7 °C (98.1 °F) 36.9 °C (98.4 °F) 37.2 °C (98.9 °F)    TempSrc:        11/15/17 1200 11/15/17 1600 11/15/17 1942 11/16/17 0046   Height:       Weight:       Weight % change since last entry.:       BP: 138/54 145/66 136/51 138/65   Pulse: 100 96 93 92   BMI (Calculated):       Resp: 20 20 16 18   Temp: 37 °C (98.6 °F) 37 °C (98.6 °F) 37.3 °C (99.1 °F) 37 °C (98.6 °F)   TempSrc:        11/16/17 0401 11/16/17 0703 11/16/17 0930 11/16/17 1000   Height:       Weight:       Weight % change since last entry.:       BP: 152/66 138/71     Pulse: 93 90     BMI (Calculated):       Resp: 18 18 18 18   Temp: 37.1 °C (98.7 °F) 37.2 °C (99 °F)     TempSrc:        11/16/17 1405 11/16/17 1442 11/16/17 1511 11/16/17 1617   Height:       Weight:       Weight % change since last entry.:       BP: 121/52 127/64 137/62 123/72   Pulse: 96 89 90 93   BMI (Calculated):       Resp: 18 18 18 18   Temp: 36.9 °C (98.5 °F) 36.6 °C (97.8 °F) 36.8 °C (98.2 °F) 37.2 °C (98.9 °F)   TempSrc:        11/16/17 1935 11/17/17 0056 11/17/17 0452 11/17/17 0800   Height:       Weight:       Weight % change since last entry.:       BP: 136/74 108/53 130/74 126/59   Pulse: 99 (!) 108 96 85   BMI (Calculated):       Resp: 16 (!) 24 18 20   Temp: 37.2 °C (98.9 °F) 37.5 °C (99.5 °F) 37 °C (98.6 °F) 37.2 °C (98.9 °F)   TempSrc:        11/17/17 0845 11/17/17 1200 11/17/17 1600 11/17/17 2040   Height:       Weight:       Weight % change since last entry.:       BP:  134/62 139/75 132/71   Pulse:  90 89 85   BMI (Calculated):       Resp: 18 18 18 16   Temp:  36.7 °C (98.1 °F) 37.6 °C (99.6 °F) 36.7 °C (98.1 °F)   TempSrc:        11/18/17 0110 11/18/17 0325 11/18/17 0800  11/18/17 1600   Height:       Weight:    66.9 kg (147 lb 7.8 oz)   Weight % change since last entry.:    0.6 %   BP: 133/72 138/71 140/77 141/71   Pulse: 89 89 89 83   BMI (Calculated):       Resp: 16 20 16 18   Temp: 36.8 °C (98.3 °F) 36.7 °C (98.1 °F) 36.4 °C (97.6 °F) 37.2 °C (99 °F)   TempSrc:        11/18/17 2038 11/19/17 0017 11/19/17 0512 11/19/17 0800   Height:       Weight:       Weight % change since last entry.:       BP: 131/51 136/60 146/64 115/59   Pulse: 83 85 93 81   BMI (Calculated):       Resp: 18 18 18 18   Temp: 36.9 °C (98.4 °F) 36.7 °C (98.1 °F) 36.8 °C (98.3 °F) 36.7 °C (98.1 °F)   TempSrc:        11/19/17 1600 11/19/17 2000 11/20/17 0000 11/20/17 0400   Height:       Weight:       Weight % change since last entry.:       BP: 139/67 133/62 149/72 149/68   Pulse: 90 91 94 95   BMI (Calculated):       Resp: 17 (!) 22 20 20   Temp: 37 °C (98.6 °F) 36.9 °C (98.4 °F) 36.6 °C (97.8 °F) 37.1 °C (98.7 °F)   TempSrc:        11/20/17 0800 11/20/17 1100 11/20/17 1153 11/20/17 1600   Height:       Weight:       Weight % change since last entry.:       BP: 148/71  146/70 144/72   Pulse: 89 90 91 93   BMI (Calculated):       Resp: 18 18 18 18   Temp: 37.2 °C (98.9 °F)  37 °C (98.6 °F) 36.9 °C (98.4 °F)   TempSrc:        11/20/17 2014 11/20/17 2115 11/20/17 2120 11/20/17 2127   Height:       Weight:       Weight % change since last entry.:       BP: 152/75 151/87 151/87 153/86   Pulse: 99 95 100 94   BMI (Calculated):       Resp: 18 18 18 18   Temp: 36.8 °C (98.2 °F) 36.7 °C (98.1 °F) 36.7 °C (98.1 °F)    TempSrc:        11/20/17 2133 11/20/17 2140 11/20/17 2339 11/21/17 0418   Height:       Weight:       Weight % change since last entry.:       BP: 150/87 155/78 144/79 147/70   Pulse: 99 (!) 101 100 99   BMI (Calculated):       Resp: 18 18 18 (!) 24   Temp:   36.5 °C (97.7 °F) 36.4 °C (97.6 °F)   TempSrc:        11/21/17 0550 11/21/17 0800 11/21/17 1143 11/21/17 1345   Height:       Weight:       Weight %  change since last entry.:       BP: 147/82 143/76 131/74 123/72   Pulse: 98 (!) 101 97 100   BMI (Calculated):       Resp:  19 19 (!) 24   Temp:  36.9 °C (98.5 °F) 36.7 °C (98 °F) 37.2 °C (98.9 °F)   TempSrc:        11/21/17 1357 11/21/17 1430 11/21/17 1500 11/21/17 1511   Height:       Weight:       Weight % change since last entry.:       BP: 136/64      Pulse: 98 (!) 102 98 (!) 102   BMI (Calculated):       Resp: (!) 22 (!) 24 (!) 22 (!) 22   Temp:  37.2 °C (98.9 °F)     TempSrc:        11/21/17 1600 11/21/17 1700 11/21/17 1800 11/21/17 1805   Height:       Weight:       Weight % change since last entry.:       BP:       Pulse: 90 96 92 93   BMI (Calculated):       Resp: 18 (!) 28 14 (!) 22   Temp:   36.7 °C (98 °F)    TempSrc:        11/21/17 1900 11/21/17 2000 11/21/17 2100 11/21/17 2200   Height:       Weight:       Weight % change since last entry.:       BP:       Pulse: (!) 107 (!) 102 91 96   BMI (Calculated):       Resp: (!) 26 (!) 24 (!) 23 20   Temp:  37.2 °C (98.9 °F)     TempSrc:        11/21/17 2207 11/21/17 2300 11/22/17 0000 11/22/17 0100   Height:       Weight:       Weight % change since last entry.:       BP:       Pulse: 94 97 97 94   BMI (Calculated):       Resp: (!) 23 (!) 31 18 20   Temp:   37.1 °C (98.7 °F)    TempSrc:        11/22/17 0200 11/22/17 0207 11/22/17 0300 11/22/17 0400   Height:       Weight:       Weight % change since last entry.:       BP:       Pulse: 91 88 90 90   BMI (Calculated):       Resp: 18 20 18 19   Temp:    37.2 °C (98.9 °F)   TempSrc:        11/22/17 0500 11/22/17 0600 11/22/17 0700 11/22/17 0800   Height:       Weight:       Weight % change since last entry.:       BP:       Pulse: 94 96 99 99   BMI (Calculated):       Resp: 20 16 (!) 26 (!) 24   Temp:    36.9 °C (98.5 °F)   TempS:        11/22/17 0845 11/22/17 0900 11/22/17 1000 11/22/17 1100   Height:       Weight:       Weight % change since last entry.:       BP:       Pulse: 97 94 88 81   BMI (Calculated):        Resp: 20 17 17 (!) 10   Temp:       TempSrc:        11/22/17 1110 11/22/17 1120 11/22/17 1130 11/22/17 1144   Height:       Weight:       Weight % change since last entry.:       BP:  (!) 79/51  106/53   Pulse: 82 82 82 84   BMI (Calculated):       Resp: 14 17 15 18   Temp:  36.7 °C (98.1 °F)  36.7 °C (98 °F)   TempSrc:       180 readings loaded. More data may exist, but no more data can be displayed here.       Physical Examination  General:Ill-appearing female, vital signs reviewed in detail, in respiratory distress and obtunded  Neuro/Psych: Arouses, weakly follows some commands, very lethargic/obtunded  HEENT: PERRL, mixed membranes dry  CVS: Tachycardic, distant  Respiratory: Diminished breath sounds throughout with scattered coarse crackles  Abdomen/: Mildly distended, soft  Extremities: 2+ anasarca, distal pulses palpable, capillary refill intact  Skin: Jaundiced, cool, dry    Recent Labs      11/20/17   0543  11/21/17   0047  11/21/17   0249  11/22/17   0327  11/22/17   0810   WBC  0.3*   --   0.2*  0.2*  0.3*   NEUTSPOLYS   --    --    --   CANCEL  CANCEL   LYMPHOCYTES   --    --    --   CANCEL  CANCEL   MONOCYTES   --    --    --   CANCEL  CANCEL   EOSINOPHILS   --    --    --   CANCEL  CANCEL   BASOPHILS   --    --    --   CANCEL  CANCEL   ASTSGOT  41  42   --   49*   --    ALTSGPT  24  27   --   27   --    ALKPHOSPHAT  130*  127*   --   115*   --    TBILIRUBIN  2.8*  3.3*   --   3.5*   --      Recent Labs      11/21/17   0047  11/21/17   1339  11/22/17   0327   SODIUM  131*  132*  136   POTASSIUM  3.9  3.9  3.4*   CHLORIDE  94*  91*  94*   CO2  31  33  36*   BUN  17  18  23*   CREATININE  0.31*  0.30*  0.26*   MAGNESIUM   --    --   1.9   CALCIUM  8.7  8.6  8.8     Recent Labs      11/20/17   0543  11/20/17   1350  11/21/17   0047  11/21/17   1339  11/22/17   0327   ALTSGPT  24   --   27   --   27   ASTSGOT  41   --   42   --   49*   ALKPHOSPHAT  130*   --   127*   --   115*   TBILIRUBIN  2.8*    --   3.3*   --   3.5*   DBILIRUBIN   --   2.0*   --    --    --    GLUCOSE  155*   --   187*  186*  170*     Recent Labs      11/21/17   0841   NBXOT81P  7.51*   [id number]  38.6*   [id number]  81.3   [id number]  95.2   ARTHCO3  30*   ARTBE  7*     DX-CHEST-PORTABLE (1 VIEW)   Final Result      No significant change from prior exam.      ECHOCARDIOGRAM-COMP W/ CONT   Final Result      DX-CHEST-PORTABLE (1 VIEW)   Final Result      Extensive airspace opacities are again noted bilaterally. Left basilar opacity is increased compared to prior.      DX-CHEST-PORTABLE (1 VIEW)   Final Result         1. No significant interval change.                                                                                          MR-BRAIN-WITH & W/O   Final Result      1.  Diffuse low T1 signal intensity in the visualized marrow likely representing marrow infiltrative disorders.   2.  There is mild diffuse supratentorial dural thickening. This is a nonspecific finding and likely secondary to the diffuse marrow infiltrative process.   3.  No acute infarct.                                          Assessment and Plan:  Neutropenic Sepsis   - suspect secondary to pneumonia   - LVEF preserved - non-cardiogenic pulmonary edema c/w ARDS   - Started on broad-spectrum antimicrobial therapy, voriconazole and meropenem infectious disease following,   - Cultures pending, beta D glucan pending, galactomannan pending  Acute hypoxemic respiratory failure   - I suspect this is related to pneumonia, sepsis and noncardiogenic pulmonary edema   - Echocardiogram shows preserved left ventricular ejection fraction   - There may be some component of volume overload although she is close to her baseline weight   - Regardless, she is developing increasing respiratory distress and overall prognosis is extremely guarded   -I have titrated high flow nasal cannula and discussed with RT in detail at bedside. She is a poor candidate for noninvasive ventilation at  this time.  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 Vidaza and Venetoclax have been held   - She is pancytopenic with severe neutropenia  Acute encephalopathy, suspected multifactorial toxic metabolic   - She did have leukemic infiltrates on MRI of her brain previously 10/24  Profound cytopenia and neutropenia   - Reverse isolation percussions  As above, had a prolonged discussion with the patient's daughter at bedside. She is not DO NOT RESUSCITATE status consistent with her prior stated advance directive and current clinical condition. Overall prognosis is extremely poor. I discussed the case with oncology (Lamine Sanabria) who is her primary oncologist. He believes her prognosis is very poor given underlying converted to AML and clinical course. We will follow the patient closely with urinary intensive care unit and further recommendations will follow.    The patient remains critically ill.  Critical care time = 45 minutes in directly providing and coordinating critical care and extensive data review.  No time overlap and excludes procedures.

## 2017-11-22 NOTE — CARE PLAN
Problem: Oxygenation:  Goal: Maintain adequate oxygenation dependent on patient condition  Outcome: PROGRESSING AS EXPECTED  High flow nasal cannula 40L 40%

## 2017-11-22 NOTE — PROGRESS NOTES
Infectious Disease Progress Note    Author: Sugar Quiles M.D. Date & Time of service: 2017  11:05 AM    Chief Complaint:  FU sepsis/PNA    Interval History:   AF, WBC 0.2, transferred to the ICU overnight, now on HFNC, obtunded and does not arouse to voice or tactile stimuli  Labs Reviewed, Medications Reviewed, Radiology Reviewed and Wound Reviewed.    Review of Systems:  Review of Systems   Unable to perform ROS: Medical condition       Hemodynamics:  Temp (24hrs), Av °C (98.6 °F), Min:36.7 °C (98 °F), Max:37.2 °C (98.9 °F)  Temperature: 36.9 °C (98.5 °F)  Pulse  Av.5  Min: 68  Max: 118Heart Rate (Monitored): 88  Blood Pressure : 136/64, NIBP: (!) 94/52       Physical Exam:  Physical Exam   Constitutional: She appears well-developed.   Elderly  Chronically ill   HENT:   Head: Normocephalic and atraumatic.   Eyes: Pupils are equal, round, and reactive to light.   Neck: Neck supple.   Cardiovascular: Normal rate, regular rhythm and normal heart sounds.    Pulmonary/Chest: Effort normal. She has rales.   R upper chest port - no erythema   Abdominal: Soft. There is tenderness.   Musculoskeletal: She exhibits edema.   Neurological:   Obtunded       Meds:    Current Facility-Administered Medications:   •  NS  •  potassium chloride SA  •  furosemide  •  meropenem (MERREM) IV  •  voriconazole (VFEND) IV  •  voriconazole (VFEND) IV  •  carvedilol  •  temazepam  •  oxyCODONE CR  •  Notify provider if pain remains uncontrolled **AND** Use the numeric rating scale (NRS-11) on regular floors and Critical-Care Pain Observation Tool (CPOT) on ICUs/Trauma to assess pain **AND** Pulse Ox (Oximetry) **AND** Pharmacy Consult Request **AND** If patient difficult to arouse and/or has respiratory depression, stop any opiates that are currently infusing and call a Rapid Response. **AND** oxycodone immediate-release **AND** oxycodone immediate-release **AND** morphine injection  •  allopurinol  •  acyclovir  •   omeprazole  •  ondansetron  •  ondansetron  •  senna-docusate **AND** [DISCONTINUED] polyethylene glycol/lytes **AND** [DISCONTINUED] magnesium hydroxide **AND** [DISCONTINUED] bisacodyl  •  acetaminophen    Labs:  Recent Labs      11/21/17   0249  11/22/17   0327  11/22/17   0810   WBC  0.2*  0.2*  0.3*   RBC  3.60*  2.26*  2.40*   HEMOGLOBIN  10.4*  6.5*  7.0*   HEMATOCRIT  29.8*  18.9*  20.0*   MCV  82.8  84.1  83.3   MCH  28.9  28.8  29.2   RDW  37.9  37.7  37.7   PLATELETCT  10*  2*  3*   MPV  11.7  10.4  13.6*   NEUTSPOLYS   --   CANCEL  CANCEL   LYMPHOCYTES   --   CANCEL  CANCEL   MONOCYTES   --   CANCEL  CANCEL   EOSINOPHILS   --   CANCEL  CANCEL   BASOPHILS   --   CANCEL  CANCEL     Recent Labs      11/21/17   0047  11/21/17   1339  11/22/17   0327   SODIUM  131*  132*  136   POTASSIUM  3.9  3.9  3.4*   CHLORIDE  94*  91*  94*   CO2  31  33  36*   GLUCOSE  187*  186*  170*   BUN  17  18  23*     Recent Labs      11/20/17   0543  11/21/17   0047  11/21/17   1339  11/22/17   0327   ALBUMIN  2.0*  2.0*   --   1.8*   TBILIRUBIN  2.8*  3.3*   --   3.5*   ALKPHOSPHAT  130*  127*   --   115*   TOTPROTEIN  5.4*  5.5*   --   4.9*   ALTSGPT  24  27   --   27   ASTSGOT  41  42   --   49*   CREATININE  0.20*  0.31*  0.30*  0.26*       Imaging:  Dx-chest-limited (1 View)    Result Date: 11/16/2017 11/16/2017 2:19 PM HISTORY/REASON FOR EXAM: Shortness of breath TECHNIQUE/EXAM DESCRIPTION AND NUMBER OF VIEWS: Single AP view of the chest. COMPARISON: 11/13/2017 FINDINGS: There are bilateral infiltrates, stable. There is a right IJ Port-A-Cath present. The heart is enlarged. There is small left pleural effusion.     1.  Stable cardiomegaly. 2.  Stable bilateral infiltrates. 3.  Stable left pleural effusion.    Dx-chest-portable (1 View)    Result Date: 11/22/2017 11/22/2017 10:29 AM HISTORY/REASON FOR EXAM:  Shortness of Breath. TECHNIQUE/EXAM DESCRIPTION AND NUMBER OF VIEWS: Single portable view of the chest. COMPARISON:  11/21/2017 FINDINGS: Cardiomediastinal contour is unchanged. Previously described pulmonary parenchymal opacities persist. No pneumothorax identified. Supportive tubing is unchanged.     No significant change from prior exam.    Dx-chest-portable (1 View)    Result Date: 11/21/2017 11/21/2017 3:20 PM HISTORY/REASON FOR EXAM:  Shortness of Breath. TECHNIQUE/EXAM DESCRIPTION AND NUMBER OF VIEWS: Single portable view of the chest. COMPARISON: 11/20/2017 FINDINGS: Chest port projects over the SVC. The cardiomediastinal silhouette is stable. Extensive airspace opacities are again seen bilaterally. No pneumothorax is seen.     Extensive airspace opacities are again noted bilaterally. Left basilar opacity is increased compared to prior.    Dx-chest-portable (1 View)    Result Date: 11/20/2017 11/20/2017 9:26 PM HISTORY/REASON FOR EXAM:  Chest Pain Difficulty breathing TECHNIQUE/EXAM DESCRIPTION AND NUMBER OF VIEWS: Single portable view of the chest. COMPARISON: 11/20/2017 6:16 PM FINDINGS: Right chest port in place. Diffuse interstitial and airspace opacities throughout both lungs. More consolidated opacities in the left upper lobe and left lower lobe. There may be small layering left pleural effusion. No pneumothorax. Stable cardiopericardial silhouette.     1. No significant interval change.    Dx-chest-portable (1 View)    Result Date: 11/20/2017 11/20/2017 6:16 PM HISTORY/REASON FOR EXAM:  Shortness of Breath TECHNIQUE/EXAM DESCRIPTION AND NUMBER OF VIEWS: Single portable view of the chest. COMPARISON:  11/16/2017 FINDINGS: Right jugular Port-A-Cath remains in place. The heart is at the upper limits of normal in size. Diffuse interstitial opacities are present which represent mild interstitial edema or pneumonia. There is focal atelectasis or pneumonia in the left lower lobe. A small left pleural effusion is present.     Mild diffuse interstitial edema or pneumonia with focal left lower lobe atelectasis or pneumonia  and small left pleural effusion.    Dx-chest-portable (1 View)    Result Date: 11/13/2017 11/13/2017 9:01 AM HISTORY/REASON FOR EXAM: Shortness of Breath. TECHNIQUE/EXAM DESCRIPTION AND NUMBER OF VIEWS: Single AP view of the chest. COMPARISON: 10/21/2017 FINDINGS: Hardware: Right internal jugular portacatheter tip overlies the SVC. Lungs: Extensive new consolidation bilateral consolidation is seen, especially affecting the left upper lobe Pleura:  Possible small left pleural effusion Heart and mediastinum: The cardiomediastinal contours are stable.     New extensive bilateral consolidation with pneumonia favored over edema Possible new small left effusion    Mr-brain-with & W/o    Result Date: 10/25/2017  10/24/2017 6:13 PM HISTORY/REASON FOR EXAM:  Headache. TECHNIQUE/EXAM DESCRIPTION:   MRI of the brain without and with contrast. T1 sagittal, T2 fast spin-echo axial, T1 coronal, FLAIR coronal, diffusion-weighted and apparent diffusion coefficient (ADC map) axial images were obtained of the whole brain. T1 postcontrast axial and T1 postcontrast coronal images were obtained. The study was performed on a Moveratia 1.5 Soo MRI scanner. 15 mL Omniscan contrast was administered intravenously. COMPARISON:  None. FINDINGS:     There is no acute infarct. There is no acute or chronic parenchymal hemorrhage. There is no intra-axial space-occupying lesion. Mild cerebral volume loss is seen. There is no abnormal signal intensity in the brain parenchyma. The ventricles, cortical sulci and the basal cisterns appear prominent consistent with mild cerebral atrophy. There is no hydrocephalus. There is no intraventricular mass. There is no extra-axial fluid collection, hemorrhage or mass. The visualized flow voids of the cerebral vasculature are unremarkable.  There is no large lesion identified in the expected course of the intracranial portions of the cranial nerves. There is mild diffuse enhancement of the supratentorial  dura. There is diffuse low T1 signal intensity in the visualized bone marrow.     1.  Diffuse low T1 signal intensity in the visualized marrow likely representing marrow infiltrative disorders. 2.  There is mild diffuse supratentorial dural thickening. This is a nonspecific finding and likely secondary to the diffuse marrow infiltrative process. 3.  No acute infarct.    Us-liver And Biliary Tree    Result Date: 11/17/2017 11/17/2017 6:03 AM HISTORY/REASON FOR EXAM:  Abnormal Labs Abdominal pain TECHNIQUE/EXAM DESCRIPTION AND NUMBER OF VIEWS:  Real-time sonography of the liver and biliary tree. COMPARISON: CT scan October 1, 2016 FINDINGS: The liver is normal in contour. The liver is echogenic. There is no evidence of solid mass lesion. The liver measures 16.47 cm. The gallbladder is normal. There is no evidence of cholelithiasis. The gallbladder wall thickness measures 0.21 cm. There is no pericholecystic fluid. The common duct measures 6 to 7 mm. The pancreas is not well visualized. The visualized aorta is normal in caliber. Intrahepatic IVC is patent. The portal vein is patent with hepatopetal flow. The right kidney measures 12.10 cm. There is no ascites.     Echogenic liver consistent with fatty infiltration. Borderline dilatation of the common bile duct at 6 to 7 mm in diameter.    Echocardiogram-comp W/ Cont    Result Date: 11/22/2017  Transthoracic Echo Report Echocardiography Laboratory CONCLUSIONS Contrast was used to enhance visualization of the endocardial border. Normal left ventricular chamber size. Left ventricular ejection fraction is visually estimated to be 55%. Hypokinetic mid anterior wall. Mildly dilated left atrium. Structurally normal aortic and mitral valve without significant stenosis or regurgitation. Normal pericardium without effusion. KOSKI, CHERYLE Exam Date:         11/21/2017                    17:28 Exam Location:     Inpatient Priority:          Routine Ordering Physician:        DEMIAN  DRISS MITCHELL Referring Physician: Sonographer:               Lamine Gaona RDCS Age:    67     Gender:    F MRN:    [id number] :    [id number] BSA:    1.68   Ht (in):    62     Wt (lb):    147 Exam Type:     Complete, Contrast Indications:     Chest Pain ICD Codes:       786.5 CPT Codes:       84117,  BP:   136    /   64     HR: Technical Quality:       Fair MEASUREMENTS  (Male / Female) Normal Values 2D ECHO LV Diastolic Diameter PLAX        4.1 cm                4.2 - 5.9 / 3.9 - 5.3 cm LV Systolic Diameter PLAX         2.7 cm                2.1 - 4.0 cm IVS Diastolic Thickness           0.77 cm               LVPW Diastolic Thickness          0.85 cm               LVOT Diameter                     2.1 cm                Estimated LV Ejection Fraction    55 %                  DOPPLER AV Peak Velocity                  1.3 m/s               AV Peak Gradient                  6.9 mmHg              AV Mean Gradient                  4.1 mmHg              LVOT Peak Velocity                1.1 m/s               AV Area Cont Eq vti               2.9 cm²               MV Velocity Time Integral         26.6 cm               Mitral E Point Velocity           0.66 m/s              Mitral E to A Ratio               0.59                  Mitral A Duration                 90 ms                 MV Pressure Half Time             49.4 ms               MV Area PHT                       4.4 cm²               MV Deceleration Time              171 ms                TR Peak Velocity                  258 cm/s              PV Peak Velocity                  0.92 m/s              PV Peak Gradient                  3.4 mmHg              PV Mean Gradient                  1.8 mmHg              * Indicates values subject to auto-interpretation LV EF:  55    % FINDINGS Left Ventricle Contrast was used to enhance visualization of the endocardial border. 3 ML of contrast was administered. Normal left ventricular chamber size. Normal left  "ventricular wall thickness. Normal left ventricular systolic function. Left ventricular ejection fraction is visually estimated to be 55%. Hypokinetic mid anterior wall. Grade I diastolic dysfunction. Right Ventricle Normal right ventricular size. Normal right ventricular systolic function. Right Atrium Normal right atrial size. Normal inferior vena cava size and inspiratory collapse. Left Atrium Mildly dilated left atrium. Mitral Valve Structurally normal mitral valve without significant stenosis or regurgitation. Aortic Valve Structurally normal aortic valve without significant stenosis or regurgitation. Tricuspid Valve Structurally normal tricuspid valve. No tricuspid stenosis. No tricuspid regurgitation. Pulmonic Valve Structurally normal pulmonic valve. No pulmonic stenosis. No pulmonic insufficiency. Pericardium Normal pericardium without effusion. Aorta Normal aortic root for body surface area. Ascending aorta not well visualized. Kerrie Roberts M.D. (Electronically Signed) Final Date:     22 November 2017                 02:17      Micro:  Results     Procedure Component Value Units Date/Time    BLOOD CULTURE [[id number]] Collected:  11/21/17 1659    Order Status:  Completed Specimen:  Blood from Peripheral Updated:  11/22/17 0135    Narrative:       Protective  Per Hospital Policy: Only change Specimen Src: to \"Line\" if  specified by physician order.    BLOOD CULTURE [[id number]] Collected:  11/21/17 1659    Order Status:  Completed Specimen:  Blood from Peripheral Updated:  11/22/17 0134    Narrative:       Protective  Per Hospital Policy: Only change Specimen Src: to \"Line\" if  specified by physician order.    URINE CULTURE(NEW) [[id number]] Collected:  11/18/17 1250    Order Status:  Completed Specimen:  Urine from Urine, Clean Catch Updated:  11/20/17 0850     Significant Indicator NEG     Source UR     Site URINE, CLEAN CATCH     Urine Culture No growth at 48 hours    Narrative:       " [id number] ANTONIO BULLARDLNA  Indication for culture:->Altered LOC    URINALYSIS [[id number]]  (Abnormal) Collected:  11/18/17 1250    Order Status:  Completed Specimen:  Urine from Urine, Clean Catch Updated:  11/18/17 1321     Color DK Yellow     Character Clear     Specific Gravity 1.035     Ph 5.5     Glucose Negative mg/dL      Ketones 15 (A) mg/dL      Protein 30 (A) mg/dL      Bilirubin Moderate (A)     Urobilinogen, Urine 0.2     Nitrite Positive (A)     Leukocyte Esterase Trace (A)     Occult Blood Negative     Micro Urine Req Microscopic    Narrative:       [id number] ANTONIO BULLARDLNJUNE  Indication for culture:->Altered LOC          Assessment:  Active Hospital Problems    Diagnosis   • *Myelodysplastic syndrome (CMS-HCC) [D46.9]   • Neutropenic fever (CMS-HCC) [D70.9, R50.81]   • Thrombocytopenia (CMS-HCC) [D69.6]   • Pancytopenia (CMS-HCC) [D61.818]   • Elevated liver enzymes [R74.8]   • Hypoxia [R09.02]   • Malaise [R53.81]   • Headache [R51]   • Symptomatic anemia [D64.9]   • HLD (hyperlipidemia) [E78.5]   • HTN (hypertension) [I10]       Plan:  Acute respiratory failure  2/2 PNA  Concern for fungal PNA given prolonged neutropenia and acute worsening of clinical presentation  Now on HFNC  CXR 11/22 with persistent opacities per my read  Continue meropenem and voriconazole  Galactomannan - pending  Beta D glucan - pending  Bcx 11/21 - pending    Encephalopathy, worse  Multifactorial  On abx above  MRI brain 10/24 with leukemic infiltrates    Pancytopenia  2/2 AML  Chemo on hold   Neutropenic but no recent fevers    AML  Chemo on hold  Onc following    Prognosis poor    Discussed with internal medicine.

## 2017-11-22 NOTE — CARE PLAN
Problem: Nutritional:  Goal: Achieve adequate nutritional intake  Patient will consume >50% of meals and snacks     Outcome: NOT MET  Pt is NPO since 11/20. Transferred to CICU 11/21 for HLOC. Pt is currently very lethargic and inappropriate for PO diet.  Code status needs to be addressed.     ?Consider Cortrak/TF if clinically appropriate.  RD following.

## 2017-11-22 NOTE — PROGRESS NOTES
[facility] Hospitalist Progress Note    Date of Service: 2017    Chief Complaint  67 y.o. female admitted 10/21/2017 with weakness.    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 She developed a neutropenic fever and has been initiated on broad-spectrum antibiotics. Due to respiratory failure, she has been transferred to the ICU.  RN notes confusion.  I met with Ms. Carbajal's daughter and 2 sons and we discussed her code status and the possibility of comfort care.   Consultants/Specialty  Oncology. I discussed with Dr. Sanabria, her oncologist as well as Dr. Ryder as well.  ID  Critical Care  I discussed her condition with Dr. Castanon on ICU Hot Rounds.   Disposition  ICU        Review of Systems   Unable to perform ROS: Mental acuity      Physical Exam  Laboratory/Imaging   Hemodynamics  Temp (24hrs), Av °C (98.6 °F), Min:36.7 °C (98 °F), Max:37.2 °C (98.9 °F)   Temperature: 37.2 °C (98.9 °F)  Pulse  Av.4  Min: 68  Max: 118 Heart Rate (Monitored): 96  Blood Pressure : 136/64, NIBP: (!) 88/66      Respiratory      Respiration: 16, Pulse Oximetry: 98 %, O2 Daily Delivery Respiratory : Highflow Nasal Cannula     Work Of Breathing / Effort: Mild  RUL Breath Sounds: Diminished, RML Breath Sounds: Diminished, RLL Breath Sounds: Diminished, DEBBIE Breath Sounds: Diminished, LLL Breath Sounds: Diminished    Fluids    Intake/Output Summary (Last 24 hours) at 17 0728  Last data filed at 17   Gross per 24 hour   Intake              280 ml   Output                0 ml   Net              280 ml       Nutrition  Orders Placed This Encounter   Procedures   • DIET NPO     Standing Status:   Standing     Number of Occurrences:   1     Order Specific Question:   Restrict to:     Answer:   Sips with Medications [3]     Physical Exam   Constitutional: No distress.   Chronically ill appearing.   Cardiovascular: Normal rate and  regular rhythm.    Distant heart sounds.    Pulmonary/Chest:   Right chest port.   Abdominal: Soft. She exhibits no distension. There is no tenderness.   Neurological:   Obtunded. She does not open her eyes.   Skin: Skin is warm and dry. There is pallor.       Recent Labs      11/20/17   0543  11/21/17   0249  11/22/17   0327   WBC  0.3*  0.2*  0.2*   RBC  2.70*  3.60*  2.26*   HEMOGLOBIN  7.8*  10.4*  6.5*   HEMATOCRIT  22.9*  29.8*  18.9*   MCV  84.8  82.8  84.1   MCH  28.9  28.9  28.8   MCHC  34.1  34.9  34.2   RDW  37.6  37.9  37.7   PLATELETCT  10*  10*  2*   MPV  9.3  11.7  10.4     Recent Labs      11/21/17   0047  11/21/17   1339  11/22/17   0327   SODIUM  131*  132*  136   POTASSIUM  3.9  3.9  3.4*   CHLORIDE  94*  91*  94*   CO2  31  33  36*   GLUCOSE  187*  186*  170*   BUN  17  18  23*   CREATININE  0.31*  0.30*  0.26*   CALCIUM  8.7  8.6  8.8                      Assessment/Plan     * Shock (CMS-[facility])   Assessment & Plan    May be multifactorial in the setting of advanced cancer, ARDS, and possible infection.  IV pressors will be started to keep MAP over 65.        Acute respiratory failure with hypoxia (CMS-[facility])- (present on admission)   Assessment & Plan    Requiring transfer to the ICU.  Pulmonology consulted.   CXR concerning for ARDS.  She cannot tolerate diuresis due to hypotension.         Myelodysplastic syndrome (CMS-HCC)- (present on admission)   Assessment & Plan    Evolved into AML per oncology.  Venetoclax/Vidaza started 10/23   Chemotherapy on hold due to illness.  Monitoring ANC, currently 0   She was scheduled to have a bone marrow biopsy but is too ill.        Neutropenic fever (CMS-HCC)- (present on admission)   Assessment & Plan    Cultures obtained and negative  ID consulted as concern for infection and source not yet identified.  Merrem and added vanco/voriconazole          Thrombocytopenia (CMS-[facility])- (present on admission)   Assessment & Plan    Keep platelet count >10 or higher if  bleeding.  Transfuse one unit as platelet count is 2 this morning.          Pancytopenia (CMS-HCC)- (present on admission)   Assessment & Plan    Severe decrease in all cell lineages         DNR (do not resuscitate)- (present on admission)   Assessment & Plan    Orders written for DNR today.  Comfort care has been discussed with family         Elevated liver enzymes   Assessment & Plan    US showing CBD no dilation , (+) for fatty liver  Tbil 2.8->2.6->2.8 ->3.3  Likely from fatty liver + meds, no complaints of pain in this area, once more stable, will consider MRCP to evaluate for obstruction, patient too unstable at this time          Malaise- (present on admission)   Assessment & Plan    Cont PT OT as needed        Headache   Assessment & Plan    PRN fioricet  States HA >1 month and associated with leg pain simultaneously  MRI brain with and without contrast done, no IC pathology, marrow abnormal as expected with MDS  Now improved with meds  LP done 10/26, no complications, pathology report - no malignant cells seen            Symptomatic anemia- (present on admission)   Assessment & Plan    Daily CBC  Transfuse for <7.0  Hb is 6.5 this morning.        HLD (hyperlipidemia)- (present on admission)   Assessment & Plan    Continue current statin therapy regimen        HTN (hypertension)- (present on admission)   Assessment & Plan    Coreg and lasix will be stopped due to low BP.            Reviewed items::  Labs reviewed, Medications reviewed and Radiology images reviewed  Gan catheter::  No Gan  Central line in place:  Chemotherapy  DVT prophylaxis pharmacological::  Contraindicated - High bleeding risk      Ms. Carbajal is critically ill in the ICU, 34 minutes of critical care time spent with patient, family, nursing, and consultants and in specific management of hypotension requiring pressors. See orders. Prognosis is poor.

## 2017-11-22 NOTE — PROGRESS NOTES
Called from Lab called with critical result of   Hgb 6.5  Hct 18.9  Platelet count of 2  WBC count of 0.2 at 0532. Critical lab result read back to Trino from lab.    Paged Dr. Barahona at 0535 and 0549 and was notified that [facility] group had not actually been consulted.     0555 Call center Incorrectly paged Dr. JANY Dodd.    Dr. GAYLE Kilpatrick paged at 0610, 0625    Dr. GAYLE Kilpatrick notified of critical lab result at  0630.  Critical lab result read back by Dr. Kilpatrick. Dr. Kilpatrick ordered for CBC to be recollected and analyzed.

## 2017-11-23 NOTE — PROGRESS NOTES
[facility] Hospitalist Progress Note    Date of Service: 2017    Chief Complaint  67 y.o. female admitted 10/21/2017 with weakness.    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 She developed a neutropenic fever and has been initiated on broad-spectrum antibiotics. Due to respiratory failure, she has been transferred to the ICU and remains on high-flow oxygen.  I met with Ms. Carbajal's daughter and 2 sons as well has her sister and brother in law. Ms. Carbajal and I discussed her condition and she feels that she does not have long to live.   32 minutes spent that of which over 50% of the time was spent in counseling about her condition and end-of-life issues.   Consultants/Specialty  Oncology. I discussed with Dr. Sanabria, her oncologist as well as Dr. Ryder as well.  ID  Critical Care  I discussed her condition with Dr. Castanon on ICU Hot Rounds today.   Disposition  ICU        Review of Systems   Constitutional: Negative for chills and fever.   Gastrointestinal: Positive for constipation.        She is tolerating a little food.     Neurological:        She feels quite weak.    Psychiatric/Behavioral: Positive for memory loss.   All other systems reviewed and are negative.     Physical Exam  Laboratory/Imaging   Hemodynamics  Temp (24hrs), Av.6 °C (97.8 °F), Min:35.6 °C (96.1 °F), Max:36.9 °C (98.5 °F)   Temperature: (!) 35.6 °C (96.1 °F)  Pulse  Av.8  Min: 68  Max: 118 Heart Rate (Monitored): 90  Blood Pressure : 106/53, NIBP: 104/68      Respiratory      Respiration: 13, Pulse Oximetry: 99 %, O2 Daily Delivery Respiratory : Highflow Nasal Cannula     Work Of Breathing / Effort: Mild;Moderate  RUL Breath Sounds: Coarse Crackles, RML Breath Sounds: Diminished, RLL Breath Sounds: Diminished, DEBBIE Breath Sounds: Diminished, LLL Breath Sounds: Diminished    Fluids    Intake/Output Summary (Last 24 hours) at 17 3233  Last  data filed at 11/22/17 2200   Gross per 24 hour   Intake             1360 ml   Output                0 ml   Net             1360 ml       Nutrition  Orders Placed This Encounter   Procedures   • DIET NPO     Standing Status:   Standing     Number of Occurrences:   1     Order Specific Question:   Restrict to:     Answer:   Sips with Medications [3]     Physical Exam   Constitutional: No distress.   Chronically ill appearing.   Neck: Neck supple.   Cardiovascular: Normal rate and regular rhythm.    Distant heart sounds.    Pulmonary/Chest:   Right chest port.  Coarse breath sounds.   Abdominal: Soft. She exhibits no distension. There is no tenderness.   Musculoskeletal: She exhibits edema.   Neurological:   Awake, alert, oriented x 4.   Skin: Skin is warm and dry. There is pallor.       Recent Labs      11/22/17   0327  11/22/17   0810  11/23/17   0441   WBC  0.2*  0.3*  0.2*   RBC  2.26*  2.40*  2.41*   HEMOGLOBIN  6.5*  7.0*  6.9*   HEMATOCRIT  18.9*  20.0*  20.4*   MCV  84.1  83.3  84.6   MCH  28.8  29.2  28.6   MCHC  34.2  35.0  33.8   RDW  37.7  37.7  37.8   PLATELETCT  2*  3*  27*   MPV  10.4  13.6*  10.6     Recent Labs      11/21/17   1339  11/22/17   0327  11/23/17   0441   SODIUM  132*  136  138   POTASSIUM  3.9  3.4*  4.5   CHLORIDE  91*  94*  94*   CO2  33  36*  36*   GLUCOSE  186*  170*  192*   BUN  18  23*  30*   CREATININE  0.30*  0.26*  0.29*   CALCIUM  8.6  8.8  9.4                      Assessment/Plan     * Shock (CMS-HCC)   Assessment & Plan    May be multifactorial in the setting of advanced cancer, ARDS, and possible infection.  IV levophed ordered to keep MAP over 65.        Acute respiratory failure with hypoxia (CMS-HCC)- (present on admission)   Assessment & Plan    Requiring transfer to the ICU.  Pulmonology consulted.   CXR concerning for ARDS.  She is on high flow oxygen.           Myelodysplastic syndrome (CMS-HCC)- (present on admission)   Assessment & Plan    Evolved into AML    Venetoclax/Vidaza started 10/23 and now on hold due to neutropenic fever.  Following WBC, RBCs, and platelets.   She was scheduled to have a bone marrow biopsy but is too ill.        Neutropenic fever (CMS-HCC)- (present on admission)   Assessment & Plan    Cultures obtained and negative  ID consulted and following.  Merrem and added vanco/voriconazole          Thrombocytopenia (CMS-HCC)- (present on admission)   Assessment & Plan    Keep platelet count >10 or higher if bleeding.  Platelets 20 this morning.          Pancytopenia (CMS-HCC)- (present on admission)   Assessment & Plan    Severe decrease in all cell lineages         DNR (do not resuscitate)- (present on admission)   Assessment & Plan    Orders written for DNR           Elevated liver enzymes   Assessment & Plan    US showing CBD no dilation , (+) for fatty liver  Bili 3.8--consider hemolysis            Malaise- (present on admission)   Assessment & Plan    Likely from AML and infection.        Headache   Assessment & Plan    PRN fioricet  States HA >1 month and associated with leg pain simultaneously  MRI brain with and without contrast done, no IC pathology, marrow abnormal as expected with MDS  Now improved with meds  LP done 10/26, no complications, pathology report - no malignant cells seen            Symptomatic anemia- (present on admission)   Assessment & Plan    Daily CBC  Hb is 6.5 this morning.        HLD (hyperlipidemia)- (present on admission)   Assessment & Plan    Hx of        HTN (hypertension)- (present on admission)   Assessment & Plan    Coreg and lasix will be stopped due to low BP.            Reviewed items::  Labs reviewed, Medications reviewed and Radiology images reviewed  Gan catheter::  No Gan  Central line in place:  Chemotherapy  DVT prophylaxis pharmacological::  Contraindicated - High bleeding risk

## 2017-11-23 NOTE — CONSULTS
Critical Care/Pulmonary Progress Note    Date of service: 11/23/2017    Consulting Physician: Maureen Cantor D.O.    History of Present Illness: I was called to see this patient is developing increasing respiratory distress after transfer to the intensive care unit. 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 She has profound pancytopenia she's been started on chemotherapy and is treated by oncology on the cancer nursing unit. She has developed increasing bilateral alveolar infiltrates and hypoxia. She was started on antimicrobial therapy 11/13. Echocardiogram shows preserved left ventricular ejection fraction. Her clinical picture is not consistent with worsening pneumonia and sepsis. She is profoundly neutropenic. She has not had significant fevers. She was transferred to intensive care unit and started on high flow nasal cannula 11/21/17. Throughout the night she developed increasing lethargy and respiratory distress. Today she is in significant distress respiratory standpoint and more obtunded. Infectious disease following the patient has she's been started on meropenem and voriconazole. I do prolonged discussion with the patient's daughter's morning. Consistent with the patient's prior advanced directive and discussion with the daughter we will change her CODE STATUS to DO NOT RESUSCITATE. She will not be intubated.    Interval Hx:  - more awake, following  - min PO intake  - refusing stool softeners   - afebrile  - up to chair with lift; max assist  - 60%, 40L HFNC,   - meropenem, voriconazole, acv  -     No current facility-administered medications on file prior to encounter.      Current Outpatient Prescriptions on File Prior to Encounter   Medication Sig Dispense Refill   • aspirin (ASA) 81 MG Chew Tab chewable tablet Take 81 mg by mouth every day.     • carvedilol (COREG) 3.125 MG Tab Take 3.125 mg by mouth 2 times a day, with meals.     •  ticagrelor (BRILINTA) 90 MG Tab tablet Take 90 mg by mouth 2 Times a Day.     • atorvastatin (LIPITOR) 10 MG Tab Take 40 mg by mouth every evening. Patient states increased per Dr. Wilson     • ciprofloxacin (CIPRO) 500 MG Tab Take 500 mg by mouth 2 times a day. Continuous.     • acyclovir (ZOVIRAX) 400 MG tablet Take 400 mg by mouth 2 times a day. Continuous.     • fluconazole (DIFLUCAN) 100 MG Tab Take 100 mg by mouth every day. Continuous.     • omeprazole (PRILOSEC) 20 MG delayed-release capsule Take 1 Cap by mouth every morning. 30 Cap 11       Social History   Substance Use Topics   • Smoking status: Never Smoker   • Smokeless tobacco: Never Used   • Alcohol use Yes      Comment: 3-4 drinks per week         Past Medical History:   Diagnosis Date   • Hiatus hernia syndrome     repaired    • High cholesterol    • Hypertension    • MDS (myelodysplastic syndrome) (CMS-HCC)    • Myocardial infarct 01/2017    stent in place    • Psychiatric problem     Depression       Past Surgical History:   Procedure Laterality Date   • BONE MARROW BIOPSY, NDL/TROCAR  9/7/2017    Procedure: BONE MARROW BIOPSY, NDL/TROCAR;  Surgeon: Angy Blanco M.D.;  Location: [facility];  Service: Orthopedics   • BONE MARROW ASPIRATION  9/7/2017    Procedure: BONE MARROW ASPIRATION;  Surgeon: Angy Blanco M.D.;  Location: [facility];  Service: Orthopedics   • BONE MARROW ASPIRATION  3/31/2017    Procedure: BONE MARROW ASPIRATION;  Surgeon: Martha Joya M.D.;  Location: [facility];  Service:    • BONE MARROW BIOPSY, NDL/TROCAR  3/31/2017    Procedure: BONE MARROW BIOPSY, NDL/TROCAR;  Surgeon: Martha Joya M.D.;  Location: [facility];  Service:    • BONE MARROW ASPIRATION  9/30/2016    Procedure: BONE MARROW ASPIRATION;  Surgeon: Shayan Branch M.D.;  Location: [facility];  Service:    • BONE MARROW BIOPSY, NDL/TROCAR  9/30/2016    Procedure: BONE MARROW  BIOPSY, NDL/TROCAR;  Surgeon: Shayan Branch M.D.;  Location: ENDOSCOPY [facility];  Service:    • VENTRAL HERNIA REPAIR  3/26/2014    Performed by Lamine Paniagua M.D. at SURGERY [facility]   • OTHER ORTHOPEDIC SURGERY  2010    R foot surgery   • OTHER ORTHOPEDIC SURGERY  2004    Bilateral foot surgery   • OTHER ABDOMINAL SURGERY  1998    Hiatal Hernia repair   • GYN SURGERY  1996    Total Hysterectomy       Allergies: Patient has no known allergies.    No family history on file.    Vitals:    11/06/17 2337 11/07/17 0418 11/07/17 0432 11/07/17 0551   Height:       Weight:       Weight % change since last entry.:       BP: 121/60 131/69 136/64 140/64   Pulse: 95 93 86 94   BMI (Calculated):       Resp: 18 16 16 16   Temp: 37.1 °C (98.7 °F) 36.8 °C (98.2 °F) 36.7 °C (98 °F) 36.7 °C (98.1 °F)   TempSrc:        11/07/17 0631 11/07/17 0635 11/07/17 0702 11/07/17 0756   Height:       Weight:       Weight % change since last entry.:       BP:  145/63 133/62 133/67   Pulse:  100 96 (!) 101   BMI (Calculated):       Resp:  18 16 17   Temp: 37.3 °C (99.1 °F) 36.6 °C (97.8 °F) 36.6 °C (97.9 °F) 36.6 °C (97.9 °F)   TempSrc:        11/07/17 0845 11/07/17 0938 11/07/17 1007 11/07/17 1214   Height:       Weight:       Weight % change since last entry.:       BP:  122/75 125/60 129/60   Pulse:  81 79 88   BMI (Calculated):       Resp: 16 18 16 18   Temp:  37.1 °C (98.7 °F) 36.4 °C (97.6 °F) 36.6 °C (97.8 °F)   TempSrc:        11/07/17 1235 11/07/17 1411 11/07/17 1556 11/07/17 1600   Height:       Weight:       Weight % change since last entry.:       BP:   115/81    Pulse:   83    BMI (Calculated):       Resp: 18 18 15 16   Temp:   36.4 °C (97.6 °F)    TempSrc:        11/07/17 2058 11/08/17 0031 11/08/17 0038 11/08/17 0557   Height:       Weight:       Weight % change since last entry.:       BP: 135/72 153/74 151/63 139/70   Pulse: 90 91  (!) 102   BMI (Calculated):       Resp: 18 18 18   Temp: 36.7 °C (98.1 °F) 36.9  °C (98.4 °F)  36.7 °C (98.1 °F)   TempSrc:        11/08/17 0800 11/08/17 1200 11/08/17 1538 11/08/17 1741   Height:       Weight:       Weight % change since last entry.:       BP: 137/71 147/65 (!) 163/61 147/66   Pulse: 93 95 93 98   BMI (Calculated):       Resp: 18 18 18    Temp: 36.7 °C (98 °F) 36.7 °C (98 °F) 36.6 °C (97.8 °F)    TempSrc:        11/08/17 2100 11/09/17 0000 11/09/17 0500 11/09/17 0742   Height:       Weight:       Weight % change since last entry.:       BP: 130/71 134/70 153/73 (!) 174/87   Pulse: 96 93 97 (!) 108   BMI (Calculated):       Resp: 18 20 18 18   Temp: 36.8 °C (98.2 °F) 36.4 °C (97.5 °F) 36.5 °C (97.7 °F) 36.8 °C (98.2 °F)   TempSrc:        11/09/17 1012 11/09/17 1200 11/09/17 1600 11/09/17 1747   Height:       Weight:       Weight % change since last entry.:       BP: 146/77 151/72 153/72 (!) 162/78   Pulse: 92 97 (!) 102 96   BMI (Calculated):       Resp:  18 18    Temp:  36.8 °C (98.3 °F) 37 °C (98.6 °F)    TempSrc:        11/09/17 2022 11/10/17 0005 11/10/17 0457 11/10/17 0603   Height:       Weight:       Weight % change since last entry.:       BP: 127/65 119/66 108/72 134/60   Pulse: 87 92 80 92   BMI (Calculated):       Resp: 16 18 18 16   Temp: 36.8 °C (98.2 °F) 36.8 °C (98.2 °F) 37.4 °C (99.3 °F) 36.7 °C (98 °F)   TempSrc:        11/10/17 0752 11/10/17 1157 11/10/17 1600 11/10/17 1730   Height:       Weight:       Weight % change since last entry.:       BP: 134/68 132/81 151/79 157/69   Pulse: 85 84 87 96   BMI (Calculated):       Resp: 16 16 18    Temp: 36.9 °C (98.4 °F) 36.7 °C (98 °F) 36.9 °C (98.4 °F)    TempSrc:        11/10/17 1951 11/11/17 0048 11/11/17 0315 11/11/17 0800   Height:       Weight:       Weight % change since last entry.:       BP: 141/72 144/64 130/66 131/67   Pulse: 86 92 90 88   BMI (Calculated):       Resp: 18 18 18 18   Temp: 36.7 °C (98 °F) 36.7 °C (98 °F) 36.6 °C (97.8 °F) 36.8 °C (98.3 °F)   TempSrc:        11/11/17 1517 11/11/17 1702  11/11/17 1725 11/11/17 1758   Height:       Weight:       Weight % change since last entry.:       BP: 144/76 149/65 143/71 141/67   Pulse: 93 93 92 92   BMI (Calculated):       Resp: 18 18 18 16   Temp: 37.1 °C (98.8 °F) 36.9 °C (98.5 °F) 36.7 °C (98.1 °F) 37.1 °C (98.8 °F)   TempSrc:        11/11/17 2107 11/11/17 2353 11/12/17 0332 11/12/17 0829   Height:       Weight:       Weight % change since last entry.:       BP: 140/67 143/63 143/72 135/66   Pulse: 93 99 96 90   BMI (Calculated):       Resp: 18 18 18 18   Temp: 37 °C (98.6 °F) 36.8 °C (98.3 °F) 37.1 °C (98.7 °F) 36.9 °C (98.4 °F)   TempSrc:        11/12/17 1200 11/12/17 1600 11/12/17 2028 11/12/17 2329   Height:       Weight:       Weight % change since last entry.:       BP: 131/63 131/87 139/65 140/61   Pulse: (!) 104 97 91 94   BMI (Calculated):       Resp: 18 18 18 18   Temp: 36.9 °C (98.4 °F) 37.3 °C (99.2 °F) 36.9 °C (98.4 °F) 36.8 °C (98.3 °F)   TempSrc:        11/13/17 0419 11/13/17 0800 11/13/17 1200 11/13/17 1632   Height:       Weight:       Weight % change since last entry.:       BP: 145/63 141/77 129/75 130/67   Pulse: (!) 105 97 88 90   BMI (Calculated):       Resp: 18 18 18 18   Temp: 36.9 °C (98.4 °F) 36.8 °C (98.3 °F) 37.6 °C (99.6 °F) 36.8 °C (98.3 °F)   TempSrc:        11/13/17 2024 11/13/17 2352 11/14/17 0545 11/14/17 0755   Height:       Weight:       Weight % change since last entry.:       BP: 130/72 137/75 143/79 133/66   Pulse: 88 96 100 93   BMI (Calculated):       Resp: 18 18 18 19   Temp: 36.9 °C (98.5 °F) 37 °C (98.6 °F) 36.9 °C (98.4 °F) 36.8 °C (98.3 °F)   TempSrc:        11/14/17 1152 11/14/17 1547 11/14/17 2016 11/15/17 0015   Height:       Weight:       Weight % change since last entry.:       BP: 132/76 137/71 147/71 143/71   Pulse: 89 92 92 (!) 103   BMI (Calculated):       Resp: 18 17 18 18   Temp: 36.9 °C (98.5 °F) 36.8 °C (98.3 °F) 36.8 °C (98.2 °F) 36.7 °C (98.1 °F)   TempSrc:        11/15/17 0526 11/15/17 0756  11/15/17 0900 11/15/17 1200   Height:       Weight:       Weight % change since last entry.:       BP: 147/79 143/65  138/54   Pulse: 100 91  100   BMI (Calculated):       Resp: 18 20 18 20   Temp: 36.9 °C (98.4 °F) 37.2 °C (98.9 °F)  37 °C (98.6 °F)   TempSrc:        11/15/17 1600 11/15/17 1942 11/16/17 0046 11/16/17 0401   Height:       Weight:       Weight % change since last entry.:       BP: 145/66 136/51 138/65 152/66   Pulse: 96 93 92 93   BMI (Calculated):       Resp: 20 16 18 18   Temp: 37 °C (98.6 °F) 37.3 °C (99.1 °F) 37 °C (98.6 °F) 37.1 °C (98.7 °F)   TempSrc:        11/16/17 0703 11/16/17 0930 11/16/17 1000 11/16/17 1405   Height:       Weight:       Weight % change since last entry.:       BP: 138/71   121/52   Pulse: 90   96   BMI (Calculated):       Resp: 18 18 18 18   Temp: 37.2 °C (99 °F)   36.9 °C (98.5 °F)   TempSrc:        11/16/17 1442 11/16/17 1511 11/16/17 1617 11/16/17 1935   Height:       Weight:       Weight % change since last entry.:       BP: 127/64 137/62 123/72 136/74   Pulse: 89 90 93 99   BMI (Calculated):       Resp: 18 18 18 16   Temp: 36.6 °C (97.8 °F) 36.8 °C (98.2 °F) 37.2 °C (98.9 °F) 37.2 °C (98.9 °F)   TempSrc:        11/17/17 0056 11/17/17 0452 11/17/17 0800 11/17/17 0845   Height:       Weight:       Weight % change since last entry.:       BP: 108/53 130/74 126/59    Pulse: (!) 108 96 85    BMI (Calculated):       Resp: (!) 24 18 20 18   Temp: 37.5 °C (99.5 °F) 37 °C (98.6 °F) 37.2 °C (98.9 °F)    TempSrc:        11/17/17 1200 11/17/17 1600 11/17/17 2040 11/18/17 0110   Height:       Weight:       Weight % change since last entry.:       BP: 134/62 139/75 132/71 133/72   Pulse: 90 89 85 89   BMI (Calculated):       Resp: 18 18 16 16   Temp: 36.7 °C (98.1 °F) 37.6 °C (99.6 °F) 36.7 °C (98.1 °F) 36.8 °C (98.3 °F)   TempSrc:        11/18/17 0325 11/18/17 0800 11/18/17 1600 11/18/17 2038   Height:       Weight:   66.9 kg (147 lb 7.8 oz)    Weight % change since last entry.:    0.6 %    BP: 138/71 140/77 141/71 131/51   Pulse: 89 89 83 83   BMI (Calculated):       Resp: 20 16 18 18   Temp: 36.7 °C (98.1 °F) 36.4 °C (97.6 °F) 37.2 °C (99 °F) 36.9 °C (98.4 °F)   TempSrc:        11/19/17 0017 11/19/17 0512 11/19/17 0800 11/19/17 1600   Height:       Weight:       Weight % change since last entry.:       BP: 136/60 146/64 115/59 139/67   Pulse: 85 93 81 90   BMI (Calculated):       Resp: 18 18 18 17   Temp: 36.7 °C (98.1 °F) 36.8 °C (98.3 °F) 36.7 °C (98.1 °F) 37 °C (98.6 °F)   TempSrc:        11/19/17 2000 11/20/17 0000 11/20/17 0400 11/20/17 0800   Height:       Weight:       Weight % change since last entry.:       BP: 133/62 149/72 149/68 148/71   Pulse: 91 94 95 89   BMI (Calculated):       Resp: (!) 22 20 20 18   Temp: 36.9 °C (98.4 °F) 36.6 °C (97.8 °F) 37.1 °C (98.7 °F) 37.2 °C (98.9 °F)   TempSrc:        11/20/17 1100 11/20/17 1153 11/20/17 1600 11/20/17 2014   Height:       Weight:       Weight % change since last entry.:       BP:  146/70 144/72 152/75   Pulse: 90 91 93 99   BMI (Calculated):       Resp: 18 18 18 18   Temp:  37 °C (98.6 °F) 36.9 °C (98.4 °F) 36.8 °C (98.2 °F)   TempSrc:        11/20/17 2115 11/20/17 2120 11/20/17 2127 11/20/17 2133   Height:       Weight:       Weight % change since last entry.:       BP: 151/87 151/87 153/86 150/87   Pulse: 95 100 94 99   BMI (Calculated):       Resp: 18 18 18 18   Temp: 36.7 °C (98.1 °F) 36.7 °C (98.1 °F)     TempSrc:        11/20/17 2140 11/20/17 2339 11/21/17 0418 11/21/17 0550   Height:       Weight:       Weight % change since last entry.:       BP: 155/78 144/79 147/70 147/82   Pulse: (!) 101 100 99 98   BMI (Calculated):       Resp: 18 18 (!) 24    Temp:  36.5 °C (97.7 °F) 36.4 °C (97.6 °F)    TempSrc:        11/21/17 0800 11/21/17 1143 11/21/17 1345 11/21/17 1357   Height:       Weight:       Weight % change since last entry.:       BP: 143/76 131/74 123/72 136/64   Pulse: (!) 101 97 100 98   BMI (Calculated):       Resp:  19 19 (!) 24 (!) 22   Temp: 36.9 °C (98.5 °F) 36.7 °C (98 °F) 37.2 °C (98.9 °F)    TempSrc:        11/21/17 1430 11/21/17 1500 11/21/17 1511 11/21/17 1600   Height:       Weight:       Weight % change since last entry.:       BP:       Pulse: (!) 102 98 (!) 102 90   BMI (Calculated):       Resp: (!) 24 (!) 22 (!) 22 18   Temp: 37.2 °C (98.9 °F)      TempSrc:        11/21/17 1700 11/21/17 1800 11/21/17 1805 11/21/17 1900   Height:       Weight:       Weight % change since last entry.:       BP:       Pulse: 96 92 93 (!) 107   BMI (Calculated):       Resp: (!) 28 14 (!) 22 (!) 26   Temp:  36.7 °C (98 °F)     TempSrc:        11/21/17 2000 11/21/17 2100 11/21/17 2200 11/21/17 2207   Height:       Weight:       Weight % change since last entry.:       BP:       Pulse: (!) 102 91 96 94   BMI (Calculated):       Resp: (!) 24 (!) 23 20 (!) 23   Temp: 37.2 °C (98.9 °F)      TempSrc:        11/21/17 2300 11/22/17 0000 11/22/17 0100 11/22/17 0200   Height:       Weight:       Weight % change since last entry.:       BP:       Pulse: 97 97 94 91   BMI (Calculated):       Resp: (!) 31 18 20 18   Temp:  37.1 °C (98.7 °F)     TempSrc:        11/22/17 0207 11/22/17 0300 11/22/17 0400 11/22/17 0500   Height:       Weight:       Weight % change since last entry.:       BP:       Pulse: 88 90 90 94   BMI (Calculated):       Resp: 20 18 19 20   Temp:   37.2 °C (98.9 °F)    TempSrc:        11/22/17 0600 11/22/17 0700 11/22/17 0800 11/22/17 0845   Height:       Weight:       Weight % change since last entry.:       BP:       Pulse: 96 99 99 97   BMI (Calculated):       Resp: 16 (!) 26 (!) 24 20   Temp:   36.9 °C (98.5 °F)    TempSrc:        11/22/17 0900 11/22/17 1000 11/22/17 1100 11/22/17 1110   Height:       Weight:       Weight % change since last entry.:       BP:       Pulse: 94 88 81 82   BMI (Calculated):       Resp: 17 17 (!) 10 14   Temp:       TempSrc:        11/22/17 1120 11/22/17 1130 11/22/17 1144 11/22/17 1200   Height:     "   Weight:       Weight % change since last entry.:       BP: (!) 79/51  106/53    Pulse: 82 82 84 89   BMI (Calculated):       Resp: 17 15 18 17   Temp: 36.7 °C (98.1 °F)  36.7 °C (98 °F)    TempSrc:        11/22/17 1300 11/22/17 1400 11/22/17 1500 11/22/17 1600   Height:       Weight:       Weight % change since last entry.:       BP:       Pulse: 86 94 84 85   BMI (Calculated):       Resp: 16 (!) 29 16 16   Temp:    36.8 °C (98.3 °F)   TempSrc:        11/22/17 1700 11/22/17 1730 11/22/17 1800 11/22/17 1851   Height:       Weight:       Weight % change since last entry.:       BP:       Pulse: 79 80 82 86   BMI (Calculated):       Resp: 16 18 16 13   Temp:       TempSrc:        11/22/17 1900 11/22/17 2000 11/22/17 2100 11/22/17 2200   Height:       Weight:       Weight % change since last entry.:       BP:       Pulse: 86 83 88 86   BMI (Calculated):       Resp: 12 13 14 (!) 7   Temp:  36.4 °C (97.6 °F)     TempSrc:        11/22/17 2208 11/22/17 2300 11/23/17 0000 11/23/17 0100   Height:       Weight:       Weight % change since last entry.:       BP:       Pulse: 79 79 73 71   BMI (Calculated):       Resp: 19 (!) 9 14 (!) 11   Temp:   36.7 °C (98.1 °F)    TempSrc:        11/23/17 0200 11/23/17 0239 11/23/17 0300 11/23/17 0400   Height:       Weight:       Weight % change since last entry.:       BP:       Pulse: 75 77 76 83   BMI (Calculated):       Resp: 12 14 16 16   Temp:       TempSrc:        11/23/17 0500 11/23/17 0600 11/23/17 0700 11/23/17 0739   Height:       Weight:       Weight % change since last entry.:       BP:       Pulse: 83 89 90 85   BMI (Calculated):       Resp: 15 17 13 13   Temp:  (!) 35.6 °C (96.1 °F)     TempSrc:        11/23/17 0800 11/23/17 0830 11/23/17 0900 11/23/17 0930   Height:   1.575 m (5' 2.01\")    Weight:   64.9 kg (143 lb 1.3 oz)    Weight % change since last entry.:   -2.99 %    BP:       Pulse: 84 93  85   BMI (Calculated):   26.16    Resp: 13 (!) 25  19   Temp: 36.1 °C (96.9 " °F)      TempSrc:       180 readings loaded. More data may exist, but no more data can be displayed here.       Physical Examination  General:Ill-appearing female, vital signs reviewed in detail, more awake, following  Neuro/Psych: Arouses, weakly follows, moves all ext  HEENT: PERRL, mixed membranes dry  CVS: Tachycardic, distant  Respiratory: Diminished breath sounds throughout with scattered coarse crackles  Abdomen/: Mildly distended, soft  Extremities: 2+ anasarca, distal pulses palpable, capillary refill intact  Skin: Jaundiced, cool, dry    Recent Labs      11/21/17   0047   11/22/17 0327 11/22/17   0810  11/23/17   0441   WBC   --    < >  0.2*  0.3*  0.2*   NEUTSPOLYS   --    --   CANCEL  CANCEL  CANCEL   LYMPHOCYTES   --    --   CANCEL  CANCEL  CANCEL   MONOCYTES   --    --   CANCEL  CANCEL  CANCEL   EOSINOPHILS   --    --   CANCEL  CANCEL  CANCEL   BASOPHILS   --    --   CANCEL  CANCEL  CANCEL   ASTSGOT  42   --   49*   --   66*   ALTSGPT  27   --   27   --   38   ALKPHOSPHAT  127*   --   115*   --   130*   TBILIRUBIN  3.3*   --   3.5*   --   3.8*    < > = values in this interval not displayed.     Recent Labs      11/21/17   1339  11/22/17 0327 11/23/17   0441   SODIUM  132*  136  138   POTASSIUM  3.9  3.4*  4.5   CHLORIDE  91*  94*  94*   CO2  33  36*  36*   BUN  18  23*  30*   CREATININE  0.30*  0.26*  0.29*   MAGNESIUM   --   1.9   --    CALCIUM  8.6  8.8  9.4     Recent Labs      11/20/17   1350   11/21/17   0047  11/21/17   1339  11/22/17 0327  11/23/17   0441   ALTSGPT   --    --   27   --   27  38   ASTSGOT   --    --   42   --   49*  66*   ALKPHOSPHAT   --    --   127*   --   115*  130*   TBILIRUBIN   --    --   3.3*   --   3.5*  3.8*   DBILIRUBIN  2.0*   --    --    --    --    --    GLUCOSE   --    < >  187*  186*  170*  192*    < > = values in this interval not displayed.     Recent Labs      11/21/17   0841   [id number]  7.51*   [id number]  38.6*   [id number]  81.3   [id number]  95.2    ARTHCO3  30*   ARTBE  7*     Assessment and Plan:  Neutropenic Sepsis   - suspect secondary to pneumonia   - LVEF preserved - non-cardiogenic pulmonary edema c/w ARDS   - Started on broad-spectrum antimicrobial therapy, voriconazole and meropenem infectious disease following,   - Cultures pending, beta D glucan pending, galactomannan pending   - continue aggressive therapy  Acute hypoxemic respiratory failure   - I suspect this is related to pneumonia, sepsis and noncardiogenic pulmonary edema   - Echocardiogram shows preserved left ventricular ejection fraction   - There may be some component of volume overload although she is close to her baseline weight   - continue titrated oxygen, HFNC, RT treatments   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 Vidaza and Venetoclax have been held   - She is pancytopenic with severe neutropenia   - reviewed with oncology   Acute encephalopathy, suspected multifactorial toxic metabolic   - She did have leukemic infiltrates on MRI of her brain previously 10/24  Profound cytopenia and neutropenia   - Reverse isolation percussions  Now DNR  Patient and family considering goals of care.

## 2017-11-23 NOTE — CARE PLAN
Problem: Oxygenation:  Goal: Maintain adequate oxygenation dependent on patient condition  Outcome: PROGRESSING AS EXPECTED  HHFNC 40L 60%

## 2017-11-23 NOTE — ASSESSMENT & PLAN NOTE
Requiring transfer to the ICU.  Pulmonology consulted.   CXR remains consistent with ARDS.  She is requiring high flow oxygen which will be tapered.

## 2017-11-23 NOTE — PROGRESS NOTES
Called from Lab called with critical result of WBC 0.2 and Plt count of 24 at 0542. Critical lab result read back to Lindsey Shell.   This critical lab result is within parameters established by  for this patient.

## 2017-11-23 NOTE — PROGRESS NOTES
12 Hour chart check.   SR 60-90s  0.14/0.08/0.40    Ms. Carbajal tolerated last night's procedures / treatments without incident. At end of shift patient sitting in recliner denies any complaints and does not display any signs of distress. Patient care to be endorsed to AM nurse. Family at bedside

## 2017-11-23 NOTE — CARE PLAN
Problem: Discharge Barriers/Planning  Goal: Patient's continuum of care needs will be met  Outcome: PROGRESSING AS EXPECTED  Plan of care conversation with family today. Received copy of pt's advanced directive and pt changed to DNR code status. Will adjust plan of care once family conference completed.    Problem: Fluid Volume:  Goal: Will maintain balanced intake and output  Outcome: PROGRESSING AS EXPECTED  Pt continues to receive IVP lasix BID. Pt incontinent so unable to get accurate I/O measurements.      Spoke with family this evening, all 3 children agree they would like to withdraw care Friday morning. Reinformed them of pt's fluctuating blood pressure and DNR code status, with the possibility pt condition may deteriorate before then. They verbalized understanding, will continue to monitor.    Dr Branch updated with plan of care.

## 2017-11-23 NOTE — PROGRESS NOTES
Infectious Disease Progress Note    Author: Sugar Quiles M.D. Date & Time of service: 2017  10:39 AM    Chief Complaint:  FU sepsis/PNA    Interval History:   AF, WBC 0.2, transferred to the ICU overnight, now on HFNC, obtunded and does not arouse to voice or tactile stimuli   AF, WBC 0.2, awake and alert, states she is very weak and cannot move much, denies any abd pain, or diarrhea, code status now DNR  Labs Reviewed, Medications Reviewed, Radiology Reviewed and Wound Reviewed.    Review of Systems:  Review of Systems   Constitutional: Negative for chills and fever.   Respiratory: Positive for shortness of breath.    Cardiovascular: Negative for chest pain.   Gastrointestinal: Negative for abdominal pain, diarrhea, nausea and vomiting.       Hemodynamics:  Temp (24hrs), Av.4 °C (97.5 °F), Min:35.6 °C (96.1 °F), Max:36.8 °C (98.3 °F)  Temperature: 36.1 °C (97 °F)  Pulse  Av.7  Min: 68  Max: 118Heart Rate (Monitored): 85  Blood Pressure : 106/53, NIBP: 117/59       Physical Exam:  Physical Exam   Constitutional: She is oriented to person, place, and time. She appears well-developed.   Elderly  Chronically ill   HENT:   Head: Normocephalic and atraumatic.   Eyes: Pupils are equal, round, and reactive to light.   Neck: Neck supple.   Cardiovascular: Normal rate, regular rhythm and normal heart sounds.    Pulmonary/Chest: Effort normal. She has rales.   R upper chest port - no erythema   Abdominal: Soft. There is tenderness.   Musculoskeletal: She exhibits edema.   Neurological: She is alert and oriented to person, place, and time.       Meds:    Current Facility-Administered Medications:   •  NORepinephrine (LEVOPHED) infusion  •  meropenem (MERREM) IV  •  voriconazole (VFEND) IV  •  temazepam  •  oxyCODONE CR  •  Notify provider if pain remains uncontrolled **AND** Use the numeric rating scale (NRS-11) on regular floors and Critical-Care Pain Observation Tool (CPOT) on ICUs/Trauma to  assess pain **AND** Pulse Ox (Oximetry) **AND** Pharmacy Consult Request **AND** If patient difficult to arouse and/or has respiratory depression, stop any opiates that are currently infusing and call a Rapid Response. **AND** oxycodone immediate-release **AND** oxycodone immediate-release **AND** morphine injection  •  allopurinol  •  acyclovir  •  omeprazole  •  ondansetron  •  ondansetron  •  senna-docusate **AND** [DISCONTINUED] polyethylene glycol/lytes **AND** [DISCONTINUED] magnesium hydroxide **AND** [DISCONTINUED] bisacodyl  •  acetaminophen    Labs:  Recent Labs      11/22/17   0327  11/22/17   0810  11/23/17   0441   WBC  0.2*  0.3*  0.2*   RBC  2.26*  2.40*  2.41*   HEMOGLOBIN  6.5*  7.0*  6.9*   HEMATOCRIT  18.9*  20.0*  20.4*   MCV  84.1  83.3  84.6   MCH  28.8  29.2  28.6   RDW  37.7  37.7  37.8   PLATELETCT  2*  3*  27*   MPV  10.4  13.6*  10.6   NEUTSPOLYS  CANCEL  CANCEL  CANCEL   LYMPHOCYTES  CANCEL  CANCEL  CANCEL   MONOCYTES  CANCEL  CANCEL  CANCEL   EOSINOPHILS  CANCEL  CANCEL  CANCEL   BASOPHILS  CANCEL  CANCEL  CANCEL     Recent Labs      11/21/17   1339  11/22/17 0327  11/23/17   0441   SODIUM  132*  136  138   POTASSIUM  3.9  3.4*  4.5   CHLORIDE  91*  94*  94*   CO2  33  36*  36*   GLUCOSE  186*  170*  192*   BUN  18  23*  30*     Recent Labs      11/21/17   0047  11/21/17   1339  11/22/17   0327  11/23/17   0441   ALBUMIN  2.0*   --   1.8*  2.1*   TBILIRUBIN  3.3*   --   3.5*  3.8*   ALKPHOSPHAT  127*   --   115*  130*   TOTPROTEIN  5.5*   --   4.9*  5.5*   ALTSGPT  27   --   27  38   ASTSGOT  42   --   49*  66*   CREATININE  0.31*  0.30*  0.26*  0.29*       Imaging:  Dx-chest-limited (1 View)    Result Date: 11/16/2017 11/16/2017 2:19 PM HISTORY/REASON FOR EXAM: Shortness of breath TECHNIQUE/EXAM DESCRIPTION AND NUMBER OF VIEWS: Single AP view of the chest. COMPARISON: 11/13/2017 FINDINGS: There are bilateral infiltrates, stable. There is a right IJ Port-A-Cath present. The heart is  enlarged. There is small left pleural effusion.     1.  Stable cardiomegaly. 2.  Stable bilateral infiltrates. 3.  Stable left pleural effusion.    Dx-chest-portable (1 View)    Result Date: 11/22/2017 11/22/2017 10:29 AM HISTORY/REASON FOR EXAM:  Shortness of Breath. TECHNIQUE/EXAM DESCRIPTION AND NUMBER OF VIEWS: Single portable view of the chest. COMPARISON: 11/21/2017 FINDINGS: Cardiomediastinal contour is unchanged. Previously described pulmonary parenchymal opacities persist. No pneumothorax identified. Supportive tubing is unchanged.     No significant change from prior exam.    Dx-chest-portable (1 View)    Result Date: 11/21/2017 11/21/2017 3:20 PM HISTORY/REASON FOR EXAM:  Shortness of Breath. TECHNIQUE/EXAM DESCRIPTION AND NUMBER OF VIEWS: Single portable view of the chest. COMPARISON: 11/20/2017 FINDINGS: Chest port projects over the SVC. The cardiomediastinal silhouette is stable. Extensive airspace opacities are again seen bilaterally. No pneumothorax is seen.     Extensive airspace opacities are again noted bilaterally. Left basilar opacity is increased compared to prior.    Dx-chest-portable (1 View)    Result Date: 11/20/2017 11/20/2017 9:26 PM HISTORY/REASON FOR EXAM:  Chest Pain Difficulty breathing TECHNIQUE/EXAM DESCRIPTION AND NUMBER OF VIEWS: Single portable view of the chest. COMPARISON: 11/20/2017 6:16 PM FINDINGS: Right chest port in place. Diffuse interstitial and airspace opacities throughout both lungs. More consolidated opacities in the left upper lobe and left lower lobe. There may be small layering left pleural effusion. No pneumothorax. Stable cardiopericardial silhouette.     1. No significant interval change.    Dx-chest-portable (1 View)    Result Date: 11/20/2017 11/20/2017 6:16 PM HISTORY/REASON FOR EXAM:  Shortness of Breath TECHNIQUE/EXAM DESCRIPTION AND NUMBER OF VIEWS: Single portable view of the chest. COMPARISON:  11/16/2017 FINDINGS: Right jugular Port-A-Cath remains  in place. The heart is at the upper limits of normal in size. Diffuse interstitial opacities are present which represent mild interstitial edema or pneumonia. There is focal atelectasis or pneumonia in the left lower lobe. A small left pleural effusion is present.     Mild diffuse interstitial edema or pneumonia with focal left lower lobe atelectasis or pneumonia and small left pleural effusion.    Dx-chest-portable (1 View)    Result Date: 11/13/2017 11/13/2017 9:01 AM HISTORY/REASON FOR EXAM: Shortness of Breath. TECHNIQUE/EXAM DESCRIPTION AND NUMBER OF VIEWS: Single AP view of the chest. COMPARISON: 10/21/2017 FINDINGS: Hardware: Right internal jugular portacatheter tip overlies the SVC. Lungs: Extensive new consolidation bilateral consolidation is seen, especially affecting the left upper lobe Pleura:  Possible small left pleural effusion Heart and mediastinum: The cardiomediastinal contours are stable.     New extensive bilateral consolidation with pneumonia favored over edema Possible new small left effusion    Mr-brain-with & W/o    Result Date: 10/25/2017  10/24/2017 6:13 PM HISTORY/REASON FOR EXAM:  Headache. TECHNIQUE/EXAM DESCRIPTION:   MRI of the brain without and with contrast. T1 sagittal, T2 fast spin-echo axial, T1 coronal, FLAIR coronal, diffusion-weighted and apparent diffusion coefficient (ADC map) axial images were obtained of the whole brain. T1 postcontrast axial and T1 postcontrast coronal images were obtained. The study was performed on a myeasydocsa 1.5 Soo MRI scanner. 15 mL Omniscan contrast was administered intravenously. COMPARISON:  None. FINDINGS:     There is no acute infarct. There is no acute or chronic parenchymal hemorrhage. There is no intra-axial space-occupying lesion. Mild cerebral volume loss is seen. There is no abnormal signal intensity in the brain parenchyma. The ventricles, cortical sulci and the basal cisterns appear prominent consistent with mild cerebral atrophy.  There is no hydrocephalus. There is no intraventricular mass. There is no extra-axial fluid collection, hemorrhage or mass. The visualized flow voids of the cerebral vasculature are unremarkable.  There is no large lesion identified in the expected course of the intracranial portions of the cranial nerves. There is mild diffuse enhancement of the supratentorial dura. There is diffuse low T1 signal intensity in the visualized bone marrow.     1.  Diffuse low T1 signal intensity in the visualized marrow likely representing marrow infiltrative disorders. 2.  There is mild diffuse supratentorial dural thickening. This is a nonspecific finding and likely secondary to the diffuse marrow infiltrative process. 3.  No acute infarct.    Us-liver And Biliary Tree    Result Date: 11/17/2017 11/17/2017 6:03 AM HISTORY/REASON FOR EXAM:  Abnormal Labs Abdominal pain TECHNIQUE/EXAM DESCRIPTION AND NUMBER OF VIEWS:  Real-time sonography of the liver and biliary tree. COMPARISON: CT scan October 1, 2016 FINDINGS: The liver is normal in contour. The liver is echogenic. There is no evidence of solid mass lesion. The liver measures 16.47 cm. The gallbladder is normal. There is no evidence of cholelithiasis. The gallbladder wall thickness measures 0.21 cm. There is no pericholecystic fluid. The common duct measures 6 to 7 mm. The pancreas is not well visualized. The visualized aorta is normal in caliber. Intrahepatic IVC is patent. The portal vein is patent with hepatopetal flow. The right kidney measures 12.10 cm. There is no ascites.     Echogenic liver consistent with fatty infiltration. Borderline dilatation of the common bile duct at 6 to 7 mm in diameter.    Echocardiogram-comp W/ Cont    Result Date: 11/22/2017  Transthoracic Echo Report Echocardiography Laboratory CONCLUSIONS Contrast was used to enhance visualization of the endocardial border. Normal left ventricular chamber size. Left ventricular ejection fraction is visually  estimated to be 55%. Hypokinetic mid anterior wall. Mildly dilated left atrium. Structurally normal aortic and mitral valve without significant stenosis or regurgitation. Normal pericardium without effusion. KOSKI, CHERYLE Exam Date:         2017                    17:28 Exam Location:     Inpatient Priority:          Routine Ordering Physician:        DRISS DAVILA Referring Physician: Sonographer:               Lamine Gaona RDCS Age:    67     Gender:    F MRN:    [id number] :    [id number] BSA:    1.68   Ht (in):    62     Wt (lb):    147 Exam Type:     Complete, Contrast Indications:     Chest Pain ICD Codes:       786.5 CPT Codes:       62522,  BP:   136    /   64     HR: Technical Quality:       Fair MEASUREMENTS  (Male / Female) Normal Values 2D ECHO LV Diastolic Diameter PLAX        4.1 cm                4.2 - 5.9 / 3.9 - 5.3 cm LV Systolic Diameter PLAX         2.7 cm                2.1 - 4.0 cm IVS Diastolic Thickness           0.77 cm               LVPW Diastolic Thickness          0.85 cm               LVOT Diameter                     2.1 cm                Estimated LV Ejection Fraction    55 %                  DOPPLER AV Peak Velocity                  1.3 m/s               AV Peak Gradient                  6.9 mmHg              AV Mean Gradient                  4.1 mmHg              LVOT Peak Velocity                1.1 m/s               AV Area Cont Eq vti               2.9 cm²               MV Velocity Time Integral         26.6 cm               Mitral E Point Velocity           0.66 m/s              Mitral E to A Ratio               0.59                  Mitral A Duration                 90 ms                 MV Pressure Half Time             49.4 ms               MV Area PHT                       4.4 cm²               MV Deceleration Time              171 ms                TR Peak Velocity                  258 cm/s              PV Peak Velocity                  0.92 m/s               PV Peak Gradient                  3.4 mmHg              PV Mean Gradient                  1.8 mmHg              * Indicates values subject to auto-interpretation LV EF:  55    % FINDINGS Left Ventricle Contrast was used to enhance visualization of the endocardial border. 3 ML of contrast was administered. Normal left ventricular chamber size. Normal left ventricular wall thickness. Normal left ventricular systolic function. Left ventricular ejection fraction is visually estimated to be 55%. Hypokinetic mid anterior wall. Grade I diastolic dysfunction. Right Ventricle Normal right ventricular size. Normal right ventricular systolic function. Right Atrium Normal right atrial size. Normal inferior vena cava size and inspiratory collapse. Left Atrium Mildly dilated left atrium. Mitral Valve Structurally normal mitral valve without significant stenosis or regurgitation. Aortic Valve Structurally normal aortic valve without significant stenosis or regurgitation. Tricuspid Valve Structurally normal tricuspid valve. No tricuspid stenosis. No tricuspid regurgitation. Pulmonic Valve Structurally normal pulmonic valve. No pulmonic stenosis. No pulmonic insufficiency. Pericardium Normal pericardium without effusion. Aorta Normal aortic root for body surface area. Ascending aorta not well visualized. Kerrie Roberts M.D. (Electronically Signed) Final Date:     22 November 2017                 02:17      Micro:  Results     Procedure Component Value Units Date/Time    BLOOD CULTURE [[id number]] Collected:  11/21/17 1659    Order Status:  Completed Specimen:  Blood from Peripheral Updated:  11/23/17 0630     Significant Indicator NEG     Source BLD     Site PERIPHERAL     Blood Culture --     No Growth    Note: Blood cultures are incubated for 5 days and  are monitored continuously.Positive blood cultures  are called to the RN and reported as soon as  they are identified.      Narrative:       Protective  Per Hospital Policy:  "Only change Specimen Src: to \"Line\" if  specified by physician order.    BLOOD CULTURE [[id number]] Collected:  11/21/17 1659    Order Status:  Completed Specimen:  Blood from Peripheral Updated:  11/23/17 0630     Significant Indicator NEG     Source BLD     Site PERIPHERAL     Blood Culture --     No Growth    Note: Blood cultures are incubated for 5 days and  are monitored continuously.Positive blood cultures  are called to the RN and reported as soon as  they are identified.      Narrative:       Protective  Per Hospital Policy: Only change Specimen Src: to \"Line\" if  specified by physician order.    URINE CULTURE(NEW) [[id number]] Collected:  11/18/17 1250    Order Status:  Completed Specimen:  Urine from Urine, Clean Catch Updated:  11/20/17 0850     Significant Indicator NEG     Source UR     Site URINE, CLEAN CATCH     Urine Culture No growth at 48 hours    Narrative:       [id number] ANTONIO VERDUGO  Indication for culture:->Altered LOC    URINALYSIS [[id number]]  (Abnormal) Collected:  11/18/17 1250    Order Status:  Completed Specimen:  Urine from Urine, Clean Catch Updated:  11/18/17 1321     Color DK Yellow     Character Clear     Specific Gravity 1.035     Ph 5.5     Glucose Negative mg/dL      Ketones 15 (A) mg/dL      Protein 30 (A) mg/dL      Bilirubin Moderate (A)     Urobilinogen, Urine 0.2     Nitrite Positive (A)     Leukocyte Esterase Trace (A)     Occult Blood Negative     Micro Urine Req Microscopic    Narrative:       [id number] ANTONIO VERDUGO  Indication for culture:->Altered LOC          Assessment:  Active Hospital Problems    Diagnosis   • *Myelodysplastic syndrome (CMS-HCC) [D46.9]   • Neutropenic fever (CMS-HCC) [D70.9, R50.81]   • Thrombocytopenia (CMS-HCC) [D69.6]   • Pancytopenia (CMS-HCC) [D61.818]   • Elevated liver enzymes [R74.8]   • Hypoxia [R09.02]   • Malaise [R53.81]   • Headache [R51]   • Symptomatic anemia [D64.9]   • HLD (hyperlipidemia) [E78.5]   • HTN " (hypertension) [I10]       Plan:  Neutropenic sepsis 2/2 PNA - additional work up ongoing  Concern for fungal PNA given prolonged neutropenia and acute worsening of clinical presentation  Continue meropenem and voriconazole  Galactomannan - not collected  Beta D glucan - pending  Bcx 11/21 - NGTD    Acute respiratory failure  Remains on HFNC - resp status tenuous  CXR 11/22 with persistent opacities per my read    Encephalopathy, improved today  Multifactorial  On abx above  MRI brain 10/24 with leukemic infiltrates    Pancytopenia  2/2 AML  Chemo on hold   Neutropenic but no recent fevers    AML  Chemo on hold  Onc following    Prognosis poor    Discussed with internal medicine.

## 2017-11-23 NOTE — PROGRESS NOTES
Received report from MARLENY Ramires. Assumed care at 0700. At this pt is AAOx3. Denies pain. Declines long acting pain medication. Given medication per MAR and Will address nutrition status. Labs noted, needs have been met, assessment complete. Bed in locked and low position, Call light in reach.  POC is address nutrition and pulmonary toileting. Will continue to monitor pt progress.

## 2017-11-23 NOTE — CARE PLAN
Problem: Bowel/Gastric:  Goal: Normal bowel function is maintained or improved  Outcome: PROGRESSING SLOWER THAN EXPECTED  Pt placed back on PO diet.   0% of breakfast consumed.   Pt drinking fluids    Problem: Discharge Barriers/Planning  Goal: Patient's continuum of care needs will be met  Outcome: PROGRESSING AS EXPECTED  Plan to address withdrawal of care on 11/24.   Pt at this time is AAOx3  Pt and family encouraged to express needs.

## 2017-11-24 PROBLEM — D64.9 ANEMIA: Status: ACTIVE | Noted: 2017-01-01

## 2017-11-24 NOTE — PROGRESS NOTES
Discussed with Dr. Branch pt bladder scan results. Ok for farrar if pt ok with farrar. Discussed with pt. Ok for farrar. Plan to await family conference in the morning.

## 2017-11-24 NOTE — CARE PLAN
Problem: Oxygenation:  Goal: Maintain adequate oxygenation dependent on patient condition  Respiratory Therapy Update    Heated Hi Flow Nasal Cannula (HHFNC): Yes (11/24/17 0252)  FiO2 (HHFNC): 50 (11/24/17 0252)  Flowrate (HHFNC): 35 (11/24/17 0252)    O2 (FiO2): 50 (11/24/17 0252)  O2 (LPM): 35 (11/24/17 0400)  O2 Daily Delivery Respiratory : Highflow Nasal Cannula (11/24/17 0252)    Breath Sounds  Pre/Post Intervention: Pre Intervention Assessment (11/23/17 2225)  RUL Breath Sounds: Diminished (11/24/17 0400)  RML Breath Sounds: Diminished (11/24/17 0400)  RLL Breath Sounds: Diminished (11/24/17 0400)  DEBBIE Breath Sounds: Diminished (11/24/17 0400)  LLL Breath Sounds: Diminished (11/24/17 0400)    Events/Summary/Plan: HHFNC check (11/24/17 0252)

## 2017-11-24 NOTE — PROGRESS NOTES
[facility] Hospitalist Progress Note    Date of Service: 2017    Chief Complaint  67 y.o. female admitted 10/21/2017 with weakness.    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 She developed a neutropenic fever and has been initiated on broad-spectrum antibiotics. Due to respiratory failure, she has been transferred to the ICU and remains on high-flow oxygen.  I met with Ms. Carbajal and her family. We discussed her condition and she feels that she does not have long to live and has expressed that she wants to be comfort care.   35 minutes spent that of which over 50% of the time was spent in counseling about her condition and comfort care. Morphine drip started.  Dr. Cantor is aware and will assume care on oncology tomorrow.  Consultants/Specialty  Oncology. I  Have discussed with Dr. Sanabria, her oncologist as well as Dr. Ryder as well.  ID  Critical Care  I discussed her condition with Dr. Castanon on ICU Hot Rounds today about comfort care.   Disposition  ICU        Review of Systems   Constitutional: Negative for fever.        Diffuse pain though no headaches.   Gastrointestinal: Positive for constipation.        She is tolerating a little food.     Neurological:        She feels quite weak.    Psychiatric/Behavioral: The patient has insomnia.    All other systems reviewed and are negative.     Physical Exam  Laboratory/Imaging   Hemodynamics  Temp (24hrs), Av.1 °C (97 °F), Min:35.8 °C (96.4 °F), Max:36.6 °C (97.9 °F)   Temperature: 36.6 °C (97.9 °F)  Pulse  Av.2  Min: 66  Max: 118 Heart Rate (Monitored): 85  NIBP: 150/73      Respiratory      Respiration: (!) 23, Pulse Oximetry: 93 %, O2 Daily Delivery Respiratory : Highflow Nasal Cannula     Work Of Breathing / Effort: Mild  RUL Breath Sounds: Diminished, RML Breath Sounds: Diminished, RLL Breath Sounds: Diminished, DEBBIE Breath Sounds: Diminished, LLL Breath Sounds:  Diminished    Fluids    Intake/Output Summary (Last 24 hours) at 11/24/17 0735  Last data filed at 11/24/17 0600   Gross per 24 hour   Intake             1470 ml   Output              635 ml   Net              835 ml       Nutrition  Orders Placed This Encounter   Procedures   • DIET ORDER     Standing Status:   Standing     Number of Occurrences:   1     Order Specific Question:   Diet:     Answer:   Regular [1]     Physical Exam   Constitutional: She appears distressed.   Chronically ill appearing.   Neck: Neck supple.   Cardiovascular: Normal rate and regular rhythm.    Tachycardia, very distant heart sounds.    Pulmonary/Chest:   Right chest port.  Coarse breath sounds.  Tachypnea with increased work of breathing.   Abdominal: Soft. She exhibits no distension. There is no tenderness.   Musculoskeletal: She exhibits edema.   Neurological: She is alert.   Skin: Skin is warm and dry. There is pallor.   Psychiatric: She has a normal mood and affect. Her behavior is normal.       Recent Labs      11/22/17   0810  11/23/17   0441  11/23/17   1050  11/24/17   0428   WBC  0.3*  0.2*   --   0.2*   RBC  2.40*  2.41*   --   2.30*   HEMOGLOBIN  7.0*  6.9*  6.5*  6.7*   HEMATOCRIT  20.0*  20.4*   --   19.7*   MCV  83.3  84.6   --   85.7   MCH  29.2  28.6   --   29.1   MCHC  35.0  33.8   --   34.0   RDW  37.7  37.8   --   39.1   PLATELETCT  3*  27*   --   20*   MPV  13.6*  10.6   --   10.4     Recent Labs      11/22/17   0327  11/23/17   0441  11/24/17   0428   SODIUM  136  138  138   POTASSIUM  3.4*  4.5  4.1   CHLORIDE  94*  94*  96   CO2  36*  36*  37*   GLUCOSE  170*  192*  165*   BUN  23*  30*  32*   CREATININE  0.26*  0.29*  0.27*   CALCIUM  8.8  9.4  9.1                      Assessment/Plan     * Shock (CMS-HCC)   Assessment & Plan    May be multifactorial in the setting of advanced cancer, ARDS, and possible infection.          Acute respiratory failure with hypoxia (CMS-HCC)- (present on admission)   Assessment &  Plan    Requiring transfer to the ICU.  Pulmonology consulted.   CXR remains consistent with ARDS.  She is requiring high flow oxygen which will be tapered.           Myelodysplastic syndrome (CMS-HCC)- (present on admission)   Assessment & Plan    Evolved into AML   Venetoclax/Vidaza started 10/23 and now on hold due to neutropenic fever.  Low WBC, RBCs, and platelets.           Neutropenic fever (CMS-HCC)- (present on admission)   Assessment & Plan    Cultures obtained and negative  ID consulted   She was treated with Merrem and added vanco/voriconazole          Thrombocytopenia (CMS-HCC)- (present on admission)   Assessment & Plan    Platelets 20 again this morning.          Pancytopenia (CMS-HCC)- (present on admission)   Assessment & Plan    Severe decrease in all cell lineages likely due to leukemia though possibly compounded by chemo.        DNR (do not resuscitate)- (present on admission)   Assessment & Plan    Orders written for DNR   Comfort care orders written for.  Morphine PCA.  Hospice consult.          Elevated liver enzymes   Assessment & Plan    US showing CBD no dilation , (+) for fatty liver  Bili 3.8--consider hemolysis            Malaise- (present on admission)   Assessment & Plan    Likely from AML and infection.        Headache   Assessment & Plan    LP done 10/26, no complications, pathology report - no malignant cells seen  MRI brain:  1.  Diffuse low T1 signal intensity in the visualized marrow likely representing marrow infiltrative disorders.  2.  There is mild diffuse supratentorial dural thickening. This is a nonspecific finding and likely secondary to the diffuse marrow infiltrative process.  3.  No acute infarct            Symptomatic anemia- (present on admission)   Assessment & Plan    Daily CBC  Hb is 6.7 this morning.        HLD (hyperlipidemia)- (present on admission)   Assessment & Plan    Hx of        HTN (hypertension)- (present on admission)   Assessment & Plan    Coreg and  lasix stopped due to low BP.            Reviewed items::  Labs reviewed and Medications reviewed  Gan catheter::  Hospice / Comfort Care  Central line in place:  Chemotherapy  DVT prophylaxis pharmacological::  Contraindicated - High bleeding risk

## 2017-11-24 NOTE — CARE PLAN
Problem: Nutritional:  Goal: Achieve adequate nutritional intake  Patient will consume >50% of meals and snacks     Outcome: NOT MET  Pt is on a regular diet with variable (usually poor) PO intake. Supplements in place, and Nutrition Representative seeing pt daily for menu options.  Pt has elected for Comfort Care. RD no longer following.

## 2017-11-24 NOTE — PROGRESS NOTES
Discussed with Dr. Jose Carlos dooley H&H and decreased UOP. Ok to monitor for now. Awaiting family conference for 730am.

## 2017-11-24 NOTE — DISCHARGE PLANNING
Patient is choosing comfort care.    DTR requesting assistance with [facility] services.  SW unable to reach hospital [facility].  Received Verbal Auth to contact [facility].  SW contacted Kevin Mendieta.  Certificate of Competency completed Hospitalist.      Notary to be bedside this AM.

## 2017-11-24 NOTE — CARE PLAN
Problem: Pain Management  Goal: Pain level will decrease to patient's comfort goal  Outcome: PROGRESSING AS EXPECTED  Assessed pain every 4 hrs and as needed. Oxycontin given for pain management.    Problem: Skin Integrity  Goal: Risk for impaired skin integrity will decrease  Outcome: PROGRESSING AS EXPECTED  Turn patient every 2 hrs.

## 2017-11-24 NOTE — PROGRESS NOTES
Received report from MARLENY Lundy. Assumed care at 0700. At this pt is drowsy, but does arouse. Denies pain. Noted increase in FIO2. Awaiting family conference.  Given medication per MAR. Family at bedside. Awaiting conference. Labs noted, needs have been met, assessment complete. Bed in locked and low position, Call light in reach.  POC is awaiting family conference. Will continue to monitor.

## 2017-11-24 NOTE — CARE PLAN
Problem: Oxygenation:  Goal: Maintain adequate oxygenation dependent on patient condition  Outcome: PROGRESSING AS EXPECTED  HHFNC 40 L/m and 40% FiO2

## 2017-11-24 NOTE — PROGRESS NOTES
Called Dr. Edge regarding pt's critical lab result of H&H, WBC and Platelet and low UOP. Critical lab result read back by Dr. Edge. No new orders received at this time. Will hold transfusion, waiting for family conference this AM to discuss POC.

## 2017-11-24 NOTE — PROGRESS NOTES
Pulmonary Critical Care Progress Note        DOS:  11/24/2017    Chief Complaint: Respiratory distress    History of Present Illness: 67-year-old female with history of myelodysplastic syndrome which is recently converted to AML. She has profound pancytopenia she's been started on chemotherapy and is treated by oncology on the cancer nursing unit. She has developed increasing bilateral alveolar infiltrates and hypoxia. She was started on antimicrobial therapy 11/13. Echocardiogram shows preserved left ventricular ejection fraction. Her clinical picture is not consistent with worsening pneumonia and sepsis. She is profoundly neutropenic. She has not had significant fevers. She was transferred to intensive care unit and started on high flow nasal cannula 11/21/17. Throughout the night she developed increasing lethargy and respiratory distress. Today she is in significant distress respiratory standpoint and more obtunded. Infectious disease following the patient has she's been started on meropenem and voriconazole    ROS:  Respiratory: unable to perform due to the patient's inability to effectively communicate, Cardiac: unable to perform due to the patient's inability to effectively communicate, GI: unable to perform due to the patient's inability to effectively communicate.  All other systems negative.    Interval Events:  24 hour interval history reviewed     Yesterday:  - more awake, following  - min PO intake  - refusing stool softeners   - afebrile  - up to chair with lift; max assist  - 60%, 40L HFNC,   - meropenem, voriconazole, acv    PFSH:  No change.    Respiratory:     Pulse Oximetry: 92 %  Chest Tube Drains:          Exam: diminished breath sounds severe  ImagingAvailable data reviewed         Invalid input(s): [id number]    HemoDynamics:  Pulse: 87, Heart Rate (Monitored): 86  NIBP: 137/67       Exam: regular rate and rhythm  Imaging: Available data reviewed        Neuro:  GCS Total Elkin Coma Score:  15       Exam: Confused  Imaging: Available data reviewed    Fluids:  Intake/Output       11/22/17 0700 - 11/23/17 0659 11/23/17 0700 - 11/24/17 0659 11/24/17 0700 - 11/25/17 0659      6735-8260 9264-8302 Total 1207-7534 9001-5472 Total 9595-4652 7322-8437 Total       Intake    P.O.  320  90 410  740  120 860  --  -- --    P.O. 320 90 410 740 120 860 -- -- --    I.V.  450  270 720  350  250 600  --  -- --    IV Piggyback Volume (IV Piggyback) 450 270 720 350 250 600 -- -- --    Blood  230  -- 230  --  -- --  --  -- --    Volume (RELEASE PLATELET PHERESIS) 230 -- 230 -- -- -- -- -- --    Other  --  -- --  10  -- 10  --  -- --    Medications (P.O./ Enteral Liquids) -- -- -- 10 -- 10 -- -- --    Total Intake 8243 029 5849 1606 271 4316 -- -- --       Output    Urine  --  -- --  325  310 635  15  -- 15    Number of Times Voided -- 1 x 1 x 1 x -- 1 x -- -- --    Number of Times Incontinent of Urine 2 x 1 x 3 x -- -- -- -- -- --    Indwelling Cathether -- -- -- 325 310 635 15 -- 15    Void (ml) -- -- -- 0 -- 0 -- -- --    Stool  --  -- --  --  -- --  --  -- --    Number of Times Stooled 0 x 0 x 0 x 0 x -- 0 x 0 x -- 0 x    Total Output -- -- -- 325 310 635 15 -- 15       Net I/O     9259 410 6578 775 60 835 -15 -- -15        Weight: 65.6 kg (144 lb 10 oz)  Recent Labs      11/22/17   0327  11/23/17   0441  11/24/17   0428   SODIUM  136  138  138   POTASSIUM  3.4*  4.5  4.1   CHLORIDE  94*  94*  96   CO2  36*  36*  37*   BUN  23*  30*  32*   CREATININE  0.26*  0.29*  0.27*   MAGNESIUM  1.9   --    --    CALCIUM  8.8  9.4  9.1       GI/Nutrition:  Exam: abdomen is soft and non-tender  Imaging: Available data reviewed  NPO  Liver Function  Recent Labs      11/22/17   0327  11/23/17   0441  11/24/17   0428   ALTSGPT  27  38  46   ASTSGOT  49*  66*  86*   ALKPHOSPHAT  115*  130*  143*   TBILIRUBIN  3.5*  3.8*  3.9*   GLUCOSE  170*  192*  165*       Heme:  Recent Labs      11/22/17   0810  11/23/17   0441  11/23/17   1050   17   RBC  2.40*  2.41*   --   2.30*   HEMOGLOBIN  7.0*  6.9*  6.5*  6.7*   HEMATOCRIT  20.0*  20.4*   --   19.7*   PLATELETCT  3*  27*   --   20*       Infectious Disease:  Temp  Av.2 °C (97.2 °F)  Min: 35.8 °C (96.4 °F)  Max: 36.8 °C (98.2 °F)  Micro: reviewed  Recent Labs      17   0327  17   0810  17   0441  17   WBC  0.2*  0.3*  0.2*  0.2*   NEUTSPOLYS  CANCEL  CANCEL  CANCEL   --    LYMPHOCYTES  CANCEL  CANCEL  CANCEL   --    MONOCYTES  CANCEL  CANCEL  CANCEL   --    EOSINOPHILS  CANCEL  CANCEL  CANCEL   --    BASOPHILS  CANCEL  CANCEL  CANCEL   --    ASTSGOT  49*   --   66*  86*   ALTSGPT  27   --   38  46   ALKPHOSPHAT  115*   --   130*  143*   TBILIRUBIN  3.5*   --   3.8*  3.9*     Current Facility-Administered Medications   Medication Dose Frequency Provider Last Rate Last Dose   • norepinephrine (LEVOPHED) 16 mg in  mL Infusion  0.5-30 mcg/min Continuous Shayan Branch M.D.   Stopped at 17 1345   • meropenem (MERREM) 500 mg in  mL IVPB  500 mg Q6HRS Sugar Quiles M.D.   Stopped at 17 0601   • voriconazole (VFEND) 270 mg in  mL IVPB  4 mg/kg Q12HRS Sugar Quiles M.D.   Stopped at 17 2209   • temazepam (RESTORIL) capsule 15 mg  15 mg QHS PRN Keren Little M.D.   15 mg at 17   • oxyCODONE CR (OXYCONTIN) tablet 20 mg  20 mg Q12HRS Keren Little M.D.   20 mg at 17   • Pharmacy Consult Request ...Pain Management Review   PRN Keren Little M.D.        And   • oxycodone immediate-release (ROXICODONE) tablet 5 mg  5 mg Q3HRS PRN Keren Little M.D.   5 mg at 17 1805    And   • oxycodone immediate release (ROXICODONE) tablet 10 mg  10 mg Q3HRS PRN Keren Little M.D.   10 mg at 17 0633    And   • morphine (pf) 4 mg/ml injection 2 mg  2 mg Q3HRS PRN Keren Little M.D.   2 mg at 10/27/17 1621   • allopurinol (ZYLOPRIM) tablet 300 mg  300 mg DAILY Keren Little M.D.   300 mg at 17 0731   • acyclovir (ZOVIRAX) tablet  400 mg  400 mg BID Brandan Bailey M.D.   400 mg at 11/23/17 2040   • omeprazole (PRILOSEC) capsule 20 mg  20 mg MELA Little M.D.   20 mg at 11/23/17 0730   • ondansetron (ZOFRAN) syringe/vial injection 4 mg  4 mg Q4HRS PRN Brandan Bailey M.D.   4 mg at 11/21/17 2212   • ondansetron (ZOFRAN ODT) dispertab 4 mg  4 mg Q4HRS PRN Brandan Bailey M.D.   4 mg at 11/18/17 0202   • senna-docusate (PERICOLACE or SENOKOT S) 8.6-50 MG per tablet 2 Tab  2 Tab BID Brandan Bailey M.D.   2 Tab at 11/23/17 2040   • acetaminophen (TYLENOL) tablet 650 mg  650 mg Q6HRS PRN Brandan Bailey M.D.   650 mg at 11/19/17 0502     Last reviewed on 10/21/2017  5:17 PM by Samina Green R.N.    Quality  Measures:  Medications reviewed, Labs reviewed and Radiology images reviewed                    Assessment and Plan:  Neutropenic Sepsis              - suspect secondary to pneumonia              - LVEF preserved - non-cardiogenic pulmonary edema c/w ARDS              - Started on broad-spectrum antimicrobial therapy, voriconazole and meropenem infectious disease following,              - Cultures pending, beta D glucan pending, galactomannan pending    Acute hypoxemic respiratory failure              - I suspect this is related to pneumonia, sepsis and noncardiogenic pulmonary edema              - Echocardiogram shows preserved left ventricular ejection fraction              - There may be some component of volume overload although she is close to her baseline weight              - continue titrated oxygen, HFNC, RT treatments   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 Vidaza and Venetoclax have been held              - She is pancytopenic with severe neutropenia              - reviewed with oncology   Acute encephalopathy, suspected multifactorial toxic metabolic              - She did have leukemic infiltrates on MRI of her brain previously 10/24  Profound cytopenia and neutropenia              -  Reverse isolation percussions  Now DNR  Family considering comfort measures. Prognosis very poor  Discussed patient condition and risk of morbidity and/or mortality with RN, RT and QA team.

## 2017-11-26 NOTE — DISCHARGE SUMMARY
DATE OF ADMISSION:  10/21/2017    DATE OF DEATH:  2017    CAUSE OF DEATH:  Acute myelogenous leukemia.    MAJOR COMORBID CONDITIONS:  1.  Shock.  2.  Acute respiratory failure.  3.  Myelodysplastic syndrome.  4.  Neutropenic fevers.  5.  Thrombocytopenia.  6.  Pancytopenia.  7.  Do not resuscitate.  8.  Transaminitis.  9.  Severe anemia.  10.  Dyslipidemia.  11.  Hypertension.    CONSULTATIONS:  Pulmonology was consulted and oncology was consulted.    LABORATORY DATA:  She is extremely pancytopenic with effectively unmeasurably   low white blood cell count, hemoglobin in the 6 range, and a platelet count   that went down as low as 2.  Blood cultures were negative.    HOSPITAL COURSE:  On 10/21/2017, this 66-year-old female was admitted with   weakness.  She has history of myelodysplastic syndrome with transformation   into acute myelogenous leukemia.  She developed severe pancytopenia and was   treated with chemotherapy, though due to decrease in her counts, this was   stopped.  She developed neutropenic fevers, transferred to the intensive care   unit.  She developed respiratory failure with chest x-ray consistent with   ARDS.  She had a known do not resuscitate status.  Despite antibiotics and   aggressive treatment, her condition worsened.  I met with the patient and her   family, the patient made the decision for comfort care.  Her prognosis was   extremely poor.  I did speak with Dr. Sanabria, her oncologist, as well as Dr. Ryder oncology on-call and Dr. Castanon, pulmonology, all agreed comfort care   measures were appropriate.  Comfort care measures were carried out.  She    in comfort and with dignity with family at bedside.    Thirty-five minutes was spent in discharge arrangements.       ____________________________________     MD ZENA RICHARDH / KRISTINE    DD:  2017 17:56:40  DT:  2017 18:14:15    D#:  [id number]  Job#:  [id number]

## 2017-11-27 LAB
BACTERIA BLD CULT: NORMAL
BACTERIA BLD CULT: NORMAL
SIGNIFICANT IND 70042: NORMAL
SIGNIFICANT IND 70042: NORMAL
SITE SITE: NORMAL
SITE SITE: NORMAL
SOURCE SOURCE: NORMAL
SOURCE SOURCE: NORMAL

## 2017-11-27 NOTE — DOCUMENTATION QUERY
"DOCUMENTATION QUERY    PROVIDERS: Please select “Cosign w/ note”to reply to query.    To better represent the severity of illness of your patient, please review the following information and exercise your independent professional judgment in responding to this query.    Shock--- \"May be multifactorial in the acute setting of advanced cancer, ARDS and possible infection\" is documented beginning with Progress Note 11/22.        .  Based upon the clinical findings, risk factors, and treatment, can the relationship between these  conditions be further specified?  •    • Septic Shock  • Other specified Shock (please specify)  • Other explanation of clinical findings ( please document)  • Unable to determine        The medical record reflects the following:   Clinical Findings  BP 83/44, pulse 118, Resp 23 on HFNC, Pt. DNR   Treatment  IV Meropenem, IV Voriconazole, Levophed   Risk Factors  As clinically indicated   Location within medical record  Progress Notes and Consult     Thank you,   BASIA Pace, CCS, HIM        "

## 2017-11-27 NOTE — DOCUMENTATION QUERY
DOCUMENTATION QUERY    PROVIDERS: Please select “Cosign w/ note”to reply to query.    Dr. Castanon,    To better represent the severity of illness of your patient, please review the following information and exercise your independent professional judgment in responding to this query.     Non-cardiogenic pulmonary edema is documented in the Progress Notes. Based upon the clinical findings, risk factors, and treatment, can this diagnosis be further specified?    • Non-cardiogenic pulmonary edema is new onset/acute  • Non-cardiogenic pulmonary edema is chronic  • Exacerbation/worsening of chronic non-cardiogenic pulmonary edema  • Other explanation of clinical findings, please explain  • Unable to determine        The medical record reflects the following:   Clinical Findings Documented pulmonary edema unspecified as to acute or chronic.   Treatment Cxr's, lasix, 02   Risk Factors AML, pancytopenia, sepsis, pna, acute respiratory failure, moderate protein calorie malnutrition, encephalopathy, ards   Location within medical record Progress Notes     Thank you,   Heather Trinidad RN, CCDS  Clinical   Phone # [phone]

## 2017-11-28 NOTE — DOCUMENTATION QUERY
"DOCUMENTATION QUERY  Dr. Castanon,         Please review this documentation query and reply by selecting \"Cosign w/note.\" You previously only co-signed the query, which does not address the needed clarification. Thank you.      To better represent the severity of illness of your patient, please review the following information and exercise your independent professional judgment in responding to this query.     Non-cardiogenic pulmonary edema is documented in the Progress Notes. Based upon the clinical findings, risk factors, and treatment, can this diagnosis be further specified?    • Non-cardiogenic pulmonary edema is new onset/acute  • Non-cardiogenic pulmonary edema is chronic  • Exacerbation/worsening of chronic non-cardiogenic pulmonary edema  • Other explanation of clinical findings, please explain  • Unable to determine        The medical record reflects the following:   Clinical Findings Documented pulmonary edema unspecified as to acute or chronic.   Treatment Cxr's, lasix, 02   Risk Factors AML, pancytopenia, sepsis, pna, acute respiratory failure, moderate protein calorie malnutrition, encephalopathy, ards   Location within medical record Progress Notes     Thank you,   Heather Trinidad RN, CCDS  Clinical   Phone # [phone]        "

## 2020-03-30 NOTE — DISCHARGE PLANNING
Medical Social Work    Pt  most likely D/C home this weekend.     Presented pt with and pt gave verbal consent to sign IMM letter. (Protective precautions) Documented in Flowsheet portion of the chart. Gave copy to pt and placed original copy in pt's chart.   67

## 2021-01-12 NOTE — PROGRESS NOTES
Patient requesting Oxycontin that she refused this am.  Administered Oxycontin for pain.  Daughter, Bonnie at bedside.   normal...

## 2021-03-07 NOTE — CARE PLAN
Problem: Safety  Goal: Will remain free from falls  Outcome: PROGRESSING AS EXPECTED  Fall precautions in place including pt wearing treaded slipper socks, personal possessions and call light w/n reach, bed in lowest position, ambulation assessed and assistance posted.  Pt educated on increased risk of falls in the hospital, pt states his/her understanding.      Problem: Pain Management  Goal: Pain level will decrease to patient's comfort goal  Outcome: PROGRESSING AS EXPECTED  Pt pain assessed and meds given as ordered on the pt MAR.  Pt pain reassessed w/n 2 hours of intervention.  If pain meds unavailable, repositioning, emotional support, and rest are used to help manage pain.         Renal Progress Note    Reason for Consult- MARIELENA    Subjective  Patient seen and examined. Labs/chart and prior notes reviewed.   Transferred to ICU for hypotension, started on aurora with improvement, pt on NC  Remains on aurora this AM    Objective:    Current Medications:    Current Facility-Administered Medications   Medication Dose Route Frequency Provider Last Rate Last Admin   • warfarin (COUMADIN) tablet 2.5 mg  2.5 mg Oral Once Earl Warner MD       • sodium chloride 0.9% infusion   Intravenous Continuous Kenny Plummer  mL/hr at 03/06/21 1816 New Bag at 03/06/21 1816   • PHENYLephrine (AURORA-SYNEPHRINE) 50 mg/250 mL in sodium chloride 0.9 % infusion  0-399 mcg/min Intravenous Continuous Kenny Plummer MD 30 mL/hr at 03/07/21 0815 100 mcg/min at 03/07/21 0815   • fluticasone-vilanterol (BREO ELLIPTA) 100-25 MCG/INH inhaler 1 puff  1 puff Inhalation Daily Resp Julio C Benavidez MD   1 puff at 03/06/21 0909   • morphine SR (MS CONTIN) tablet 60 mg  60 mg Oral 2 times per day Monique Moe MD   60 mg at 03/06/21 2047   • fentaNYL (DURAGESIC) 100 MCG/HR patch 1 patch  1 patch Transdermal Q3 Days Monique Moe MD   1 patch at 03/04/21 1000   • fentaNYL (DURAGESIC) 50 MCG/HR patch 1 patch  1 patch Transdermal Q3 Days Monique Moe MD   1 patch at 03/04/21 1828   • hydroCORTisone (CORTIZONE) 1 % cream   Topical BID Earl Warner MD   Given at 03/06/21 1948   • polyethylene glycol (MIRALAX) packet 17 g  17 g Oral Daily Earl Warner MD   17 g at 03/07/21 0859   • sertraline (ZOLOFT) tablet 100 mg  100 mg Oral Daily Monique Moe MD   100 mg at 03/06/21 0851   • albuterol inhaler 4 puff  4 puff Inhalation TID Julia Bell MD   4 puff at 03/06/21 2024   • metoPROLOL tartrate (LOPRESSOR) tablet 75 mg  75 mg Oral 2 times per day Yeimy Sutherland MD   75 mg at 03/06/21 2046   • HYDROcodone-acetaminophen (NORCO)  MG per tablet 1 tablet  1 tablet Oral Q4H PRN Monique Moe MD   1 tablet  at 03/03/21 1631   • WARFARIN - PHARMACIST MONITORED   Does not apply See Admin Instructions Monique Moe MD       • Alert  patient's own medication stored in pharmacy  1 each Oral See Admin Instructions Earl Warner MD       • SUMAtriptan (IMITREX) tablet 50 mg  50 mg Oral PRN Earl Warner MD       • tamoxifen (NOLVADEX) tablet 20 mg  20 mg Oral Daily Earl Warner MD   20 mg at 03/06/21 1212   • tamsulosin (FLOMAX) capsule 0.4 mg  0.4 mg Oral Daily Earl Warner MD   0.4 mg at 03/06/21 0852   • acetaminophen-codeine (TYLENOL NO.3) 300-30 MG per tablet 2 tablet  2 tablet Oral Q4H PRN Earl Warner MD   2 tablet at 03/07/21 0534        Review of Systems:    As in HPI.  Rest of 10 point ROS is negative    Physical Exam:    Blood pressure 101/41, pulse 89, temperature 96.8 °F (36 °C), temperature source Tympanic, resp. rate 12, height 5' 9\" (1.753 m), weight (!) 238.5 kg (525 lb 12.8 oz), SpO2 100 %.       Intake/Output Summary (Last 24 hours) at 3/7/2021 0859  Last data filed at 3/7/2021 0815  Gross per 24 hour   Intake 3220.56 ml   Output 2300 ml   Net 920.56 ml       Gen: Oriented X 3, NAD, morbid obesity  Skin: no rashes, no lesions  HEENT: anicteric, neck supple  Neck: supple, trachea midline  Chest: CTAB, No wheezing  CV: S1S2 nml, RRR, no murmur  Abd: Soft NT/ND, +BS normoactive  Ext: 1+ BLE Edema  Neuro: No focal deficit  Access: N/A    Labs:   Recent Labs     03/05/21  0504 03/06/21  0357 03/07/21  0402   SODIUM 140 142 143   POTASSIUM 4.5 4.5 4.4   CO2 33* 29 31   ANIONGAP 9* 13 8*   GLUCOSE 126* 127* 108*   BUN 36* 38* 28*   CREATININE 1.37* 1.51* 0.97   BCRAT 26* 25 29*   CALCIUM 8.5 7.9* 8.1*   BILIRUBIN  --   --  0.3   AST  --   --  15   GPT  --   --  45   ALKPT  --   --  56   GLOB  --   --  3.0   AGR  --   --  0.6*        Recent Labs     03/05/21  1740 03/06/21  0357 03/07/21  0403   WBC 4.0* 3.9* 4.0*   RBC 2.36* 2.50* 2.37*   HGB 7.5* 7.9* 7.6*   HCT 24.2* 25.6* 24.5*   * 124*  157   .5* 102.4* 103.4*   MCH 31.8 31.6 32.1   MCHC 31.0* 30.9* 31.0*   NRBCRE 2* 3* 3*   ASEG  --   --  2.2   ALYMP  --   --  1.6   AMONO  --   --  0.0*   AEO  --   --  0.1   ABAS  --   --  0.0        Imaging:    LAST MRI:  No results found for this or any previous visit.    LAST CT:  02/18/19   PET ADVOCATE PROCEDURE  Narrative  Accession #[id number] PET/CT SCANCLINICAL HISTORY: Left breast carcinoma; initial stagingCOMPARISON: NoneTECHNIQUE:This is an FDG PET/CT scan from the base of the skull through the mid-thighs obtained in the coronal, sagittal, and transverse planes.  The patient received 15.79 mCi of F-18 FDG injected into a right hand vein.  The patient was imaged at 69 minutes after the intravenous injection of radiotracer and the patient's fasting blood glucose level at the time of injection was 94 mg/dl. A low dose, non-contrast CT study was performed for the purpose of attenuation correction and anatomic localization.There is some heterogeneity of FDG uptake throughout both lobes of the liver, and the hepatic maximum SUV is 6.50, and this needs to be normalized to 3.0 to account for this.The mediastinal maximum blood pool activity is 4.04.FINDINGS:Head and Neck:The calvarium is moderately rotated to the right.  There is no significant hypermetabolic FDG activity present within the visualized portions of the brain/skull/base of the skull/soft tissues of the bilateral neck. There is mild symmetric bilateral parotid glands FDG uptake; normal physiologic FDG uptake.  There are air-fluid levels in both maxillary sinuses, consistent with bilateral maxillary sinusitis.  There is typical mild to at most moderate physiologic FDG uptake within the nasopharynx/anterior oral cavity/posterior oropharynx/bilateral tonsillar beds/bilateral submandibular and salivary regions.  There is no significant hypermetabolic FDG activity present along lymph node chains within the anterior or posterior bilateral  neck.  The thyroid/thyroid bed, larynx, and cervical trachea are unremarkable on PET.Chest:There is a right chest port.  There is a 4.1 cm FDG focus/CT mass in the upper outer quadrant of the left breast, with a normalized maximum SUV of 4.8.  In the right lateral breast, there is a 1.5 cm parenchymal density, with a normalized maximum SUV of 0.6.  No abnormal axillary hypermetabolic FDG activity is present bilaterally.  There is no significant hypermetabolic FDG activity present within the bilateral lung parenchyma, mediastinum, and both pulmonary katie. The heart is normal in size, vascular calcifications are present within the coronary arteries, there are valvular calcifications, and  vascular calcifications are present within the thoracic aorta, abdominal aorta, both iliac arteries, and both proximal femoral arteries.  There are no pericardial effusions, and there are bilateral pleural effusions, small on the left and trace on the right.  There is a benign hiatal hernia.Abdomen and Pelvis: The liver, spleen, gallbladder, pancreas, adrenal glands, gastrointestinal tract, genitourinary tract, and lymphatics in the abdomen, retroperitoneum, and pelvis are unremarkable on PET.  The pelvis and scrotum are unremarkable on PET.Skeletal:There is very vague/very mild bone marrow uptake in this patient. No focal hypermetabolic osseous lesions are present.  Impression  Abnormal FDG PET/CT scan.1.     There is a 4.1 cm FDG focus/CT mass in the upper outer quadrant of the left breast, with a normalized maximum SUV of 4.8, consistent with a left breast carcinoma.Accession #[id number].     In the right lateral breast, there is a 1.5 cm parenchymal density, with a normalized maximum SUV of 0.6, more likely a benign finding.3.     No abnormal axillary hypermetabolic FDG activity is present bilaterally.4.    There are no pericardial effusions, and there are bilateral pleural effusions, small on the left and trace on the  right.5.    There is a benign hiatal hernia.6.    There is very vague/very mild bone marrow uptake in this patient; a very non-specific finding.-----  F I N A L  -----Transcribed By: DONNA 02/18/19 3:55 pmDictated By:            ARLEN ASENCIO MDElectronically Reviewed and Approved By:           ARLEN ASENCIO MD  02/18/19 4:45 pm    01/23/19   CT ADVOCATE PROCEDURE  Narrative  Accession #[id number]: CONTRAST-ENHANCED CT OF THE ABDOMEN AND PELVISCLINICAL INDICATIONS: Shortness of breath and abdominal pain.  Evaluate for small bowel obstruction.COMPARISON: CT abdomen and pelvis 12/08/2017.TECHNIQUE: After the uneventful administration of nonionic iodinated intravenous contrast, transaxial contiguous multislice images were acquired. Multiplanar reconstructed images were generated.CONTRASTName: Iohexol (Omnipaque).Concentration: 350 mg/mLVolume: 120 mLCT OF THE ABDOMENLung bases: Trace right lower lobe platelike atelectasis at the medial lung base.  Minimal bilateral posterobasilar hypostatic changes.  Otherwise clear.Abdomen/peritoneum: Normal liver and bile ducts.  Normal gallbladder. Normal stomach. Normal spleen. Normal small bowel and mesentery. Colon contains mild to moderate stool and gas without significant dilatation and without inflammation.Retroperitoneum: Normal adrenals.  Normal kidneys. Normal pancreas. Normal duodenum. Unremarkable vessels for age. No mass or lymphadenopathy.Abdominal wall: Bilateral fatty atrophy again noted of the rectus abdominis muscle.  Otherwise unremarkable.CT OF THE PELVIS:Normal small bowel and mesentery. Normal appendix. Normal colon. Normal urinary bladder. No mass or lymphadenopathy. No free fluid.  Normal prostate for age.Bone window images are unremarkable for age.  Impression  1.  Mild to moderate gas and stool in the colon without significant dilatation or findings to suggest obstruction.2.  Otherwise normal abdomen and pelvis CT, Including normal small  bowel.-----  F I N A L  -----Transcribed By: DONNA 01/23/19 2:28 amDictated By:            Kalyani, Samy NEGRETE MDElectronically Reviewed and Approved By:           Kalyani, Samy NEGRETE MD  01/23/19 2:40 am    LAST X-RAY:  02/26/21   XR CHEST PA OR AP 1 VIEW  Narrative  Exam: AP portable view the chest was performed on 02/26/2021 at 1006 hoursClinical indication: Shortness of breathCOMPARISON: Chest film: 11/08/2020FINDINGS:Mild bilateral interstitial airspace disease is noted.  The cardiacsilhouette remains enlarged.  No pneumothorax is identified.  Themediastinal is mildly widened but this is likely secondary to technique. Right chest port with catheter tip over the proximal SVC.  Impression  Bibasilar airspace opacities, this may represent atelectasis cannot excludeunderlying infection.  Reimaging to confirm resolution is recommended.Electronically Signed by: GREGORIO CONTRERAS MD Signed on: 2/26/2021 10:49 AM     11/08/20   XR CHEST PA OR AP 1 VIEW  Narrative  EXAM:  CHEST SINGLE VIEWCLINICAL INDICATION:  sobCOMPARISON: 12/10/2019.FINDINGS:  Heart size is magnified, while the pulmonary vasculature is normal.  A right-sided central line is stable in positioning.  There is no focal lung consolidation, pleural effusion or pneumothorax.  Impression  No acute cardiopulmonary abnormality.  Stable chest.Electronically Signed by: TAVON CASTANEDA M.D. Signed on: 11/8/2020 2:43 PM     12/10/19   XR ADVOCATE PROCEDURE  Narrative  Accession #[id number]: XR CHEST 1VCLINICAL INDICATION: Shortness of breathCOMPARISON: 11/10/2019FINDINGS: Right IJ port catheter terminates in the upper SVC. Lungs are clear.  No pneumothorax.  No pleural effusion.  Heart size and mediastinal contours are normal.  No acute osseous abnormality.  Impression  No acute cardiopulmonary abnormality.-----  F I N A L  -----Transcribed By: DONNA 12/10/19 3:48 pmDictated By:            ROJAS NUGENT MDElectronically Reviewed and Approved By:           JOVANNA,  ROJAS QUIGLEY MD  12/10/19 3:49 pm    LAST U/S:  01/08/19   US ADVOCATE PROCEDURE  Narrative  Accession #[id number]: US CHESTCLINICAL INDICATION: Palpable mass left chest breast areaCOMPARISON: None.FINDINGS: A heterogeneous mass in the left breast at the 1:00 position is noted.  There is color flow within it and measuring 5.43 x 4.37 x 4.067 m in size.  It is just deep to the skin. A possible lymph node in the left axillary region is noted however it does appear to have a fatty hilum and is indistinct.  Impression   Solid mass in the left breast 1:00 position.  Malignancy is suspected.  Follow-up is recommended-----  F I N A L  -----Transcribed By: DONNA 01/17/19 10:38 amDictated By:            DEZ HALL, KIMI DORANElectronically Reviewed and Approved By:           DEZ HALL, KIMI DORAN  01/17/19 10:40 am      ASSESSMENT/PLAN    1. MARIELENA on CKD 2  Baseline Cr ~0.9-1.1. Cr 2.67 on admit; Likely 2/2 urinary retention vs infection/PNA  - S/p 750 cc total NS bolus  - valsartan and celecoxib held due to marielena  - Cr improved then worsened again 2/2 hemodynamic changes with hypotension.  - Cr again improving s/p aurora and BP stabilization  - non oliguric, BP now improved w/ AURORA, will monitor  - encourage po intake  - Monitor I/O, Cr and avoid nephrotoxins    2. HTN  - BP improved w/ aurora, off antihypertensives  - will monitor    3. Anemia  - Transfuse to hgb >7 from renal standpoint  - Monitor H/H    4. Elevated Troponin  - cardiology consulted     5. Shortness of Breath  CXR with bibasilar opacities and NT proBNP 1623  - S/p empiric antibiotics  - on CPAP/bipap PRN      Thank you for allowing me to participate in the care. Will follow closely with you.    Total critical care time 35 min  Due to a high probability of clinically significant, life threatening deterioration, the patient required my highest level of preparedness to intervene emergently and I personally spent this critical care time directly and personally  managing the patient. This critical care time included obtaining a history; examining the patient; pulse oximetry; ordering and review of studies; arranging urgent treatment with development of a management plan; evaluation of patient's response to treatment; frequent reassessment; and, discussions with other providers.

## (undated) DEVICE — ELECTRODE 850 FOAM ADHESIVE - HYDROGEL RADIOTRNSPRNT (50/PK)

## (undated) DEVICE — CANNULA W/ SUPPLY TUBING O2 - (50/CA)

## (undated) DEVICE — CON SEDATION/>5 YR 1ST 15 MIN

## (undated) DEVICE — SOD. CHL 10CC SYRINGE PREFILL - W/10 CC (30/BX)

## (undated) DEVICE — CON SEDATION EA ADDL 15 MIN

## (undated) DEVICE — SYRINGE 3 CC 22 GA X 1-1/2 - NDL SAFETY (50/BX 8BX/CA)

## (undated) DEVICE — SYRINGE 6 CC 20 GA X 1 1/2 - NDL SAFETY  (50/BX)